# Patient Record
Sex: FEMALE | Race: WHITE | NOT HISPANIC OR LATINO | Employment: OTHER | URBAN - METROPOLITAN AREA
[De-identification: names, ages, dates, MRNs, and addresses within clinical notes are randomized per-mention and may not be internally consistent; named-entity substitution may affect disease eponyms.]

---

## 2017-01-27 ENCOUNTER — DOCUMENTATION ONLY (OUTPATIENT)
Dept: FAMILY MEDICINE | Facility: CLINIC | Age: 67
End: 2017-01-27

## 2017-01-27 NOTE — PROGRESS NOTES
Pre-Visit Chart Review  For Appointment Scheduled on 1/31/17.    Health Maintenance Due   Topic Date Due    TETANUS VACCINE  01/18/1969    DEXA SCAN  01/18/1991    Mammogram  02/10/2017

## 2017-01-31 ENCOUNTER — CLINICAL SUPPORT (OUTPATIENT)
Dept: REHABILITATION | Facility: HOSPITAL | Age: 67
End: 2017-01-31
Attending: INTERNAL MEDICINE
Payer: MEDICARE

## 2017-01-31 ENCOUNTER — LAB VISIT (OUTPATIENT)
Dept: LAB | Facility: HOSPITAL | Age: 67
End: 2017-01-31
Attending: FAMILY MEDICINE
Payer: MEDICARE

## 2017-01-31 ENCOUNTER — OFFICE VISIT (OUTPATIENT)
Dept: FAMILY MEDICINE | Facility: CLINIC | Age: 67
End: 2017-01-31
Payer: MEDICARE

## 2017-01-31 VITALS
SYSTOLIC BLOOD PRESSURE: 138 MMHG | HEIGHT: 66 IN | DIASTOLIC BLOOD PRESSURE: 80 MMHG | BODY MASS INDEX: 19.35 KG/M2 | TEMPERATURE: 98 F | HEART RATE: 99 BPM | WEIGHT: 120.38 LBS

## 2017-01-31 DIAGNOSIS — E78.2 HYPERLIPIDEMIA, MIXED: ICD-10-CM

## 2017-01-31 DIAGNOSIS — F41.9 ANXIETY AND DEPRESSION: ICD-10-CM

## 2017-01-31 DIAGNOSIS — E03.9 HYPOTHYROIDISM, UNSPECIFIED TYPE: ICD-10-CM

## 2017-01-31 DIAGNOSIS — F32.A ANXIETY AND DEPRESSION: ICD-10-CM

## 2017-01-31 DIAGNOSIS — E04.1 THYROID NODULE: ICD-10-CM

## 2017-01-31 DIAGNOSIS — I10 ESSENTIAL HYPERTENSION: Primary | ICD-10-CM

## 2017-01-31 DIAGNOSIS — G35 MULTIPLE SCLEROSIS: Primary | ICD-10-CM

## 2017-01-31 DIAGNOSIS — G47.00 INSOMNIA, UNSPECIFIED TYPE: ICD-10-CM

## 2017-01-31 DIAGNOSIS — K21.9 GASTROESOPHAGEAL REFLUX DISEASE, ESOPHAGITIS PRESENCE NOT SPECIFIED: ICD-10-CM

## 2017-01-31 LAB
ALBUMIN SERPL BCP-MCNC: 3.5 G/DL
ALP SERPL-CCNC: 138 U/L
ALT SERPL W/O P-5'-P-CCNC: 16 U/L
ANION GAP SERPL CALC-SCNC: 9 MMOL/L
AST SERPL-CCNC: 22 U/L
BILIRUB SERPL-MCNC: 0.5 MG/DL
BUN SERPL-MCNC: 6 MG/DL
CALCIUM SERPL-MCNC: 9.6 MG/DL
CHLORIDE SERPL-SCNC: 101 MMOL/L
CHOLEST/HDLC SERPL: 3.6 {RATIO}
CO2 SERPL-SCNC: 29 MMOL/L
CREAT SERPL-MCNC: 0.7 MG/DL
EST. GFR  (AFRICAN AMERICAN): >60 ML/MIN/1.73 M^2
EST. GFR  (NON AFRICAN AMERICAN): >60 ML/MIN/1.73 M^2
GLUCOSE SERPL-MCNC: 95 MG/DL
HDL/CHOLESTEROL RATIO: 28.1 %
HDLC SERPL-MCNC: 199 MG/DL
HDLC SERPL-MCNC: 56 MG/DL
LDLC SERPL CALC-MCNC: 125.4 MG/DL
NONHDLC SERPL-MCNC: 143 MG/DL
POTASSIUM SERPL-SCNC: 3.9 MMOL/L
PROT SERPL-MCNC: 7.4 G/DL
SODIUM SERPL-SCNC: 139 MMOL/L
TRIGL SERPL-MCNC: 88 MG/DL

## 2017-01-31 PROCEDURE — 1157F ADVNC CARE PLAN IN RCRD: CPT | Mod: S$GLB,,, | Performed by: FAMILY MEDICINE

## 2017-01-31 PROCEDURE — 92610 EVALUATE SWALLOWING FUNCTION: CPT | Mod: PN

## 2017-01-31 PROCEDURE — 80053 COMPREHEN METABOLIC PANEL: CPT

## 2017-01-31 PROCEDURE — 80061 LIPID PANEL: CPT

## 2017-01-31 PROCEDURE — 99999 PR PBB SHADOW E&M-EST. PATIENT-LVL IV: CPT | Mod: PBBFAC,,, | Performed by: FAMILY MEDICINE

## 2017-01-31 PROCEDURE — 3075F SYST BP GE 130 - 139MM HG: CPT | Mod: S$GLB,,, | Performed by: FAMILY MEDICINE

## 2017-01-31 PROCEDURE — G8998 SWALLOW D/C STATUS: HCPCS | Mod: CH,PN

## 2017-01-31 PROCEDURE — 99214 OFFICE O/P EST MOD 30 MIN: CPT | Mod: 25,S$GLB,, | Performed by: FAMILY MEDICINE

## 2017-01-31 PROCEDURE — 99499 UNLISTED E&M SERVICE: CPT | Mod: S$GLB,,, | Performed by: FAMILY MEDICINE

## 2017-01-31 PROCEDURE — 1126F AMNT PAIN NOTED NONE PRSNT: CPT | Mod: S$GLB,,, | Performed by: FAMILY MEDICINE

## 2017-01-31 PROCEDURE — G8997 SWALLOW GOAL STATUS: HCPCS | Mod: CH,PN

## 2017-01-31 PROCEDURE — 1159F MED LIST DOCD IN RCRD: CPT | Mod: S$GLB,,, | Performed by: FAMILY MEDICINE

## 2017-01-31 PROCEDURE — 1160F RVW MEDS BY RX/DR IN RCRD: CPT | Mod: S$GLB,,, | Performed by: FAMILY MEDICINE

## 2017-01-31 PROCEDURE — G8996 SWALLOW CURRENT STATUS: HCPCS | Mod: CH,PN

## 2017-01-31 PROCEDURE — 3079F DIAST BP 80-89 MM HG: CPT | Mod: S$GLB,,, | Performed by: FAMILY MEDICINE

## 2017-01-31 PROCEDURE — 36415 COLL VENOUS BLD VENIPUNCTURE: CPT | Mod: PO

## 2017-01-31 RX ORDER — ZOLPIDEM TARTRATE 5 MG/1
5 TABLET ORAL NIGHTLY PRN
Qty: 30 TABLET | Refills: 1 | Status: SHIPPED | OUTPATIENT
Start: 2017-01-31 | End: 2017-02-21

## 2017-01-31 RX ORDER — ESCITALOPRAM OXALATE 10 MG/1
10 TABLET ORAL DAILY
Qty: 30 TABLET | Refills: 3 | Status: SHIPPED | OUTPATIENT
Start: 2017-01-31 | End: 2017-03-13 | Stop reason: SDUPTHER

## 2017-01-31 RX ORDER — PRAVASTATIN SODIUM 10 MG/1
10 TABLET ORAL DAILY
COMMUNITY
End: 2017-02-09 | Stop reason: SDUPTHER

## 2017-01-31 RX ORDER — OMEPRAZOLE 40 MG/1
40 CAPSULE, DELAYED RELEASE ORAL
COMMUNITY
End: 2017-02-21

## 2017-01-31 RX ORDER — CHLORTHALIDONE 25 MG/1
TABLET ORAL
Qty: 30 TABLET | Refills: 11 | Status: SHIPPED | OUTPATIENT
Start: 2017-01-31 | End: 2018-01-08

## 2017-01-31 NOTE — PLAN OF CARE
"TIME RECORD    Date: 01/31/2017    Start Time: 1400  Stop Time:  1500    PROCEDURES: Clinical Swallow Evaluation    Total Timed Minutes:  0  Total Timed Units:  0  Total Untimed Units:  60  Charges Billed/# of units: 1    Primary Diagnosis: GERD; Esophagitis, Multiple Sclerosis, Sinusitis  Treatment Diagnosis:  Oropharyngeal Dysphagia  Pertinent Medical History:    Past Medical History   Diagnosis Date    Allergy     Asthma     GERD (gastroesophageal reflux disease)     Hyperlipidemia     Hypertension     Hypothyroidism     Insomnia 1/31/2017    MS (multiple sclerosis)      very well controlled, no symptoms    Shortness of breath     Sinusitis     Thyroid disease      hypothyroidism- no med       Pt is a 67 year old female. She has had multiple sinus surgeries that have not fully relieved the sinus problems, and has ongoing diagnosis of GERD.  Patient complains of a persistent cough. She stated that she has a sensation of "burning" or tickle in her throat prior to coughing episodes. She indicated that Dr. Cotter, her gastroenterologist feels that the cough is related to reflux. She noted that the cough can be triggered from liquid or solid intake, but can just as often occur at anytime of the day and is not always associated with oral intake. The patient was seen for a Modified Barium Swallow Study on 08/05/2016. Results of the study indicated oropharyngeal swallow function WNL. Results were as follows:     She exhibited trace/mild generalized pharyngeal residue with all consistencies presented.  Mild lingual residue post solids.  All residue cleared with independent repeat swallows by the Patient.  No penetration or aspiration occurred.  Delayed esophageal emptying noted on A-P view, consistent with hx GERD.  Recommend regular solids/thin liquids/medications whole as tolerated.  Recommend reflux precautions including slow rate of intake and remaining upright at least 30 minutes after meals.  F/U with " physician.    The patient reported that she has had no change in her symptoms or her swallow function since the time of the MBSS; however the coughing episodes continue. In addition to the Gastroenterologist, Pt has been seen by ENT, Allergist, and Pulmonologist, and is followed by a neurologist for MS.          Nutrition:  Pt is currently on a diet of regular solid textures and thin liquids  Alertness/Attention:  Good; WNL  Oral Motor:  Cursory oral peripheral examination reveals structure and function within normal limits for eating and swallowing function.    Presentations: Pt was given single and serial swallows of thin liquid, pudding self fed by spoon , mixed viscosity of soft fruit and juice, and cracker bolus  Oral Phase:  Oral swallow was found to be WNL for all boluses tested. Pt formed and propelled boluses without difficulty. Oral transit was efficient and timely. There was no appreciable residue with any texture, following swallows. No anterior spillage of any boluses.   Pharyngeal Phase:  Pt exhibited a swallow reflex with no apparent delay. She exhibited good laryngeal elevation and forward hyolaryngeal movement upon swallow. Cervical auscul Pt did present a cough after first swallow of liquid that persisted throughout the study. Pt noted that she has had this cough for at least two years. She indicated that it can occur in association with swallow or at other times as well. As pt continued to cough her sinuses became blocked and she was noticeably hyponasal. Although the cough persisted throughout the study, it did not change with delivery of different viscosity or textures, but remained the same.   Impressions: Oralpharyngeal swallow function is judged to be WNL.     Recommendations/Precautions:  A follow-up MBSS is not felt to be warranted at this time as pt's symptoms have not changed since the last study in August of 2016. At the time of that study there were not s/s of penetration/aspiration and  residue after swallows was mild at level of normal limits.  The coughing is not felt to be a direct cause of oral pharyngeal swallow dysfunction, and since pt reports coughing episodes at times other than eating/oral intake, it is not judged to be a direct correlation with swallow function. Therapy is not warranted at this time as swallow function was found to be WNL.    Therapist's Name: Xiomara Rodríguez    Therapist's Signature: Xiomara Rodríguez SOFIA,CCC-SLP  Date: 01/31/2017    I CERTIFY THE NEED FOR THESE SERVICES FURNISHED UNDER THIS PLAN OF TREATMENT AND WHILE UNDER MY CARE    Physician's comments: ________________________________________________________________________________________________________________________________________________      Physician's Name: Dr Lucy Cotter

## 2017-01-31 NOTE — PROGRESS NOTES
TIME RECORD    Date:  01/31/2017    Start Time: 1500  Stop Time: 1600    PROCEDURES: Clinical Swallow Evaluation        Total Timed Minutes:  0  Total Timed Units:  0  Total Untimed Units:  60  Charges Billed/# of units:  01      Pt referred by Dr Cotter for a clinical swallow evaluation. See POC for evaluation notes

## 2017-01-31 NOTE — MR AVS SNAPSHOT
[facility]  [address]  [place] [address]  Phone: [phone]  Fax: [phone]                  Babita Thomas   2017 8:00 AM   Office Visit    Description:  Female : 1950   Provider:  Natalie Gale MD   Department:  [facility] Medicine           Reason for Visit     Establish Care           Diagnoses this Visit        Comments    Essential hypertension    -  Primary     Hypothyroidism, unspecified type         Hyperlipidemia, mixed         Thyroid nodule         Insomnia, unspecified type         Anxiety and depression                To Do List           Future Appointments        Provider Department Dept Phone    2017 9:00 AM LAB, [facility] - Lab [phone]    2017 3:00 PM Xiomara Rodríguez, [facility]-SLP [facility] [phone]    2017 10:00 AM HRAMARK 1 [facility] [phone]    2017 10:30 AM SLIC US1 [facility]- Ultrasound [phone]    2017 11:20 AM Natalie Gale MD [facility] [phone]      Goals (5 Years of Data)     None      Follow-Up and Disposition     Return in about 3 weeks (around 2017) for anxiety, depression.       These Medications        Disp Refills Start End    zolpidem (AMBIEN) 5 MG Tab 30 tablet 1 2017    Take 1 tablet (5 mg total) by mouth nightly as needed. - Oral    Pharmacy: [facility] [address] [place] [address]. Ph #: [phone]       escitalopram oxalate (LEXAPRO) 10 MG tablet 30 tablet 3 2017    Take 1 tablet (10 mg total) by mouth once daily. - Oral    Pharmacy: [facility] [place] [address]. Ph #: [phone]       chlorthalidone (HYGROTEN) 25 MG Tab 30 tablet 11 2017     1 tablet PO daily prn for BP greater than 140/90    Pharmacy: [facility] [address] [place] [address] [address]. Ph #: [phone]         [facility]     [facility] -  Assistance  Registered nurses in the [facility] provide clinical advisement, health education, appointment booking, and other advisory services.  Call for this free service at [phone].             Medications           Message regarding Medications     Verify the changes and/or additions to your medication regime listed below are the same as discussed with your clinician today.  If any of these changes or additions are incorrect, please notify your healthcare provider.        START taking these NEW medications        Refills    zolpidem (AMBIEN) 5 MG Tab 1    Sig: Take 1 tablet (5 mg total) by mouth nightly as needed.    Class: Normal    Route: Oral    escitalopram oxalate (LEXAPRO) 10 MG tablet 3    Sig: Take 1 tablet (10 mg total) by mouth once daily.    Class: Normal    Route: Oral    chlorthalidone (HYGROTEN) 25 MG Tab 11    Si tablet PO daily prn for BP greater than 140/90    Class: Normal      STOP taking these medications     FLUTICASONE/VILANTEROL (BREO ELLIPTA INHL) Inhale 1 puff into the lungs once daily.     cyanocobalamin (VITAMIN B-12) 1000 MCG tablet Take 5,000 mcg by mouth once daily.    INCRUSE ELLIPTA 62.5 mcg/actuation DsDv Take 62.5 mcg by mouth once daily.     UNABLE TO FIND Take by mouth daily as needed. ULTRAFLORA BALANCE OTC  When on antibx    amlodipine (NORVASC) 2.5 MG tablet Take 1 tablet (2.5 mg total) by mouth once daily.           Verify that the below list of medications is an accurate representation of the medications you are currently taking.  If none reported, the list may be blank. If incorrect, please contact your healthcare provider. Carry this list with you in case of emergency.           Current Medications     CALCIUM ACETATE ORAL Take 1 tablet by mouth 2 (two) times daily. Every day    chlorthalidone (HYGROTEN) 25 MG Tab 1 tablet PO daily prn for BP greater than 140/90    clonazePAM (KLONOPIN) 2 MG Tab Take  "2 mg by mouth nightly as needed (1-2 qhs prn).     escitalopram oxalate (LEXAPRO) 10 MG tablet Take 1 tablet (10 mg total) by mouth once daily.    multivitamin-minerals-lutein (CENTRUM SILVER) Tab Take 1 tablet by mouth once daily. 1 Tablet(s) Oral Every day.    omeprazole (PRILOSEC) 40 MG capsule Take 40 mg by mouth 2 (two) times daily before meals.    pravastatin (PRAVACHOL) 10 MG tablet Take 10 mg by mouth once daily.    ranitidine (ZANTAC) 300 MG tablet Take 300 mg by mouth 2 (two) times daily.     vitamin D 1000 units Tab Take 2,000 mg by mouth once daily.    zolpidem (AMBIEN) 5 MG Tab Take 1 tablet (5 mg total) by mouth nightly as needed.           Clinical Reference Information           Vital Signs - Last Recorded  Most recent update: 1/31/2017  8:38 AM by Suze Castellanos MA    BP Pulse Temp Ht Wt BMI    138/80 (BP Location: Right arm, Patient Position: Sitting, BP Method: Manual) 99 98.3 °F (36.8 °C) (Oral) 5' 6" (1.676 m) 54.6 kg (120 lb 5.9 oz) 19.43 kg/m2      Blood Pressure          Most Recent Value    BP  138/80      Allergies as of 1/31/2017     Augmentin [Amoxicillin-pot Clavulanate]    Tecfidera [Dimethyl Fumarate]      Immunizations Administered on Date of Encounter - 1/31/2017     None      Orders Placed During Today's Visit     Future Labs/Procedures Expected by Expires    Comprehensive metabolic panel  1/31/2017 4/1/2018    Lipid panel  1/31/2017 4/1/2018    US Soft Tissue Head Neck Thyroid  1/31/2017 1/31/2018      Instructions    Take 1/2 up to 2 tablets of ambien nightly as needed for insomnia.     Start lexapro 10 mg daily    Stop norvasc. Can take chlorthalidone 1 tablet daily as needed for blood pressure greater than 140/90.       "

## 2017-01-31 NOTE — PROGRESS NOTES
CHIEF COMPLAINT:  Establish care      HISTORY OF PRESENT ILLNESS:  Babita Thomas is a 67 y.o. female who presents to clinic as a new patient to me to establish care    1. HTN: she is on norvasc 2.5 mg but does not take this consistently as it causes hypotension. She states that her blood pressure is usually normal    2. She has a history of hypothyroidism, thyroid nodules.  She is not on any thyroid supplementation, she is due for thyroid ultrasound    3. She has had worsening insomnia and has been on klonopin and trazodone in the past which have been ineffective. She also describes worsening anxiety, depression due to her chronic illnesses.  She was on lexapro in the past which she states worked and she did not have any side effects.     4. She has hyperlipidemia and is on pravastatin. She denies any myalgia, dark colored urine      REVIEW OF SYSTEMS:  The patient denies any fever, chills, night sweats, headaches, vision changes, difficulty speaking or swallowing, decreased hearing, weight loss, weight gain, chest pain, palpitations, shortness of breath,  nausea, vomiting, abdominal pain, dysuria, diarrhea, constipation, hematuria, hematochezia, melena, changes in her hair, skin, nails, numbness or weakness in her extremities, erythema, pain or swelling over any of her joints, myalgia, swollen glands, easy bruising, fatigue, edema        MEDICATIONS:   Reviewed and/or reconciled in EPIC    ALLERGIES:  Reviewed and/or reconciled in [facility]    PAST MEDICAL/SURGICAL HISTORY:   Past Medical History   Diagnosis Date    Allergy     Asthma     GERD (gastroesophageal reflux disease)     Hyperlipidemia     Hypertension     Hypothyroidism     MS (multiple sclerosis)      very well controlled, no symptoms    Shortness of breath     Sinusitis     Thyroid disease      hypothyroidism- no med      Past Surgical History   Procedure Laterality Date    Cholecystectomy      Esophagus surgery       6 year old, peanut  "stuck in throat     Multiple tooth extractions      [place] tooth extraction      Colonoscopy  3-5-2010     Dr Cotter 10 year bal     Breast surgery       biopsy    Tracheostomy closure       age 6 for peanut in throat    Sinus surgery  2015    Tympanostomy tube placement         FAMILY HISTORY:    Family History   Problem Relation Age of Onset    Hypertension Mother     Diabetes Mother     Heart disease Father      MI    No Known Problems Sister     No Known Problems Daughter     No Known Problems Paternal Aunt     No Known Problems Paternal Uncle     No Known Problems Maternal Grandmother     Heart disease Maternal Grandfather     No Known Problems Paternal Grandmother     No Known Problems Paternal Grandfather     No Known Problems Paternal Uncle     No Known Problems Daughter        SOCIAL HISTORY:    Social History     Social History    Marital status:      Spouse name: N/A    Number of children: N/A    Years of education: N/A     Occupational History    Not on file.     Social History Main Topics    Smoking status: Never Smoker    Smokeless tobacco: Never Used    Alcohol use No    Drug use: No    Sexual activity: Yes     Partners: Male     Other Topics Concern    Not on file     Social History Narrative       PHYSICAL EXAM:  VITAL SIGNS:   Vitals:    01/31/17 0803   BP: (!) 136/98   Pulse: 99   Temp: 98.3 °F (36.8 °C)   TempSrc: Oral   Weight: 54.6 kg (120 lb 5.9 oz)   Height: 5' 6" (1.676 m)     GENERAL:  Patient appears well nourished, sitting on exam table, in no acute distress.  HEENT:  Atraumatic, normocephalic, PERRLA, EOMI, no conjunctival injection, sclerae are anicteric, normal external auditory canals,TMs clear b/l, gross hearing intact to whisper, MMM, no oropharygneal erythema or exudate.  NECK:  Supple, normal ROM, trachea is midline , no supraclavicular or cervical LAD or masses palpated.  Thyroid gland not palpable.  CARDIOVASCULAR:  RRR, normal S1 and S2, no " m/r/g.  RESPIRATORY:  CTA b/l, no wheezes, rhonchi, rales.  No increased work of breathing, no  use of accessory muscles.  ABDOMEN:  Soft, nontender, nondistended, normoactive bowel sounds in all four quadrants, no rebound or guarding, no HSM or masses palpated.  Normal percussion.  EXTREMITIES:  2+ DP pulses b/l, no edema.  SKIN:  Warm, no lesions on exposed skin.  NEUROMUSCULAR:  Cranial nerves II-XII grossly intact.   No clubbing or cyanosis of digits/nails.  Steady gait.  PSYCH:  Patient is alert and oriented to person, time, place. They are appropriately dressed and groomed. There is normal eye contact. Rate and tone of speech is normal. Normal insight, judgement. Normal thought content and process.         ASSESSMENT/PLAN: This is a 67 y.o. female who presents to clinic for evaluation of the following concerns   1. HTN: see below  2. Thyroid nodule: thyroid ultrasound  3. Hyperlipidemia: CMP, lipid panel  4. Insomnia: stop klonopin, trazodone. Start low dose ambien. The risks/side effects of this medication were discussed with her  5. Anxiety, depression: start lexapro 10 mg daily, follow up in 3-4 weeks    Patient readiness: acceptance and barriers:none    During the course of the visit the patient was educated and counseled about the following:     Hypertension:   stop norvasc, start chlorthalidone 25 mg daily as needed for BP greater than 140/90    Goals: Hypertension: Reduce Blood Pressure    Did patient meet goals/outcomes: No    The following self management tools provided: declined    Patient Instructions (the written plan) was given to the patient/family.     Time spent with patient: 30 minutes        FOLLOW UP:  3-4 weeks      Natalie Gale MD

## 2017-01-31 NOTE — PATIENT INSTRUCTIONS
Take 1/2 up to 2 tablets of ambien nightly as needed for insomnia.     Start lexapro 10 mg daily    Stop norvasc. Can take chlorthalidone 1 tablet daily as needed for blood pressure greater than 140/90.

## 2017-02-06 ENCOUNTER — HOSPITAL ENCOUNTER (OUTPATIENT)
Dept: RADIOLOGY | Facility: CLINIC | Age: 67
Discharge: HOME | End: 2017-02-06
Attending: OTOLARYNGOLOGY
Payer: MEDICARE

## 2017-02-06 DIAGNOSIS — R05.9 COUGH: ICD-10-CM

## 2017-02-06 PROCEDURE — 71020 XR CHEST PA AND LATERAL: CPT | Mod: 26,,, | Performed by: RADIOLOGY

## 2017-02-06 PROCEDURE — 71020 XR CHEST PA AND LATERAL: CPT | Mod: TC,PO

## 2017-02-09 ENCOUNTER — TELEPHONE (OUTPATIENT)
Dept: FAMILY MEDICINE | Facility: CLINIC | Age: 67
End: 2017-02-09

## 2017-02-09 DIAGNOSIS — E78.2 HYPERLIPIDEMIA, MIXED: Primary | ICD-10-CM

## 2017-02-09 RX ORDER — PRAVASTATIN SODIUM 40 MG/1
40 TABLET ORAL DAILY
Qty: 90 TABLET | Refills: 3 | Status: SHIPPED | OUTPATIENT
Start: 2017-02-09 | End: 2017-03-14

## 2017-02-10 NOTE — TELEPHONE ENCOUNTER
Cholesterol improved but cardiac risk is high enough to be on a higher dose of pravastatin, am increasing it to 40 mg daily and needs CMP, fasting lipids in 3 months

## 2017-02-10 NOTE — TELEPHONE ENCOUNTER
Patient notified and states understanding,patient states she will call back to schedule lab appointment

## 2017-02-17 ENCOUNTER — DOCUMENTATION ONLY (OUTPATIENT)
Dept: FAMILY MEDICINE | Facility: CLINIC | Age: 67
End: 2017-02-17

## 2017-02-17 NOTE — PROGRESS NOTES
Pre-Visit Chart Review  For Appointment Scheduled on 2/21/17.    Health Maintenance Due   Topic Date Due    TETANUS VACCINE  01/18/1969    DEXA SCAN  01/18/1991    Mammogram  02/10/2016

## 2017-02-21 ENCOUNTER — HOSPITAL ENCOUNTER (OUTPATIENT)
Dept: RADIOLOGY | Facility: CLINIC | Age: 67
Discharge: HOME | End: 2017-02-21
Attending: FAMILY MEDICINE
Payer: MEDICARE

## 2017-02-21 ENCOUNTER — OFFICE VISIT (OUTPATIENT)
Dept: FAMILY MEDICINE | Facility: CLINIC | Age: 67
End: 2017-02-21
Payer: MEDICARE

## 2017-02-21 VITALS
HEART RATE: 79 BPM | TEMPERATURE: 98 F | HEIGHT: 66 IN | WEIGHT: 117.31 LBS | DIASTOLIC BLOOD PRESSURE: 81 MMHG | SYSTOLIC BLOOD PRESSURE: 117 MMHG | OXYGEN SATURATION: 90 % | BODY MASS INDEX: 18.85 KG/M2

## 2017-02-21 DIAGNOSIS — F32.A ANXIETY AND DEPRESSION: Primary | ICD-10-CM

## 2017-02-21 DIAGNOSIS — F41.9 ANXIETY AND DEPRESSION: Primary | ICD-10-CM

## 2017-02-21 DIAGNOSIS — E04.1 THYROID NODULE: ICD-10-CM

## 2017-02-21 DIAGNOSIS — I10 ESSENTIAL HYPERTENSION: ICD-10-CM

## 2017-02-21 PROCEDURE — 1126F AMNT PAIN NOTED NONE PRSNT: CPT | Mod: S$GLB,,, | Performed by: FAMILY MEDICINE

## 2017-02-21 PROCEDURE — 1160F RVW MEDS BY RX/DR IN RCRD: CPT | Mod: S$GLB,,, | Performed by: FAMILY MEDICINE

## 2017-02-21 PROCEDURE — 3079F DIAST BP 80-89 MM HG: CPT | Mod: S$GLB,,, | Performed by: FAMILY MEDICINE

## 2017-02-21 PROCEDURE — 3074F SYST BP LT 130 MM HG: CPT | Mod: S$GLB,,, | Performed by: FAMILY MEDICINE

## 2017-02-21 PROCEDURE — 1159F MED LIST DOCD IN RCRD: CPT | Mod: S$GLB,,, | Performed by: FAMILY MEDICINE

## 2017-02-21 PROCEDURE — 76536 US EXAM OF HEAD AND NECK: CPT | Mod: 26,,, | Performed by: RADIOLOGY

## 2017-02-21 PROCEDURE — 99499 UNLISTED E&M SERVICE: CPT | Mod: S$GLB,,, | Performed by: FAMILY MEDICINE

## 2017-02-21 PROCEDURE — 99214 OFFICE O/P EST MOD 30 MIN: CPT | Mod: S$GLB,,, | Performed by: FAMILY MEDICINE

## 2017-02-21 PROCEDURE — 99999 PR PBB SHADOW E&M-EST. PATIENT-LVL III: CPT | Mod: PBBFAC,,, | Performed by: FAMILY MEDICINE

## 2017-02-21 PROCEDURE — 1157F ADVNC CARE PLAN IN RCRD: CPT | Mod: S$GLB,,, | Performed by: FAMILY MEDICINE

## 2017-02-21 RX ORDER — BACLOFEN 10 MG/1
10 TABLET ORAL 4 TIMES DAILY PRN
COMMUNITY
Start: 2017-02-02 | End: 2017-08-01

## 2017-02-21 RX ORDER — BUDESONIDE 0.5 MG/2ML
2 INHALANT ORAL 2 TIMES DAILY
COMMUNITY
Start: 2017-02-07 | End: 2017-05-23 | Stop reason: CLARIF

## 2017-02-21 RX ORDER — AMLODIPINE BESYLATE 2.5 MG/1
2.5 TABLET ORAL DAILY PRN
COMMUNITY
End: 2017-02-21

## 2017-02-21 RX ORDER — MONTELUKAST SODIUM 10 MG/1
10 TABLET ORAL NIGHTLY
COMMUNITY
Start: 2017-02-06 | End: 2017-03-14

## 2017-02-21 NOTE — MR AVS SNAPSHOT
[facility]  [address]  [place] [address]  Phone: [phone]  Fax: [phone]                  Babita Thomas   2017 11:20 AM   Office Visit    Description:  Female : 1950   Provider:  Natalie Gale MD   Department:  [facility]           Reason for Visit     Follow-up           Diagnoses this Visit        Comments    Essential hypertension    -  Primary     Anxiety and depression                To Do List           Future Appointments        Provider Department Dept Phone    5/15/2017 8:30 AM LAB [facility] - Lab [phone]    2017 9:40 AM Natalie Gale MD [facility] [phone]      Goals (5 Years of Data)     None      Follow-Up and Disposition     Return in about 3 months (around 2017).      [facility] On Call     [facility] On Call Nurse [facility] -  Assistance  Registered nurses in the [facility] Call Center provide clinical advisement, health education, appointment booking, and other advisory services.  Call for this free service at [phone].             Medications           Message regarding Medications     Verify the changes and/or additions to your medication regime listed below are the same as discussed with your clinician today.  If any of these changes or additions are incorrect, please notify your healthcare provider.        STOP taking these medications     omeprazole (PRILOSEC) 40 MG capsule Take 40 mg by mouth 2 (two) times daily before meals.    ranitidine (ZANTAC) 300 MG tablet Take 300 mg by mouth 2 (two) times daily.     zolpidem (AMBIEN) 5 MG Tab Take 1 tablet (5 mg total) by mouth nightly as needed.    amlodipine (NORVASC) 2.5 MG tablet Take 2.5 mg by mouth daily as needed.           Verify that the below list of medications is an accurate representation of the medications you are currently taking.  If none reported, the list may be blank. If incorrect, please contact your  "healthcare provider. Carry this list with you in case of emergency.           Current Medications     baclofen (LIORESAL) 10 MG tablet Take 10 mg by mouth 4 (four) times daily as needed.    budesonide (PULMICORT) 0.5 mg/2 mL nebulizer solution Take 2 mLs by nebulization 2 (two) times daily.    CALCIUM ACETATE ORAL Take 1 tablet by mouth 2 (two) times daily. Every day    clonazePAM (KLONOPIN) 2 MG Tab Take 2 mg by mouth nightly as needed (1-2 qhs prn).     escitalopram oxalate (LEXAPRO) 10 MG tablet Take 1 tablet (10 mg total) by mouth once daily.    FERROUS FUMARATE/VIT BCOMP,C (SUPER B COMPLEX ORAL) Take 1 tablet by mouth once daily.    montelukast (SINGULAIR) 10 mg tablet Take 10 mg by mouth nightly.    multivitamin-minerals-lutein (CENTRUM SILVER) Tab Take 1 tablet by mouth once daily. 1 Tablet(s) Oral Every day.    pravastatin (PRAVACHOL) 40 MG tablet Take 1 tablet (40 mg total) by mouth once daily.    chlorthalidone (HYGROTEN) 25 MG Tab 1 tablet PO daily prn for BP greater than 140/90    vitamin D 1000 units Tab Take 2,000 mg by mouth once daily.           Clinical Reference Information           Your Vitals Were     BP Pulse Temp Height Weight SpO2    117/81 (BP Location: Right arm, Patient Position: Sitting, BP Method: Automatic) 79 98.3 °F (36.8 °C) (Oral) 5' 6" (1.676 m) 53.2 kg (117 lb 4.6 oz) 90%    BMI                18.93 kg/m2          Blood Pressure          Most Recent Value    BP  117/81      Allergies as of 2/21/2017     Augmentin [Amoxicillin-pot Clavulanate]    Tecfidera [Dimethyl Fumarate]      Immunizations Administered on Date of Encounter - 2/21/2017     None      Instructions    Call clinic in 3 weeks to give update on lexapro.      Stop norvasc/amlodipine.  Can take chlorthalidone as needed for BP.  Call clinic if BP not at goal (less than 140/90) after taking the chlorthalidone.        Language Assistance Services     ATTENTION: Language assistance services are available, free of charge. " Please call [phone].      ATENCIÓN: Si habla español, tiene a spencer disposición servicios gratuitos de asistencia lingüística. Llame al [phone].     CHÚ Ý: N?u b?n nói Ti?ng Vi?t, có các d?ch v? h? tr? ngôn ng? mi?n phí dành cho b?n. G?i s? [phone].         [facility] complies with applicable Federal civil rights laws and does not discriminate on the basis of race, color, national origin, age, disability, or sex.

## 2017-02-21 NOTE — PROGRESS NOTES
CHIEF COMPLAINT: follow up anxiety, depression      HISTORY OF PRESENT ILLNESS:  Babita Thomas is a 67 y.o. female who presents to clinic for follow up on anxiety and depression. She was recently started on lexapro 10 mg daily. However she states that she started this a week ago. She denies any side effects. She feels that it has improved her anxiety,depression.           REVIEW OF SYSTEMS:  The patient denies any fever, chills, night sweats, headaches, vision changes, difficulty speaking or swallowing, decreased hearing, weight loss, weight gain, chest pain, palpitations, shortness of breath,  nausea, vomiting, abdominal pain, dysuria, diarrhea, constipation, hematuria, hematochezia, melena, changes in her hair, skin, nails, numbness or weakness in her extremities, erythema, pain or swelling over any of her joints, myalgia, swollen glands, easy bruising, fatigue, edema        MEDICATIONS:   Reviewed and/or reconciled in EPIC    ALLERGIES:  Reviewed and/or reconciled in [facility]    PAST MEDICAL/SURGICAL HISTORY:   Past Medical History   Diagnosis Date    Allergy     Asthma     GERD (gastroesophageal reflux disease)     Hyperlipidemia     Hypertension     Hypothyroidism     Insomnia 1/31/2017    MS (multiple sclerosis)      very well controlled, no symptoms    Shortness of breath     Sinusitis     Thyroid disease      hypothyroidism- no med      Past Surgical History   Procedure Laterality Date    Cholecystectomy      Esophagus surgery       6 year old, peanut stuck in throat     Multiple tooth extractions      [place] tooth extraction      Colonoscopy  3-5-2010     Dr Cotter 10 year bal     Tracheostomy closure       age 6 for peanut in throat    Sinus surgery  2015     x3    Tympanostomy tube placement       x2    Breast surgery Left      biopsy, benign       FAMILY HISTORY:    Family History   Problem Relation Age of Onset    Hypertension Mother     Heart disease Father 60     MI    No Known  "Problems Sister     No Known Problems Daughter     No Known Problems Paternal Aunt     No Known Problems Paternal Uncle     No Known Problems Maternal Grandmother     Heart disease Maternal Grandfather 70     MI    No Known Problems Paternal Grandmother     No Known Problems Paternal Grandfather     No Known Problems Paternal Uncle     No Known Problems Daughter        SOCIAL HISTORY:    Social History     Social History    Marital status:      Spouse name: N/A    Number of children: N/A    Years of education: N/A     Occupational History    Not on file.     Social History Main Topics    Smoking status: Never Smoker    Smokeless tobacco: Never Used    Alcohol use No    Drug use: No    Sexual activity: Yes     Partners: Male     Other Topics Concern    Not on file     Social History Narrative       PHYSICAL EXAM:  VITAL SIGNS:   Vitals:    02/21/17 1138   BP: 117/81   BP Location: Right arm   Patient Position: Sitting   BP Method: Automatic   Pulse: 79   Temp: 98.3 °F (36.8 °C)   TempSrc: Oral   Weight: 53.2 kg (117 lb 4.6 oz)   Height: 5' 6" (1.676 m)     GENERAL:  Patient appears well nourished, sitting on exam table, in no acute distress.  HEENT:  Atraumatic, normocephalic, PERRLA, EOMI, no conjunctival injection, sclerae are anicteric, normal external auditory canals,TMs clear b/l, gross hearing intact to whisper, MMM, no oropharygneal erythema or exudate.  NECK:  Supple, normal ROM, trachea is midline , no supraclavicular or cervical LAD or masses palpated.  Thyroid gland not palpable.  CARDIOVASCULAR:  RRR, normal S1 and S2, no m/r/g.  RESPIRATORY:  CTA b/l, no wheezes, rhonchi, rales.  No increased work of breathing, no  use of accessory muscles.  ABDOMEN:  Soft, nontender, nondistended, normoactive bowel sounds in all four quadrants, no rebound or guarding, no HSM or masses palpated.  Normal percussion.  EXTREMITIES:  2+ DP pulses b/l, no edema.  SKIN:  Warm, no lesions on exposed " skin.  NEUROMUSCULAR:  Cranial nerves II-XII grossly intact.   No clubbing or cyanosis of digits/nails.  Steady gait.  PSYCH:  Patient is alert and oriented to person, time, place. They are appropriately dressed and groomed. There is normal eye contact. Rate and tone of speech is normal. Normal insight, judgement. Normal thought content and process.         ASSESSMENT/PLAN: This is a 67 y.o. female who presents to clinic for evaluation of the following concerns   1.  Anxiety, depression: continue with lexapro 10 mg daily, call clinic in 3 weeks to give update, can consider increasing lexapro at that time. Follow up in 3 months, sooner as needed  2. HTN: see below    Patient readiness: acceptance and barriers:none    During the course of the visit the patient was educated and counseled about the following:     Hypertension:   stop norvasc, start chlorthalidone 25 mg daily as needed for BP greater than 140/90    Goals: Hypertension: Reduce Blood Pressure    Did patient meet goals/outcomes: No    The following self management tools provided: declined    Patient Instructions (the written plan) was given to the patient/family.     Time spent with patient: 30 minutes        FOLLOW UP:  3 months      Natalie Gale MD

## 2017-02-21 NOTE — PATIENT INSTRUCTIONS
Call clinic in 3 weeks to give update on lexapro.      Stop norvasc/amlodipine.  Can take chlorthalidone as needed for BP.  Call clinic if BP not at goal (less than 140/90) after taking the chlorthalidone.

## 2017-02-27 ENCOUNTER — TELEPHONE (OUTPATIENT)
Dept: FAMILY MEDICINE | Facility: CLINIC | Age: 67
End: 2017-02-27

## 2017-02-27 NOTE — TELEPHONE ENCOUNTER
Spoke to patient. Reports that she has been having muscle pain, mostly located in her thighs, that she thinks it is related to the pravastatin that she is taking. Medication dose was recently increased to 40 mg daily from 109 mg daily. Patient also reporting that she is having pale colored stools since starting this medication. Please advise.

## 2017-02-27 NOTE — TELEPHONE ENCOUNTER
----- Message from Emmett Jacobs sent at 2/27/2017 10:12 AM CST -----  Contact: self   Patient wants to speak with a nurse regarding pravastatin side effects please call back at [phone] (home)

## 2017-02-27 NOTE — TELEPHONE ENCOUNTER
She can hold the pravastatin until next Monday. Let us know then if she is still having the cramping or not.

## 2017-03-13 ENCOUNTER — TELEPHONE (OUTPATIENT)
Dept: FAMILY MEDICINE | Facility: CLINIC | Age: 67
End: 2017-03-13

## 2017-03-13 ENCOUNTER — DOCUMENTATION ONLY (OUTPATIENT)
Dept: FAMILY MEDICINE | Facility: CLINIC | Age: 67
End: 2017-03-13

## 2017-03-13 RX ORDER — ESCITALOPRAM OXALATE 20 MG/1
20 TABLET ORAL DAILY
Qty: 30 TABLET | Refills: 3 | Status: SHIPPED | OUTPATIENT
Start: 2017-03-13 | End: 2017-04-04

## 2017-03-13 NOTE — TELEPHONE ENCOUNTER
----- Message from Jamil Mooney sent at 3/10/2017  9:33 AM CST -----  Contact: Patient  Patient states that the cramping has stopped when she stopped taking her cholesterol medication. Patient states that Lexapro has not improved her symptoms. Please call the patient back at [phone] to advise. Thanks.

## 2017-03-13 NOTE — TELEPHONE ENCOUNTER
Place pravastatin on intolerance list. Does she want to try crestor or a non statin cholesterol medication?    Am increasing lexapro to 20 mg and needs to call or e-mail in 3-4weeks to give update.

## 2017-03-13 NOTE — PROGRESS NOTES
Pre-Visit Chart Review  For Appointment Scheduled on 3/14/17    Health Maintenance Due   Topic Date Due    TETANUS VACCINE  01/18/1969    DEXA SCAN  01/18/1991    Mammogram  02/10/2016

## 2017-03-13 NOTE — TELEPHONE ENCOUNTER
Patient states she stopped pravastatin 2/21/17 since last visit and since stopping she has no leg cramping.patient also states lexapro is not working,still feels anxious and nervousness. Patient has never been on any other anxiety/depression medication.

## 2017-03-14 ENCOUNTER — OFFICE VISIT (OUTPATIENT)
Dept: FAMILY MEDICINE | Facility: CLINIC | Age: 67
End: 2017-03-14
Payer: MEDICARE

## 2017-03-14 VITALS
OXYGEN SATURATION: 96 % | SYSTOLIC BLOOD PRESSURE: 128 MMHG | HEART RATE: 104 BPM | TEMPERATURE: 99 F | WEIGHT: 117.06 LBS | HEIGHT: 66 IN | BODY MASS INDEX: 18.81 KG/M2 | DIASTOLIC BLOOD PRESSURE: 70 MMHG

## 2017-03-14 DIAGNOSIS — J44.9 CHRONIC OBSTRUCTIVE PULMONARY DISEASE, UNSPECIFIED COPD TYPE: ICD-10-CM

## 2017-03-14 DIAGNOSIS — J44.1 COPD WITH EXACERBATION: Primary | ICD-10-CM

## 2017-03-14 DIAGNOSIS — E04.2 MULTIPLE THYROID NODULES: ICD-10-CM

## 2017-03-14 DIAGNOSIS — F41.9 ANXIETY: ICD-10-CM

## 2017-03-14 DIAGNOSIS — I10 ESSENTIAL HYPERTENSION: ICD-10-CM

## 2017-03-14 PROCEDURE — 99214 OFFICE O/P EST MOD 30 MIN: CPT | Mod: 25,S$GLB,, | Performed by: PHYSICIAN ASSISTANT

## 2017-03-14 PROCEDURE — 1159F MED LIST DOCD IN RCRD: CPT | Mod: S$GLB,,, | Performed by: PHYSICIAN ASSISTANT

## 2017-03-14 PROCEDURE — 1160F RVW MEDS BY RX/DR IN RCRD: CPT | Mod: S$GLB,,, | Performed by: PHYSICIAN ASSISTANT

## 2017-03-14 PROCEDURE — 99999 PR PBB SHADOW E&M-EST. PATIENT-LVL IV: CPT | Mod: PBBFAC,,, | Performed by: PHYSICIAN ASSISTANT

## 2017-03-14 PROCEDURE — 94640 AIRWAY INHALATION TREATMENT: CPT | Mod: S$GLB,,, | Performed by: PHYSICIAN ASSISTANT

## 2017-03-14 PROCEDURE — 1126F AMNT PAIN NOTED NONE PRSNT: CPT | Mod: S$GLB,,, | Performed by: PHYSICIAN ASSISTANT

## 2017-03-14 PROCEDURE — 1157F ADVNC CARE PLAN IN RCRD: CPT | Mod: S$GLB,,, | Performed by: PHYSICIAN ASSISTANT

## 2017-03-14 PROCEDURE — 3078F DIAST BP <80 MM HG: CPT | Mod: S$GLB,,, | Performed by: PHYSICIAN ASSISTANT

## 2017-03-14 PROCEDURE — 3074F SYST BP LT 130 MM HG: CPT | Mod: S$GLB,,, | Performed by: PHYSICIAN ASSISTANT

## 2017-03-14 PROCEDURE — 99499 UNLISTED E&M SERVICE: CPT | Mod: S$GLB,,, | Performed by: PHYSICIAN ASSISTANT

## 2017-03-14 RX ORDER — OFLOXACIN 3 MG/ML
SOLUTION AURICULAR (OTIC)
COMMUNITY
Start: 2017-03-07 | End: 2017-05-19

## 2017-03-14 RX ORDER — ROSUVASTATIN CALCIUM 5 MG/1
5 TABLET, COATED ORAL DAILY
Qty: 90 TABLET | Refills: 1 | Status: SHIPPED | OUTPATIENT
Start: 2017-03-14 | End: 2017-06-05 | Stop reason: SDUPTHER

## 2017-03-14 RX ORDER — FLUTICASONE FUROATE AND VILANTEROL 200; 25 UG/1; UG/1
1 POWDER RESPIRATORY (INHALATION) DAILY
Qty: 28 EACH | Refills: 5 | Status: SHIPPED | OUTPATIENT
Start: 2017-03-14 | End: 2017-08-01

## 2017-03-14 RX ORDER — IPRATROPIUM BROMIDE AND ALBUTEROL SULFATE 2.5; .5 MG/3ML; MG/3ML
3 SOLUTION RESPIRATORY (INHALATION)
Status: COMPLETED | OUTPATIENT
Start: 2017-03-14 | End: 2017-03-14

## 2017-03-14 RX ORDER — MEGESTROL ACETATE 40 MG/ML
SUSPENSION ORAL
COMMUNITY
Start: 2017-03-08 | End: 2017-05-19

## 2017-03-14 RX ORDER — PREDNISONE 20 MG/1
20 TABLET ORAL 2 TIMES DAILY
Qty: 20 TABLET | Refills: 0 | Status: SHIPPED | OUTPATIENT
Start: 2017-03-14 | End: 2017-03-24

## 2017-03-14 RX ADMIN — IPRATROPIUM BROMIDE AND ALBUTEROL SULFATE 3 ML: 2.5; .5 SOLUTION RESPIRATORY (INHALATION) at 11:03

## 2017-03-14 NOTE — MR AVS SNAPSHOT
[facility]  [address]  [place] [address]  Phone: [phone]  Fax: [phone]                  Babita Thomas   3/14/2017 10:40 AM   Office Visit    Description:  Female : 1950   Provider:  PATRIA Mendes   Department:  [facility]           Reason for Visit     Shortness of Breath           Diagnoses this Visit        Comments    COPD with exacerbation    -  Primary     Chronic obstructive pulmonary disease, unspecified COPD type         Multiple thyroid nodules         Essential hypertension         Anxiety         BMI less than 19,adult                To Do List           Future Appointments        Provider Department Dept Phone    2017 9:00 AM [facility] PULMONARY [facility] [phone]    5/15/2017 8:30 AM [facility] - Lab [phone]    2017 8:40 AM [facility] - Pulmonary [phone]    2017 9:40 AM Natalie Gale MD [facility] [phone]      Goals (5 Years of Data)     None      Follow-Up and Disposition     Return for thyroid u/s in 1 year;.    Follow-up and Disposition History       These Medications        Disp Refills Start End    rosuvastatin (CRESTOR) 5 MG tablet 90 tablet 1 3/14/2017 3/14/2018    Take 1 tablet (5 mg total) by mouth once daily. - Oral    Pharmacy: [facility] [address] [place] [address]. Ph #: [phone]       predniSONE (DELTASONE) 20 MG tablet 20 tablet 0 3/14/2017 3/24/2017    Take 1 tablet (20 mg total) by mouth 2 (two) times daily. - Oral    Pharmacy: [facility] [address] [place] [address]. Ph #: [phone]       fluticasone-vilanterol (BREO ELLIPTA) 200-25 mcg/dose DsDv diskus inhaler 28 each 5 3/14/2017     Inhale 1 puff into the lungs once daily. Controller - Inhalation    Pharmacy: [facility] [address] [place] [address]. Ph #: [phone]        ipratropium-albuterol (COMBIVENT)  mcg/actuation inhaler 4 g 5 3/14/2017     Inhale 1 puff into the lungs every 6 (six) hours as needed for Wheezing. Rescue - Inhalation    Pharmacy: [facility] [id number]  #: [phone]         [facility] On Call     [facility] On Call Nurse Care Line - 24/7 Assistance  Registered nurses in the [facility] On Call Center provide clinical advisement, health education, appointment booking, and other advisory services.  Call for this free service at [phone].             Medications           Message regarding Medications     Verify the changes and/or additions to your medication regime listed below are the same as discussed with your clinician today.  If any of these changes or additions are incorrect, please notify your healthcare provider.        START taking these NEW medications        Refills    rosuvastatin (CRESTOR) 5 MG tablet 1    Sig: Take 1 tablet (5 mg total) by mouth once daily.    Class: Normal    Route: Oral    predniSONE (DELTASONE) 20 MG tablet 0    Sig: Take 1 tablet (20 mg total) by mouth 2 (two) times daily.    Class: Normal    Route: Oral    fluticasone-vilanterol (BREO ELLIPTA) 200-25 mcg/dose DsDv diskus inhaler 5    Sig: Inhale 1 puff into the lungs once daily. Controller    Class: Normal    Route: Inhalation    ipratropium-albuterol (COMBIVENT)  mcg/actuation inhaler 5    Sig: Inhale 1 puff into the lungs every 6 (six) hours as needed for Wheezing. Rescue    Class: Normal    Route: Inhalation      These medications were administered today        Dose Freq    albuterol-ipratropium 2.5mg-0.5mg/3mL nebulizer solution 3 mL 3 mL Clinic/HOD 1 time    Sig: Take 3 mLs by nebulization one time.    Class: Normal    Route: Nebulization    Cosign for Ordering: Accepted by Natalie Gale MD on 3/14/2017 11:15 AM      STOP taking these medications     pravastatin (PRAVACHOL) 40 MG tablet Take 1 tablet (40 mg total) by mouth  once daily.    montelukast (SINGULAIR) 10 mg tablet Take 10 mg by mouth nightly.           Verify that the below list of medications is an accurate representation of the medications you are currently taking.  If none reported, the list may be blank. If incorrect, please contact your healthcare provider. Carry this list with you in case of emergency.           Current Medications     baclofen (LIORESAL) 10 MG tablet Take 10 mg by mouth 4 (four) times daily as needed.    budesonide (PULMICORT) 0.5 mg/2 mL nebulizer solution Take 2 mLs by nebulization 2 (two) times daily.    CALCIUM ACETATE ORAL Take 1 tablet by mouth 2 (two) times daily. Every day    chlorthalidone (HYGROTEN) 25 MG Tab 1 tablet PO daily prn for BP greater than 140/90    clonazePAM (KLONOPIN) 2 MG Tab Take 2 mg by mouth nightly as needed (1-2 qhs prn).     escitalopram oxalate (LEXAPRO) 20 MG tablet Take 1 tablet (20 mg total) by mouth once daily.    FERROUS FUMARATE/VIT BCOMP,C (SUPER B COMPLEX ORAL) Take 1 tablet by mouth once daily.    LEVOFLOXACIN ORAL Take 100 mg by mouth.    megestrol (MEGACE) 400 mg/10 mL (40 mg/mL) Susp     multivitamin-minerals-lutein (CENTRUM SILVER) Tab Take 1 tablet by mouth once daily. 1 Tablet(s) Oral Every day.    ofloxacin (FLOXIN) 0.3 % otic solution     vitamin D 1000 units Tab Take 2,000 mg by mouth once daily.    fluticasone-vilanterol (BREO ELLIPTA) 200-25 mcg/dose DsDv diskus inhaler Inhale 1 puff into the lungs once daily. Controller    ipratropium-albuterol (COMBIVENT)  mcg/actuation inhaler Inhale 1 puff into the lungs every 6 (six) hours as needed for Wheezing. Rescue    predniSONE (DELTASONE) 20 MG tablet Take 1 tablet (20 mg total) by mouth 2 (two) times daily.    rosuvastatin (CRESTOR) 5 MG tablet Take 1 tablet (5 mg total) by mouth once daily.           Clinical Reference Information           Your Vitals Were     BP Pulse Temp Height Weight SpO2    128/70 (BP Location: Right arm, Patient Position:  "Sitting, BP Method: Manual) 104 98.5 °F (36.9 °C) (Oral) 5' 6" (1.676 m) 53.1 kg (117 lb 1 oz) 96%    BMI                18.89 kg/m2          Blood Pressure          Most Recent Value    BP  128/70      Allergies as of 3/14/2017     Augmentin [Amoxicillin-pot Clavulanate]    Pravastatin    Tecfidera [Dimethyl Fumarate]      Immunizations Administered on Date of Encounter - 3/14/2017     None      Orders Placed During Today's Visit      Normal Orders This Visit    Ambulatory referral to Pulmonology     Future Labs/Procedures Expected by Expires    US Soft Tissue Head Neck Thyroid  3/14/2017 3/14/2018    Complete PFT with bronchodilator  As directed 3/14/2018      Instructions    Please take prednisone 20mg BID x 5 days.       Language Assistance Services     ATTENTION: Language assistance services are available, free of charge. Please call [phone].      ATENCIÓN: Si habla español, tiene a spencer disposición servicios gratuitos de asistencia lingüística. Llame al [phone].     CHÚ Ý: N?u b?n nói Ti?ng Vi?t, có các d?ch v? h? tr? ngôn ng? mi?n phí dành cho b?n. G?i s? [phone].         [facility] complies with applicable Federal civil rights laws and does not discriminate on the basis of race, color, national origin, age, disability, or sex.        "

## 2017-03-14 NOTE — PROGRESS NOTES
Subjective:       Patient ID: Babita Thomas is a 67 y.o. female.    Chief Complaint: Shortness of Breath    HPI   Patient is a 67 year old  female presenting to the clinic with c/o of shortness of breath on exertion & low oxygen level. Patient reports being seen January with O2 at 90%. She had a CXR which was found to be within normal limits in Feb. Today, oxgyen initially 90% but improved to 95% after rest. She reports Dr. Stoddard took her off of all of her inhalers. Last PFT 4/2015 showed moderately severe chronic obstructive pulmonary disease. She reports she tried to call & get her inhalers refilled, but they were denied. She reports persistent wheezing that's worse with exertion. She has not seen Dr. Stoddard in quite some time. She reports previously being on Nayla Ellipta & Incruse. During walk test, patient drop to 88% for a few seconds, but increased back to 92% quickly.     She still has chronic cough concerns although improved since last time I have seen her. She reports her GI doctor thinks chronic cough issues are related to her MS & acid reflux.     Of note, we have also addressed thyroid u/s results, changing cholesterol medication to crestor & increasing lexapro dosing as per recent telephone encounter from Dr. Gale.   Review of Systems   Constitutional: Negative for activity change, appetite change, chills, diaphoresis, fatigue and fever.   HENT: Negative for congestion, postnasal drip and rhinorrhea.    Eyes: Negative for visual disturbance.   Respiratory: Positive for cough, shortness of breath and wheezing. Negative for chest tightness and stridor.    Cardiovascular: Negative.  Negative for chest pain.   Gastrointestinal: Negative for abdominal pain, blood in stool, constipation, diarrhea, nausea and vomiting.   Genitourinary: Negative for dysuria, frequency, hematuria and urgency.   Musculoskeletal: Negative.    Skin: Negative.  Negative for color change and rash.   Neurological:  Negative for dizziness and syncope.   Psychiatric/Behavioral: Negative for agitation, behavioral problems and confusion.       Objective:      Physical Exam   Constitutional: Vital signs are normal. She appears well-developed and well-nourished. No distress.   HENT:   Head: Normocephalic and atraumatic.   Nose: Nose normal.   Mouth/Throat: Uvula is midline and oropharynx is clear and moist.   White fluid behind both TMs; tubes bilaterally   Cardiovascular: Regular rhythm, S1 normal, S2 normal and normal heart sounds.  Tachycardia present.  Exam reveals no gallop.    No murmur heard.  Pulses:       Radial pulses are 2+ on the right side, and 2+ on the left side.   <2sec cap refill fingers bilat     Pulmonary/Chest: Effort normal. No respiratory distress. She has no decreased breath sounds. She has wheezes in the right upper field, the right middle field, the right lower field, the left upper field, the left middle field and the left lower field. She has no rhonchi.   Skin: Skin is warm and dry. She is not diaphoretic.   Appropriate skin turgor   Psychiatric: She has a normal mood and affect. Her speech is normal and behavior is normal. Judgment and thought content normal. Cognition and memory are normal.       Assessment:       1. COPD with exacerbation    2. Chronic obstructive pulmonary disease, unspecified COPD type    3. Multiple thyroid nodules    4. Essential hypertension    5. Anxiety    6. BMI less than 19,adult        Plan:   Babita was seen today for shortness of breath.    Diagnoses and all orders for this visit:    COPD with exacerbation  -     predniSONE (DELTASONE) 20 MG tablet; Take 1 tablet (20 mg total) by mouth 2 (two) times daily.  -     Complete PFT with bronchodilator; Future  -     fluticasone-vilanterol (BREO ELLIPTA) 200-25 mcg/dose DsDv diskus inhaler; Inhale 1 puff into the lungs once daily. Controller  -     ipratropium-albuterol (COMBIVENT)  mcg/actuation inhaler; Inhale 1 puff into the  lungs every 6 (six) hours as needed for Wheezing. Rescue    Chronic obstructive pulmonary disease, unspecified COPD type  -     Ambulatory referral to Pulmonology  -     albuterol-ipratropium 2.5mg-0.5mg/3mL nebulizer solution 3 mL; Take 3 mLs by nebulization one time.    Multiple thyroid nodules  Recent u/s with several thyroid nodules, none meeting criteria for biopsy  -     US Soft Tissue Head Neck Thyroid; Future    Essential hypertension  Good control; continue current bp medications    Anxiety  Increased dose from lexapro 10mg to lexapro 20mg daily    Other orders  -     rosuvastatin (CRESTOR) 5 MG tablet; Take 1 tablet (5 mg total) by mouth once daily.      BMI less than 19,adult    Patient readiness: acceptance and barriers:none    During the course of the visit the patient was educated and counseled about the following:     Hypertension:   Medication: no change.  Underweight:  Follow up and re-weight in: 4  weeks and as needed.     Goals: Hypertension: Reduce Blood Pressure and Underweight: Increase calorie intake and BMI      Did patient meet goals/outcomes: No    The following self management tools provided: declined    Patient Instructions (the written plan) was given to the patient/family.     Time spent with patient: 30 minutes

## 2017-03-21 ENCOUNTER — TELEPHONE (OUTPATIENT)
Dept: FAMILY MEDICINE | Facility: CLINIC | Age: 67
End: 2017-03-21

## 2017-04-03 ENCOUNTER — TELEPHONE (OUTPATIENT)
Dept: FAMILY MEDICINE | Facility: CLINIC | Age: 67
End: 2017-04-03

## 2017-04-03 DIAGNOSIS — F41.8 MIXED ANXIETY AND DEPRESSIVE DISORDER: Primary | ICD-10-CM

## 2017-04-03 NOTE — TELEPHONE ENCOUNTER
----- Message from Carol Villatoroza sent at 4/3/2017  9:35 AM CDT -----  Contact: self [phone]  She is calling back regarding the lexapro.  You were to speak with the doctor and call her back.  Please call her.  Thank you!

## 2017-04-03 NOTE — TELEPHONE ENCOUNTER
lexapro 40mg once a day patient is still having depression and anxiety symptoms asking for adjustment for medication

## 2017-04-04 ENCOUNTER — HOSPITAL ENCOUNTER (OUTPATIENT)
Dept: RESPIRATORY THERAPY | Facility: HOSPITAL | Age: 67
Discharge: HOME | End: 2017-04-04
Attending: PHYSICIAN ASSISTANT
Payer: MEDICARE

## 2017-04-04 ENCOUNTER — TELEPHONE (OUTPATIENT)
Dept: FAMILY MEDICINE | Facility: CLINIC | Age: 67
End: 2017-04-04

## 2017-04-04 DIAGNOSIS — J44.1 COPD WITH EXACERBATION: ICD-10-CM

## 2017-04-04 RX ORDER — CITALOPRAM 20 MG/1
20 TABLET, FILM COATED ORAL DAILY
Qty: 30 TABLET | Refills: 0 | Status: SHIPPED | OUTPATIENT
Start: 2017-04-04 | End: 2017-05-03

## 2017-04-04 NOTE — TELEPHONE ENCOUNTER
We will switch to celexa at same dose.     She can stop the lexapro today & start celexa tomorrow.     Please schedule 4 week f/u visit.

## 2017-04-04 NOTE — TELEPHONE ENCOUNTER
Patient had PFT done in November 2016 for Dr Stoddard per marifer with [facility], report will fax over report to see if another PFT is necessary

## 2017-04-04 NOTE — TELEPHONE ENCOUNTER
Was patient instructed to go up to lexapro 40mg?    I believe she was told to go from 10mg to 20mg which is the highest dose recommended.    Has she ever tried celexa? I would recommend switching to this.

## 2017-04-10 NOTE — TELEPHONE ENCOUNTER
If Ms. Thomas feels like her breathing is worse than it was 11/2016 with her last PFT then I think she needs another one.    Especially since her O2 has been dropping. Is she doing any better? I see her apt with Dr. Heard is not until May.

## 2017-04-11 NOTE — TELEPHONE ENCOUNTER
----- Message from Lavinia Russell sent at 4/11/2017 11:41 AM CDT -----  Patient states she missed a call from office please call [phone] (xbxd)

## 2017-04-11 NOTE — TELEPHONE ENCOUNTER
Patient states her breathing is much better than last PFT patient will keep scheduled appointment with Dr Heard for consult

## 2017-04-13 DIAGNOSIS — Z12.31 OTHER SCREENING MAMMOGRAM: ICD-10-CM

## 2017-05-01 ENCOUNTER — DOCUMENTATION ONLY (OUTPATIENT)
Dept: FAMILY MEDICINE | Facility: CLINIC | Age: 67
End: 2017-05-01

## 2017-05-03 ENCOUNTER — OFFICE VISIT (OUTPATIENT)
Dept: FAMILY MEDICINE | Facility: CLINIC | Age: 67
End: 2017-05-03
Payer: MEDICARE

## 2017-05-03 ENCOUNTER — LAB VISIT (OUTPATIENT)
Dept: LAB | Facility: HOSPITAL | Age: 67
End: 2017-05-03
Attending: FAMILY MEDICINE
Payer: MEDICARE

## 2017-05-03 VITALS
HEIGHT: 66 IN | BODY MASS INDEX: 18.35 KG/M2 | SYSTOLIC BLOOD PRESSURE: 152 MMHG | DIASTOLIC BLOOD PRESSURE: 96 MMHG | HEART RATE: 111 BPM | WEIGHT: 114.19 LBS | TEMPERATURE: 99 F

## 2017-05-03 DIAGNOSIS — R68.89 TEMPERATURE INTOLERANCE: ICD-10-CM

## 2017-05-03 DIAGNOSIS — E78.5 HYPERLIPIDEMIA, UNSPECIFIED HYPERLIPIDEMIA TYPE: ICD-10-CM

## 2017-05-03 DIAGNOSIS — I10 WHITE COAT SYNDROME WITH HYPERTENSION: ICD-10-CM

## 2017-05-03 DIAGNOSIS — R73.09 ELEVATED GLUCOSE: Primary | ICD-10-CM

## 2017-05-03 DIAGNOSIS — F41.9 ANXIETY: ICD-10-CM

## 2017-05-03 DIAGNOSIS — F41.9 ANXIETY: Primary | ICD-10-CM

## 2017-05-03 LAB
ALBUMIN SERPL BCP-MCNC: 3.9 G/DL
ALP SERPL-CCNC: 94 U/L
ALT SERPL W/O P-5'-P-CCNC: 25 U/L
ANION GAP SERPL CALC-SCNC: 11 MMOL/L
AST SERPL-CCNC: 22 U/L
BASOPHILS # BLD AUTO: 0.04 K/UL
BASOPHILS NFR BLD: 0.7 %
BILIRUB SERPL-MCNC: 0.6 MG/DL
BUN SERPL-MCNC: 7 MG/DL
CALCIUM SERPL-MCNC: 9.7 MG/DL
CHLORIDE SERPL-SCNC: 102 MMOL/L
CHOLEST/HDLC SERPL: 2.4 {RATIO}
CO2 SERPL-SCNC: 26 MMOL/L
CREAT SERPL-MCNC: 0.8 MG/DL
DIFFERENTIAL METHOD: ABNORMAL
EOSINOPHIL # BLD AUTO: 1 K/UL
EOSINOPHIL NFR BLD: 17.7 %
ERYTHROCYTE [DISTWIDTH] IN BLOOD BY AUTOMATED COUNT: 13.1 %
EST. GFR  (AFRICAN AMERICAN): >60 ML/MIN/1.73 M^2
EST. GFR  (NON AFRICAN AMERICAN): >60 ML/MIN/1.73 M^2
GLUCOSE SERPL-MCNC: 120 MG/DL
HCT VFR BLD AUTO: 47.1 %
HDL/CHOLESTEROL RATIO: 41.8 %
HDLC SERPL-MCNC: 165 MG/DL
HDLC SERPL-MCNC: 69 MG/DL
HGB BLD-MCNC: 15.9 G/DL
LDLC SERPL CALC-MCNC: 84.6 MG/DL
LYMPHOCYTES # BLD AUTO: 1 K/UL
LYMPHOCYTES NFR BLD: 19.4 %
MCH RBC QN AUTO: 31.4 PG
MCHC RBC AUTO-ENTMCNC: 33.8 %
MCV RBC AUTO: 93 FL
MONOCYTES # BLD AUTO: 0.8 K/UL
MONOCYTES NFR BLD: 14.7 %
NEUTROPHILS # BLD AUTO: 2.5 K/UL
NEUTROPHILS NFR BLD: 47.3 %
NONHDLC SERPL-MCNC: 96 MG/DL
PLATELET # BLD AUTO: 327 K/UL
PMV BLD AUTO: 9.2 FL
POTASSIUM SERPL-SCNC: 3.7 MMOL/L
PROT SERPL-MCNC: 7.7 G/DL
RBC # BLD AUTO: 5.06 M/UL
SODIUM SERPL-SCNC: 139 MMOL/L
TRIGL SERPL-MCNC: 57 MG/DL
TSH SERPL DL<=0.005 MIU/L-ACNC: 2.47 UIU/ML
WBC # BLD AUTO: 5.36 K/UL

## 2017-05-03 PROCEDURE — 80053 COMPREHEN METABOLIC PANEL: CPT

## 2017-05-03 PROCEDURE — 99213 OFFICE O/P EST LOW 20 MIN: CPT | Mod: S$GLB,,, | Performed by: PHYSICIAN ASSISTANT

## 2017-05-03 PROCEDURE — 1160F RVW MEDS BY RX/DR IN RCRD: CPT | Mod: S$GLB,,, | Performed by: PHYSICIAN ASSISTANT

## 2017-05-03 PROCEDURE — 83036 HEMOGLOBIN GLYCOSYLATED A1C: CPT

## 2017-05-03 PROCEDURE — 84443 ASSAY THYROID STIM HORMONE: CPT

## 2017-05-03 PROCEDURE — 3077F SYST BP >= 140 MM HG: CPT | Mod: S$GLB,,, | Performed by: PHYSICIAN ASSISTANT

## 2017-05-03 PROCEDURE — 85025 COMPLETE CBC W/AUTO DIFF WBC: CPT

## 2017-05-03 PROCEDURE — 3080F DIAST BP >= 90 MM HG: CPT | Mod: S$GLB,,, | Performed by: PHYSICIAN ASSISTANT

## 2017-05-03 PROCEDURE — 99499 UNLISTED E&M SERVICE: CPT | Mod: S$GLB,,, | Performed by: PHYSICIAN ASSISTANT

## 2017-05-03 PROCEDURE — 1159F MED LIST DOCD IN RCRD: CPT | Mod: S$GLB,,, | Performed by: PHYSICIAN ASSISTANT

## 2017-05-03 PROCEDURE — 36415 COLL VENOUS BLD VENIPUNCTURE: CPT | Mod: PO

## 2017-05-03 PROCEDURE — 1157F ADVNC CARE PLAN IN RCRD: CPT | Mod: S$GLB,,, | Performed by: PHYSICIAN ASSISTANT

## 2017-05-03 PROCEDURE — 80061 LIPID PANEL: CPT

## 2017-05-03 PROCEDURE — 99999 PR PBB SHADOW E&M-EST. PATIENT-LVL IV: CPT | Mod: PBBFAC,,, | Performed by: PHYSICIAN ASSISTANT

## 2017-05-03 PROCEDURE — 1126F AMNT PAIN NOTED NONE PRSNT: CPT | Mod: S$GLB,,, | Performed by: PHYSICIAN ASSISTANT

## 2017-05-03 RX ORDER — CLONAZEPAM 0.5 MG/1
0.5 TABLET ORAL 2 TIMES DAILY PRN
Qty: 60 TABLET | Refills: 0 | Status: SHIPPED | OUTPATIENT
Start: 2017-05-03 | End: 2017-06-20

## 2017-05-03 RX ORDER — VENLAFAXINE HYDROCHLORIDE 37.5 MG/1
37.5 CAPSULE, EXTENDED RELEASE ORAL DAILY
Qty: 30 CAPSULE | Refills: 1 | Status: SHIPPED | OUTPATIENT
Start: 2017-05-03 | End: 2017-05-23 | Stop reason: DRUGHIGH

## 2017-05-03 NOTE — PROGRESS NOTES
Subjective:       Patient ID: Babita Thomas is a 67 y.o. female.    Chief Complaint: 4 week follow-up    HPI   Patient is a 67 year old  female presenting to the clinic for f/u after switching from lexapro 20mg daily to celexa 20mg daily. Patient reports no improvement since starting medication. She reports brain fog, anxiety, agitation, night sweats, temerpature intoelrance. She also has klonopin 2mg on her medication list for which she uses at night time for insomnia. She is willing to try increase dose or switch medication altogether. She has white coat hypertension. BP very elevated today, but at home runs  systolic and 53-79 diastolic. She has had her BP cuff checked with Dr. Go previously which was comparable.     Of note, she continues to undergo work-up for chronic cough issues. This is thought to be related to chronic GERD issues. This has led to some bronchospasm issues as well. She is scheduled for surgery to have her epiglottis repaired to help with her chronic GERD issues. She also has an upcoming apt with Dr. Heard for second opinion regarding these issues.   Review of Systems   Constitutional: Negative for activity change, appetite change, chills, diaphoresis, fatigue and fever.   HENT: Negative for congestion, postnasal drip and rhinorrhea.    Respiratory: Negative.  Negative for cough, shortness of breath and wheezing.    Cardiovascular: Negative.  Negative for chest pain.   Gastrointestinal: Negative for abdominal pain, blood in stool, constipation, diarrhea, nausea and vomiting.   Genitourinary: Negative for dysuria, frequency, hematuria and urgency.   Musculoskeletal: Negative.    Skin: Negative.  Negative for color change and rash.   Neurological: Negative for dizziness and syncope.   Psychiatric/Behavioral: Negative for agitation, behavioral problems and confusion.       Objective:      Physical Exam   Constitutional: Vital signs are normal. She appears well-developed  and well-nourished. No distress.   Cardiovascular: Normal rate, regular rhythm, S1 normal, S2 normal and normal heart sounds.  Exam reveals no gallop.    No murmur heard.  Pulses:       Radial pulses are 2+ on the right side, and 2+ on the left side.   <2sec cap refill fingers bilat     Pulmonary/Chest: Effort normal and breath sounds normal. No respiratory distress. She has no wheezes. She has no rhonchi.   Skin: Skin is warm and dry. She is not diaphoretic.   Appropriate skin turgor   Psychiatric: She has a normal mood and affect. Her speech is normal and behavior is normal. Judgment and thought content normal. Cognition and memory are normal.       Assessment:       1. Anxiety    2. White coat syndrome with hypertension    3. Temperature intolerance    4. Hyperlipidemia, unspecified hyperlipidemia type    5. BMI less than 19,adult        Plan:   Babita was seen today for 4 week follow-up.    Diagnoses and all orders for this visit:    Anxiety  Uncontrolled; stop celexa 20mg daily; trial of effexor 37.5mg daily  -     CBC auto differential; Future  -     TSH; Future  -     venlafaxine (EFFEXOR-XR) 37.5 MG 24 hr capsule; Take 1 capsule (37.5 mg total) by mouth once daily.    White coat syndrome with hypertension  -     CBC auto differential; Future    Temperature intolerance  -     Comprehensive metabolic panel; Future  -     CBC auto differential; Future  -     TSH; Future    Hyperlipidemia, unspecified hyperlipidemia type  -     Lipid panel; Future    BMI less than 19,adult  Patient readiness: acceptance and barriers:none    During the course of the visit the patient was educated and counseled about the following:     Hypertension:   Medication: no change.  Underweight:  Follow up and re-weight in: 4  weeks and as needed.     Goals: Hypertension: Reduce Blood Pressure and Underweight: Increase calorie intake and BMI      Did patient meet goals/outcomes: No    The following self management tools provided:  declined    Patient Instructions (the written plan) was given to the patient/family.     Time spent with patient: 20 minutes

## 2017-05-03 NOTE — MR AVS SNAPSHOT
[facility]  [address]  [place] [address]  Phone: [phone]  Fax: [phone]                  Babita Thomas   5/3/2017 9:00 AM   Office Visit    Description:  Female : 1950   Provider:  PATRIA Mendes   Department:  [facility] Medicine           Reason for Visit     4 week follow-up           Diagnoses this Visit        Comments    White coat syndrome with hypertension    -  Primary     Temperature intolerance         Anxiety         Hyperlipidemia, unspecified hyperlipidemia type                To Do List           Future Appointments        Provider Department Dept Phone    5/3/2017 9:40 AM LAB, [place] [facility] [phone]    5/15/2017 8:30 AM [facility], [place] [facility] [phone]    2017 8:40 AM Angelo Heard MD [facility] [phone]    2017 9:40 AM Natalie Gale MD [facility] [phone]      Goals (5 Years of Data)     None      Follow-Up and Disposition     Return for labs today; 4 week f/u.       These Medications        Disp Refills Start End    venlafaxine (EFFEXOR-XR) 37.5 MG 24 hr capsule 30 capsule 1 5/3/2017 5/3/2018    Take 1 capsule (37.5 mg total) by mouth once daily. - Oral    Pharmacy: [facility] [address] [place] [address] - [address] [place] [address]. Ph #: [phone]         [facility] On Call     [facility] Nurse Care Line - 24/ Assistance  Unless otherwise directed by your provider, please contact [facility], our nurse care line that is available for 24/ assistance.     Registered nurses in the [facility] Center provide: appointment scheduling, clinical advisement, health education, and other advisory services.  Call: [phone] (toll free)               Medications           Message regarding Medications     Verify the changes and/or additions to your medication regime listed below are the same as discussed with your clinician today.  If any of  these changes or additions are incorrect, please notify your healthcare provider.        START taking these NEW medications        Refills    venlafaxine (EFFEXOR-XR) 37.5 MG 24 hr capsule 1    Sig: Take 1 capsule (37.5 mg total) by mouth once daily.    Class: Normal    Route: Oral      STOP taking these medications     citalopram (CELEXA) 20 MG tablet Take 1 tablet (20 mg total) by mouth once daily.    clonazePAM (KLONOPIN) 2 MG Tab Take 2 mg by mouth nightly as needed (1-2 qhs prn).            Verify that the below list of medications is an accurate representation of the medications you are currently taking.  If none reported, the list may be blank. If incorrect, please contact your healthcare provider. Carry this list with you in case of emergency.           Current Medications     baclofen (LIORESAL) 10 MG tablet Take 10 mg by mouth 4 (four) times daily as needed.    budesonide (PULMICORT) 0.5 mg/2 mL nebulizer solution Take 2 mLs by nebulization 2 (two) times daily.    CALCIUM ACETATE ORAL Take 1 tablet by mouth 2 (two) times daily. Every day    chlorthalidone (HYGROTEN) 25 MG Tab 1 tablet PO daily prn for BP greater than 140/90    FERROUS FUMARATE/VIT BCOMP,C (SUPER B COMPLEX ORAL) Take 1 tablet by mouth once daily.    fluticasone-vilanterol (BREO ELLIPTA) 200-25 mcg/dose DsDv diskus inhaler Inhale 1 puff into the lungs once daily. Controller    ipratropium-albuterol (COMBIVENT)  mcg/actuation inhaler Inhale 1 puff into the lungs every 6 (six) hours as needed for Wheezing. Rescue    LEVOFLOXACIN ORAL Take 100 mg by mouth.    megestrol (MEGACE) 400 mg/10 mL (40 mg/mL) Susp     multivitamin-minerals-lutein (CENTRUM SILVER) Tab Take 1 tablet by mouth once daily. 1 Tablet(s) Oral Every day.    ofloxacin (FLOXIN) 0.3 % otic solution     rosuvastatin (CRESTOR) 5 MG tablet Take 1 tablet (5 mg total) by mouth once daily.    vitamin D 1000 units Tab Take 2,000 mg by mouth once daily.    clonazePAM (KLONOPIN) 0.5  "MG tablet Take 1 tablet (0.5 mg total) by mouth 2 (two) times daily as needed for Anxiety.    venlafaxine (EFFEXOR-XR) 37.5 MG 24 hr capsule Take 1 capsule (37.5 mg total) by mouth once daily.           Clinical Reference Information           Your Vitals Were     BP Pulse Temp Height Weight BMI    152/96 111 98.8 °F (37.1 °C) (Oral) 5' 6" (1.676 m) 51.8 kg (114 lb 3.2 oz) 18.43 kg/m2      Blood Pressure          Most Recent Value    BP  (!)  152/96      Allergies as of 5/3/2017     Augmentin [Amoxicillin-pot Clavulanate]    Pravastatin    Tecfidera [Dimethyl Fumarate]      Immunizations Administered on Date of Encounter - 5/3/2017     None      Orders Placed During Today's Visit     Future Labs/Procedures Expected by Expires    CBC auto differential  5/3/2017 7/2/2018    Comprehensive metabolic panel  5/3/2017 7/2/2018    Lipid panel  5/3/2017 7/2/2018    TSH  5/3/2017 7/2/2018      Language Assistance Services     ATTENTION: Language assistance services are available, free of charge. Please call [phone].      ATENCIÓN: Si herberth mauricio, tiene a spencer disposición servicios gratuitos de asistencia lingüística. Llame al [phone].     ALESHIA Ý: N?u b?n nói Ti?ng Vi?t, có các d?ch v? h? tr? ngôn ng? mi?n phí dành cho b?n. G?i s? [phone].         [facility] complies with applicable Federal civil rights laws and does not discriminate on the basis of race, color, national origin, age, disability, or sex.        "

## 2017-05-04 LAB
ESTIMATED AVG GLUCOSE: 126 MG/DL
HBA1C MFR BLD HPLC: 6 %

## 2017-05-17 ENCOUNTER — DOCUMENTATION ONLY (OUTPATIENT)
Dept: FAMILY MEDICINE | Facility: CLINIC | Age: 67
End: 2017-05-17

## 2017-05-17 NOTE — PROGRESS NOTES
Pre-Visit Chart Review  For Appointment Scheduled on 05/19/17    Health Maintenance Due   Topic Date Due    TETANUS VACCINE  01/18/1969    DEXA SCAN  01/18/1991    Mammogram  02/10/2017

## 2017-05-18 ENCOUNTER — DOCUMENTATION ONLY (OUTPATIENT)
Dept: FAMILY MEDICINE | Facility: CLINIC | Age: 67
End: 2017-05-18

## 2017-05-18 NOTE — PROGRESS NOTES
Pre-Visit Chart Review  For Appointment Scheduled on 5/23/17.    Health Maintenance Due   Topic Date Due    TETANUS VACCINE  01/18/1969    DEXA SCAN  01/18/1991    Mammogram  02/10/2017

## 2017-05-19 ENCOUNTER — OFFICE VISIT (OUTPATIENT)
Dept: PULMONOLOGY | Facility: CLINIC | Age: 67
End: 2017-05-19
Payer: MEDICARE

## 2017-05-19 VITALS
DIASTOLIC BLOOD PRESSURE: 106 MMHG | SYSTOLIC BLOOD PRESSURE: 163 MMHG | BODY MASS INDEX: 18.32 KG/M2 | HEART RATE: 96 BPM | WEIGHT: 114 LBS | HEIGHT: 66 IN | OXYGEN SATURATION: 92 %

## 2017-05-19 DIAGNOSIS — D84.9 IMMUNE DEFICIENCY DISORDER: ICD-10-CM

## 2017-05-19 DIAGNOSIS — B37.9 YEAST INFECTION: ICD-10-CM

## 2017-05-19 DIAGNOSIS — D72.10 EOSINOPHILIA: Primary | ICD-10-CM

## 2017-05-19 DIAGNOSIS — J32.9 CHRONIC SINUSITIS, UNSPECIFIED LOCATION: ICD-10-CM

## 2017-05-19 DIAGNOSIS — F32.A DEPRESSION, UNSPECIFIED DEPRESSION TYPE: ICD-10-CM

## 2017-05-19 DIAGNOSIS — J45.50 UNCOMPLICATED SEVERE PERSISTENT ASTHMA: ICD-10-CM

## 2017-05-19 PROCEDURE — 99205 OFFICE O/P NEW HI 60 MIN: CPT | Mod: S$GLB,,, | Performed by: INTERNAL MEDICINE

## 2017-05-19 PROCEDURE — 3080F DIAST BP >= 90 MM HG: CPT | Mod: S$GLB,,, | Performed by: INTERNAL MEDICINE

## 2017-05-19 PROCEDURE — 3077F SYST BP >= 140 MM HG: CPT | Mod: S$GLB,,, | Performed by: INTERNAL MEDICINE

## 2017-05-19 PROCEDURE — 1126F AMNT PAIN NOTED NONE PRSNT: CPT | Mod: S$GLB,,, | Performed by: INTERNAL MEDICINE

## 2017-05-19 PROCEDURE — 99499 UNLISTED E&M SERVICE: CPT | Mod: S$GLB,,, | Performed by: INTERNAL MEDICINE

## 2017-05-19 PROCEDURE — 1160F RVW MEDS BY RX/DR IN RCRD: CPT | Mod: S$GLB,,, | Performed by: INTERNAL MEDICINE

## 2017-05-19 PROCEDURE — 99999 PR PBB SHADOW E&M-EST. PATIENT-LVL IV: CPT | Mod: PBBFAC,,, | Performed by: INTERNAL MEDICINE

## 2017-05-19 PROCEDURE — 1159F MED LIST DOCD IN RCRD: CPT | Mod: S$GLB,,, | Performed by: INTERNAL MEDICINE

## 2017-05-19 RX ORDER — ASPIRIN 81 MG/1
TABLET ORAL
COMMUNITY
Start: 2017-05-09 | End: 2017-06-15

## 2017-05-19 RX ORDER — PREDNISONE 10 MG/1
10 TABLET ORAL 2 TIMES DAILY
Qty: 60 TABLET | Refills: 2 | Status: SHIPPED | OUTPATIENT
Start: 2017-05-19 | End: 2017-07-18

## 2017-05-19 RX ORDER — MONTELUKAST SODIUM 10 MG/1
10 TABLET ORAL NIGHTLY
Qty: 30 TABLET | Refills: 11 | Status: SHIPPED | OUTPATIENT
Start: 2017-05-19 | End: 2017-08-01

## 2017-05-19 RX ORDER — AZITHROMYCIN 500 MG/1
500 TABLET, FILM COATED ORAL
Qty: 12 TABLET | Refills: 2 | Status: SHIPPED | OUTPATIENT
Start: 2017-05-20 | End: 2017-06-15 | Stop reason: ALTCHOICE

## 2017-05-19 RX ORDER — FLUTICASONE PROPIONATE 50 MCG
2 SPRAY, SUSPENSION (ML) NASAL DAILY
Qty: 1 BOTTLE | Refills: 11 | Status: SHIPPED | OUTPATIENT
Start: 2017-05-19 | End: 2017-08-01

## 2017-05-19 RX ORDER — MIRTAZAPINE 15 MG/1
15 TABLET, FILM COATED ORAL NIGHTLY
Qty: 30 TABLET | Refills: 11 | Status: SHIPPED | OUTPATIENT
Start: 2017-05-19 | End: 2017-08-01

## 2017-05-19 RX ORDER — ALPRAZOLAM 0.5 MG/1
0.5 TABLET ORAL 3 TIMES DAILY
COMMUNITY
End: 2017-06-20

## 2017-05-19 RX ORDER — FLUCONAZOLE 200 MG/1
200 TABLET ORAL DAILY
Qty: 7 TABLET | Refills: 1 | Status: SHIPPED | OUTPATIENT
Start: 2017-05-19 | End: 2017-05-26

## 2017-05-19 NOTE — LETTER
May 19, 2017      Natalie Gale MD  [address]  [place] [address]           [facility]  [address]  [place] [address]  Phone: [phone]          Patient: Babita Thomas   MR Number: [id number]   YOB: 1950   Date of Visit: 5/19/2017       Dear Dr. Natalie Gale:    Thank you for referring Babita Thomas to me for evaluation. Attached you will find relevant portions of my assessment and plan of care.    If you have questions, please do not hesitate to call me. I look forward to following Babita Thomas along with you.    Sincerely,    Angelo Heard MD    Enclosure  CC:  No Recipients    If you would like to receive this communication electronically, please contact [email or web address] or [phone] to request more information on [facility] [place] [facility] access.    For providers and/or their staff who would like to refer a patient to [facility], please contact us through our one-stop-shop provider referral line, [facility] , at [phone].    If you feel you have received this communication in error or would no longer like to receive these types of communications, please e-mail [email or web address]

## 2017-05-19 NOTE — PATIENT INSTRUCTIONS
Chronic refractory sinuses    Weak immune system?  Re check response to vaccine, may need repeat vaccine.     Antibiotic again? Take azithromycin daily for 3 then tues  thur and sat     Afrin then 2 flonase in 10 min or so.  singulair.    Lungs- breathing test was nl 2014 and now less than 1/2 normal, did have asthma response, eos associated with asthma and other problems.  Lung tissue looks good on ct,      Prednisone and follow up breathing test may be reasonable, if significant improvement may consider therapy for eosinophilic asthma.   Prednisone 10 mg twice a day.      Immune weak - re check, if off -need to get repeat vaccine and Dr Putnam follow up.      Reflux and sinuses - may benefit from fundoplication.  Constant acid taste not good, may get into sinuses??     remeron may help nerves and sleep and appetite.  15mg daily - stop effexor and use remeron if no better in 2 weeks on effexor.

## 2017-05-19 NOTE — MR AVS SNAPSHOT
[facility]  [address]  [facility] [address]  Phone: [phone]                  Babita Thomas   2017 8:40 AM   Office Visit    Description:  Female : 1950   Provider:  Angelo Heard MD   Department:  [facility]           Reason for Visit     COPD     Asthma     Sinus Problem           Diagnoses this Visit        Comments    Eosinophilia    -  Primary     Immune deficiency disorder         Uncomplicated severe persistent asthma         Chronic sinusitis, unspecified location         Yeast infection         Depression, unspecified depression type                To Do List           Future Appointments        Provider Department Dept Phone    2017 3:20 PM PATRIA Mendes [facility] [phone]    2017 10:00 AM [facility] [phone]    2017 9:40 AM [facility] [phone]    2017 8:40 AM Angelo Heard MD [facility] Pulmonary [phone]      Goals (5 Years of Data)     None      Follow-Up and Disposition     Return in about 3 months (around 2017), or if symptoms worsen or fail to improve.    Follow-up and Disposition History       These Medications        Disp Refills Start End    predniSONE (DELTASONE) 10 MG tablet 60 tablet 2 2017    Take 1 tablet (10 mg total) by mouth 2 (two) times daily. - Oral    Pharmacy: [facility] [address] [place] [address]. Ph #: [phone]       montelukast (SINGULAIR) 10 mg tablet 30 tablet 11 2017     Take 1 tablet (10 mg total) by mouth every evening. - Oral    Pharmacy: [facility] [address] [place] [address]. Ph #: [phone]       fluticasone (FLONASE) 50 mcg/actuation nasal spray 1 Bottle 11 2017     2 sprays by Each Nare route once daily. - Each Nare    Pharmacy: [facility] [address] [place] [address]. Ph  #: [phone]       mirtazapine (REMERON) 15 MG tablet 30 tablet 11 2017    Take 1 tablet (15 mg total) by mouth every evening. - Oral    Pharmacy: [address] Ph #: [phone]       azithromycin (ZITHROMAX) 500 MG tablet 12 tablet 2 2017    Take 1 tablet (500 mg total) by mouth every Tues, Th, Sat. - Oral    Pharmacy: [address]. Ph #: [phone]       fluconazole (DIFLUCAN) 200 MG Tab 7 tablet 1 2017    Take 1 tablet (200 mg total) by mouth once daily. - Oral    Pharmacy: [address]. Ph #: [phone]         [facility] On Call     [facility] On Call Nurse Care Line -  Assistance  Unless otherwise directed by your provider, please contact [facility], our nurse care line that is available for  assistance.     Registered nurses in the [facility] provide: appointment scheduling, clinical advisement, health education, and other advisory services.  Call: [phone] (toll free)               Medications           Message regarding Medications     Verify the changes and/or additions to your medication regime listed below are the same as discussed with your clinician today.  If any of these changes or additions are incorrect, please notify your healthcare provider.        START taking these NEW medications        Refills    predniSONE (DELTASONE) 10 MG tablet 2    Sig: Take 1 tablet (10 mg total) by mouth 2 (two) times daily.    Class: Normal    Route: Oral    montelukast (SINGULAIR) 10 mg tablet 11    Sig: Take 1 tablet (10 mg total) by mouth every evening.    Class: Normal    Route: Oral    fluticasone (FLONASE) 50 mcg/actuation nasal spray 11    Si sprays by Each Nare route once daily.    Class: Normal    Route: Each Nare    mirtazapine (REMERON) 15 MG tablet 11    Sig: Take 1 tablet (15 mg total)  by mouth every evening.    Class: Print    Route: Oral    azithromycin (ZITHROMAX) 500 MG tablet 2    Starting on: 5/20/2017    Sig: Take 1 tablet (500 mg total) by mouth every Tues, Thurs, Sat.    Class: Normal    Route: Oral    fluconazole (DIFLUCAN) 200 MG Tab 1    Sig: Take 1 tablet (200 mg total) by mouth once daily.    Class: Normal    Route: Oral      STOP taking these medications     FERROUS FUMARATE/VIT BCOMP,C (SUPER B COMPLEX ORAL) Take 1 tablet by mouth once daily.    LEVOFLOXACIN ORAL Take 100 mg by mouth.    megestrol (MEGACE) 400 mg/10 mL (40 mg/mL) Susp     ofloxacin (FLOXIN) 0.3 % otic solution            Verify that the below list of medications is an accurate representation of the medications you are currently taking.  If none reported, the list may be blank. If incorrect, please contact your healthcare provider. Carry this list with you in case of emergency.           Current Medications     alprazolam (XANAX) 0.5 MG tablet Take 0.5 mg by mouth 3 (three) times daily.    aspirin (ECOTRIN) 81 MG EC tablet     baclofen (LIORESAL) 10 MG tablet Take 10 mg by mouth 4 (four) times daily as needed.    CALCIUM ACETATE ORAL Take 1 tablet by mouth 2 (two) times daily. Every day    chlorthalidone (HYGROTEN) 25 MG Tab 1 tablet PO daily prn for BP greater than 140/90    clonazePAM (KLONOPIN) 0.5 MG tablet Take 1 tablet (0.5 mg total) by mouth 2 (two) times daily as needed for Anxiety.    fluticasone-vilanterol (BREO ELLIPTA) 200-25 mcg/dose DsDv diskus inhaler Inhale 1 puff into the lungs once daily. Controller    ipratropium-albuterol (COMBIVENT)  mcg/actuation inhaler Inhale 1 puff into the lungs every 6 (six) hours as needed for Wheezing. Rescue    multivitamin-minerals-lutein (CENTRUM SILVER) Tab Take 1 tablet by mouth once daily. 1 Tablet(s) Oral Every day.    rosuvastatin (CRESTOR) 5 MG tablet Take 1 tablet (5 mg total) by mouth once daily.    venlafaxine (EFFEXOR-XR) 37.5 MG 24 hr capsule Take 1  "capsule (37.5 mg total) by mouth once daily.    vitamin D 1000 units Tab Take 2,000 mg by mouth once daily.    azithromycin (ZITHROMAX) 500 MG tablet Starting on May 20, 2017. Take 1 tablet (500 mg total) by mouth every Tues, Thurs, Sat.    budesonide (PULMICORT) 0.5 mg/2 mL nebulizer solution Take 2 mLs by nebulization 2 (two) times daily.    fluconazole (DIFLUCAN) 200 MG Tab Take 1 tablet (200 mg total) by mouth once daily.    fluticasone (FLONASE) 50 mcg/actuation nasal spray 2 sprays by Each Nare route once daily.    mirtazapine (REMERON) 15 MG tablet Take 1 tablet (15 mg total) by mouth every evening.    montelukast (SINGULAIR) 10 mg tablet Take 1 tablet (10 mg total) by mouth every evening.    predniSONE (DELTASONE) 10 MG tablet Take 1 tablet (10 mg total) by mouth 2 (two) times daily.           Clinical Reference Information           Your Vitals Were     BP Pulse Height Weight SpO2 BMI    163/106 (BP Location: Left arm, Patient Position: Sitting, BP Method: Automatic) 96 5' 6" (1.676 m) 51.7 kg (113 lb 15.7 oz) 92% 18.4 kg/m2      Blood Pressure          Most Recent Value    BP  (!)  163/106      Allergies as of 5/19/2017     Augmentin [Amoxicillin-pot Clavulanate]    Pravastatin    Tecfidera [Dimethyl Fumarate]      Immunizations Administered on Date of Encounter - 5/19/2017     None      Orders Placed During Today's Visit     Future Labs/Procedures Expected by Expires    Anti-neutrophilic cytoplasmic antibody  5/19/2017 7/18/2018    Humoral Immune Eval (Pneumo 14) with H. flu  5/19/2017 7/18/2018    IgA  5/19/2017 7/18/2018    IgE  5/19/2017 7/18/2018    IgG  5/19/2017 7/18/2018    IgM  5/19/2017 7/18/2018    STRONGYLOIDES IGG ANTIBODIES  5/19/2017 7/18/2018    Basic metabolic panel  6/19/2017 7/18/2018    Complete PFT with bronchodilator  8/19/2017 5/19/2018    Complete PFT w/ bronchodilator  As directed 5/19/2018      Instructions    Chronic refractory sinuses    Weak immune system?  Re check response to " vaccine, may need repeat vaccine.     Antibiotic again? Take azithromycin daily for 3 then tues  thur and sat     Afrin then 2 flonase in 10 min or so.  singulair.    Lungs- breathing test was nl 2014 and now less than 1/2 normal, did have asthma response, eos associated with asthma and other problems.  Lung tissue looks good on ct,      Prednisone and follow up breathing test may be reasonable, if significant improvement may consider therapy for eosinophilic asthma.   Prednisone 10 mg twice a day.      Immune weak - re check, if off -need to get repeat vaccine and Dr Putnam follow up.      Reflux and sinuses - may benefit from fundoplication.  Constant acid taste not good, may get into sinuses??     remeron may help nerves and sleep and appetite.  15mg daily - stop effexor and use remeron if no better in 2 weeks on effexor.           Language Assistance Services     ATTENTION: Language assistance services are available, free of charge. Please call [phone].      ATENCIÓN: Si habla luly, tiene a spencer disposición servicios gratuitos de asistencia lingüística. Llame al [phone].     [facility] Ý: N?u b?n nói Ti?ng Vi?t, có các d?ch v? h? tr? ngôn ng? mi?n phí dành cho b?n. G?i s? [phone].         [facility] complies with applicable Federal civil rights laws and does not discriminate on the basis of race, color, national origin, age, disability, or sex.

## 2017-05-19 NOTE — PROGRESS NOTES
"5/19/2017    Babita Thomas  New Patient Consult    Chief Complaint   Patient presents with    COPD    Asthma    Sinus Problem       HPI:has had breathing problems last 2 yrs, chr nasal congestion and smell ok, had no nasal polyps, had sinus surg x 3 - one balloon and 2 other - may have helped 1/3.  Has no good response to abx, chr yg mucous.  Has had sinus problem last 3 yrs, has had bad reflux with freq taste in mouth. Uses zantac prn , tried ppi with no good result, no major swallow problem.    Appetite off, megace occ, poor sleep for yrs- no good response.      Ms initially feet numb , has lesions and in remission- 5-10 yrs.      Depressed a bit - just started effexor  2 weeks,    Lost 30 lbs, coughs mainly noct.         The chief compliant  problem is new to me",   PFSH:  Past Medical History:   Diagnosis Date    Allergy     Asthma     GERD (gastroesophageal reflux disease)     Hyperlipidemia     Hypertension     Hypothyroidism     Immune deficiency disorder 5/19/2017    Insomnia 1/31/2017    MS (multiple sclerosis)     very well controlled, no symptoms    Shortness of breath     Sinusitis     Thyroid disease     hypothyroidism- no med         Past Surgical History:   Procedure Laterality Date    BREAST SURGERY Left     biopsy, benign    CHOLECYSTECTOMY      COLONOSCOPY  3-5-2010    Dr Cotter 11 year bal     ESOPHAGUS SURGERY      6 year old, peanut stuck in throat     MULTIPLE TOOTH EXTRACTIONS      SINUS SURGERY  2015    x3    TRACHEOSTOMY CLOSURE      age 6 for peanut in throat    TYMPANOSTOMY TUBE PLACEMENT      x2    WISDOM TOOTH EXTRACTION       Social History   Substance Use Topics    Smoking status: Never Smoker    Smokeless tobacco: Never Used    Alcohol use No     Family History   Problem Relation Age of Onset    Hypertension Mother     Heart disease Father 60     MI    No Known Problems Sister     No Known Problems Daughter     No Known Problems Paternal Aunt     " "No Known Problems Paternal Uncle     No Known Problems Maternal Grandmother     Heart disease Maternal Grandfather 70     MI    No Known Problems Paternal Grandmother     No Known Problems Paternal Grandfather     No Known Problems Paternal Uncle     No Known Problems Daughter      Review of patient's allergies indicates:   Allergen Reactions    Augmentin [amoxicillin-pot clavulanate] Nausea Only    Pravastatin Other (See Comments)     cramping    Tecfidera [dimethyl fumarate] Swelling       Performance Status:The patient's activity level is functions out of house.      Review of Systems:  a review of eleven systems covering constitutional, Eye, HEENT, Psych, Respiratory, Cardiac, GI, , Musculoskeletal, Endocrine, Dermatologic was negative except for pertinent findings as listed ABOVE and below: fatigue, wt loss, sob, cough, sinus, ms,      Exam:Comprehensive exam done. BP (!) 163/106 (BP Location: Left arm, Patient Position: Sitting, BP Method: Automatic)  Pulse 96  Ht 5' 6" (1.676 m)  Wt 51.7 kg (113 lb 15.7 oz)  SpO2 (!) 92%  BMI 18.4 kg/m2  Exam included Vitals as listed, and patient's appearance and affect and alertness and mood, oral exam for yeast and hygiene and pharynx lesions and Mallapatti (M) score, neck with inspection for jvd and masses and thyroid abnormalities and lymph nodes (supraclavicular and infraclavicular nodes and axillary also examined and noted if abn), chest exam included symmetry and effort and fremitus and percussion and auscultation, cardiac exam included rhythm and gallops and murmur and rubs and jvd and edema, abdominal exam for mass and hepatosplenomegaly and tenderness and hernias and bowel sounds, Musculoskeletal exam with muscle tone and posture and mobility/gait and  strength, and skin for rashes and cyanosis and pallor and turgor, extremity for clubbing.  Findings were normal except for pertinent findings listed below:  Slender M1, good exam    Radiographs " (ct chest and cxr) reviewed: view by direct vision  Ct lungs look good    Labs reviewed outside labs        Results for DEBORAH RODRIGUEZ (MRN [id number]) as of 5/19/2017 09:58   Ref. Range 9/9/2016 13:31   Diphtheria Toxoid Ab Latest Ref Range: >0.099 IU/mL 0.775   Tetanus Ab Latest Ref Range: >0.150 IU/mL 0.930   S.pneumoniae Type 1 Latest Units: mcg/mL <0.3   S.pneumoniae Type 3 Latest Units: mcg/mL 0.7   Strep pneumo Type 4 Latest Units: mcg/mL <0.3   S.pneumoniae Type 5 Latest Units: mcg/mL <0.3   S.pneumoniae Type 6B Latest Units: mcg/mL <0.3   S.pneumoniae Type 7F Latest Units: mcg/mL 0.5   S.pneumoniae Type 8 Latest Units: mcg/mL 1.6   S.pneumoniae Type 9N Latest Units: mcg/mL <0.3   S.pneumoniae Type 9V Abs Latest Units: mcg/mL 0.3   S.pneumoniae Type 12F Latest Units: mcg/mL 0.6   Strep pneumo Type 14 Latest Units: mcg/mL 0.9   S.pneumoniae Type 18C Latest Units: mcg/mL 1.2   S.pneumoniae Type 19F Latest Units: mcg/mL 0.8   S.pneumoniae Type 23F Latest Units: mcg/mL 0.4   Results for DEBORAH RODRIGUEZ (MRN [id number]) as of 5/19/2017 09:58   Ref. Range 5/3/2017 09:47   WBC Latest Ref Range: 3.90 - 12.70 K/uL 5.36   RBC Latest Ref Range: 4.00 - 5.40 M/uL 5.06   Hemoglobin Latest Ref Range: 12.0 - 16.0 g/dL 15.9   Hematocrit Latest Ref Range: 37.0 - 48.5 % 47.1   MCV Latest Ref Range: 82 - 98 fL 93   MCH Latest Ref Range: 27.0 - 31.0 pg 31.4 (H)   MCHC Latest Ref Range: 32.0 - 36.0 % 33.8   RDW Latest Ref Range: 11.5 - 14.5 % 13.1   Platelets Latest Ref Range: 150 - 350 K/uL 327   MPV Latest Ref Range: 9.2 - 12.9 fL 9.2   Gran% Latest Ref Range: 38.0 - 73.0 % 47.3   Gran # Latest Ref Range: 1.8 - 7.7 K/uL 2.5   Lymph% Latest Ref Range: 18.0 - 48.0 % 19.4   Lymph # Latest Ref Range: 1.0 - 4.8 K/uL 1.0   Mono% Latest Ref Range: 4.0 - 15.0 % 14.7   Mono # Latest Ref Range: 0.3 - 1.0 K/uL 0.8   Eosinophil% Latest Ref Range: 0.0 - 8.0 % 17.7 (H)   Eos # Latest Ref Range: 0.0 - 0.5 K/uL 1.0 (H)   Basophil% Latest Ref  Range: 0.0 - 1.9 % 0.7   Baso # Latest Ref Range: 0.00 - 0.20 K/uL 0.04     PFT results reviewed     Plan:  Clinical impression is ambiguous and will need repeated evaluation wrt  Total picture.    Babita was seen today for copd, asthma and sinus problem.    Diagnoses and all orders for this visit:    Eosinophilia  -     IgE; Future  -     Anti-neutrophilic cytoplasmic antibody; Future  -     STRONGYLOIDES IGG ANTIBODIES; Future  -     Complete PFT with bronchodilator; Future    Immune deficiency disorder  -     Humoral Immune Eval (Pneumo 14) with H. flu; Future  -     IgG; Future  -     IgM; Future  -     IgA; Future  -     IgE; Future  -     Anti-neutrophilic cytoplasmic antibody; Future  -     azithromycin (ZITHROMAX) 500 MG tablet; Take 1 tablet (500 mg total) by mouth every Tues, Thurs, Sat.    Uncomplicated severe persistent asthma  -     Complete PFT w/ bronchodilator; Future  -     predniSONE (DELTASONE) 10 MG tablet; Take 1 tablet (10 mg total) by mouth 2 (two) times daily.  -     Anti-neutrophilic cytoplasmic antibody; Future  -     Complete PFT with bronchodilator; Future    Chronic sinusitis, unspecified location  -     montelukast (SINGULAIR) 10 mg tablet; Take 1 tablet (10 mg total) by mouth every evening.  -     fluticasone (FLONASE) 50 mcg/actuation nasal spray; 2 sprays by Each Nare route once daily.  -     Anti-neutrophilic cytoplasmic antibody; Future  -     Complete PFT with bronchodilator; Future  -     azithromycin (ZITHROMAX) 500 MG tablet; Take 1 tablet (500 mg total) by mouth every Tues, Thurs, Sat.    Yeast infection  -     fluconazole (DIFLUCAN) 200 MG Tab; Take 1 tablet (200 mg total) by mouth once daily.    Depression, unspecified depression type  -     mirtazapine (REMERON) 15 MG tablet; Take 1 tablet (15 mg total) by mouth every evening.      Return in about 3 months (around 8/19/2017), or if symptoms worsen or fail to improve.    Discussed with patient above for education the following:            Chronic refractory sinuses    Weak immune system?  Re check response to vaccine, may need repeat vaccine.     Antibiotic again? Take azithromycin daily for 3 then tues  thur and sat     Afrin then 2 flonase in 10 min or so.  singulair.    Lungs- breathing test was nl 2014 and now less than 1/2 normal, did have asthma response, eos associated with asthma and other problems.  Lung tissue looks good on ct,      Prednisone and follow up breathing test may be reasonable, if significant improvement may consider therapy for eosinophilic asthma.   Prednisone 10 mg twice a day.      Immune weak - re check, if off -need to get repeat vaccine and Dr Putnam follow up.      Reflux and sinuses - may benefit from fundoplication.  Constant acid taste not good, may get into sinuses??     remeron may help nerves and sleep and appetite.  15mg daily - stop effexor and use remeron if no better in 2 weeks on effexor.                                      Eosin Initial testing -- In the patient groups listed above, we suggest the following initial tests for possible causes of eosinophilia and signs of organ involvement (table 4) (see 'Whom to test' above):   ?Complete blood count with differential to determine abnormalities of other cell lines. Absolute numbers of other white blood cells must be determined because relative percentages may be lowered in the setting of increased eosinophils.   ?Review of the peripheral blood smear (for immature white blood cells, dysplastic features that would suggest primary hematologic disorder)  ?Serum chemistries, creatinine, urinalysis (for evidence of renal insufficiency, and in rare cases adrenal insufficiency)  ?Serum B12 level (elevated in myeloproliferative neoplasms and autoimmune lymphoproliferative syndrome [ALPS])   ?Liver function tests (for evidence of hepatic involvement)  ?Troponin (for evidence of subclinical eosinophil-associated myocarditis); those with elevated troponin  should have electrocardiography and echocardiography.  ?Chest radiography (for evidence of pulmonary involvement)  ?Serologic testing for Strongyloides. Positive serology in an untreated patient is presumptive evidence of infection; however, serology remains positive after treatment, so it is not useful for documenting cure or reinfection.  ?Flow cytometry for lymphocyte subsets (may show clonality in lymphocytic hypereosinophilic syndrome, or lymphoid malignancy (table 5); selective deficiencies in immunodeficiency syndromes (table 6)).

## 2017-05-22 ENCOUNTER — HOSPITAL ENCOUNTER (OUTPATIENT)
Dept: RESPIRATORY THERAPY | Facility: HOSPITAL | Age: 67
Discharge: HOME | End: 2017-05-22
Attending: INTERNAL MEDICINE
Payer: MEDICARE

## 2017-05-22 DIAGNOSIS — J45.50 UNCOMPLICATED SEVERE PERSISTENT ASTHMA: ICD-10-CM

## 2017-05-22 PROCEDURE — 94727 GAS DIL/WSHOT DETER LNG VOL: CPT | Mod: 26,,, | Performed by: INTERNAL MEDICINE

## 2017-05-22 PROCEDURE — 94729 DIFFUSING CAPACITY: CPT | Mod: 26,,, | Performed by: INTERNAL MEDICINE

## 2017-05-22 PROCEDURE — 94620 PR PULMONARY STRESS TESTING,SIMPLE: CPT | Mod: 26,,, | Performed by: INTERNAL MEDICINE

## 2017-05-22 PROCEDURE — 94729 DIFFUSING CAPACITY: CPT

## 2017-05-22 PROCEDURE — 94727 GAS DIL/WSHOT DETER LNG VOL: CPT

## 2017-05-22 PROCEDURE — 94060 EVALUATION OF WHEEZING: CPT

## 2017-05-23 ENCOUNTER — OFFICE VISIT (OUTPATIENT)
Dept: FAMILY MEDICINE | Facility: CLINIC | Age: 67
End: 2017-05-23
Payer: MEDICARE

## 2017-05-23 ENCOUNTER — TELEPHONE (OUTPATIENT)
Dept: PULMONOLOGY | Facility: CLINIC | Age: 67
End: 2017-05-23

## 2017-05-23 ENCOUNTER — LAB VISIT (OUTPATIENT)
Dept: LAB | Facility: HOSPITAL | Age: 67
End: 2017-05-23
Attending: INTERNAL MEDICINE
Payer: MEDICARE

## 2017-05-23 VITALS
HEIGHT: 66 IN | WEIGHT: 110.88 LBS | DIASTOLIC BLOOD PRESSURE: 88 MMHG | SYSTOLIC BLOOD PRESSURE: 122 MMHG | TEMPERATURE: 98 F | OXYGEN SATURATION: 93 % | BODY MASS INDEX: 17.82 KG/M2 | HEART RATE: 103 BPM | RESPIRATION RATE: 22 BRPM

## 2017-05-23 DIAGNOSIS — J32.9 CHRONIC SINUSITIS, UNSPECIFIED LOCATION: ICD-10-CM

## 2017-05-23 DIAGNOSIS — I10 ESSENTIAL HYPERTENSION: ICD-10-CM

## 2017-05-23 DIAGNOSIS — F32.A DEPRESSION, UNSPECIFIED DEPRESSION TYPE: Primary | ICD-10-CM

## 2017-05-23 DIAGNOSIS — F41.9 ANXIETY: ICD-10-CM

## 2017-05-23 DIAGNOSIS — D72.10 EOSINOPHILIA: ICD-10-CM

## 2017-05-23 DIAGNOSIS — R63.6 UNDERWEIGHT: ICD-10-CM

## 2017-05-23 DIAGNOSIS — J45.50 UNCOMPLICATED SEVERE PERSISTENT ASTHMA: ICD-10-CM

## 2017-05-23 DIAGNOSIS — D84.9 IMMUNE DEFICIENCY DISORDER: ICD-10-CM

## 2017-05-23 LAB
ANION GAP SERPL CALC-SCNC: 13 MMOL/L
BUN SERPL-MCNC: 14 MG/DL
CALCIUM SERPL-MCNC: 10.3 MG/DL
CHLORIDE SERPL-SCNC: 96 MMOL/L
CO2 SERPL-SCNC: 25 MMOL/L
CREAT SERPL-MCNC: 0.8 MG/DL
EST. GFR  (AFRICAN AMERICAN): >60 ML/MIN/1.73 M^2
EST. GFR  (NON AFRICAN AMERICAN): >60 ML/MIN/1.73 M^2
GLUCOSE SERPL-MCNC: 93 MG/DL
IGA SERPL-MCNC: 308 MG/DL
IGE SERPL-ACNC: 44 IU/ML
IGG SERPL-MCNC: 1086 MG/DL
IGM SERPL-MCNC: 98 MG/DL
POTASSIUM SERPL-SCNC: 3.5 MMOL/L
SODIUM SERPL-SCNC: 134 MMOL/L

## 2017-05-23 PROCEDURE — 1126F AMNT PAIN NOTED NONE PRSNT: CPT | Mod: S$GLB,,, | Performed by: FAMILY MEDICINE

## 2017-05-23 PROCEDURE — 82784 ASSAY IGA/IGD/IGG/IGM EACH: CPT | Mod: 59

## 2017-05-23 PROCEDURE — 86255 FLUORESCENT ANTIBODY SCREEN: CPT

## 2017-05-23 PROCEDURE — 82784 ASSAY IGA/IGD/IGG/IGM EACH: CPT

## 2017-05-23 PROCEDURE — 99214 OFFICE O/P EST MOD 30 MIN: CPT | Mod: S$GLB,,, | Performed by: FAMILY MEDICINE

## 2017-05-23 PROCEDURE — 99499 UNLISTED E&M SERVICE: CPT | Mod: S$GLB,,, | Performed by: FAMILY MEDICINE

## 2017-05-23 PROCEDURE — 99999 PR PBB SHADOW E&M-EST. PATIENT-LVL V: CPT | Mod: PBBFAC,,, | Performed by: FAMILY MEDICINE

## 2017-05-23 PROCEDURE — 86682 HELMINTH ANTIBODY: CPT

## 2017-05-23 PROCEDURE — 36415 COLL VENOUS BLD VENIPUNCTURE: CPT | Mod: PO

## 2017-05-23 PROCEDURE — 80048 BASIC METABOLIC PNL TOTAL CA: CPT

## 2017-05-23 PROCEDURE — 1159F MED LIST DOCD IN RCRD: CPT | Mod: S$GLB,,, | Performed by: FAMILY MEDICINE

## 2017-05-23 PROCEDURE — 82785 ASSAY OF IGE: CPT

## 2017-05-23 PROCEDURE — 86648 DIPHTHERIA ANTIBODY: CPT

## 2017-05-23 RX ORDER — VENLAFAXINE HYDROCHLORIDE 75 MG/1
75 CAPSULE, EXTENDED RELEASE ORAL DAILY
COMMUNITY
End: 2017-05-23 | Stop reason: SDUPTHER

## 2017-05-23 RX ORDER — VENLAFAXINE HYDROCHLORIDE 150 MG/1
150 CAPSULE, EXTENDED RELEASE ORAL DAILY
Qty: 30 CAPSULE | Refills: 1 | Status: SHIPPED | OUTPATIENT
Start: 2017-05-23 | End: 2017-06-02

## 2017-05-23 RX ORDER — TRIAMCINOLONE ACETONIDE 1 MG/G
OINTMENT TOPICAL 2 TIMES DAILY
Qty: 30 G | Refills: 1 | Status: SHIPPED | OUTPATIENT
Start: 2017-05-23 | End: 2017-06-20

## 2017-05-23 RX ORDER — OXYMETAZOLINE HCL 0.05 %
2 SPRAY, NON-AEROSOL (ML) NASAL DAILY
COMMUNITY
End: 2017-08-01

## 2017-05-23 NOTE — PROGRESS NOTES
CHIEF COMPLAINT: follow up anxiety, depression      HISTORY OF PRESENT ILLNESS:  Babita Thomas is a 67 y.o. female who presents to clinic for follow up on anxiety and depression. She has recently been on  lexapro, celexa and was recently started on effexor xr and is currently on 75 mg daily. She recently saw Dr. Heard who suggested possibly starting remeron if effexor xr is ineffective to also help increase her weight. She does not feel that the effexor is effective at 75 mg.       REVIEW OF SYSTEMS:  The patient denies any fever, chills, night sweats, headaches, vision changes, difficulty speaking or swallowing, decreased hearing, weight loss, weight gain, chest pain, palpitations, shortness of breath,  nausea, vomiting, abdominal pain, dysuria, diarrhea, constipation, hematuria, hematochezia, melena, changes in her hair, skin, nails, numbness or weakness in her extremities, erythema, pain or swelling over any of her joints, myalgia, swollen glands, easy bruising, fatigue, edema      MEDICATIONS:   Reviewed and/or reconciled in EPIC    ALLERGIES:  Reviewed and/or reconciled in [facility]    PAST MEDICAL/SURGICAL HISTORY:   Past Medical History:   Diagnosis Date    Allergy     Asthma     Depression 5/19/2017    GERD (gastroesophageal reflux disease)     Hyperlipidemia     Hypertension     Hypothyroidism     Immune deficiency disorder 5/19/2017    Insomnia 1/31/2017    MS (multiple sclerosis)     very well controlled, no symptoms    Shortness of breath     Sinusitis     Thyroid disease     hypothyroidism- no med      Past Surgical History:   Procedure Laterality Date    BREAST SURGERY Left     biopsy, benign    CHOLECYSTECTOMY      COLONOSCOPY  3-5-2010    Dr Cotter 11 year bal     ESOPHAGUS SURGERY      6 year old, peanut stuck in throat     MULTIPLE TOOTH EXTRACTIONS      SINUS SURGERY  2015    x3    TRACHEOSTOMY CLOSURE      age 6 for peanut in throat    TYMPANOSTOMY TUBE PLACEMENT      x2     "WISDOM TOOTH EXTRACTION         FAMILY HISTORY:    Family History   Problem Relation Age of Onset    Hypertension Mother     Heart disease Father 60     MI    No Known Problems Sister     No Known Problems Daughter     No Known Problems Paternal Aunt     No Known Problems Paternal Uncle     No Known Problems Maternal Grandmother     Heart disease Maternal Grandfather 70     MI    No Known Problems Paternal Grandmother     No Known Problems Paternal Grandfather     No Known Problems Paternal Uncle     No Known Problems Daughter        SOCIAL HISTORY:    Social History     Social History    Marital status:      Spouse name: N/A    Number of children: N/A    Years of education: N/A     Occupational History    Not on file.     Social History Main Topics    Smoking status: Never Smoker    Smokeless tobacco: Never Used    Alcohol use No    Drug use: No    Sexual activity: Yes     Partners: Male     Other Topics Concern    Not on file     Social History Narrative    No narrative on file       PHYSICAL EXAM:  VITAL SIGNS:   Vitals:    05/23/17 0941   Weight: 50.3 kg (110 lb 14.3 oz)   Height: 5' 6" (1.676 m)     GENERAL:  Patient appears well nourished, sitting on exam table, in no acute distress.  HEENT:  Atraumatic, normocephalic, PERRLA, EOMI, no conjunctival injection, sclerae are anicteric, normal external auditory canals,TMs clear b/l, gross hearing intact to whisper, MMM, no oropharygneal erythema or exudate.  NECK:  Supple, normal ROM, trachea is midline , no supraclavicular or cervical LAD or masses palpated.  Thyroid gland not palpable.  CARDIOVASCULAR:  RRR, normal S1 and S2, no m/r/g.  RESPIRATORY:  CTA b/l, no wheezes, rhonchi, rales.  No increased work of breathing, no  use of accessory muscles.  ABDOMEN:  Soft, nontender, nondistended, normoactive bowel sounds in all four quadrants, no rebound or guarding, no HSM or masses palpated.  Normal percussion.  EXTREMITIES:  2+ DP pulses " b/l, no edema.  SKIN:  Warm, no lesions on exposed skin.  NEUROMUSCULAR:  Cranial nerves II-XII grossly intact.   No clubbing or cyanosis of digits/nails.  Steady gait.  PSYCH:  Patient is alert and oriented to person, time, place. They are appropriately dressed and groomed. There is normal eye contact. Rate and tone of speech is normal. Normal insight, judgement. Normal thought content and process.         ASSESSMENT/PLAN: This is a 67 y.o. female who presents to clinic for evaluation of the following concerns   1.  Depression and anxiety: increase effexor to 150 mg daily. Call in 2 weeks to give update may further increase at that time or start remeron  2. HTN: see below  3. Underweight: see below    Patient readiness: acceptance and barriers:none    During the course of the visit the patient was educated and counseled about the following:     Hypertension:   Medication: no change.  Underweight:  Nutritional supplements and Follow up and re-weight in: 6  months and as needed.   May increase with starting remeron.    Goals: Hypertension: Reduce Blood Pressure and Underweight: Increase calorie intake and BMI      Did patient meet goals/outcomes: No    The following self management tools provided: declined    Patient Instructions (the written plan) was given to the patient/family.     Time spent with patient: 45 minutes          FOLLOW UP:  3 months      Natalie Gale MD

## 2017-05-23 NOTE — PATIENT INSTRUCTIONS
Increase effexor xr to 75 mg daily.  Call or e-mail clinic in 1-2 weeks to give update. If no improvement will increase again and then potentially start remeron.

## 2017-05-23 NOTE — TELEPHONE ENCOUNTER
Spoke to pt and she stat    ----- Message from Rebeca Wagner sent at 5/23/2017 12:24 PM CDT -----  Pt started a new medication started on Saturday/ sunday / Monday ... She is covered in a rash and whelps over her body / saw Dr Gale/ was given steroid cream ... Needs to discuss / please call pt at  [phone]

## 2017-05-24 ENCOUNTER — TELEPHONE (OUTPATIENT)
Dept: PULMONOLOGY | Facility: CLINIC | Age: 67
End: 2017-05-24

## 2017-05-24 LAB
ANCA AB TITR SER IF: NORMAL TITER
P-ANCA TITR SER IF: NORMAL TITER
STRONGYLOIDES ANTIBODY IGG: NEGATIVE

## 2017-05-24 NOTE — TELEPHONE ENCOUNTER
Notified pt and scheduled appt in June for f/u     ----- Message from Angelo Heard MD sent at 5/24/2017  1:52 PM CDT -----  Notify blood wk so far ok, should see in f/u in June please.

## 2017-05-25 NOTE — PROCEDURES
Pulmonary Functions, including spirometry and bronchodilator response and lung volumes and diffusion, study was done May 22, 2017.  Spirometry shows severe obstruction, loss vital capacity and obstruction and no bronchodilator response.   FEV1 is 40% or 0.97 liters.  Lung volumes show  loss of TLC with restriction 75% and elevated residual volume.  Diffusion shows reduced but falls within normal range when corrected for lung volumes.    Pulmonary functions show obstruction and air trapping. Clinical correlation recommended.     Angelo Heard M.D.      Copd stages by spirometry with fev1 / fvc < 0.70 (present if report indicates obstruction)     Mild Fev1 80% or more   Moderate 50% to 79%   Severe 30% to 49%   Very severe < 30%

## 2017-06-01 LAB
C DIPHTHERIAE AB SER IA-ACNC: 0.81 IU/ML
C TETANI AB SER-ACNC: 2.33 IU/ML
DEPRECATED S PNEUM 1 IGG SER-MCNC: <0.3 MCG/ML
DEPRECATED S PNEUM12 IGG SER-MCNC: 0.5 MCG/ML
DEPRECATED S PNEUM14 IGG SER-MCNC: 1.2 MCG/ML
DEPRECATED S PNEUM19 IGG SER-MCNC: 2.3 MCG/ML
DEPRECATED S PNEUM23 IGG SER-MCNC: 1.7 MCG/ML
DEPRECATED S PNEUM3 IGG SER-MCNC: 1.3 MCG/ML
DEPRECATED S PNEUM4 IGG SER-MCNC: <0.3 MCG/ML
DEPRECATED S PNEUM5 IGG SER-MCNC: 0.4 MCG/ML
DEPRECATED S PNEUM8 IGG SER-MCNC: 3.7 MCG/ML
DEPRECATED S PNEUM9 IGG SER-MCNC: <0.3 MCG/ML
HAEM INFLU B IGG SER-MCNC: 0.27 MG/L
S PNEUM DA 18C IGG SER-MCNC: 1.2 MCG/ML
S PNEUM DA 6B IGG SER-MCNC: <0.3 MCG/ML
S PNEUM DA 7F IGG SER-MCNC: <0.3 MCG/ML
S PNEUM DA 9V IGG SER-MCNC: <0.3 MCG/ML

## 2017-06-02 ENCOUNTER — TELEPHONE (OUTPATIENT)
Dept: PULMONOLOGY | Facility: CLINIC | Age: 67
End: 2017-06-02

## 2017-06-02 DIAGNOSIS — D84.9 IMMUNE DEFICIENCY DISORDER: Primary | ICD-10-CM

## 2017-06-02 RX ORDER — VENLAFAXINE HYDROCHLORIDE 75 MG/1
CAPSULE, EXTENDED RELEASE ORAL
Qty: 7 CAPSULE | Refills: 0 | Status: SHIPPED | OUTPATIENT
Start: 2017-06-02 | End: 2017-06-15

## 2017-06-02 RX ORDER — VENLAFAXINE HYDROCHLORIDE 37.5 MG/1
CAPSULE, EXTENDED RELEASE ORAL
Qty: 11 CAPSULE | Refills: 0 | Status: SHIPPED | OUTPATIENT
Start: 2017-06-02 | End: 2017-06-15

## 2017-06-02 NOTE — TELEPHONE ENCOUNTER
Spoke to pt and notified her Immune system is still weak, and that she should f/u with Dr Putnam. Pt understood.

## 2017-06-02 NOTE — TELEPHONE ENCOUNTER
Needs to do a cross taper. Can start remeron now but needs to decrease effexor to 75 mg daily for 1 week  then 37.5 mg daily  for 1 week, then 37.5 every other day for 1 week.  Then stop.    needs to let me know if she needs 75 and 37.5 mg capsules.

## 2017-06-02 NOTE — TELEPHONE ENCOUNTER
----- Message from Lavinia Marie sent at 6/2/2017 10:30 AM CDT -----  Contact: self  Patient called regarding prescribed Effexor. Was advised after a week to f/u with how she is doing, not doing better. Had a recent appt with Dr. Heard and wants the patient to stop Effexor and begin using Remeron. Please contact [phone] (pmyk)

## 2017-06-05 RX ORDER — ROSUVASTATIN CALCIUM 5 MG/1
5 TABLET, COATED ORAL DAILY
Qty: 90 TABLET | Refills: 1 | Status: SHIPPED | OUTPATIENT
Start: 2017-06-05 | End: 2018-01-08

## 2017-06-05 NOTE — TELEPHONE ENCOUNTER
----- Message from Emmett Jacobs sent at 6/5/2017  8:18 AM CDT -----  Contact: self   Patient need a refill on crestor please send to [facility], any questions please call back at [phone] (home)       [facility] - [facility] [address]  [address] [facility] [address] [address]  Phone: [phone] Fax: [phone]

## 2017-06-15 ENCOUNTER — OFFICE VISIT (OUTPATIENT)
Dept: ALLERGY | Facility: CLINIC | Age: 67
End: 2017-06-15
Payer: MEDICARE

## 2017-06-15 VITALS
BODY MASS INDEX: 19.31 KG/M2 | HEART RATE: 83 BPM | SYSTOLIC BLOOD PRESSURE: 116 MMHG | WEIGHT: 120.13 LBS | HEIGHT: 66 IN | DIASTOLIC BLOOD PRESSURE: 72 MMHG | OXYGEN SATURATION: 95 %

## 2017-06-15 DIAGNOSIS — J31.0 RHINITIS, CHRONIC: ICD-10-CM

## 2017-06-15 DIAGNOSIS — J32.9 RECURRENT SINUS INFECTIONS: Primary | ICD-10-CM

## 2017-06-15 PROCEDURE — 99999 PR PBB SHADOW E&M-EST. PATIENT-LVL III: CPT | Mod: PBBFAC,,, | Performed by: ALLERGY & IMMUNOLOGY

## 2017-06-15 PROCEDURE — 99499 UNLISTED E&M SERVICE: CPT | Mod: S$GLB,,, | Performed by: ALLERGY & IMMUNOLOGY

## 2017-06-15 PROCEDURE — 90670 PCV13 VACCINE IM: CPT | Mod: S$GLB,,, | Performed by: ALLERGY & IMMUNOLOGY

## 2017-06-15 PROCEDURE — 99214 OFFICE O/P EST MOD 30 MIN: CPT | Mod: S$GLB,,, | Performed by: ALLERGY & IMMUNOLOGY

## 2017-06-15 PROCEDURE — 1159F MED LIST DOCD IN RCRD: CPT | Mod: S$GLB,,, | Performed by: ALLERGY & IMMUNOLOGY

## 2017-06-15 PROCEDURE — 1126F AMNT PAIN NOTED NONE PRSNT: CPT | Mod: S$GLB,,, | Performed by: ALLERGY & IMMUNOLOGY

## 2017-06-15 PROCEDURE — G0009 ADMIN PNEUMOCOCCAL VACCINE: HCPCS | Mod: S$GLB,,, | Performed by: ALLERGY & IMMUNOLOGY

## 2017-06-15 NOTE — PROGRESS NOTES
Subjective:       Patient ID: Babita Thomas is a 67 y.o. female.    Chief Complaint:  Consult (low immune titers)      66 year-old woman presents for evaluation of chronic congestion, hoarse, cough and now low strep titers. She was last seen 8/2016. She had negative immunocaps in past but then some positives to tree, weed and mold on skin test. She has reflux and is on medication for that. She is on Flonase, Singulair, antihistamine, Breo, and combivent. She still always has congestion and pressure, throbbing in head but no runny nose,no PND right now but at times does, no sneeze. She has never had pneumonia but gets exacerbations of chronic sinus issues and antibiotics about 3 times per year has been going in for several years. She is fatigued.   Had 3 sinus surgeries and ear tubes. Has seen pulmonary, PFT normal, GI, ENT and nothing helps. Is on Prilosec in AM, was on BID was on with zantac and no help. Has used antihistamines like Claritin and zyrtec and nose sprays an no help.oral antibiotics an steroid no help. No eczema. No known food, insect or latex allergy.   Recent labs in Sept with normal immunoglobulins and humoral panel only -protected to 2 strep pneumo serotypes. Got pneumovax in November (had Prevnar in 2015) and repeat iters not done until end of may again normal immunoglobulins and titers protected to 4/14.     Prior History taken 10/7/14: consult from Dr Richard Stevens for cough. She states she has been coughing since June. She has been seen 8 times by 4 doctors as well as ER twice. She has had antibiotics, steroid, nasal sprays, cough meds, etc and nothing helps. One of the times she went to ER she had 2 neb treatments and steroid injection and she felt great for couple days but came back. She has albuterol inhaler but does not help. Cough is dry and hacking. It is better now, before she would get SOB when she coughed and felt like would pass out, not as bad now. occ has runny nose or sneeze  with it. She has seasonal rhinitis in past and takes loratadine in spring and fall, on ow. She has noticed after outside or work in garden she is worse. Also took nail polish off the other day and coughed after. She has reflux, she saw an ENT for this and they said was reflux so put her on med then GI doc increased med and she is still coughing. She has no known asthma, no eczema, no food, insect or latex allergy.         Environmental History: see history section for home environment  Review of Systems   Constitutional: Negative for appetite change, chills, fatigue and fever.   HENT: Positive for congestion, ear pain, postnasal drip, rhinorrhea, sinus pressure, sneezing, sore throat and voice change. Negative for ear discharge, facial swelling, nosebleeds and trouble swallowing.    Eyes: Negative for discharge, redness, itching and visual disturbance.   Respiratory: Positive for cough. Negative for choking, chest tightness, shortness of breath and wheezing.    Cardiovascular: Negative for chest pain, palpitations and leg swelling.   Gastrointestinal: Negative for abdominal distention, abdominal pain, constipation, diarrhea, nausea and vomiting.   Genitourinary: Negative for difficulty urinating.   Musculoskeletal: Negative for arthralgias, gait problem, joint swelling and myalgias.   Skin: Negative for color change and rash.   Neurological: Negative for dizziness, syncope, weakness, light-headedness and headaches.   Hematological: Negative for adenopathy. Does not bruise/bleed easily.   Psychiatric/Behavioral: Negative for agitation, behavioral problems, confusion and sleep disturbance. The patient is not nervous/anxious.         Objective:    Physical Exam   Constitutional: She is oriented to person, place, and time. She appears well-developed and well-nourished. No distress.   HENT:   Head: Normocephalic and atraumatic.   Right Ear: Hearing, tympanic membrane, external ear and ear canal normal.   Left Ear:  Hearing, tympanic membrane, external ear and ear canal normal.   Nose: No mucosal edema, rhinorrhea, sinus tenderness or septal deviation. No epistaxis. Right sinus exhibits no maxillary sinus tenderness and no frontal sinus tenderness. Left sinus exhibits no maxillary sinus tenderness and no frontal sinus tenderness.   Mouth/Throat: Uvula is midline, oropharynx is clear and moist and mucous membranes are normal. No uvula swelling.   Eyes: Conjunctivae are normal. Right eye exhibits no discharge. Left eye exhibits no discharge.   Neck: Normal range of motion. No thyromegaly present.   Cardiovascular: Normal rate, regular rhythm and normal heart sounds.    No murmur heard.  Pulmonary/Chest: Effort normal and breath sounds normal. No respiratory distress. She has no wheezes.   Abdominal: Soft. She exhibits no distension. There is no tenderness.   Musculoskeletal: Normal range of motion. She exhibits no edema or tenderness.   Lymphadenopathy:     She has no cervical adenopathy.   Neurological: She is alert and oriented to person, place, and time.   Skin: Skin is warm and dry. No rash noted. No erythema.   Psychiatric: She has a normal mood and affect. Her behavior is normal. Judgment and thought content normal.   Nursing note and vitals reviewed.      Laboratory:   Percutaneous Skin Testing: inhalants; 2+ pecan tree, alternaria mold, 1-2+ pigweed and penicillium mold, 1+ cottonwood tree, acremonium with 3+ histamine control and reminder negative  Assessment:       1. Recurrent sinus infections    2. Rhinitis, chronic         Plan:       1. Prevnar today and repeat labs in 5 weeks with phone review  2. continue Flonase 2 SNE daily, antihistamine daily and montelukast daily  3. continue PPI daily

## 2017-06-20 ENCOUNTER — OFFICE VISIT (OUTPATIENT)
Dept: PULMONOLOGY | Facility: CLINIC | Age: 67
End: 2017-06-20
Payer: MEDICARE

## 2017-06-20 VITALS
OXYGEN SATURATION: 95 % | SYSTOLIC BLOOD PRESSURE: 146 MMHG | HEIGHT: 66 IN | HEART RATE: 93 BPM | DIASTOLIC BLOOD PRESSURE: 96 MMHG | WEIGHT: 119.69 LBS | BODY MASS INDEX: 19.24 KG/M2

## 2017-06-20 DIAGNOSIS — F32.A DEPRESSION, UNSPECIFIED DEPRESSION TYPE: ICD-10-CM

## 2017-06-20 DIAGNOSIS — G47.00 INSOMNIA, UNSPECIFIED TYPE: ICD-10-CM

## 2017-06-20 DIAGNOSIS — J32.4 CHRONIC PANSINUSITIS: Primary | ICD-10-CM

## 2017-06-20 DIAGNOSIS — J45.30 MILD PERSISTENT ASTHMA WITHOUT COMPLICATION: ICD-10-CM

## 2017-06-20 DIAGNOSIS — D84.9 IMMUNE DEFICIENCY DISORDER: ICD-10-CM

## 2017-06-20 PROCEDURE — 1159F MED LIST DOCD IN RCRD: CPT | Mod: S$GLB,,, | Performed by: INTERNAL MEDICINE

## 2017-06-20 PROCEDURE — 99999 PR PBB SHADOW E&M-EST. PATIENT-LVL III: CPT | Mod: PBBFAC,,, | Performed by: INTERNAL MEDICINE

## 2017-06-20 PROCEDURE — 99214 OFFICE O/P EST MOD 30 MIN: CPT | Mod: S$GLB,,, | Performed by: INTERNAL MEDICINE

## 2017-06-20 PROCEDURE — 1126F AMNT PAIN NOTED NONE PRSNT: CPT | Mod: S$GLB,,, | Performed by: INTERNAL MEDICINE

## 2017-06-20 PROCEDURE — 99499 UNLISTED E&M SERVICE: CPT | Mod: S$GLB,,, | Performed by: INTERNAL MEDICINE

## 2017-06-20 RX ORDER — VENLAFAXINE 25 MG/1
25 TABLET ORAL 2 TIMES DAILY
Qty: 60 TABLET | Refills: 3 | Status: SHIPPED | OUTPATIENT
Start: 2017-06-20 | End: 2017-08-01

## 2017-06-20 NOTE — PATIENT INSTRUCTIONS
Use sinus irrigation with 1% baby shampoo in saline twice a day.    Reflux disease may be playing role with sinus disease.      Breathing good now?  Check lung capacity in 6 months.    Might skip singulair but need to resume if any worsening?    Need to continue flonase, use afrin if stuffy.    If no infections and stable, may be best to observe immune problem??    Use effexor if needed and good results,  For sleep.    Use remeron for appetite/sleep/mood.  Higher dose may just affect mood?    Need gerd rxed.

## 2017-06-20 NOTE — PROGRESS NOTES
"6/20/2017    Babita Thomas  New Patient Consult    Chief Complaint   Patient presents with    Follow-up    Asthma       June 20,  Breathing fine , still sinus problems, mood good/appetite good- sleep poorly.  Had rash was pustular on prednisone and azithromycin.  Still having gerd with severe disease.  Slept well with remeron and effexor.  Sinus problem still.        May 19, 2017HPI:has had breathing problems last 2 yrs, chr nasal congestion and smell ok, had no nasal polyps, had sinus surg x 3 - one balloon and 2 other - may have helped 1/3.  Has no good response to abx, chr yg mucous.  Has had sinus problem last 3 yrs, has had bad reflux with freq taste in mouth. Uses zantac prn , tried ppi with no good result, no major swallow problem.    Appetite off, megace occ, poor sleep for yrs- no good response.      Ms initially feet numb , has lesions and in remission- 5-10 yrs.      Depressed a bit - just started effexor  2 weeks,    Lost 30 lbs, coughs mainly noct.         The chief compliant  problem is new to me",   PFSH:  Past Medical History:   Diagnosis Date    Allergy     Asthma     Depression 5/19/2017    GERD (gastroesophageal reflux disease)     Hyperlipidemia     Hypertension     Hypothyroidism     Immune deficiency disorder 5/19/2017    Insomnia 1/31/2017    MS (multiple sclerosis)     very well controlled, no symptoms    Shortness of breath     Sinusitis     Thyroid disease     hypothyroidism- no med         Past Surgical History:   Procedure Laterality Date    BREAST SURGERY Left     biopsy, benign    CHOLECYSTECTOMY      COLONOSCOPY  3-5-2010    Dr Cotter 11 year bal     ESOPHAGUS SURGERY      6 year old, peanut stuck in throat     MULTIPLE TOOTH EXTRACTIONS      SINUS SURGERY  2015    x3    TRACHEOSTOMY CLOSURE      age 6 for peanut in throat    TYMPANOSTOMY TUBE PLACEMENT      x2    WISDOM TOOTH EXTRACTION       Social History   Substance Use Topics    Smoking status: Never " "Smoker    Smokeless tobacco: Never Used    Alcohol use No     Family History   Problem Relation Age of Onset    Hypertension Mother     Heart disease Father 60     MI    No Known Problems Sister     No Known Problems Daughter     No Known Problems Paternal Aunt     No Known Problems Paternal Uncle     No Known Problems Maternal Grandmother     Heart disease Maternal Grandfather 70     MI    No Known Problems Paternal Grandmother     No Known Problems Paternal Grandfather     No Known Problems Paternal Uncle     No Known Problems Daughter      Review of patient's allergies indicates:   Allergen Reactions    Augmentin [amoxicillin-pot clavulanate] Nausea Only    Pravastatin Other (See Comments)     cramping    Tecfidera [dimethyl fumarate] Swelling       Performance Status:The patient's activity level is functions out of house.      Review of Systems:  a review of eleven systems covering constitutional, Eye, HEENT, Psych, Respiratory, Cardiac, GI, , Musculoskeletal, Endocrine, Dermatologic was negative except for pertinent findings as listed ABOVE and below: fatigue, wt loss, sob, cough, sinus, ms,      Exam:Comprehensive exam done. BP (!) 146/96 (BP Location: Left arm, Patient Position: Sitting, BP Method: Automatic)   Pulse 93   Ht 5' 5.5" (1.664 m)   Wt 54.3 kg (119 lb 11.4 oz)   SpO2 95%   BMI 19.62 kg/m²   Exam included Vitals as listed, and patient's appearance and affect and alertness and mood, oral exam for yeast and hygiene and pharynx lesions and Mallapatti (M) score, neck with inspection for jvd and masses and thyroid abnormalities and lymph nodes (supraclavicular and infraclavicular nodes and axillary also examined and noted if abn), chest exam included symmetry and effort and fremitus and percussion and auscultation, cardiac exam included rhythm and gallops and murmur and rubs and jvd and edema, abdominal exam for mass and hepatosplenomegaly and tenderness and hernias and bowel " sounds, Musculoskeletal exam with muscle tone and posture and mobility/gait and  strength, and skin for rashes and cyanosis and pallor and turgor, extremity for clubbing.  Findings were normal except for pertinent findings listed below:  Slender M1, good exam    Radiographs (ct chest and cxr) reviewed: view by direct vision  Ct lungs look good    Labs reviewed outside labs        Results for DEBORAH RODRIGUEZ (MRN [id number]) as of 5/19/2017 09:58   Ref. Range 9/9/2016 13:31   Diphtheria Toxoid Ab Latest Ref Range: >0.099 IU/mL 0.775   Tetanus Ab Latest Ref Range: >0.150 IU/mL 0.930   S.pneumoniae Type 1 Latest Units: mcg/mL <0.3   S.pneumoniae Type 3 Latest Units: mcg/mL 0.7   Strep pneumo Type 4 Latest Units: mcg/mL <0.3   S.pneumoniae Type 5 Latest Units: mcg/mL <0.3   S.pneumoniae Type 6B Latest Units: mcg/mL <0.3   S.pneumoniae Type 7F Latest Units: mcg/mL 0.5   S.pneumoniae Type 8 Latest Units: mcg/mL 1.6   S.pneumoniae Type 9N Latest Units: mcg/mL <0.3   S.pneumoniae Type 9V Abs Latest Units: mcg/mL 0.3   S.pneumoniae Type 12F Latest Units: mcg/mL 0.6   Strep pneumo Type 14 Latest Units: mcg/mL 0.9   S.pneumoniae Type 18C Latest Units: mcg/mL 1.2   S.pneumoniae Type 19F Latest Units: mcg/mL 0.8   S.pneumoniae Type 23F Latest Units: mcg/mL 0.4   Results for DEBORAH RODRIGUEZ (MRN [id number]) as of 5/19/2017 09:58   Ref. Range 5/3/2017 09:47   WBC Latest Ref Range: 3.90 - 12.70 K/uL 5.36   RBC Latest Ref Range: 4.00 - 5.40 M/uL 5.06   Hemoglobin Latest Ref Range: 12.0 - 16.0 g/dL 15.9   Hematocrit Latest Ref Range: 37.0 - 48.5 % 47.1   MCV Latest Ref Range: 82 - 98 fL 93   MCH Latest Ref Range: 27.0 - 31.0 pg 31.4 (H)   MCHC Latest Ref Range: 32.0 - 36.0 % 33.8   RDW Latest Ref Range: 11.5 - 14.5 % 13.1   Platelets Latest Ref Range: 150 - 350 K/uL 327   MPV Latest Ref Range: 9.2 - 12.9 fL 9.2   Gran% Latest Ref Range: 38.0 - 73.0 % 47.3   Gran # Latest Ref Range: 1.8 - 7.7 K/uL 2.5   Lymph% Latest Ref Range:  18.0 - 48.0 % 19.4   Lymph # Latest Ref Range: 1.0 - 4.8 K/uL 1.0   Mono% Latest Ref Range: 4.0 - 15.0 % 14.7   Mono # Latest Ref Range: 0.3 - 1.0 K/uL 0.8   Eosinophil% Latest Ref Range: 0.0 - 8.0 % 17.7 (H)   Eos # Latest Ref Range: 0.0 - 0.5 K/uL 1.0 (H)   Basophil% Latest Ref Range: 0.0 - 1.9 % 0.7   Baso # Latest Ref Range: 0.00 - 0.20 K/uL 0.04     PFT results reviewed     Plan:  Clinical impression is ambiguous and will need repeated evaluation wrt  Total picture.    Babita was seen today for follow-up and asthma.    Diagnoses and all orders for this visit:    Chronic pansinusitis    Mild persistent asthma without complication    Immune deficiency disorder    Depression, unspecified depression type  -     venlafaxine (EFFEXOR) 25 MG Tab; Take 1 tablet (25 mg total) by mouth 2 (two) times daily.    Insomnia, unspecified type        Return in about 6 months (around 12/20/2017), or if symptoms worsen or fail to improve.    Discussed with patient above for education the following:        Use sinus irrigation with 1% baby shampoo in saline twice a day.    Reflux disease may be playing role with sinus disease.      Breathing good now?  Check lung capacity in 6 months.    Might skip singulair but need to resume if any worsening?    Need to continue flonase, use afrin if stuffy.    If no infections and stable, may be best to observe immune problem??    Use effexor if needed and good results,  For sleep.    Use remeron for appetite/sleep/mood.  Higher dose may just affect mood?    Need gerd rxed.

## 2017-07-20 ENCOUNTER — LAB VISIT (OUTPATIENT)
Dept: LAB | Facility: HOSPITAL | Age: 67
End: 2017-07-20
Attending: ALLERGY & IMMUNOLOGY
Payer: MEDICARE

## 2017-07-20 DIAGNOSIS — J32.9 RECURRENT SINUS INFECTIONS: ICD-10-CM

## 2017-07-20 LAB — IGG SERPL-MCNC: 913 MG/DL

## 2017-07-20 PROCEDURE — 36415 COLL VENOUS BLD VENIPUNCTURE: CPT | Mod: PO

## 2017-07-20 PROCEDURE — 82784 ASSAY IGA/IGD/IGG/IGM EACH: CPT

## 2017-07-20 PROCEDURE — 82787 IGG 1 2 3 OR 4 EACH: CPT | Mod: 59

## 2017-07-20 PROCEDURE — 86684 HEMOPHILUS INFLUENZA ANTIBDY: CPT

## 2017-07-22 LAB
IGG1 SER-MCNC: 499 MG/DL
IGG2 SER-MCNC: 172 MG/DL
IGG3 SER-MCNC: 26 MG/DL
IGG4 SER-MCNC: 13 MG/DL

## 2017-07-24 ENCOUNTER — TELEPHONE (OUTPATIENT)
Dept: PULMONOLOGY | Facility: CLINIC | Age: 67
End: 2017-07-24

## 2017-07-24 NOTE — TELEPHONE ENCOUNTER
----- Message from Angelo Heard MD sent at 7/24/2017  4:21 PM CDT -----  Stop remeron and see eye doc asap, remeron can cause glaucoma and sensitivity to light.

## 2017-07-27 LAB
C DIPHTHERIAE AB SER IA-ACNC: 1.11 IU/ML
C TETANI AB SER-ACNC: 0.7 IU/ML
DEPRECATED S PNEUM 1 IGG SER-MCNC: 0.5 MCG/ML
DEPRECATED S PNEUM12 IGG SER-MCNC: 0.5 MCG/ML
DEPRECATED S PNEUM14 IGG SER-MCNC: 2.2 MCG/ML
DEPRECATED S PNEUM19 IGG SER-MCNC: 2.1 MCG/ML
DEPRECATED S PNEUM23 IGG SER-MCNC: 2.6 MCG/ML
DEPRECATED S PNEUM3 IGG SER-MCNC: 3.1 MCG/ML
DEPRECATED S PNEUM4 IGG SER-MCNC: <0.3 MCG/ML
DEPRECATED S PNEUM5 IGG SER-MCNC: 0.6 MCG/ML
DEPRECATED S PNEUM8 IGG SER-MCNC: 2.9 MCG/ML
DEPRECATED S PNEUM9 IGG SER-MCNC: 0.3 MCG/ML
HAEM INFLU B IGG SER-MCNC: 0.28 MG/L
S PNEUM DA 18C IGG SER-MCNC: 1.7 MCG/ML
S PNEUM DA 6B IGG SER-MCNC: 0.7 MCG/ML
S PNEUM DA 7F IGG SER-MCNC: 0.6 MCG/ML
S PNEUM DA 9V IGG SER-MCNC: 0.4 MCG/ML

## 2017-07-28 ENCOUNTER — TELEPHONE (OUTPATIENT)
Dept: ALLERGY | Facility: CLINIC | Age: 67
End: 2017-07-28

## 2017-07-28 NOTE — TELEPHONE ENCOUNTER
Please let her know she did not have great response to Prevnar, increased some to protection 6/14. 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 I cannot remember how much we talked about IgG therapy but I would like her to consider it so may be best if she comes in to discuss further

## 2017-07-28 NOTE — TELEPHONE ENCOUNTER
"Spoke to pt, told her DR Putnam said:    "Please let her know she did not have great response to Prevnar, increased some to protection 6/14. 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 I cannot remember how much we talked about IgG therapy but I would like her to consider it so may be best if she comes in to discuss further."    Made appt for pt 8/8/17  "

## 2017-07-31 ENCOUNTER — DOCUMENTATION ONLY (OUTPATIENT)
Dept: FAMILY MEDICINE | Facility: CLINIC | Age: 67
End: 2017-07-31

## 2017-07-31 NOTE — PROGRESS NOTES
Pre-Visit Chart Review  For Appointment Scheduled on 07/31/17    Health Maintenance Due   Topic Date Due    TETANUS VACCINE  01/18/1969    DEXA SCAN  01/18/1991    Mammogram  02/10/2016

## 2017-08-01 ENCOUNTER — OFFICE VISIT (OUTPATIENT)
Dept: FAMILY MEDICINE | Facility: CLINIC | Age: 67
End: 2017-08-01
Payer: MEDICARE

## 2017-08-01 VITALS
DIASTOLIC BLOOD PRESSURE: 87 MMHG | BODY MASS INDEX: 20.55 KG/M2 | HEART RATE: 74 BPM | HEIGHT: 66 IN | SYSTOLIC BLOOD PRESSURE: 134 MMHG | WEIGHT: 127.88 LBS | TEMPERATURE: 98 F

## 2017-08-01 DIAGNOSIS — G47.00 INSOMNIA, UNSPECIFIED TYPE: ICD-10-CM

## 2017-08-01 DIAGNOSIS — F41.1 GAD (GENERALIZED ANXIETY DISORDER): Primary | ICD-10-CM

## 2017-08-01 PROCEDURE — 1159F MED LIST DOCD IN RCRD: CPT | Mod: S$GLB,,, | Performed by: PHYSICIAN ASSISTANT

## 2017-08-01 PROCEDURE — 1126F AMNT PAIN NOTED NONE PRSNT: CPT | Mod: S$GLB,,, | Performed by: PHYSICIAN ASSISTANT

## 2017-08-01 PROCEDURE — 99214 OFFICE O/P EST MOD 30 MIN: CPT | Mod: S$GLB,,, | Performed by: PHYSICIAN ASSISTANT

## 2017-08-01 PROCEDURE — 99999 PR PBB SHADOW E&M-EST. PATIENT-LVL III: CPT | Mod: PBBFAC,,, | Performed by: PHYSICIAN ASSISTANT

## 2017-08-01 PROCEDURE — 99499 UNLISTED E&M SERVICE: CPT | Mod: S$GLB,,, | Performed by: PHYSICIAN ASSISTANT

## 2017-08-01 RX ORDER — ZOLPIDEM TARTRATE 5 MG/1
TABLET ORAL
Qty: 30 TABLET | Refills: 0 | Status: SHIPPED | OUTPATIENT
Start: 2017-08-01 | End: 2017-08-11

## 2017-08-01 RX ORDER — VENLAFAXINE HYDROCHLORIDE 37.5 MG/1
CAPSULE, EXTENDED RELEASE ORAL
Qty: 21 CAPSULE | Refills: 0 | Status: SHIPPED | OUTPATIENT
Start: 2017-08-01 | End: 2017-09-12

## 2017-08-01 RX ORDER — VENLAFAXINE HYDROCHLORIDE 75 MG/1
150 CAPSULE, EXTENDED RELEASE ORAL DAILY
Qty: 30 CAPSULE | Refills: 0 | Status: SHIPPED | OUTPATIENT
Start: 2017-08-01 | End: 2017-08-08

## 2017-08-01 NOTE — PROGRESS NOTES
Subjective:       Patient ID: Babita Thomas is a 67 y.o. female.    Chief Complaint: Insomnia    HPI   Patient is a 67 year old  female presenting to the clinic for f/u anxiety & insomnia. She was previously taking effexor 150mg but was weaned down & this was tried with remeron via Dr. Heard. She reports the Remeron seemed to help her sleep, but caused her visual issues so she had to stop this. She has tried lexapro & celexa in the past without any improvement. She would like to retry the effexor again & wean up to 225mg. She also reports increasing difficulty sleeping at night time. She reports laying in bed for 3 hours last night before falling asleep. She also has difficulty staying asleep at night time. She has tried trazodone & klonopin in the past without much relief.   Review of Systems   Constitutional: Negative for activity change, appetite change, chills, diaphoresis, fatigue and fever.   HENT: Negative for congestion, postnasal drip and rhinorrhea.    Respiratory: Negative.  Negative for cough, shortness of breath and wheezing.    Cardiovascular: Negative.  Negative for chest pain.   Gastrointestinal: Negative for abdominal pain, blood in stool, constipation, diarrhea, nausea and vomiting.   Genitourinary: Negative for dysuria, frequency, hematuria and urgency.   Musculoskeletal: Negative.    Skin: Negative.  Negative for color change and rash.   Neurological: Negative for dizziness and syncope.   Psychiatric/Behavioral: Positive for sleep disturbance. Negative for agitation, behavioral problems, confusion, self-injury and suicidal ideas. The patient is not nervous/anxious.        Objective:      Physical Exam   Constitutional: Vital signs are normal. She appears well-developed and well-nourished. No distress.   Cardiovascular: Normal rate, regular rhythm, S1 normal, S2 normal and normal heart sounds.  Exam reveals no gallop.    No murmur heard.  Pulses:       Radial pulses are 2+ on the right  side, and 2+ on the left side.   <2sec cap refill fingers bilat     Pulmonary/Chest: Effort normal and breath sounds normal. No respiratory distress. She has no wheezes. She has no rhonchi.   Skin: Skin is warm and dry. She is not diaphoretic.   Appropriate skin turgor   Psychiatric: She has a normal mood and affect. Her speech is normal and behavior is normal. Judgment and thought content normal. Cognition and memory are normal.       Assessment:       1. SAMUEL (generalized anxiety disorder)    2. Insomnia, unspecified type        Plan:   Babita was seen today for insomnia.    Diagnoses and all orders for this visit:    SAMUEL (generalized anxiety disorder)  Wean effexor back up to 150mg daily; may increase to 225mg   -     venlafaxine (EFFEXOR-XR) 37.5 MG 24 hr capsule; Take 37.5mg x 1 week; then take 75mg x 1 week; then start 150mg capsules  -     venlafaxine (EFFEXOR-XR) 75 MG 24 hr capsule; Take 2 capsules (150 mg total) by mouth once daily.    Insomnia, unspecified type  Trial of ambien 2.5-5mg nightly PRN insomnia per Dr. Gale

## 2017-08-04 ENCOUNTER — TELEPHONE (OUTPATIENT)
Dept: FAMILY MEDICINE | Facility: CLINIC | Age: 67
End: 2017-08-04

## 2017-08-04 NOTE — TELEPHONE ENCOUNTER
Spoke with pt, states since she started the 37.5 of effexor and has woken up in the middle of the night with headaches and abd cramping. Does not like the way it makes her feel. Pt asked if she could bump back down to 25mg. Advised pt that will be okay and she will wait to hear from us with what PATRIA Cordoba suggest. Please advise

## 2017-08-04 NOTE — TELEPHONE ENCOUNTER
----- Message from Janette Olsen sent at 8/4/2017  8:33 AM CDT -----  Patient requesting to speak with nurse concerning medication/has question/please call back at [phone] to advise. (only information given)

## 2017-08-04 NOTE — TELEPHONE ENCOUNTER
If she is doing well on the effexor, we can stay on this dose, but I thought we discussed this was ineffective.     I would recommend trying something else- maybe zoloft?    Is the ambien helping with sleep.    Let me know if she wants to try zozloft.

## 2017-08-08 ENCOUNTER — OFFICE VISIT (OUTPATIENT)
Dept: ALLERGY | Facility: CLINIC | Age: 67
End: 2017-08-08
Payer: MEDICARE

## 2017-08-08 ENCOUNTER — TELEPHONE (OUTPATIENT)
Dept: FAMILY MEDICINE | Facility: CLINIC | Age: 67
End: 2017-08-08

## 2017-08-08 ENCOUNTER — TELEPHONE (OUTPATIENT)
Dept: ALLERGY | Facility: CLINIC | Age: 67
End: 2017-08-08

## 2017-08-08 VITALS
WEIGHT: 126.56 LBS | BODY MASS INDEX: 21.09 KG/M2 | HEART RATE: 78 BPM | HEIGHT: 65 IN | SYSTOLIC BLOOD PRESSURE: 149 MMHG | DIASTOLIC BLOOD PRESSURE: 89 MMHG

## 2017-08-08 DIAGNOSIS — K21.9 GASTROESOPHAGEAL REFLUX DISEASE WITHOUT ESOPHAGITIS: ICD-10-CM

## 2017-08-08 DIAGNOSIS — G35 MULTIPLE SCLEROSIS: ICD-10-CM

## 2017-08-08 DIAGNOSIS — D83.9 CVID (COMMON VARIABLE IMMUNODEFICIENCY): Primary | ICD-10-CM

## 2017-08-08 DIAGNOSIS — J32.4 CHRONIC PANSINUSITIS: ICD-10-CM

## 2017-08-08 PROCEDURE — 3008F BODY MASS INDEX DOCD: CPT | Mod: S$GLB,,, | Performed by: ALLERGY & IMMUNOLOGY

## 2017-08-08 PROCEDURE — 1159F MED LIST DOCD IN RCRD: CPT | Mod: S$GLB,,, | Performed by: ALLERGY & IMMUNOLOGY

## 2017-08-08 PROCEDURE — 99214 OFFICE O/P EST MOD 30 MIN: CPT | Mod: S$GLB,,, | Performed by: ALLERGY & IMMUNOLOGY

## 2017-08-08 PROCEDURE — 3079F DIAST BP 80-89 MM HG: CPT | Mod: S$GLB,,, | Performed by: ALLERGY & IMMUNOLOGY

## 2017-08-08 PROCEDURE — 99999 PR PBB SHADOW E&M-EST. PATIENT-LVL III: CPT | Mod: PBBFAC,,, | Performed by: ALLERGY & IMMUNOLOGY

## 2017-08-08 PROCEDURE — 99499 UNLISTED E&M SERVICE: CPT | Mod: S$GLB,,, | Performed by: ALLERGY & IMMUNOLOGY

## 2017-08-08 PROCEDURE — 3077F SYST BP >= 140 MM HG: CPT | Mod: S$GLB,,, | Performed by: ALLERGY & IMMUNOLOGY

## 2017-08-08 RX ORDER — PNV NO.95/FERROUS FUM/FOLIC AC 28MG-0.8MG
1000 TABLET ORAL DAILY
COMMUNITY
End: 2018-01-08

## 2017-08-08 NOTE — TELEPHONE ENCOUNTER
Patient advised that the lower dose of effexor is starting to work, but she feels like she needs something in addition to this, like a booster. She was taking remeron previously but this gave her visual problems. Also patient would like to know if she can take klonopin for sleep instead of ambien.

## 2017-08-08 NOTE — TELEPHONE ENCOUNTER
Please see below message. She needs to try ambien as klonopin is just as dangerous as ambien & may even be more dangerous as the dose she was taking.    Also, we suggest she try a different medication other than the effexor.

## 2017-08-08 NOTE — TELEPHONE ENCOUNTER
Dr. Gale,    Please see below telephone messages. Would you like for me to make her the first available apt for her to see you to discuss these medications?    Edwige

## 2017-08-08 NOTE — TELEPHONE ENCOUNTER
"Patient notified of message below and will start Ambien in place on Klonopin.    Patient states she isn't taking 75mg and tried to "Take 37.5mg x 1 week; then take 75mg x 1 week; then start 150mg capsules" but patient stopped and continued with just 25mg again because she couldn't stand the headaches it was giving her and the 25mg seems to help for now. States she feels like it just worked better when she was taking it with Remeron in the past.    Patient states she can continue medication as is and will follow up if needed.  "

## 2017-08-08 NOTE — TELEPHONE ENCOUNTER
If you want, but it won't be soon and needs to be 40 min.  I would tell her that the ambien is just as safe as the klonopin.  Also if effexor 75 is not effective why would she take 25 mg ?  I suggest she start a different medication.

## 2017-08-08 NOTE — TELEPHONE ENCOUNTER
----- Message from Lisette Degroot sent at 8/8/2017 10:05 AM CDT -----  Patient is returning office call concerning her medication. Please call for details at [phone].

## 2017-08-08 NOTE — TELEPHONE ENCOUNTER
Patient advised she has heard a lot of stories about ambien and has reservations about taking this medication.

## 2017-08-08 NOTE — PROGRESS NOTES
Subjective:       Patient ID: Babita Thomas is a 67 y.o. female.    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 She was last seen 6/15/2017. She had negative immunocaps in past but then some positives to tree, weed and mold on skin test. She has reflux and is on medication for that. She is on Flonase, Singulair, antihistamine, Breo, and combivent. She still always has congestion and pressure, throbbing in head but no runny nose,no PND right now but at times does, no sneeze. She has never had pneumonia but gets exacerbations of chronic sinus issues and antibiotics about 3 times per year has been going in for several years. She is fatigued.   Had 3 sinus surgeries and ear tubes. Has seen pulmonary, PFT normal, GI, ENT and nothing helps. Is on Prilosec in AM, was on BID was on with zantac and no help. Has used antihistamines like Claritin and zyrtec and nose sprays an no help.oral antibiotics an steroid no help. No eczema. No known food, insect or latex allergy.   Recent labs in Sept with normal immunoglobulins and humoral panel only -protected to 2 strep pneumo serotypes. Got pneumovax in November (had Prevnar in 2015) then got Prevnar at last visit and repeat labs show low IgG2 and strep titers only slightly protected to 6/14. She still has pressure and CT scan shows pansinusitis despite 3 surgeries and many antibiotics.     Prior History taken 10/7/14: consult from Dr Richard Stevens for cough. She states she has been coughing since June. She has been seen 8 times by 4 doctors as well as ER twice. She has had antibiotics, steroid, nasal sprays, cough meds, etc and nothing helps. One of the times she went to ER she had 2 neb treatments and steroid injection and she felt great for couple days but came back. She has albuterol inhaler but does not help. Cough is dry  and hacking. It is better now, before she would get SOB when she coughed and felt like would pass out, not as bad now. occ has runny nose or sneeze with it. She has seasonal rhinitis in past and takes loratadine in spring and fall, on ow. She has noticed after outside or work in garden she is worse. Also took nail polish off the other day and coughed after. She has reflux, she saw an ENT for this and they said was reflux so put her on med then GI doc increased med and she is still coughing. She has no known asthma, no eczema, no food, insect or latex allergy.         Environmental History: see history section for home environment  Review of Systems   Constitutional: Negative for appetite change, chills, fatigue and fever.   HENT: Positive for congestion, ear pain, postnasal drip, rhinorrhea, sinus pressure, sneezing, sore throat and voice change. Negative for ear discharge, facial swelling, nosebleeds and trouble swallowing.    Eyes: Negative for discharge, redness, itching and visual disturbance.   Respiratory: Positive for cough. Negative for choking, chest tightness, shortness of breath and wheezing.    Cardiovascular: Negative for chest pain, palpitations and leg swelling.   Gastrointestinal: Negative for abdominal distention, abdominal pain, constipation, diarrhea, nausea and vomiting.   Genitourinary: Negative for difficulty urinating.   Musculoskeletal: Negative for arthralgias, gait problem, joint swelling and myalgias.   Skin: Negative for color change and rash.   Neurological: Negative for dizziness, syncope, weakness, light-headedness and headaches.   Hematological: Negative for adenopathy. Does not bruise/bleed easily.   Psychiatric/Behavioral: Negative for agitation, behavioral problems, confusion and sleep disturbance. The patient is not nervous/anxious.         Objective:    Physical Exam   Constitutional: She is oriented to person, place, and time. She appears well-developed and well-nourished. No  distress.   HENT:   Head: Normocephalic and atraumatic.   Right Ear: Hearing, tympanic membrane, external ear and ear canal normal.   Left Ear: Hearing, tympanic membrane, external ear and ear canal normal.   Nose: No mucosal edema, rhinorrhea, sinus tenderness or septal deviation. No epistaxis. Right sinus exhibits no maxillary sinus tenderness and no frontal sinus tenderness. Left sinus exhibits no maxillary sinus tenderness and no frontal sinus tenderness.   Mouth/Throat: Uvula is midline, oropharynx is clear and moist and mucous membranes are normal. No uvula swelling.   Eyes: Conjunctivae are normal. Right eye exhibits no discharge. Left eye exhibits no discharge.   Neck: Normal range of motion. No thyromegaly present.   Cardiovascular: Normal rate, regular rhythm and normal heart sounds.    No murmur heard.  Pulmonary/Chest: Effort normal and breath sounds normal. No respiratory distress. She has no wheezes.   Abdominal: Soft. She exhibits no distension. There is no tenderness.   Musculoskeletal: Normal range of motion. She exhibits no edema or tenderness.   Lymphadenopathy:     She has no cervical adenopathy.   Neurological: She is alert and oriented to person, place, and time.   Skin: Skin is warm and dry. No rash noted. No erythema.   Psychiatric: She has a normal mood and affect. Her behavior is normal. Judgment and thought content normal.   Nursing note and vitals reviewed.      Laboratory:   Percutaneous Skin Testing: inhalants; 2+ pecan tree, alternaria mold, 1-2+ pigweed and penicillium mold, 1+ cottonwood tree, acremonium with 3+ histamine control and reminder negative  Assessment:       1. CVID (common variable immunodeficiency)    2. Multiple sclerosis    3. Chronic pansinusitis    4. Gastroesophageal reflux disease without esophagitis         Plan:       1. 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 Wilson tart IgG cuvitru 12 g every 2 weeks  2. continue Flonase 2 SNE daily, antihistamine daily and montelukast daily  3. continue PPI daily

## 2017-08-09 ENCOUNTER — TELEPHONE (OUTPATIENT)
Dept: FAMILY MEDICINE | Facility: CLINIC | Age: 67
End: 2017-08-09

## 2017-08-09 NOTE — TELEPHONE ENCOUNTER
----- Message from Katelyn Bullock sent at 8/9/2017  8:35 AM CDT -----  Contact: pt, # [phone]  Pt was told by Dr Gale to schedule appt for next month for: Med Chk  Call back on # [phone]  thanks

## 2017-08-11 ENCOUNTER — TELEPHONE (OUTPATIENT)
Dept: FAMILY MEDICINE | Facility: CLINIC | Age: 67
End: 2017-08-11

## 2017-08-11 RX ORDER — CLONAZEPAM 2 MG/1
2 TABLET ORAL NIGHTLY PRN
Qty: 30 TABLET | Refills: 2 | Status: SHIPPED | OUTPATIENT
Start: 2017-08-11 | End: 2017-09-12

## 2017-08-11 NOTE — TELEPHONE ENCOUNTER
ambien removed from med list, amberly sent to pharmacy. Please let patient know that I recommend that she see psychiatry for her insomnia, depression due to the fact that the medications we have tried have been ineffective or she has had intolerances. She can check with Dr. Zhu, Dr. Shirley's offices or the [facility] which has a psychiatrist, or [facility].  If she finds a psychiatrist and needs a referral she can then let us know.

## 2017-08-11 NOTE — TELEPHONE ENCOUNTER
Patient notified and states understanding spell Doctors names out below for patient,states she will notify office who she decides to see patient states understanding.

## 2017-08-11 NOTE — TELEPHONE ENCOUNTER
Patient advised she is taking 1 whole ambien, and it is not helping. She states again she would like to go back to 2mg of klonopin.

## 2017-08-11 NOTE — TELEPHONE ENCOUNTER
----- Message from Mikey Dorman sent at 8/11/2017  9:47 AM CDT -----  Contact: patient  Patient called to advise that she is taking Ambien and is not helping.please call back at [phone]. Thanks,

## 2017-08-14 ENCOUNTER — TELEPHONE (OUTPATIENT)
Dept: ALLERGY | Facility: CLINIC | Age: 67
End: 2017-08-14

## 2017-08-14 RX ORDER — CEFDINIR 300 MG/1
300 CAPSULE ORAL 2 TIMES DAILY
Qty: 42 CAPSULE | Refills: 0 | Status: SHIPPED | OUTPATIENT
Start: 2017-08-14 | End: 2017-09-04

## 2017-08-14 NOTE — TELEPHONE ENCOUNTER
----- Message from Francesca Bryan LPN sent at 8/14/2017 11:02 AM CDT -----  Contact: pt      ----- Message -----  From: Katelyn Bullock  Sent: 8/14/2017   8:37 AM  To: [place] MURPHY Staff    Pt states would like to do the Antibiotic,please send to [facility]  Call back on # [phone]  thanks    [facility] [address]  [place] [address]  Phone: [phone] Fax: [phone]

## 2017-08-14 NOTE — TELEPHONE ENCOUNTER
Please call and see what she is talking about, we had discussed avoiding antibiotic so please see what is going on

## 2017-08-14 NOTE — TELEPHONE ENCOUNTER
"Spoke to pt, she said she is feeling a little better but would like to try one more round of antibiotic to see if that will knock it out. If not she would do the infusion, she states that the infusion seemed "like a big deal".     "

## 2017-08-15 NOTE — TELEPHONE ENCOUNTER
Spoke to pt, told her Dr Putnam called in antibiotic. Pt states she picked it up this morning, asked that  call in something for yeast infection because everytime she take an antibiotic she gets a yeast infection.

## 2017-08-15 NOTE — TELEPHONE ENCOUNTER
I don't routinely call in diflucan, of she starts with symptoms then can call and let us know what is going on, I don't want her to take unless sh needs it

## 2017-08-22 ENCOUNTER — TELEPHONE (OUTPATIENT)
Dept: ALLERGY | Facility: CLINIC | Age: 67
End: 2017-08-22

## 2017-08-22 RX ORDER — FLUCONAZOLE 150 MG/1
150 TABLET ORAL DAILY
Qty: 1 TABLET | Refills: 0 | Status: SHIPPED | OUTPATIENT
Start: 2017-08-22 | End: 2017-08-23

## 2017-08-22 NOTE — TELEPHONE ENCOUNTER
Spoke to pt, she states that she has not started the antibiotics because she had to go out of town, but she knows that every time she has taken an antibiotic in the past 3 yrs she has developed a yeast infection. Would like to have diflucan on hand.         ----- Message from Naeem White sent at 8/21/2017 12:08 PM CDT -----  Contact: pt  Pt is calling to see if she can get a prescription for Diflucan   Call Back#[phone]  Thanks    [facility] [address]  [place] [address]  Phone: [phone] Fax: [phone]

## 2017-08-30 ENCOUNTER — TELEPHONE (OUTPATIENT)
Dept: PULMONOLOGY | Facility: CLINIC | Age: 67
End: 2017-08-30

## 2017-08-30 NOTE — TELEPHONE ENCOUNTER
----- Message from Angelo Heard MD sent at 8/30/2017  2:55 PM CDT -----  Contact: pt [phone]  Dr dennis indicates psyc should manage  Her insomnia.  I can do pulmonary, but have to support dr dennis.  ----- Message -----  From: Stephanie Joe MA  Sent: 8/30/2017   2:37 PM  To: Angelo Heard MD        ----- Message -----  From: Jennifer Hawley  Sent: 8/30/2017  10:38 AM  To: Félix JEFFERSON Staff    Patient called and asked for a call back she is asking for some medication to help her sleep.

## 2017-09-12 ENCOUNTER — DOCUMENTATION ONLY (OUTPATIENT)
Dept: FAMILY MEDICINE | Facility: CLINIC | Age: 67
End: 2017-09-12

## 2017-09-12 ENCOUNTER — OFFICE VISIT (OUTPATIENT)
Dept: FAMILY MEDICINE | Facility: CLINIC | Age: 67
End: 2017-09-12
Payer: MEDICARE

## 2017-09-12 ENCOUNTER — TELEPHONE (OUTPATIENT)
Dept: ALLERGY | Facility: CLINIC | Age: 67
End: 2017-09-12

## 2017-09-12 VITALS
OXYGEN SATURATION: 98 % | BODY MASS INDEX: 21.71 KG/M2 | HEIGHT: 65 IN | SYSTOLIC BLOOD PRESSURE: 115 MMHG | WEIGHT: 130.31 LBS | TEMPERATURE: 98 F | HEART RATE: 91 BPM | DIASTOLIC BLOOD PRESSURE: 81 MMHG

## 2017-09-12 DIAGNOSIS — I10 ESSENTIAL HYPERTENSION: ICD-10-CM

## 2017-09-12 DIAGNOSIS — D83.9 CVID (COMMON VARIABLE IMMUNODEFICIENCY): ICD-10-CM

## 2017-09-12 DIAGNOSIS — F41.1 GAD (GENERALIZED ANXIETY DISORDER): Primary | ICD-10-CM

## 2017-09-12 DIAGNOSIS — G47.00 INSOMNIA, UNSPECIFIED TYPE: ICD-10-CM

## 2017-09-12 PROCEDURE — 99999 PR PBB SHADOW E&M-EST. PATIENT-LVL IV: CPT | Mod: PBBFAC,,, | Performed by: PHYSICIAN ASSISTANT

## 2017-09-12 PROCEDURE — 99213 OFFICE O/P EST LOW 20 MIN: CPT | Mod: S$GLB,,, | Performed by: PHYSICIAN ASSISTANT

## 2017-09-12 PROCEDURE — 1159F MED LIST DOCD IN RCRD: CPT | Mod: S$GLB,,, | Performed by: PHYSICIAN ASSISTANT

## 2017-09-12 PROCEDURE — 1126F AMNT PAIN NOTED NONE PRSNT: CPT | Mod: S$GLB,,, | Performed by: PHYSICIAN ASSISTANT

## 2017-09-12 PROCEDURE — 3074F SYST BP LT 130 MM HG: CPT | Mod: S$GLB,,, | Performed by: PHYSICIAN ASSISTANT

## 2017-09-12 PROCEDURE — 99499 UNLISTED E&M SERVICE: CPT | Mod: S$GLB,,, | Performed by: PHYSICIAN ASSISTANT

## 2017-09-12 PROCEDURE — 3079F DIAST BP 80-89 MM HG: CPT | Mod: S$GLB,,, | Performed by: PHYSICIAN ASSISTANT

## 2017-09-12 PROCEDURE — 3008F BODY MASS INDEX DOCD: CPT | Mod: S$GLB,,, | Performed by: PHYSICIAN ASSISTANT

## 2017-09-12 RX ORDER — MIRTAZAPINE 15 MG/1
15 TABLET, FILM COATED ORAL NIGHTLY
COMMUNITY
End: 2017-09-12

## 2017-09-12 RX ORDER — MIRTAZAPINE 30 MG/1
30 TABLET, FILM COATED ORAL NIGHTLY
Qty: 30 TABLET | Refills: 2 | Status: SHIPPED | OUTPATIENT
Start: 2017-09-12 | End: 2018-01-08

## 2017-09-12 RX ORDER — ZOLPIDEM TARTRATE 5 MG/1
5 TABLET ORAL NIGHTLY PRN
COMMUNITY
End: 2017-09-26

## 2017-09-12 RX ORDER — BACLOFEN 10 MG/1
10 TABLET ORAL 3 TIMES DAILY
COMMUNITY
End: 2018-01-08

## 2017-09-12 NOTE — PROGRESS NOTES
Subjective:       Patient ID: Babita Thomas is a 67 y.o. female.    Chief Complaint: follow up anxiety and insomnia    HPI   Patient is a 67 year old  female presenting to the clinic for f/u SAMUEL, insomnia. She reports effexor was providing her no relief. She stopped this on her own. She reports wanting to try remeron again because it helped the best in the past, however, she had some eye issue side effects. She had an eye exam since try remeron & her eye doctor did not feel like her symptoms were related to the medication so she has decided to restart remeron 15mg nightly. She reports that this is helping best with her insomnia & helps some with her anxiety, but feels like her anxiety could be better controlled. She reports increased stress in her life, family issues, taking care of elderly mother out of town.  Review of Systems   Constitutional: Negative for activity change, appetite change, chills, diaphoresis, fatigue and fever.   HENT: Negative for congestion, postnasal drip and rhinorrhea.    Respiratory: Negative.  Negative for cough, shortness of breath and wheezing.    Cardiovascular: Negative.  Negative for chest pain.   Gastrointestinal: Negative for abdominal pain, blood in stool, constipation, diarrhea, nausea and vomiting.   Genitourinary: Negative for dysuria, frequency, hematuria and urgency.   Musculoskeletal: Negative.    Skin: Negative.  Negative for color change and rash.   Neurological: Negative for dizziness and syncope.   Psychiatric/Behavioral: Positive for sleep disturbance. Negative for agitation, behavioral problems and confusion. The patient is nervous/anxious.        Objective:      Physical Exam   Constitutional: Vital signs are normal. She appears well-developed and well-nourished. No distress.   Cardiovascular: Normal rate, regular rhythm, S1 normal, S2 normal and normal heart sounds.  Exam reveals no gallop.    No murmur heard.  Pulses:       Radial pulses are 2+ on the  right side, and 2+ on the left side.        Pulmonary/Chest: Effort normal and breath sounds normal. No respiratory distress. She has no wheezes. She has no rhonchi.   Skin: Skin is warm and dry. She is not diaphoretic.   Psychiatric: She has a normal mood and affect. Her speech is normal and behavior is normal. Judgment and thought content normal. Cognition and memory are normal.       Assessment:       1. SAMUEL (generalized anxiety disorder)    2. Insomnia, unspecified type    3. Essential hypertension        Plan:   Babita was seen today for follow up anxiety and insomnia.    Diagnoses and all orders for this visit:    SAMUEL (generalized anxiety disorder)  Discussed trial of increasing remeron to 30mg nightly  Phone call in 2-3 weeks to let us know how medication is working.  -     mirtazapine (REMERON) 30 MG tablet; Take 1 tablet (30 mg total) by mouth every evening.    Insomnia, unspecified type  -     mirtazapine (REMERON) 30 MG tablet; Take 1 tablet (30 mg total) by mouth every evening.    Patient reports ambien did not help with sleep well; she is now concerned to take klonopin that she was finally given- 2mg nightly; she does still, however, have this on hand in case needed.    Essential hypertension  Well controlled  Continue current bp medications    Patient readiness: acceptance and barriers:none    During the course of the visit the patient was educated and counseled about the following:     Hypertension:   Medication: no change.    Goals: Hypertension: Reduce Blood Pressure    Did patient meet goals/outcomes: No    The following self management tools provided: declined    Patient Instructions (the written plan) was given to the patient/family.     Time spent with patient: 30 minutes

## 2017-09-12 NOTE — TELEPHONE ENCOUNTER
No repeat labs are needed. As we discussed at her visit she has subclass deficiencies and low antibodies and we talked about Cuvitru. She asked to try antibiotic again to see how she feels but there are no more labs needed

## 2017-09-12 NOTE — PATIENT INSTRUCTIONS
Established High Blood Pressure    High blood pressure (hypertension) is a chronic disease. Often, healthcare providers dont know what causes it. But it can be caused by certain health conditions and medicines.  If you have high blood pressure, you may not have any symptoms. If you do have symptoms, they may include headache, dizziness, changes in your vision, chest pain, and shortness of breath. But even without symptoms, high blood pressure thats not treated raises your risk for heart attack and stroke. High blood pressure is a serious health risk and shouldnt be ignored.  A blood pressure reading is made up of two numbers: a higher number over a lower number. The top number is the systolic pressure. The bottom number is the diastolic pressure. A normal blood pressure is a systolic pressure of  less than 120 over a diastolic pressure of less than 80. You will see your blood pressure readings written together. For example, a person with a systolic pressure of 188 and a diastolic pressure of 78 will have 118/78 written in the medical record.  High blood pressure is when either the top number is 140 or higher, or the bottom number is 90 or higher. This must be the result when taking your blood pressure a number of times. The blood pressures between normal and high are called prehypertension.  Home care  If you have high blood pressure, you should do what is listed below to lower your blood pressure. If you are taking medicines for high blood pressure, these methods may reduce or end your need for medicines in the future.  · Begin a weight-loss program if you are overweight.  · Cut back on how much salt you get in your diet. Heres how to do this:  ¨ Dont eat foods that have a lot of salt. These include olives, pickles, smoked meats, and salted potato chips.  ¨ Dont add salt to your food at the table.  ¨ Use only small amounts of salt when cooking.  · Start an exercise program. Talk with your healthcare  provider about the type of exercise program that would be best for you. It doesn't have to be hard. Even brisk walking for 20 minutes 3 times a week is a good form of exercise.  · Dont take medicines that stimulate the heart. This includes many over-the-counter cold and sinus decongestant pills and sprays, as well as diet pills. Check the warnings about hypertension on the label. Before buying any over-the-counter medicines or supplements, always ask the pharmacist about the product's potential interaction with your high blood pressure and your high blood pressure medicines.  · Stimulants such as amphetamine or cocaine could be deadly for someone with high blood pressure. Never take these.  · Limit how much caffeine you get in your diet. Switch to caffeine-free products.  · Stop smoking. If you are a long-time smoker, this can be hard. Talk to your healthcare provider about medicines and nicotine replacement options to help you. Also, enroll in a stop-smoking program to make it more likely that you will quit for good.  · Learn how to handle stress. This is an important part of any program to lower blood pressure. Learn about relaxation methods like meditation, yoga, or biofeedback.  · If your provider prescribed medicines, take them exactly as directed. Missing doses may cause your blood pressure get out of control.  · If you miss a dose or doses, check with your healthcare provider or pharmacist about what to do.  · Consider buying an automatic blood pressure machine. Ask your provider for a recommendation. You can get one of these at most pharmacies.     The American Heart Association recommends the following guidelines for home blood pressure monitoring:  · Don't smoke or drink coffee for 30 minutes before taking your blood pressure.  · Go to the bathroom before the test.  · Relax for 5 minutes before taking the measurement.  · Sit with your back supported (don't sit on a couch or soft chair); keep your feet on  the floor uncrossed. Place your arm on a solid flat surface (like a table) with the upper part of the arm at heart level. Place the middle of the cuff directly above the eye of the elbow. Check the monitor's instruction manual for an illustration.  · Take multiple readings. When you measure, take 2 to 3 readings one minute apart and record all of the results.  · Take your blood pressure at the same time every day, or as your healthcare provider recommends.  · Record the date, time, and blood pressure reading.  · Take the record with you to your next medical appointment. If your blood pressure monitor has a built-in memory, simply take the monitor with you to your next appointment.  · Call your provider if you have several high readings. Don't be frightened by a single high blood pressure reading, but if you get several high readings, check in with your healthcare provider.  · Note: When blood pressure reaches a systolic (top number) of 180 or higher OR diastolic (bottom number) of 110 or higher, seek emergency medical treatment.  Follow-up care  You will need to see your healthcare provider regularly. This is to check your blood pressure and to make changes to your medicines. Make a follow-up appointment as directed. Bring the record of your home blood pressure readings to the appointment.  When to seek medical advice  Call your healthcare provider right away if any of these occur:  · Blood pressure reaches a systolic (upper number) of 180 or higher OR a diastolic (bottom number) of 110 or higher  · Chest pain or shortness of breath  · Severe headache  · Throbbing or rushing sound in the ears  · Nosebleed  · Sudden severe pain in your belly (abdomen)  · Extreme drowsiness, confusion, or fainting  · Dizziness or spinning sensation (vertigo)  · Weakness of an arm or leg or one side of the face  · You have problems speaking or seeing   Date Last Reviewed: 12/1/2016  © 0832-4779 [employer]. [address] [place] [address]  [place] [address]. All rights reserved. This information is not intended as a substitute for professional medical care. Always follow your healthcare professional's instructions.

## 2017-09-12 NOTE — TELEPHONE ENCOUNTER
----- Message from Francesca Bryan LPN sent at 9/12/2017 11:29 AM CDT -----  Contact: self  Please advise.      ----- Message -----  From: Serena Betancourt  Sent: 9/12/2017  10:02 AM  To: [place] MURPHY Staff    Patient called to report that she has finished the antibiotics and needs to know if she now needs labs   Please call  to advise     Thanks

## 2017-09-12 NOTE — PROGRESS NOTES
Pre-Visit Chart Review  For Appointment Scheduled on 9/12/17    Health Maintenance Due   Topic Date Due    TETANUS VACCINE  01[birth year]18/1968    DEXA SCAN  01/18/1991    Mammogram  02/10/2016    Influenza Vaccine  08/01/2017

## 2017-09-26 ENCOUNTER — OFFICE VISIT (OUTPATIENT)
Dept: FAMILY MEDICINE | Facility: CLINIC | Age: 67
End: 2017-09-26
Payer: MEDICARE

## 2017-09-26 ENCOUNTER — DOCUMENTATION ONLY (OUTPATIENT)
Dept: FAMILY MEDICINE | Facility: CLINIC | Age: 67
End: 2017-09-26

## 2017-09-26 ENCOUNTER — LAB VISIT (OUTPATIENT)
Dept: LAB | Facility: HOSPITAL | Age: 67
End: 2017-09-26
Attending: PHYSICIAN ASSISTANT
Payer: MEDICARE

## 2017-09-26 VITALS
BODY MASS INDEX: 22.44 KG/M2 | HEART RATE: 81 BPM | HEIGHT: 65 IN | DIASTOLIC BLOOD PRESSURE: 81 MMHG | SYSTOLIC BLOOD PRESSURE: 115 MMHG | TEMPERATURE: 98 F | WEIGHT: 134.69 LBS

## 2017-09-26 DIAGNOSIS — M25.473 ANKLE SWELLING, UNSPECIFIED LATERALITY: ICD-10-CM

## 2017-09-26 DIAGNOSIS — R63.5 WEIGHT GAIN: ICD-10-CM

## 2017-09-26 DIAGNOSIS — T50.905A MEDICATION REACTION, INITIAL ENCOUNTER: Primary | ICD-10-CM

## 2017-09-26 DIAGNOSIS — R22.0 FACIAL SWELLING: ICD-10-CM

## 2017-09-26 DIAGNOSIS — R63.6 UNDERWEIGHT: ICD-10-CM

## 2017-09-26 LAB
BASOPHILS # BLD AUTO: 0.06 K/UL
BASOPHILS NFR BLD: 0.9 %
BNP SERPL-MCNC: 72 PG/ML
DIFFERENTIAL METHOD: ABNORMAL
EOSINOPHIL # BLD AUTO: 1.1 K/UL
EOSINOPHIL NFR BLD: 16 %
ERYTHROCYTE [DISTWIDTH] IN BLOOD BY AUTOMATED COUNT: 12.9 %
HCT VFR BLD AUTO: 42.4 %
HGB BLD-MCNC: 13.7 G/DL
LYMPHOCYTES # BLD AUTO: 2.1 K/UL
LYMPHOCYTES NFR BLD: 31 %
MCH RBC QN AUTO: 30.3 PG
MCHC RBC AUTO-ENTMCNC: 32.3 G/DL
MCV RBC AUTO: 94 FL
MONOCYTES # BLD AUTO: 1 K/UL
MONOCYTES NFR BLD: 14 %
NEUTROPHILS # BLD AUTO: 2.6 K/UL
NEUTROPHILS NFR BLD: 37.8 %
PLATELET # BLD AUTO: 327 K/UL
PMV BLD AUTO: 9.9 FL
RBC # BLD AUTO: 4.52 M/UL
WBC # BLD AUTO: 6.87 K/UL

## 2017-09-26 PROCEDURE — 99214 OFFICE O/P EST MOD 30 MIN: CPT | Mod: S$GLB,,, | Performed by: PHYSICIAN ASSISTANT

## 2017-09-26 PROCEDURE — 3008F BODY MASS INDEX DOCD: CPT | Mod: S$GLB,,, | Performed by: PHYSICIAN ASSISTANT

## 2017-09-26 PROCEDURE — 3074F SYST BP LT 130 MM HG: CPT | Mod: S$GLB,,, | Performed by: PHYSICIAN ASSISTANT

## 2017-09-26 PROCEDURE — 85025 COMPLETE CBC W/AUTO DIFF WBC: CPT

## 2017-09-26 PROCEDURE — 99999 PR PBB SHADOW E&M-EST. PATIENT-LVL III: CPT | Mod: PBBFAC,,, | Performed by: PHYSICIAN ASSISTANT

## 2017-09-26 PROCEDURE — 36415 COLL VENOUS BLD VENIPUNCTURE: CPT | Mod: PO

## 2017-09-26 PROCEDURE — 3079F DIAST BP 80-89 MM HG: CPT | Mod: S$GLB,,, | Performed by: PHYSICIAN ASSISTANT

## 2017-09-26 PROCEDURE — 84443 ASSAY THYROID STIM HORMONE: CPT

## 2017-09-26 PROCEDURE — 1159F MED LIST DOCD IN RCRD: CPT | Mod: S$GLB,,, | Performed by: PHYSICIAN ASSISTANT

## 2017-09-26 PROCEDURE — 1125F AMNT PAIN NOTED PAIN PRSNT: CPT | Mod: S$GLB,,, | Performed by: PHYSICIAN ASSISTANT

## 2017-09-26 PROCEDURE — 80053 COMPREHEN METABOLIC PANEL: CPT

## 2017-09-26 PROCEDURE — 83880 ASSAY OF NATRIURETIC PEPTIDE: CPT

## 2017-09-26 RX ORDER — CLONAZEPAM 2 MG/1
2 TABLET ORAL 2 TIMES DAILY
COMMUNITY
End: 2018-04-16

## 2017-09-26 NOTE — PROGRESS NOTES
Pre-Visit Chart Review  For Appointment Scheduled on 09/26/17    Health Maintenance Due   Topic Date Due    TETANUS VACCINE  01/18/1969    DEXA SCAN  01/18/1991    Mammogram  02/10/2016    Influenza Vaccine  08/01/2017

## 2017-09-26 NOTE — PROGRESS NOTES
Subjective:       Patient ID: Babita Thomas is a 67 y.o. female.    Chief Complaint: Edema (Feet)    HPI   Patient is a 67 year old  female presenting to the clinic for c/o swelling in b/l ankles and feet for the past 2 weeks. She reports swelling is persistent throughout the entire day. She denies any pain. She denies any redness and warmth in lower legs. The only recent medication adjustments have been due to increasing klonopin to 2mg nightly for difficult to control insomnia & restarting back on remeron (which did not cause similar issue when being on this in the past.) She denies any associated shortness of breath, but does have a chronic cough. She also reports swelling under bilateral eyes.  Review of Systems   Constitutional: Negative for activity change, appetite change, chills, diaphoresis, fatigue and fever.   HENT: Negative for congestion, postnasal drip and rhinorrhea.    Respiratory: Positive for cough. Negative for shortness of breath and wheezing.    Cardiovascular: Positive for leg swelling. Negative for chest pain.   Gastrointestinal: Negative for abdominal pain, blood in stool, constipation, diarrhea, nausea and vomiting.   Genitourinary: Negative for dysuria, frequency, hematuria and urgency.   Musculoskeletal: Negative.    Skin: Negative.  Negative for color change and rash.   Neurological: Negative for dizziness and syncope.   Psychiatric/Behavioral: Negative for agitation, behavioral problems and confusion.       Objective:      Physical Exam   Constitutional: Vital signs are normal. She appears well-developed and well-nourished. No distress.   HENT:   Head: Normocephalic and atraumatic.   Nose: Nose normal.   Mouth/Throat: Uvula is midline and oropharynx is clear and moist.   Mild edema b/l lower lids   Cardiovascular: Normal rate, regular rhythm, S1 normal, S2 normal and normal heart sounds.  Exam reveals no gallop.    No murmur heard.  Pulses:       Radial pulses are 2+ on the  right side, and 2+ on the left side.   <2sec cap refill fingers bilat     Pulmonary/Chest: Effort normal and breath sounds normal. No respiratory distress. She has no wheezes. She has no rhonchi.   Skin: Skin is warm and dry. She is not diaphoretic.   Mild feet and ankle swelling bilaterally, non-pitting, non-TTP, no warmth, no redness   Psychiatric: She has a normal mood and affect. Her speech is normal and behavior is normal. Judgment and thought content normal. Cognition and memory are normal.       Assessment:       1. Medication reaction, initial encounter    2. Facial swelling    3. Ankle swelling, unspecified laterality    4. Underweight    5. Weight gain        Plan:   Babita was seen today for edema.    Diagnoses and all orders for this visit:    Medication reaction, initial encounter  Decrease Klonopin to 1mg nightly & add melatonin to help with insomnia   Patient to call next week to let us know how change is working.    Facial swelling  -     Comprehensive metabolic panel; Future  -     CBC auto differential; Future  -     TSH; Future  -     Brain natriuretic peptide; Future  -     Urinalysis; Future    Ankle swelling, unspecified laterality  -     Comprehensive metabolic panel; Future  -     CBC auto differential; Future  -     TSH; Future  -     Brain natriuretic peptide; Future  -     Urinalysis; Future    Underweight    Weight gain  -     TSH; Future

## 2017-09-27 LAB
ALBUMIN SERPL BCP-MCNC: 3.5 G/DL
ALP SERPL-CCNC: 105 U/L
ALT SERPL W/O P-5'-P-CCNC: 24 U/L
ANION GAP SERPL CALC-SCNC: 8 MMOL/L
AST SERPL-CCNC: 36 U/L
BILIRUB SERPL-MCNC: 0.2 MG/DL
BUN SERPL-MCNC: 14 MG/DL
CALCIUM SERPL-MCNC: 9.5 MG/DL
CHLORIDE SERPL-SCNC: 103 MMOL/L
CO2 SERPL-SCNC: 31 MMOL/L
CREAT SERPL-MCNC: 0.8 MG/DL
EST. GFR  (AFRICAN AMERICAN): >60 ML/MIN/1.73 M^2
EST. GFR  (NON AFRICAN AMERICAN): >60 ML/MIN/1.73 M^2
GLUCOSE SERPL-MCNC: 91 MG/DL
POTASSIUM SERPL-SCNC: 3.6 MMOL/L
PROT SERPL-MCNC: 7.2 G/DL
SODIUM SERPL-SCNC: 142 MMOL/L
TSH SERPL DL<=0.005 MIU/L-ACNC: 3.2 UIU/ML

## 2017-10-02 ENCOUNTER — TELEPHONE (OUTPATIENT)
Dept: FAMILY MEDICINE | Facility: CLINIC | Age: 67
End: 2017-10-02

## 2017-10-02 NOTE — TELEPHONE ENCOUNTER
----- Message from PATRIA Mosley sent at 9/28/2017  9:30 AM CDT -----  Blood counts, electrolytes, thyroid all normal/stable. Lab looking for fluid overload is within normal limits.  How is cutting back on the klonopin with melatonin helping?

## 2017-10-18 ENCOUNTER — TELEPHONE (OUTPATIENT)
Dept: FAMILY MEDICINE | Facility: CLINIC | Age: 67
End: 2017-10-18

## 2017-10-18 NOTE — TELEPHONE ENCOUNTER
Patient states since increasing patient's remeron 30mg she is hot and sweat all the time. Patient requesting to take remeron 15mg with combination of effexor 25mg. Patient states she stopped effexor in the past due to remeron effective alone at first.

## 2017-10-18 NOTE — TELEPHONE ENCOUNTER
I would not recommend restarting effexor as it was ineffective at both low and high doses and gave her a headache.  I thought we had recommended she establish care with psychiatry?

## 2017-10-26 ENCOUNTER — TELEPHONE (OUTPATIENT)
Dept: ALLERGY | Facility: CLINIC | Age: 67
End: 2017-10-26

## 2017-10-26 NOTE — TELEPHONE ENCOUNTER
Faxed documentation for curt Crocker.     ----- Message from Ortiz Moreno sent at 10/25/2017  8:29 AM CDT -----  Contact: Lizzette/ [facility]/ [phone] / [phone] fax  Company is calling to request information on the pt above.  They need lab work indicating low IGG levels and a 2nd set of Pneumovax titers with the dates of the vaccines.  They will also need the diagnosis for CVID.  Please call and advise.    Thank you

## 2017-11-03 NOTE — TELEPHONE ENCOUNTER
The patient needs to establish care with psychiatry. Edwige and I will not be treating her depression or anxiety due to her multiple drug intolerances.

## 2017-11-03 NOTE — TELEPHONE ENCOUNTER
Spoke to patient,patient states she does not want to see physiatrist. Patient states she is taking care of her mother and does not have the time to see one. Patient states the Remeron is effective but she is gaining weight and does not want to gain weight

## 2017-11-09 ENCOUNTER — TELEPHONE (OUTPATIENT)
Dept: FAMILY MEDICINE | Facility: CLINIC | Age: 67
End: 2017-11-09

## 2017-11-09 NOTE — TELEPHONE ENCOUNTER
Provided patient with phone number to adhd clinic.  Patient states she will call the providers from her book of providers to see who she can see.  Patient will notified office with who she decides to see

## 2017-11-09 NOTE — TELEPHONE ENCOUNTER
----- Message from Tabitha Chilel sent at 11/8/2017 12:29 PM CST -----  Contact: gabe   Referral to psyc   Needs another   Call back

## 2017-11-16 ENCOUNTER — TELEPHONE (OUTPATIENT)
Dept: ALLERGY | Facility: CLINIC | Age: 67
End: 2017-11-16

## 2017-11-16 NOTE — TELEPHONE ENCOUNTER
Faxed forms back to Lizzette.     ----- Message from Inderjit Umanzor sent at 11/8/2017 11:11 AM CST -----  Contact: Lizzette/[facility]  Lizzette called in regarding the attached patient and stated she got a couple of days ago a Rx for patient for Cuvitru but only certain pharmacies can dispense this.  Lizzette wanted to see if Rx could be changed to Hizentra?      Lizzette's call back number is [phone], option 2

## 2017-11-20 ENCOUNTER — TELEPHONE (OUTPATIENT)
Dept: FAMILY MEDICINE | Facility: CLINIC | Age: 67
End: 2017-11-20

## 2017-11-20 NOTE — TELEPHONE ENCOUNTER
She has an immune deficiency and was recently started on cuvitru by Dr. Putnam.    She needs to contact their office about the need for getting another pneumonia vaccine. I believe Dr. Eastman is out of the office this week but will reply to her when she is back.

## 2017-11-20 NOTE — TELEPHONE ENCOUNTER
Spoke to patient notified she has had both pneumonia vaccines. Patient states Dr Putnam told her when she took pneumonia shot and flu shot little improvement in her immune system. Patient is requesting shots to be reorder notified patient I was not sure about this and will send message to MD

## 2017-11-20 NOTE — TELEPHONE ENCOUNTER
----- Message from Mimi Baltazar sent at 11/20/2017  9:20 AM CST -----  Contact: self [phone]  Patient came to , wants to get flu and pneumonia shots.  I told her we could do the flu shot but needed to get order for pneumonia shot.  Patient said she would wait to have both done at the same time.  Please put in order for pneumonia shot and contact patient to book appts at [phone]

## 2017-11-21 ENCOUNTER — TELEPHONE (OUTPATIENT)
Dept: PULMONOLOGY | Facility: CLINIC | Age: 67
End: 2017-11-21

## 2017-12-04 ENCOUNTER — TELEPHONE (OUTPATIENT)
Dept: PULMONOLOGY | Facility: CLINIC | Age: 67
End: 2017-12-04

## 2017-12-04 NOTE — TELEPHONE ENCOUNTER
No answer, left voice mail with scheduling's phone number and our number and told her to call either one to reschedule.     ----- Message from Janette Olsen sent at 12/4/2017 12:43 PM CST -----  Patient needs to reschedule Pulmonary Function test on December 12th/please call back at [phone] to reschedule with patient.

## 2017-12-06 ENCOUNTER — TELEPHONE (OUTPATIENT)
Dept: FAMILY MEDICINE | Facility: CLINIC | Age: 67
End: 2017-12-06

## 2017-12-06 NOTE — TELEPHONE ENCOUNTER
----- Message from Rebeca Wagner sent at 12/5/2017  2:15 PM CST -----  Please call [phone] ... Cannot find a new  psychiatry appointment in [place]

## 2017-12-06 NOTE — TELEPHONE ENCOUNTER
Spoke to patient.  Patient states she can not find psychiatry that has available appointment that are coming up notified patient to schedule first available and notified office with appointment date so Dr Gale can be notified

## 2017-12-07 DIAGNOSIS — F41.9 ANXIETY: Primary | ICD-10-CM

## 2017-12-07 RX ORDER — VENLAFAXINE HYDROCHLORIDE 37.5 MG/1
37.5 CAPSULE, EXTENDED RELEASE ORAL DAILY
Qty: 30 CAPSULE | Refills: 0 | Status: SHIPPED | OUTPATIENT
Start: 2017-12-07 | End: 2018-01-08 | Stop reason: SDUPTHER

## 2017-12-07 NOTE — TELEPHONE ENCOUNTER
Patient states she will see Dr Zhu in January requesting refill on venlafaxine 37.5mg once a day until she is seen in January  patient will also need a referral

## 2017-12-07 NOTE — TELEPHONE ENCOUNTER
----- Message from Carlota Walters sent at 12/6/2017  1:38 PM CST -----  Contact: Patient  Meli, patient [phone] calling for a referral to see Dr. Zhu in Psychiatry. Patient would like to speak with nurse as well, has a few questions. Please advise. Thanks.

## 2017-12-12 ENCOUNTER — HOSPITAL ENCOUNTER (OUTPATIENT)
Dept: RESPIRATORY THERAPY | Facility: HOSPITAL | Age: 67
Discharge: HOME | End: 2017-12-12
Attending: INTERNAL MEDICINE
Payer: MEDICARE

## 2017-12-12 DIAGNOSIS — J32.9 CHRONIC SINUSITIS, UNSPECIFIED LOCATION: ICD-10-CM

## 2017-12-12 DIAGNOSIS — D72.10 EOSINOPHILIA: ICD-10-CM

## 2017-12-12 DIAGNOSIS — J45.50 UNCOMPLICATED SEVERE PERSISTENT ASTHMA: ICD-10-CM

## 2017-12-12 PROCEDURE — 94729 DIFFUSING CAPACITY: CPT | Mod: 26,,, | Performed by: INTERNAL MEDICINE

## 2017-12-12 PROCEDURE — 94060 EVALUATION OF WHEEZING: CPT | Mod: 26,,, | Performed by: INTERNAL MEDICINE

## 2017-12-12 PROCEDURE — 94727 GAS DIL/WSHOT DETER LNG VOL: CPT | Mod: 26,,, | Performed by: INTERNAL MEDICINE

## 2017-12-12 PROCEDURE — 94729 DIFFUSING CAPACITY: CPT

## 2017-12-12 PROCEDURE — 94060 EVALUATION OF WHEEZING: CPT

## 2017-12-12 PROCEDURE — 94727 GAS DIL/WSHOT DETER LNG VOL: CPT

## 2017-12-15 ENCOUNTER — TELEPHONE (OUTPATIENT)
Dept: PULMONOLOGY | Facility: CLINIC | Age: 67
End: 2017-12-15

## 2017-12-15 NOTE — TELEPHONE ENCOUNTER
----- Message from Angelo Heard MD sent at 12/14/2017  7:02 PM CST -----  Notify pft 72% capacity.

## 2017-12-15 NOTE — PROCEDURES
Pulmonary Functions, including spirometry and bronchodilator response and lung volumes and diffusion, study was done 12/12/2017.  Spirometry shows moderate obstruction, loss vital capacity and obstruction and no bronchodilator response.   FEV1 is 72% or 1.74 liters.  Lung volumes show  loss of TLC with restriction 76%.  Diffusion shows reduced but falls within normal range when corrected for lung volumes.    Pulmonary functions show moderate obstruction and low tlc. Clinical correlation recommended.     Angelo Heard M.D.

## 2018-01-08 ENCOUNTER — OFFICE VISIT (OUTPATIENT)
Dept: PULMONOLOGY | Facility: CLINIC | Age: 68
End: 2018-01-08
Payer: MEDICARE

## 2018-01-08 VITALS
HEIGHT: 65 IN | DIASTOLIC BLOOD PRESSURE: 86 MMHG | BODY MASS INDEX: 22.11 KG/M2 | HEART RATE: 73 BPM | WEIGHT: 132.69 LBS | OXYGEN SATURATION: 99 % | SYSTOLIC BLOOD PRESSURE: 130 MMHG

## 2018-01-08 DIAGNOSIS — J45.909 UNCOMPLICATED ASTHMA, UNSPECIFIED ASTHMA SEVERITY, UNSPECIFIED WHETHER PERSISTENT: Primary | ICD-10-CM

## 2018-01-08 DIAGNOSIS — F32.A DEPRESSION, UNSPECIFIED DEPRESSION TYPE: ICD-10-CM

## 2018-01-08 PROCEDURE — 99214 OFFICE O/P EST MOD 30 MIN: CPT | Mod: S$GLB,,, | Performed by: INTERNAL MEDICINE

## 2018-01-08 PROCEDURE — 99999 PR PBB SHADOW E&M-EST. PATIENT-LVL III: CPT | Mod: PBBFAC,,, | Performed by: INTERNAL MEDICINE

## 2018-01-08 PROCEDURE — 99499 UNLISTED E&M SERVICE: CPT | Mod: S$GLB,,, | Performed by: INTERNAL MEDICINE

## 2018-01-08 RX ORDER — CHLORTHALIDONE 25 MG/1
25 TABLET ORAL DAILY
COMMUNITY
End: 2018-04-16

## 2018-01-08 RX ORDER — VENLAFAXINE HYDROCHLORIDE 37.5 MG/1
75 CAPSULE, EXTENDED RELEASE ORAL DAILY
Qty: 30 CAPSULE | Refills: 0 | Status: SHIPPED | OUTPATIENT
Start: 2018-01-08 | End: 2018-04-16

## 2018-01-08 RX ORDER — CHOLECALCIFEROL (VITAMIN D3) 50 MCG
TABLET ORAL
COMMUNITY

## 2018-01-08 RX ORDER — ALBUTEROL SULFATE 90 UG/1
AEROSOL, METERED RESPIRATORY (INHALATION)
Qty: 1 INHALER | Refills: 11 | Status: SHIPPED | OUTPATIENT
Start: 2018-01-08 | End: 2018-04-16

## 2018-01-08 NOTE — PROGRESS NOTES
"1/8/2018    Babita Thomas  offic f/u    Chief Complaint   Patient presents with    Follow-up     3 month    Cough     dry   jan 8, 2018-- started hirxentra just 2 doses - having problems with sinus infections.  Got off and now back on sinus rinse with baby shampoo - abx ordered.  Coughs light daily - much better than initially.  Weight gained and stable.  Mood still a problem - to see Dr Kapadia. Uses effexor and clonopin-     June 20,  Breathing fine , still sinus problems, mood good/appetite good- sleep poorly.  Had rash was pustular on prednisone and azithromycin.  Still having gerd with severe disease.  Slept well with remeron and effexor.  Sinus problem still.        May 19, 2017HPI:has had breathing problems last 2 yrs, chr nasal congestion and smell ok, had no nasal polyps, had sinus surg x 3 - one balloon and 2 other - may have helped 1/3.  Has no good response to abx, chr yg mucous.  Has had sinus problem last 3 yrs, has had bad reflux with freq taste in mouth. Uses zantac prn , tried ppi with no good result, no major swallow problem.    Appetite off, megace occ, poor sleep for yrs- no good response.      Ms initially feet numb , has lesions and in remission- 5-10 yrs.      Depressed a bit - just started effexor  2 weeks,    Lost 30 lbs, coughs mainly noct.         The chief compliant  problem is new to me",   PFSH:  Past Medical History:   Diagnosis Date    Allergy     Asthma     Depression 5/19/2017    GERD (gastroesophageal reflux disease)     Hyperlipidemia     Hypertension     Hypothyroidism     Immune deficiency disorder 5/19/2017    Insomnia 1/31/2017    MS (multiple sclerosis)     very well controlled, no symptoms    Shortness of breath     Sinusitis     Thyroid disease     hypothyroidism- no med         Past Surgical History:   Procedure Laterality Date    BREAST SURGERY Left     biopsy, benign    CHOLECYSTECTOMY      COLONOSCOPY  3-5-2010    Dr Cotter 11 year bal     " "ESOPHAGUS SURGERY      6 year old, peanut stuck in throat     MULTIPLE TOOTH EXTRACTIONS      SINUS SURGERY  2015    x3    TRACHEOSTOMY CLOSURE      age 6 for peanut in throat    TYMPANOSTOMY TUBE PLACEMENT      x2    WISDOM TOOTH EXTRACTION       Social History   Substance Use Topics    Smoking status: Never Smoker    Smokeless tobacco: Never Used    Alcohol use No     Family History   Problem Relation Age of Onset    Hypertension Mother     Heart disease Father 60     MI    No Known Problems Sister     No Known Problems Daughter     No Known Problems Paternal Aunt     No Known Problems Paternal Uncle     No Known Problems Maternal Grandmother     Heart disease Maternal Grandfather 70     MI    No Known Problems Paternal Grandmother     No Known Problems Paternal Grandfather     No Known Problems Paternal Uncle     No Known Problems Daughter     Allergic rhinitis Neg Hx     Allergies Neg Hx     Angioedema Neg Hx     Asthma Neg Hx     Atopy Neg Hx     Eczema Neg Hx     Immunodeficiency Neg Hx     Rhinitis Neg Hx     Urticaria Neg Hx      Review of patient's allergies indicates:   Allergen Reactions    Augmentin [amoxicillin-pot clavulanate] Nausea Only    Pravastatin Other (See Comments)     cramping    Tecfidera [dimethyl fumarate] Swelling       Performance Status:The patient's activity level is functions out of house.      Review of Systems:  a review of eleven systems covering constitutional, Eye, HEENT, Psych, Respiratory, Cardiac, GI, , Musculoskeletal, Endocrine, Dermatologic was negative except for pertinent findings as listed ABOVE and below: fatigue, wt loss, sob, cough, sinus, ms,      Exam:Comprehensive exam done. /86 (BP Location: Right arm, Patient Position: Sitting)   Pulse 73   Ht 5' 5" (1.651 m)   Wt 60.2 kg (132 lb 11.5 oz)   SpO2 99%   BMI 22.09 kg/m²   Exam included Vitals as listed, and patient's appearance and affect and alertness and mood, oral exam " for yeast and hygiene and pharynx lesions and Mallapatti (M) score, neck with inspection for jvd and masses and thyroid abnormalities and lymph nodes (supraclavicular and infraclavicular nodes and axillary also examined and noted if abn), chest exam included symmetry and effort and fremitus and percussion and auscultation, cardiac exam included rhythm and gallops and murmur and rubs and jvd and edema, abdominal exam for mass and hepatosplenomegaly and tenderness and hernias and bowel sounds, Musculoskeletal exam with muscle tone and posture and mobility/gait and  strength, and skin for rashes and cyanosis and pallor and turgor, extremity for clubbing.  Findings were normal except for pertinent findings listed below:  Slender M1, good exam    Radiographs (ct chest and cxr) reviewed: view by direct vision  Ct lungs look good    Labs reviewed outside labs        Results for DEBORAH RODRIGUEZ (MRN [id number]) as of 5/19/2017 09:58   Ref. Range 9/9/2016 13:31   Diphtheria Toxoid Ab Latest Ref Range: >0.099 IU/mL 0.775   Tetanus Ab Latest Ref Range: >0.150 IU/mL 0.930   S.pneumoniae Type 1 Latest Units: mcg/mL <0.3   S.pneumoniae Type 3 Latest Units: mcg/mL 0.7   Strep pneumo Type 4 Latest Units: mcg/mL <0.3   S.pneumoniae Type 5 Latest Units: mcg/mL <0.3   S.pneumoniae Type 6B Latest Units: mcg/mL <0.3   S.pneumoniae Type 7F Latest Units: mcg/mL 0.5   S.pneumoniae Type 8 Latest Units: mcg/mL 1.6   S.pneumoniae Type 9N Latest Units: mcg/mL <0.3   S.pneumoniae Type 9V Abs Latest Units: mcg/mL 0.3   S.pneumoniae Type 12F Latest Units: mcg/mL 0.6   Strep pneumo Type 14 Latest Units: mcg/mL 0.9   S.pneumoniae Type 18C Latest Units: mcg/mL 1.2   S.pneumoniae Type 19F Latest Units: mcg/mL 0.8   S.pneumoniae Type 23F Latest Units: mcg/mL 0.4   Results for DEBORAH RDORIGUEZ (MRN [id number]) as of 5/19/2017 09:58   Ref. Range 5/3/2017 09:47   WBC Latest Ref Range: 3.90 - 12.70 K/uL 5.36   RBC Latest Ref Range: 4.00 - 5.40 M/uL  5.06   Hemoglobin Latest Ref Range: 12.0 - 16.0 g/dL 15.9   Hematocrit Latest Ref Range: 37.0 - 48.5 % 47.1   MCV Latest Ref Range: 82 - 98 fL 93   MCH Latest Ref Range: 27.0 - 31.0 pg 31.4 (H)   MCHC Latest Ref Range: 32.0 - 36.0 % 33.8   RDW Latest Ref Range: 11.5 - 14.5 % 13.1   Platelets Latest Ref Range: 150 - 350 K/uL 327   MPV Latest Ref Range: 9.2 - 12.9 fL 9.2   Gran% Latest Ref Range: 38.0 - 73.0 % 47.3   Gran # Latest Ref Range: 1.8 - 7.7 K/uL 2.5   Lymph% Latest Ref Range: 18.0 - 48.0 % 19.4   Lymph # Latest Ref Range: 1.0 - 4.8 K/uL 1.0   Mono% Latest Ref Range: 4.0 - 15.0 % 14.7   Mono # Latest Ref Range: 0.3 - 1.0 K/uL 0.8   Eosinophil% Latest Ref Range: 0.0 - 8.0 % 17.7 (H)   Eos # Latest Ref Range: 0.0 - 0.5 K/uL 1.0 (H)   Basophil% Latest Ref Range: 0.0 - 1.9 % 0.7   Baso # Latest Ref Range: 0.00 - 0.20 K/uL 0.04     PFT     fev 1.14 in 2016 pft now 1.74 liter in dec 2017- 72%  Pulmonary Functions, including spirometry and bronchodilator response and lung volumes and diffusion, study was done 12/12/2017.  Spirometry shows moderate obstruction, loss vital capacity and obstruction and no bronchodilator response.   FEV1 is 72% or 1.74 liters.  Lung volumes show  loss of TLC with restriction 76%.  Diffusion shows reduced but falls within normal range when corrected for lung volumes.     Pulmonary functions show moderate obstruction and low tlc. Clinical correlation recommended.     Angelo Heard M.D.    Plan:  Clinical impression is ambiguous and will need repeated evaluation wrt  Total picture.    Babita was seen today for follow-up and cough.    Diagnoses and all orders for this visit:    Uncomplicated asthma, unspecified asthma severity, unspecified whether persistent  -     albuterol 90 mcg/actuation inhaler; 2 puffs every 4 hours as needed for cough, wheeze, or shortness of breath    Depression, unspecified depression type  -     venlafaxine (EFFEXOR-XR) 37.5 MG 24 hr capsule; Take 2 capsules (75 mg  total) by mouth once daily.        Return in about 1 year (around 1/8/2019), or if symptoms worsen or fail to improve.    Discussed with patient above for education the following:        LUNGS ARE DOING WELL, SINUSES MAY BE TRIGGER FOR ASTHMA- USE ALBUTEROL AS  NEEDED.    You will be out of effexor soon, will renew at your request - use 37.5 daily, 75 may be helpful if no headache.  Call to see Dr Kapadia.    Call as needed.        Gave script for tamiflu to start after calling in.

## 2018-01-08 NOTE — PATIENT INSTRUCTIONS
LUNGS ARE DOING WELL, SINUSES MAY BE TRIGGER FOR ASTHMA- USE ALBUTEROL AS  NEEDED.    You will be out of effexor soon, will renew at your request - use 37.5 daily, 75 may be helpful if no headache.  Call to see Dr Kapadia.    Call as needed.

## 2018-01-09 ENCOUNTER — TELEPHONE (OUTPATIENT)
Dept: FAMILY MEDICINE | Facility: CLINIC | Age: 68
End: 2018-01-09

## 2018-01-09 NOTE — TELEPHONE ENCOUNTER
----- Message from Saleem Hogan sent at 1/9/2018 10:38 AM CST -----  Contact: Patient  Patient states that she would like the referral that is in the [facility] faxed over the the psychiatry department.  To Dr Joanne Zhu's Office as soon as you can because the patient stated that she has been waiting to make an appointment.   The doctor's fax number is:  [phone]    Any questions, you can call the patient back at [phone].  Thank you

## 2018-01-15 ENCOUNTER — PES CALL (OUTPATIENT)
Dept: ADMINISTRATIVE | Facility: CLINIC | Age: 68
End: 2018-01-15

## 2018-01-16 ENCOUNTER — TELEPHONE (OUTPATIENT)
Dept: FAMILY MEDICINE | Facility: CLINIC | Age: 68
End: 2018-01-16

## 2018-01-16 NOTE — TELEPHONE ENCOUNTER
----- Message from Janette Olsen sent at 1/16/2018  9:04 AM CST -----  Patient requesting to speak with nurse concerning home health care/has question/please call back at [phone] to advise.

## 2018-01-16 NOTE — TELEPHONE ENCOUNTER
Patient calling to see if her mother can be seen so she can get home health here in [place].  Advised Ms Thomas her mom will need to be seen first.  She will call the phone room to schedule an appt

## 2018-02-01 ENCOUNTER — LAB VISIT (OUTPATIENT)
Dept: LAB | Facility: HOSPITAL | Age: 68
End: 2018-02-01
Attending: PSYCHIATRY & NEUROLOGY
Payer: MEDICARE

## 2018-02-01 DIAGNOSIS — E55.9 VITAMIN D DEFICIENCY DISEASE: ICD-10-CM

## 2018-02-01 DIAGNOSIS — E78.2 MULTIPLE-TYPE HYPERLIPIDEMIA: Primary | ICD-10-CM

## 2018-02-01 DIAGNOSIS — Z79.899 POLYPHARMACY: ICD-10-CM

## 2018-02-01 LAB
ALBUMIN SERPL BCP-MCNC: 3.3 G/DL
ALP SERPL-CCNC: 89 U/L
ALT SERPL W/O P-5'-P-CCNC: 20 U/L
ANION GAP SERPL CALC-SCNC: 9 MMOL/L
AST SERPL-CCNC: 26 U/L
BASOPHILS # BLD AUTO: 0.05 K/UL
BASOPHILS NFR BLD: 1.1 %
BILIRUB SERPL-MCNC: 0.4 MG/DL
BUN SERPL-MCNC: 14 MG/DL
CALCIUM SERPL-MCNC: 9.6 MG/DL
CHLORIDE SERPL-SCNC: 101 MMOL/L
CO2 SERPL-SCNC: 28 MMOL/L
CREAT SERPL-MCNC: 0.7 MG/DL
DIFFERENTIAL METHOD: ABNORMAL
EOSINOPHIL # BLD AUTO: 0.4 K/UL
EOSINOPHIL NFR BLD: 9.3 %
ERYTHROCYTE [DISTWIDTH] IN BLOOD BY AUTOMATED COUNT: 12.6 %
ERYTHROCYTE [SEDIMENTATION RATE] IN BLOOD BY WESTERGREN METHOD: 4 MM/HR
EST. GFR  (AFRICAN AMERICAN): >60 ML/MIN/1.73 M^2
EST. GFR  (NON AFRICAN AMERICAN): >60 ML/MIN/1.73 M^2
FOLATE SERPL-MCNC: 16.8 NG/ML
GLUCOSE SERPL-MCNC: 79 MG/DL
HCT VFR BLD AUTO: 42.5 %
HGB BLD-MCNC: 14 G/DL
IMM GRANULOCYTES # BLD AUTO: 0.01 K/UL
IMM GRANULOCYTES NFR BLD AUTO: 0.2 %
LYMPHOCYTES # BLD AUTO: 2.1 K/UL
LYMPHOCYTES NFR BLD: 46.8 %
MAGNESIUM SERPL-MCNC: 2 MG/DL
MCH RBC QN AUTO: 30.3 PG
MCHC RBC AUTO-ENTMCNC: 32.9 G/DL
MCV RBC AUTO: 92 FL
MONOCYTES # BLD AUTO: 0.6 K/UL
MONOCYTES NFR BLD: 14.5 %
NEUTROPHILS # BLD AUTO: 1.2 K/UL
NEUTROPHILS NFR BLD: 28.1 %
NRBC BLD-RTO: 0 /100 WBC
PLATELET # BLD AUTO: 300 K/UL
PMV BLD AUTO: 9.6 FL
POTASSIUM SERPL-SCNC: 4.1 MMOL/L
PROT SERPL-MCNC: 7.2 G/DL
RBC # BLD AUTO: 4.62 M/UL
SODIUM SERPL-SCNC: 138 MMOL/L
TSH SERPL DL<=0.005 MIU/L-ACNC: 3.16 UIU/ML
VIT B12 SERPL-MCNC: >2000 PG/ML
WBC # BLD AUTO: 4.42 K/UL

## 2018-02-01 PROCEDURE — 84443 ASSAY THYROID STIM HORMONE: CPT

## 2018-02-01 PROCEDURE — 82652 VIT D 1 25-DIHYDROXY: CPT

## 2018-02-01 PROCEDURE — 82607 VITAMIN B-12: CPT

## 2018-02-01 PROCEDURE — 36415 COLL VENOUS BLD VENIPUNCTURE: CPT | Mod: PO

## 2018-02-01 PROCEDURE — 80053 COMPREHEN METABOLIC PANEL: CPT

## 2018-02-01 PROCEDURE — 86038 ANTINUCLEAR ANTIBODIES: CPT

## 2018-02-01 PROCEDURE — 83735 ASSAY OF MAGNESIUM: CPT

## 2018-02-01 PROCEDURE — 85651 RBC SED RATE NONAUTOMATED: CPT | Mod: PO

## 2018-02-01 PROCEDURE — 85025 COMPLETE CBC W/AUTO DIFF WBC: CPT

## 2018-02-01 PROCEDURE — 82746 ASSAY OF FOLIC ACID SERUM: CPT

## 2018-02-02 LAB — ANA SER QL IF: NORMAL

## 2018-02-05 LAB — 1,25(OH)2D3 SERPL-MCNC: 63 PG/ML

## 2018-04-06 ENCOUNTER — HOSPITAL ENCOUNTER (OUTPATIENT)
Dept: RADIOLOGY | Facility: CLINIC | Age: 68
Discharge: HOME | End: 2018-04-06
Attending: SPECIALIST
Payer: MEDICARE

## 2018-04-06 DIAGNOSIS — Z12.39 ENCOUNTER FOR SPECIAL SCREENING EXAMINATION FOR NEOPLASM OF BREAST: ICD-10-CM

## 2018-04-06 DIAGNOSIS — Z78.0 POST-MENOPAUSAL: ICD-10-CM

## 2018-04-06 DIAGNOSIS — N94.89 PELVIC CONGESTION SYNDROME: ICD-10-CM

## 2018-04-06 PROCEDURE — 76830 TRANSVAGINAL US NON-OB: CPT | Mod: TC,PO

## 2018-04-06 PROCEDURE — 76830 TRANSVAGINAL US NON-OB: CPT | Mod: 26,,, | Performed by: RADIOLOGY

## 2018-04-06 PROCEDURE — 77080 DXA BONE DENSITY AXIAL: CPT | Mod: 26,,, | Performed by: RADIOLOGY

## 2018-04-06 PROCEDURE — 77080 DXA BONE DENSITY AXIAL: CPT | Mod: TC,PO

## 2018-04-06 PROCEDURE — 77067 SCR MAMMO BI INCL CAD: CPT | Mod: 26,,, | Performed by: RADIOLOGY

## 2018-04-06 PROCEDURE — 77063 BREAST TOMOSYNTHESIS BI: CPT | Mod: 26,,, | Performed by: RADIOLOGY

## 2018-04-06 PROCEDURE — 77067 SCR MAMMO BI INCL CAD: CPT | Mod: TC,PO

## 2018-04-06 PROCEDURE — 76856 US EXAM PELVIC COMPLETE: CPT | Mod: 26,,, | Performed by: RADIOLOGY

## 2018-04-09 ENCOUNTER — TELEPHONE (OUTPATIENT)
Dept: FAMILY MEDICINE | Facility: CLINIC | Age: 68
End: 2018-04-09

## 2018-04-09 NOTE — TELEPHONE ENCOUNTER
----- Message from Tammie Franco sent at 4/9/2018  9:21 AM CDT -----  Contact: pt  Pt states supposed to have an ultrasound of thyroid once a year so it is time...[phone] (home)

## 2018-04-09 NOTE — TELEPHONE ENCOUNTER
She needs an office visit as other labs will likely need to be ordered in addition to her ultrasound.

## 2018-04-16 ENCOUNTER — LAB VISIT (OUTPATIENT)
Dept: LAB | Facility: HOSPITAL | Age: 68
End: 2018-04-16
Attending: PHYSICIAN ASSISTANT
Payer: MEDICARE

## 2018-04-16 ENCOUNTER — OFFICE VISIT (OUTPATIENT)
Dept: FAMILY MEDICINE | Facility: CLINIC | Age: 68
End: 2018-04-16
Payer: MEDICARE

## 2018-04-16 ENCOUNTER — DOCUMENTATION ONLY (OUTPATIENT)
Dept: FAMILY MEDICINE | Facility: CLINIC | Age: 68
End: 2018-04-16

## 2018-04-16 VITALS
OXYGEN SATURATION: 99 % | BODY MASS INDEX: 22.81 KG/M2 | HEART RATE: 81 BPM | DIASTOLIC BLOOD PRESSURE: 86 MMHG | WEIGHT: 136.88 LBS | TEMPERATURE: 98 F | HEIGHT: 65 IN | SYSTOLIC BLOOD PRESSURE: 123 MMHG

## 2018-04-16 DIAGNOSIS — E78.5 HYPERLIPIDEMIA, UNSPECIFIED HYPERLIPIDEMIA TYPE: Primary | ICD-10-CM

## 2018-04-16 DIAGNOSIS — R73.02 GLUCOSE INTOLERANCE (IMPAIRED GLUCOSE TOLERANCE): ICD-10-CM

## 2018-04-16 DIAGNOSIS — Z13.220 SCREENING CHOLESTEROL LEVEL: ICD-10-CM

## 2018-04-16 DIAGNOSIS — E04.1 THYROID NODULE: ICD-10-CM

## 2018-04-16 DIAGNOSIS — E78.00 HYPERCHOLESTEREMIA: ICD-10-CM

## 2018-04-16 DIAGNOSIS — R73.02 GLUCOSE INTOLERANCE (IMPAIRED GLUCOSE TOLERANCE): Primary | ICD-10-CM

## 2018-04-16 LAB
CHOLEST SERPL-MCNC: 358 MG/DL
CHOLEST/HDLC SERPL: 5 {RATIO}
ESTIMATED AVG GLUCOSE: 100 MG/DL
HBA1C MFR BLD HPLC: 5.1 %
HDLC SERPL-MCNC: 72 MG/DL
HDLC SERPL: 20.1 %
LDLC SERPL CALC-MCNC: 261.6 MG/DL
NONHDLC SERPL-MCNC: 286 MG/DL
TRIGL SERPL-MCNC: 122 MG/DL

## 2018-04-16 PROCEDURE — 99213 OFFICE O/P EST LOW 20 MIN: CPT | Mod: S$GLB,,, | Performed by: PHYSICIAN ASSISTANT

## 2018-04-16 PROCEDURE — 83036 HEMOGLOBIN GLYCOSYLATED A1C: CPT

## 2018-04-16 PROCEDURE — 36415 COLL VENOUS BLD VENIPUNCTURE: CPT | Mod: PO

## 2018-04-16 PROCEDURE — 3074F SYST BP LT 130 MM HG: CPT | Mod: CPTII,S$GLB,, | Performed by: PHYSICIAN ASSISTANT

## 2018-04-16 PROCEDURE — 99999 PR PBB SHADOW E&M-EST. PATIENT-LVL III: CPT | Mod: PBBFAC,,, | Performed by: PHYSICIAN ASSISTANT

## 2018-04-16 PROCEDURE — 99499 UNLISTED E&M SERVICE: CPT | Mod: S$PBB,,, | Performed by: PHYSICIAN ASSISTANT

## 2018-04-16 PROCEDURE — 80061 LIPID PANEL: CPT

## 2018-04-16 PROCEDURE — 3079F DIAST BP 80-89 MM HG: CPT | Mod: CPTII,S$GLB,, | Performed by: PHYSICIAN ASSISTANT

## 2018-04-16 RX ORDER — QUETIAPINE FUMARATE 100 MG/1
100-200 TABLET, FILM COATED ORAL NIGHTLY PRN
COMMUNITY
Start: 2018-03-28 | End: 2023-07-10

## 2018-04-16 RX ORDER — LOVASTATIN 10 MG/1
10 TABLET ORAL NIGHTLY
Qty: 90 TABLET | Refills: 0 | Status: SHIPPED | OUTPATIENT
Start: 2018-04-16 | End: 2018-06-25 | Stop reason: SDUPTHER

## 2018-04-16 RX ORDER — VENLAFAXINE HYDROCHLORIDE 75 MG/1
CAPSULE, EXTENDED RELEASE ORAL DAILY
COMMUNITY
Start: 2018-03-23 | End: 2019-09-03

## 2018-04-16 RX ORDER — HUMAN IMMUNOGLOBULIN G 0.2 G/ML
LIQUID SUBCUTANEOUS
COMMUNITY
Start: 2018-04-03 | End: 2018-12-18

## 2018-04-16 NOTE — PROGRESS NOTES
Pre-Visit Chart Review  For Appointment Scheduled on 04/16/18    Health Maintenance Due   Topic Date Due    TETANUS VACCINE  01/18/1968    Influenza Vaccine  08/01/2017

## 2018-04-16 NOTE — PROGRESS NOTES
Subjective:       Patient ID: Babita Thomas is a 68 y.o. female.    Chief Complaint: Hand Pain    HPI   Patient is a 68 year old  female presenting to the clinic for orders for yearly thyroid ultrasound. She recently had TSH checked via Dr. Zhu which was within normal limits. She is doing well at this time. She reports chronic cough has resolved since undergoing infusions for her immune system.   Review of Systems   Constitutional: Negative for activity change, appetite change, chills, diaphoresis, fatigue and fever.   HENT: Negative for congestion, postnasal drip and rhinorrhea.    Respiratory: Negative.  Negative for cough, shortness of breath and wheezing.    Cardiovascular: Negative.  Negative for chest pain.   Gastrointestinal: Negative for abdominal pain, blood in stool, constipation, diarrhea, nausea and vomiting.   Genitourinary: Negative for dysuria, frequency, hematuria and urgency.   Musculoskeletal: Negative.    Skin: Negative.  Negative for color change and rash.   Neurological: Negative for dizziness and syncope.   Psychiatric/Behavioral: Negative for agitation, behavioral problems and confusion.       Objective:      Physical Exam   Constitutional: Vital signs are normal. She appears well-developed and well-nourished. No distress.   Cardiovascular: Normal rate, regular rhythm, S1 normal, S2 normal and normal heart sounds.  Exam reveals no gallop.    No murmur heard.  Pulses:       Radial pulses are 2+ on the right side, and 2+ on the left side.   <2sec cap refill fingers bilat     Pulmonary/Chest: Effort normal and breath sounds normal. No respiratory distress. She has no wheezes. She has no rhonchi.   Skin: Skin is warm and dry. She is not diaphoretic.   Appropriate skin turgor   Psychiatric: She has a normal mood and affect. Her speech is normal and behavior is normal. Judgment and thought content normal. Cognition and memory are normal.       Assessment:       1. Glucose intolerance  (impaired glucose tolerance)    2. Thyroid nodule    3. Screening cholesterol level    4. Hypercholesteremia        Plan:       Babita was seen today for hand pain.    Diagnoses and all orders for this visit:    Glucose intolerance (impaired glucose tolerance)  -     Hemoglobin A1c; Future  -     Lipid panel; Future    Thyroid nodule  -     US Soft Tissue Head Neck Thyroid; Future    Screening cholesterol level  -     Lipid panel; Future    Hypercholesteremia  -     Lipid panel; Future

## 2018-04-17 ENCOUNTER — HOSPITAL ENCOUNTER (OUTPATIENT)
Dept: RADIOLOGY | Facility: CLINIC | Age: 68
Discharge: HOME | End: 2018-04-17
Attending: PHYSICIAN ASSISTANT
Payer: MEDICARE

## 2018-04-17 DIAGNOSIS — E04.1 THYROID NODULE: ICD-10-CM

## 2018-04-17 PROCEDURE — 76536 US EXAM OF HEAD AND NECK: CPT | Mod: TC,PO

## 2018-04-17 PROCEDURE — 76536 US EXAM OF HEAD AND NECK: CPT | Mod: 26,,, | Performed by: RADIOLOGY

## 2018-04-17 NOTE — TELEPHONE ENCOUNTER
----- Message from PATRIA Mosley sent at 4/17/2018  1:24 PM CDT -----  Thyroid size is normal. There is a stable right thyroid nodule and stable cyst in the lower left lobe.  There is a newer nodule of 7mm not seen on previous study on inferior pole of thyroid.     However, none of these meet criteria for biopsy. She needs to continue with yearly thyroid ultrasounds.

## 2018-04-20 ENCOUNTER — TELEPHONE (OUTPATIENT)
Dept: FAMILY MEDICINE | Facility: CLINIC | Age: 68
End: 2018-04-20

## 2018-05-23 ENCOUNTER — PES CALL (OUTPATIENT)
Dept: ADMINISTRATIVE | Facility: CLINIC | Age: 68
End: 2018-05-23

## 2018-05-25 ENCOUNTER — DOCUMENTATION ONLY (OUTPATIENT)
Dept: FAMILY MEDICINE | Facility: CLINIC | Age: 68
End: 2018-05-25

## 2018-05-25 NOTE — PROGRESS NOTES
Pre-Visit Chart Review  For Appointment Scheduled on 05/28/18    Health Maintenance Due   Topic Date Due    TETANUS VACCINE  01/18/1968

## 2018-05-28 ENCOUNTER — OFFICE VISIT (OUTPATIENT)
Dept: FAMILY MEDICINE | Facility: CLINIC | Age: 68
End: 2018-05-28
Payer: MEDICARE

## 2018-05-28 VITALS
BODY MASS INDEX: 22.85 KG/M2 | WEIGHT: 137.13 LBS | DIASTOLIC BLOOD PRESSURE: 85 MMHG | HEIGHT: 65 IN | TEMPERATURE: 98 F | HEART RATE: 100 BPM | SYSTOLIC BLOOD PRESSURE: 139 MMHG

## 2018-05-28 DIAGNOSIS — K21.00 GERD WITH ESOPHAGITIS: ICD-10-CM

## 2018-05-28 DIAGNOSIS — R05.3 PERSISTENT COUGH: Primary | ICD-10-CM

## 2018-05-28 PROCEDURE — 99499 UNLISTED E&M SERVICE: CPT | Mod: S$PBB,,, | Performed by: PHYSICIAN ASSISTANT

## 2018-05-28 PROCEDURE — 3079F DIAST BP 80-89 MM HG: CPT | Mod: CPTII,S$GLB,, | Performed by: PHYSICIAN ASSISTANT

## 2018-05-28 PROCEDURE — 99999 PR PBB SHADOW E&M-EST. PATIENT-LVL III: CPT | Mod: PBBFAC,,, | Performed by: PHYSICIAN ASSISTANT

## 2018-05-28 PROCEDURE — 3075F SYST BP GE 130 - 139MM HG: CPT | Mod: CPTII,S$GLB,, | Performed by: PHYSICIAN ASSISTANT

## 2018-05-28 PROCEDURE — 99214 OFFICE O/P EST MOD 30 MIN: CPT | Mod: S$GLB,,, | Performed by: PHYSICIAN ASSISTANT

## 2018-05-28 RX ORDER — PANTOPRAZOLE SODIUM 20 MG/1
20 TABLET, DELAYED RELEASE ORAL
Qty: 60 TABLET | Refills: 2 | Status: SHIPPED | OUTPATIENT
Start: 2018-05-28 | End: 2018-12-18

## 2018-05-28 NOTE — PROGRESS NOTES
Subjective:       Patient ID: Babita Thomas is a 68 y.o. female.    Chief Complaint: Cough    HPI   Patient is a 68 year old  female presenting to the clinic with concern of persistent cough. She has had a chronic cough for years that has recently improved with tx of immunodeficiency among other things. Patient states this cough does not feel like the chronic cough she had for years. The cough is described a dry, irritating cough into the back of the throat and is associated with increased regurgitation, reflux, heart burn. She denies any abdominal pain, black stools, blood in stool. She does have more frequent belching. She does have a history of GERD, but is not currently taking any medication.  Review of Systems   Constitutional: Negative for activity change, appetite change, chills, diaphoresis, fatigue and fever.   HENT: Negative for congestion, postnasal drip and rhinorrhea.    Respiratory: Positive for cough. Negative for shortness of breath and wheezing.    Cardiovascular: Negative.  Negative for chest pain.   Gastrointestinal: Negative for abdominal pain, blood in stool, constipation, diarrhea, nausea and vomiting.   Genitourinary: Negative for dysuria, frequency, hematuria and urgency.   Musculoskeletal: Negative.    Skin: Negative.  Negative for color change and rash.   Neurological: Negative for dizziness and syncope.   Psychiatric/Behavioral: Negative for agitation, behavioral problems and confusion.       Objective:      Physical Exam   Constitutional: Vital signs are normal. She appears well-developed and well-nourished. No distress.   HENT:   Head: Normocephalic and atraumatic.   Right Ear: Hearing, tympanic membrane, external ear and ear canal normal.   Left Ear: Hearing, tympanic membrane, external ear and ear canal normal.   Nose: Nose normal.   Mouth/Throat: Uvula is midline. Posterior oropharyngeal erythema present.   Cardiovascular: Normal rate, regular rhythm, S1 normal, S2 normal  and normal heart sounds.  Exam reveals no gallop.    No murmur heard.  Pulses:       Radial pulses are 2+ on the right side, and 2+ on the left side.   <2sec cap refill fingers bilat     Pulmonary/Chest: Effort normal and breath sounds normal. No respiratory distress. She has no wheezes. She has no rhonchi.   Skin: Skin is warm and dry. She is not diaphoretic.   Appropriate skin turgor   Psychiatric: She has a normal mood and affect. Her speech is normal and behavior is normal. Judgment and thought content normal. Cognition and memory are normal.       Assessment:       1. Persistent cough    2. GERD with esophagitis        Plan:       Babita was seen today for cough.    Diagnoses and all orders for this visit:    Persistent cough  -     pantoprazole (PROTONIX) 20 MG tablet; Take 1 tablet (20 mg total) by mouth 2 (two) times daily before meals.  -     ranitidine (ZANTAC) 300 MG tablet; Take 1 tablet (300 mg total) by mouth every evening.    GERD with esophagitis  -     pantoprazole (PROTONIX) 20 MG tablet; Take 1 tablet (20 mg total) by mouth 2 (two) times daily before meals.  -     ranitidine (ZANTAC) 300 MG tablet; Take 1 tablet (300 mg total) by mouth every evening.    Patient to inform clinic if no improvement in cough & will discuss further recommendations.

## 2018-06-05 DIAGNOSIS — J45.909 ASTHMA, UNSPECIFIED ASTHMA SEVERITY, UNSPECIFIED WHETHER COMPLICATED, UNSPECIFIED WHETHER PERSISTENT: Primary | ICD-10-CM

## 2018-06-05 RX ORDER — FLUTICASONE FUROATE AND VILANTEROL 200; 25 UG/1; UG/1
1 POWDER RESPIRATORY (INHALATION) DAILY
Qty: 1 EACH | Refills: 5 | Status: SHIPPED | OUTPATIENT
Start: 2018-06-05 | End: 2018-08-27 | Stop reason: SDUPTHER

## 2018-06-05 NOTE — TELEPHONE ENCOUNTER
----- Message from Carlota Walters sent at 6/5/2018  9:21 AM CDT -----  Contact: Patient  Meli, patient [phone] calling because she needs something called into pharmacy for her asthma, she does not have any inhalers. Please advise. Thanks.    [facility] [address]  [place] [address]  Phone: [phone] Fax: [phone]

## 2018-06-07 ENCOUNTER — OFFICE VISIT (OUTPATIENT)
Dept: FAMILY MEDICINE | Facility: CLINIC | Age: 68
End: 2018-06-07
Payer: MEDICARE

## 2018-06-07 VITALS
HEIGHT: 65 IN | BODY MASS INDEX: 22.89 KG/M2 | SYSTOLIC BLOOD PRESSURE: 115 MMHG | HEART RATE: 103 BPM | WEIGHT: 137.38 LBS | DIASTOLIC BLOOD PRESSURE: 81 MMHG

## 2018-06-07 DIAGNOSIS — J45.909 UNCOMPLICATED ASTHMA, UNSPECIFIED ASTHMA SEVERITY, UNSPECIFIED WHETHER PERSISTENT: ICD-10-CM

## 2018-06-07 DIAGNOSIS — Z00.00 ENCOUNTER FOR PREVENTIVE HEALTH EXAMINATION: Primary | ICD-10-CM

## 2018-06-07 DIAGNOSIS — D84.9 IMMUNE DEFICIENCY DISORDER: ICD-10-CM

## 2018-06-07 DIAGNOSIS — K21.9 GASTROESOPHAGEAL REFLUX DISEASE WITHOUT ESOPHAGITIS: ICD-10-CM

## 2018-06-07 DIAGNOSIS — I10 ESSENTIAL HYPERTENSION: ICD-10-CM

## 2018-06-07 DIAGNOSIS — I77.1 TORTUOUS AORTA: ICD-10-CM

## 2018-06-07 DIAGNOSIS — G35 MULTIPLE SCLEROSIS: ICD-10-CM

## 2018-06-07 DIAGNOSIS — E03.9 HYPOTHYROIDISM, UNSPECIFIED TYPE: ICD-10-CM

## 2018-06-07 DIAGNOSIS — F33.9 EPISODE OF RECURRENT MAJOR DEPRESSIVE DISORDER, UNSPECIFIED DEPRESSION EPISODE SEVERITY: ICD-10-CM

## 2018-06-07 PROBLEM — B37.9 YEAST INFECTION: Status: RESOLVED | Noted: 2017-05-19 | Resolved: 2018-06-07

## 2018-06-07 PROCEDURE — G0439 PPPS, SUBSEQ VISIT: HCPCS | Mod: S$GLB,,, | Performed by: NURSE PRACTITIONER

## 2018-06-07 PROCEDURE — 3074F SYST BP LT 130 MM HG: CPT | Mod: CPTII,S$GLB,, | Performed by: NURSE PRACTITIONER

## 2018-06-07 PROCEDURE — 99499 UNLISTED E&M SERVICE: CPT | Mod: S$GLB,,, | Performed by: NURSE PRACTITIONER

## 2018-06-07 PROCEDURE — 99999 PR PBB SHADOW E&M-EST. PATIENT-LVL IV: CPT | Mod: PBBFAC,,, | Performed by: NURSE PRACTITIONER

## 2018-06-07 PROCEDURE — 3079F DIAST BP 80-89 MM HG: CPT | Mod: CPTII,S$GLB,, | Performed by: NURSE PRACTITIONER

## 2018-06-07 NOTE — Clinical Note
Primary Care Providers: Natalie Gale MD, MD (General)  Your patient was seen today for a HRA visit. Gap(s) in care (HEDIS gaps) have been identified during this visit that require additional testing and possible follow up.  No orders of the defined types were placed in this encounter.   These orders were placed using [facility] approved protocol and any results will be forwarded to your office for appropriate follow up. I have included a copy of my visit note; please review the note and feel free to contact me with any questions.   Thank you for allowing me to participate in the care of your patients. Kellie Browne NP

## 2018-06-07 NOTE — PROGRESS NOTES
"Babita Thomas presented for a  Medicare AWV and comprehensive Health Risk Assessment today. The following components were reviewed and updated:    · Medical history  · Family History  · Social history  · Allergies and Current Medications  · Health Risk Assessment  · Health Maintenance  · Care Team     ** See Completed Assessments for Annual Wellness Visit within the encounter summary.**       The following assessments were completed:  · Living Situation  · CAGE  · Depression Screening  · Timed Get Up and Go  · Whisper Test  · Cognitive Function Screening  · Nutrition Screening  · ADL Screening  · PAQ Screening    Vitals:    06/07/18 0802   BP: 115/81   Pulse: 103   Weight: 62.3 kg (137 lb 5.6 oz)   Height: 5' 5" (1.651 m)     Body mass index is 22.86 kg/m².  Physical Exam   Constitutional: She is oriented to person, place, and time. She appears well-developed.   HENT:   Head: Normocephalic and atraumatic.   Eyes: Pupils are equal, round, and reactive to light.   Neck: Normal range of motion.   Cardiovascular: Normal rate, regular rhythm and normal heart sounds.    No murmur heard.  Pulmonary/Chest: Effort normal and breath sounds normal.   Abdominal: Soft. Bowel sounds are normal. She exhibits no mass.   Musculoskeletal: Normal range of motion. She exhibits no edema.   Neurological: She is alert and oriented to person, place, and time. She exhibits normal muscle tone.   Skin: Skin is warm and dry.   Psychiatric: She has a normal mood and affect. Her behavior is normal.   Nursing note and vitals reviewed.            Diagnoses and health risks identified today and associated recommendations/orders:    Encounter for preventive health examination    Tortuous aorta  Comments:  stable; continue to monitor    Multiple sclerosis  Comments:  stable; followed by neurology    Immune deficiency disorder  Comments:  stable; continue to monitor    BMI 22.0-22.9, adult  Comments:  stable; continue to monitor    Gastroesophageal " reflux disease without esophagitis  Comments:  stable; contineu mediaction regimen    Essential hypertension  Comments:  controlled; continnue regimen    Episode of recurrent major depressive disorder, unspecified depression episode severity  Comments:  stable; continue to monitor    Uncomplicated asthma, unspecified asthma severity, unspecified whether persistent  Comments:  stable; continue to monitor    Hypothyroidism, unspecified type  Comments:  stable; continue to monitor          Provided Babita with a 5-10 year written screening schedule and personal prevention plan. Recommendations were developed using the USPSTF age appropriate recommendations. Education, counseling, and referrals were provided as needed. After Visit Summary printed and given to patient which includes a list of additional screenings\tests needed.    F/U with Dr. Gale in 3-4 months    Kellie Browne NP     Patient readiness: acceptance and barriers:none    During the course of the visit the patient was educated and counseled about the following:     Hypertension:   Regular aerobic exercise.    Goals: Hypertension: Reduce Blood Pressure    Did patient meet goals/outcomes: Yes    The following self management tools provided: declined    Patient Instructions (the written plan) was given to the patient/family.     Time spent with patient: 55 minutes    Barriers to medications present (no )    Adverse reactions to current medications (no)    Over the counter medications reviewed (Yes)

## 2018-06-07 NOTE — PATIENT INSTRUCTIONS
Counseling and Referral of Other Preventative  (Italic type indicates deductible and co-insurance are waived)    Patient Name: Babita Thomas  Today's Date: 6/7/2018    Health Maintenance       Date Due Completion Date    TETANUS VACCINE 06/14/2019 (Originally 1/18/1968) ---    Influenza Vaccine 08/01/2018 4/16/2018 (ClinicallyNA)    Override on 4/16/2018: Not Clinically Appropriate    Lipid Panel 04/16/2019 4/16/2018    Colonoscopy 03/05/2020 3/5/2010 (Done)    Override on 3/5/2010: Done (Dr Cotter 10 year bal)    Mammogram 04/06/2020 4/6/2018    Override on 2/10/2015: Done (DIS--sent to scanning 03/28/2016)    DEXA SCAN 04/06/2021 4/6/2018        No orders of the defined types were placed in this encounter.    The following information is provided to all patients.  This information is to help you find resources for any of the problems found today that may be affecting your health:                Living healthy guide: www.[facility].[place].gov      Understanding Diabetes: www.diabetes.org      Eating healthy: www.cdc.gov/healthyweight      CDC home safety checklist: www.cdc.gov/steadi/patient.html      Agency on Aging: www.goea.[place].gov      Alcoholics anonymous (AA): www.aa.org      Physical Activity: www.jagdish.nih.gov/oq7hdnv      Tobacco use: www.quitwithusla.org

## 2018-06-25 DIAGNOSIS — E78.5 HYPERLIPIDEMIA, UNSPECIFIED HYPERLIPIDEMIA TYPE: ICD-10-CM

## 2018-06-25 RX ORDER — LOVASTATIN 10 MG/1
TABLET ORAL
Qty: 90 TABLET | Refills: 0 | Status: SHIPPED | OUTPATIENT
Start: 2018-06-25 | End: 2018-07-17

## 2018-07-16 ENCOUNTER — LAB VISIT (OUTPATIENT)
Dept: LAB | Facility: HOSPITAL | Age: 68
End: 2018-07-16
Attending: PHYSICIAN ASSISTANT
Payer: MEDICARE

## 2018-07-16 DIAGNOSIS — E78.5 HYPERLIPIDEMIA, UNSPECIFIED HYPERLIPIDEMIA TYPE: ICD-10-CM

## 2018-07-16 LAB
ALBUMIN SERPL BCP-MCNC: 3.7 G/DL
ALP SERPL-CCNC: 82 U/L
ALT SERPL W/O P-5'-P-CCNC: 23 U/L
ANION GAP SERPL CALC-SCNC: 7 MMOL/L
AST SERPL-CCNC: 30 U/L
BILIRUB SERPL-MCNC: 0.4 MG/DL
BUN SERPL-MCNC: 13 MG/DL
CALCIUM SERPL-MCNC: 10.2 MG/DL
CHLORIDE SERPL-SCNC: 102 MMOL/L
CHOLEST SERPL-MCNC: 273 MG/DL
CHOLEST/HDLC SERPL: 3.4 {RATIO}
CO2 SERPL-SCNC: 29 MMOL/L
CREAT SERPL-MCNC: 0.9 MG/DL
EST. GFR  (AFRICAN AMERICAN): >60 ML/MIN/1.73 M^2
EST. GFR  (NON AFRICAN AMERICAN): >60 ML/MIN/1.73 M^2
GLUCOSE SERPL-MCNC: 89 MG/DL
HDLC SERPL-MCNC: 80 MG/DL
HDLC SERPL: 29.3 %
LDLC SERPL CALC-MCNC: 177.2 MG/DL
NONHDLC SERPL-MCNC: 193 MG/DL
POTASSIUM SERPL-SCNC: 4.7 MMOL/L
PROT SERPL-MCNC: 7.6 G/DL
SODIUM SERPL-SCNC: 138 MMOL/L
TRIGL SERPL-MCNC: 79 MG/DL

## 2018-07-16 PROCEDURE — 36415 COLL VENOUS BLD VENIPUNCTURE: CPT | Mod: PO

## 2018-07-16 PROCEDURE — 80053 COMPREHEN METABOLIC PANEL: CPT

## 2018-07-16 PROCEDURE — 80061 LIPID PANEL: CPT

## 2018-07-17 DIAGNOSIS — E78.5 HYPERLIPIDEMIA, UNSPECIFIED HYPERLIPIDEMIA TYPE: Primary | ICD-10-CM

## 2018-07-17 RX ORDER — LOVASTATIN 20 MG/1
20 TABLET ORAL NIGHTLY
Qty: 90 TABLET | Refills: 3 | Status: SHIPPED | OUTPATIENT
Start: 2018-07-17 | End: 2020-01-15

## 2018-08-09 ENCOUNTER — HOSPITAL ENCOUNTER (OUTPATIENT)
Dept: RADIOLOGY | Facility: CLINIC | Age: 68
Discharge: HOME | End: 2018-08-09
Attending: SPECIALIST
Payer: MEDICARE

## 2018-08-09 DIAGNOSIS — N95.0 PMB (POSTMENOPAUSAL BLEEDING): ICD-10-CM

## 2018-08-09 PROCEDURE — 76856 US EXAM PELVIC COMPLETE: CPT | Mod: TC,PO

## 2018-08-09 PROCEDURE — 76830 TRANSVAGINAL US NON-OB: CPT | Mod: 26,,, | Performed by: RADIOLOGY

## 2018-08-09 PROCEDURE — 76856 US EXAM PELVIC COMPLETE: CPT | Mod: 26,,, | Performed by: RADIOLOGY

## 2018-08-09 PROCEDURE — 76830 TRANSVAGINAL US NON-OB: CPT | Mod: TC,PO

## 2018-08-27 DIAGNOSIS — J45.909 ASTHMA, UNSPECIFIED ASTHMA SEVERITY, UNSPECIFIED WHETHER COMPLICATED, UNSPECIFIED WHETHER PERSISTENT: ICD-10-CM

## 2018-08-27 RX ORDER — FLUTICASONE FUROATE AND VILANTEROL 200; 25 UG/1; UG/1
1 POWDER RESPIRATORY (INHALATION) DAILY
Qty: 1 EACH | Refills: 5 | Status: SHIPPED | OUTPATIENT
Start: 2018-08-27 | End: 2019-05-14 | Stop reason: ALTCHOICE

## 2018-08-27 NOTE — TELEPHONE ENCOUNTER
Sent request to MD   ----- Message from Perico Washington sent at 8/27/2018  4:37 PM CDT -----  Contact: Patient  Meli, [phone]. Requesting refill for fluticasone-vilanterol (BREO ELLIPTA) 200-25 mcg/dose DsDv diskus inhaler 90 day supply. Please advise. Thanks.    [facility]  [address] [address]  Phone: [phone] Fax: [phone]

## 2018-10-23 ENCOUNTER — TELEPHONE (OUTPATIENT)
Dept: ALLERGY | Facility: CLINIC | Age: 68
End: 2018-10-23

## 2018-11-05 ENCOUNTER — TELEPHONE (OUTPATIENT)
Dept: FAMILY MEDICINE | Facility: CLINIC | Age: 68
End: 2018-11-05

## 2018-11-05 NOTE — TELEPHONE ENCOUNTER
Called pt regarding below message. Informed pt that Dr. Gale typically does not place lab orders prior to her visits. Informed pt she does have orders placed per Edwige ESPAÑA if she wishes to have those done a little earlier in order to discuss with Dr. Gale. Pt wishes to reschedule labs. Appt date, time, and location given. Pt verbalized understanding with no further questions.     ----- Message from Mikey Dorman sent at 11/5/2018  9:29 AM CST -----  Contact: patient  Type: Needs Medical Advice    Who Called:  patient  Symptoms (please be specific):    How long has patient had these symptoms:    Pharmacy name and phone #:    Best Call Back Number: [phone]  Additional Information: requesting a call back when order for labs has been place in epic

## 2018-11-19 ENCOUNTER — CLINICAL SUPPORT (OUTPATIENT)
Dept: FAMILY MEDICINE | Facility: CLINIC | Age: 68
End: 2018-11-19
Payer: MEDICARE

## 2018-11-19 DIAGNOSIS — Z23 ENCOUNTER FOR IMMUNIZATION: Primary | ICD-10-CM

## 2018-11-19 PROCEDURE — 99499 UNLISTED E&M SERVICE: CPT | Mod: S$GLB,,, | Performed by: FAMILY MEDICINE

## 2018-11-19 PROCEDURE — 99999 PR PBB SHADOW E&M-EST. PATIENT-LVL I: CPT | Mod: PBBFAC,,, | Performed by: FAMILY MEDICINE

## 2018-11-19 PROCEDURE — G0008 ADMIN INFLUENZA VIRUS VAC: HCPCS | Mod: S$GLB,,, | Performed by: FAMILY MEDICINE

## 2018-11-19 PROCEDURE — 90662 IIV NO PRSV INCREASED AG IM: CPT | Mod: S$GLB,,, | Performed by: FAMILY MEDICINE

## 2018-12-11 DIAGNOSIS — D83.9 CVID (COMMON VARIABLE IMMUNODEFICIENCY): Primary | ICD-10-CM

## 2018-12-12 ENCOUNTER — LAB VISIT (OUTPATIENT)
Dept: LAB | Facility: HOSPITAL | Age: 68
End: 2018-12-12
Attending: ALLERGY & IMMUNOLOGY
Payer: MEDICARE

## 2018-12-12 DIAGNOSIS — D83.9 CVID (COMMON VARIABLE IMMUNODEFICIENCY): ICD-10-CM

## 2018-12-12 LAB — IGG SERPL-MCNC: 1164 MG/DL

## 2018-12-12 PROCEDURE — 36415 COLL VENOUS BLD VENIPUNCTURE: CPT | Mod: HCWC,PO

## 2018-12-12 PROCEDURE — 82784 ASSAY IGA/IGD/IGG/IGM EACH: CPT | Mod: HCWC

## 2018-12-18 ENCOUNTER — TELEPHONE (OUTPATIENT)
Dept: ALLERGY | Facility: CLINIC | Age: 68
End: 2018-12-18

## 2018-12-18 ENCOUNTER — OFFICE VISIT (OUTPATIENT)
Dept: ALLERGY | Facility: CLINIC | Age: 68
End: 2018-12-18
Payer: MEDICARE

## 2018-12-18 VITALS — BODY MASS INDEX: 23.43 KG/M2 | HEART RATE: 82 BPM | OXYGEN SATURATION: 96 % | HEIGHT: 65 IN | WEIGHT: 140.63 LBS

## 2018-12-18 DIAGNOSIS — D83.9 CVID (COMMON VARIABLE IMMUNODEFICIENCY): Primary | ICD-10-CM

## 2018-12-18 DIAGNOSIS — K21.9 GASTROESOPHAGEAL REFLUX DISEASE WITHOUT ESOPHAGITIS: ICD-10-CM

## 2018-12-18 DIAGNOSIS — J45.40 MODERATE PERSISTENT ASTHMA WITHOUT COMPLICATION: ICD-10-CM

## 2018-12-18 DIAGNOSIS — D83.9 CVID (COMMON VARIABLE IMMUNODEFICIENCY): ICD-10-CM

## 2018-12-18 DIAGNOSIS — G35 MULTIPLE SCLEROSIS: ICD-10-CM

## 2018-12-18 DIAGNOSIS — J32.9 RECURRENT SINUS INFECTIONS: ICD-10-CM

## 2018-12-18 DIAGNOSIS — J31.0 RHINITIS, CHRONIC: ICD-10-CM

## 2018-12-18 PROCEDURE — 99999 PR PBB SHADOW E&M-EST. PATIENT-LVL III: CPT | Mod: PBBFAC,,, | Performed by: ALLERGY & IMMUNOLOGY

## 2018-12-18 PROCEDURE — 1101F PT FALLS ASSESS-DOCD LE1/YR: CPT | Mod: CPTII,S$GLB,, | Performed by: ALLERGY & IMMUNOLOGY

## 2018-12-18 PROCEDURE — 99214 OFFICE O/P EST MOD 30 MIN: CPT | Mod: S$GLB,,, | Performed by: ALLERGY & IMMUNOLOGY

## 2018-12-18 NOTE — TELEPHONE ENCOUNTER
Spoke to efraín at [facility] [phone] he confirmed pt's hizentra dose is 12g every 14 days.     His fax number is [phone] if we need to send an update.

## 2018-12-18 NOTE — PROGRESS NOTES
ALLERGY & IMMUNOLOGY CLINIC - FOLLOW UP     HISTORY OF PRESENT ILLNESS     Patient ID: Babita Thomas is a 68 y.o. 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 She was last seen by Dr. Putnam in 6/5/2017.    She has been on Hizentra 12g every 2 weeks. [facility] is delivering, and a nurse comes to the house to administer. She has been on the Hizentra for one year.  She has never been on any other formulations of IgG.    In the past year, she has been on one or two courses of antibiotics for sinus infections, this is fewer courses of antibiotics than prior to starting hizentra. She notes an improvement in her rhinitis symptoms, asthma symptoms, stomach symptoms, and ear symptoms since starting Hizentra. No signficant side effects from Hizentra.     Has a history of asthma, has been prescribed Breo. She takes this as needed, and has not needed in several months. Unsure when last PFTs were done (at least one year ago).     Has a history of rhinitis, but is not currently on any nasal sprays or allergy medications.     She has a history of eosinophilia in the past.      REVIEW OF SYSTEMS     CONST: no F/C/NS, no unintentional weight changes  NEURO: no H/A, no weakness, no paresthesias  EYES: no discharge, no pruritus, no erythema  EARS: no hearing loss, no sensation of fullness  NOSE: no congestion, no rhinorrhea, no discharge, no itching, no sneezing  PULM: no SOB, no wheezing, no cough, no snoring  CV: no CP, no palpitations, no leg swelling  GI: no dysphagia, no heartburn, no pain, no N/V/D, no BRBPR/melena  URO: no dysuria, no hematuria, no nocturia  MSK: no joint pain, no muscle pain  DERM: no rashes, no skin breaks     MEDICAL HISTORY     MedHx: active problems reviewed  Allergies: Pravastatin, Tecfidera  Medications: MAR reviewed  Vaccines: has completed pneumonia series, flu shot one  "month ago    H/o Asthma: yes  H/o Eczema: denies  H/o Rhinitis: yes  Oral Allergy:  denies  Food Allergy: denies  Venom Allergy: denies     PHYSICAL EXAM     VS: Pulse 82   Ht 5' 5" (1.651 m)   Wt 63.8 kg (140 lb 10.5 oz)   SpO2 96%   BMI 23.41 kg/m²   GENERAL: AAOx4, NAD, well-appearing, cooperative  EYES: PERRL, EOMI, no conjunctival injection, no discharge, no infraorbital shiners  EARS: external auditory canals normal B/L, TM normal B/L  NOSE: NT 2+ and pink B/L, no stringing mucous, no polyps  ORAL: MMM, no ulcers, no thrush, no cobblestoning  NECK: supple, trachea midline, no thyromegaly, no LAD  LUNGS: CTAB, no w/r/c, no increased WOB  HEART: RRR, normal S1/S2, no m/g/r  EXTREMITIES: +2 distal pulses, no c/c/e  LYMPHATICS: no cervical/submandibular LAD  DERM: no rashes, no skin breaks, no dystrophic fingernails  NEURO: normal gait, no facial asymmetry     LABORATORY STUDIES     7/2017: IgG2 low, pneumococcal titers protective to 1/14.   AEC most recently 400, has been 1100 in the past     PULMONARY FUNCTION     PFTs 12/2017: moderate obstruction without response to bronchodilator     IMAGING & OTHER DIAGNOSTICS     CT Chest 2014: linear scarring vs atelectasis in RML, no mention of bronchiectasis in the read     ASSESSMENT & PLAN     Babita Thomas is a 68 y.o. 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    -[facility] for hizentra 12g q14 days   -Recommend annual flu shot   -Has had all annual "CVID labs" in the past 12 months.   -Spirometry ordered today    History of asthma, with obstruction without response to bronchodilator on most recent spirometry.   -Spirometry ordered today   -Would recommend daily use of Breo   -Flu shot UTD, pneumonia vaccine series completed, also is on IgG.    Chronic rhinitis   -Low threshhold to re-start flonase and azelastine if " symptoms recur    History of Eosinophilia   -Not with most recent CBC, will follow    Follow up annually or sooner if symptoms change.

## 2018-12-18 NOTE — LETTER
December 18, 2018      Renetta Putnam MD  [address] [facility] [address] [place] [address]           [place] [facility]  [address]  [place] [address]  Phone: [phone]          Patient: Babita Thomas   MR Number: [id number]   YOB: 1950   Date of Visit: 12/18/2018       Dear Dr. Renetta Putnam:    Thank you for referring Babita Thomas to me for evaluation. Attached you will find relevant portions of my assessment and plan of care.    If you have questions, please do not hesitate to call me. I look forward to following Babita Thomas along with you.    Sincerely,    Trista Tesfaye MD    Enclosure  CC:  No Recipients    If you would like to receive this communication electronically, please contact [email or web address] or [phone] to request more information on [facility] Link access.    For providers and/or their staff who would like to refer a patient to [facility], please contact us through our one-stop-shop provider referral line, [facility], at [phone].    If you feel you have received this communication in error or would no longer like to receive these types of communications, please e-mail [email or web address]

## 2018-12-26 ENCOUNTER — TELEPHONE (OUTPATIENT)
Dept: ALLERGY | Facility: CLINIC | Age: 68
End: 2018-12-26

## 2018-12-26 NOTE — TELEPHONE ENCOUNTER
----- Message from Lavinia Marie sent at 12/26/2018 12:14 PM CST -----  Type: Needs Medical Advice    Who Called: Patient    Best Call Back Number: [phone] (home)     Additional Information: Patient stating she is supposed to start infusion but has not received a call regarding. Please contact to discuss

## 2018-12-27 ENCOUNTER — TELEPHONE (OUTPATIENT)
Dept: ALLERGY | Facility: CLINIC | Age: 68
End: 2018-12-27

## 2018-12-27 NOTE — TELEPHONE ENCOUNTER
Spoke to [facility] they are processing her medication    Called pt to tell her I spoke to [facility]  ----- Message from Arianne Pruett LPN sent at 12/26/2018  2:52 PM CST -----  Hi  Would you mind calling this patient.  I see she gets her meds from [facility] and didn't know if you knew when they should be sending them.  Thanks,  J  ----- Message -----  From: Lavinia Marie  Sent: 12/26/2018  12:14 PM  To: [facility] Staff    Type: Needs Medical Advice    Who Called: Patient    Best Call Back Number: [phone] (home)     Additional Information: Patient stating she is supposed to start infusion but has not received a call regarding. Please contact to discuss

## 2019-01-02 ENCOUNTER — LAB VISIT (OUTPATIENT)
Dept: LAB | Facility: HOSPITAL | Age: 69
End: 2019-01-02
Attending: PHYSICIAN ASSISTANT
Payer: MEDICARE

## 2019-01-02 ENCOUNTER — DOCUMENTATION ONLY (OUTPATIENT)
Dept: FAMILY MEDICINE | Facility: CLINIC | Age: 69
End: 2019-01-02

## 2019-01-02 DIAGNOSIS — E78.5 HYPERLIPIDEMIA, UNSPECIFIED HYPERLIPIDEMIA TYPE: ICD-10-CM

## 2019-01-02 LAB
ALBUMIN SERPL BCP-MCNC: 3.5 G/DL
ALP SERPL-CCNC: 121 U/L
ALT SERPL W/O P-5'-P-CCNC: 20 U/L
ANION GAP SERPL CALC-SCNC: 8 MMOL/L
AST SERPL-CCNC: 27 U/L
BILIRUB SERPL-MCNC: 0.2 MG/DL
BUN SERPL-MCNC: 15 MG/DL
CALCIUM SERPL-MCNC: 9.4 MG/DL
CHLORIDE SERPL-SCNC: 103 MMOL/L
CHOLEST SERPL-MCNC: 231 MG/DL
CHOLEST/HDLC SERPL: 3.3 {RATIO}
CO2 SERPL-SCNC: 28 MMOL/L
CREAT SERPL-MCNC: 0.8 MG/DL
EST. GFR  (AFRICAN AMERICAN): >60 ML/MIN/1.73 M^2
EST. GFR  (NON AFRICAN AMERICAN): >60 ML/MIN/1.73 M^2
GLUCOSE SERPL-MCNC: 99 MG/DL
HDLC SERPL-MCNC: 69 MG/DL
HDLC SERPL: 29.9 %
LDLC SERPL CALC-MCNC: 149 MG/DL
NONHDLC SERPL-MCNC: 162 MG/DL
POTASSIUM SERPL-SCNC: 4.2 MMOL/L
PROT SERPL-MCNC: 7.6 G/DL
SODIUM SERPL-SCNC: 139 MMOL/L
TRIGL SERPL-MCNC: 65 MG/DL

## 2019-01-02 PROCEDURE — 80061 LIPID PANEL: CPT

## 2019-01-02 PROCEDURE — 36415 COLL VENOUS BLD VENIPUNCTURE: CPT | Mod: PO

## 2019-01-02 PROCEDURE — 80053 COMPREHEN METABOLIC PANEL: CPT

## 2019-01-07 ENCOUNTER — TELEPHONE (OUTPATIENT)
Dept: FAMILY MEDICINE | Facility: CLINIC | Age: 69
End: 2019-01-07

## 2019-01-07 NOTE — TELEPHONE ENCOUNTER
Called pt. Need to reschedule appt with Dr. Gale today. Dr. Gale called out sick.  Rescheduled to 2/13/19. Thanks, Suze

## 2019-01-29 ENCOUNTER — DOCUMENTATION ONLY (OUTPATIENT)
Dept: FAMILY MEDICINE | Facility: CLINIC | Age: 69
End: 2019-01-29

## 2019-01-29 NOTE — PROGRESS NOTES
Pre-Visit Chart Review  For Appointment Scheduled on 2/13/19.    There are no preventive care reminders to display for this patient.

## 2019-01-30 ENCOUNTER — HOSPITAL ENCOUNTER (OUTPATIENT)
Dept: RESPIRATORY THERAPY | Facility: HOSPITAL | Age: 69
Discharge: HOME | End: 2019-01-30
Attending: ALLERGY & IMMUNOLOGY
Payer: MEDICARE

## 2019-01-30 DIAGNOSIS — D83.9 CVID (COMMON VARIABLE IMMUNODEFICIENCY): ICD-10-CM

## 2019-01-30 DIAGNOSIS — J45.40 MODERATE PERSISTENT ASTHMA WITHOUT COMPLICATION: ICD-10-CM

## 2019-01-30 LAB
BRPFT: ABNORMAL
ERV LLN: 0.69
ERV REF: 0.69
ERVN2 LLN: 0.69
ERVN2 PRE REF: 72.7 %
ERVN2 PRE: 0.5 L (ref 0.69–0.69)
ERVN2 REF: 0.69
FEF 25 75 CHG: 26.9 %
FEF 25 75 LLN: 0.94
FEF 25 75 POST REF: 96.3 %
FEF 25 75 PRE REF: 75.9 %
FEF 25 75 REF: 1.99
FET100 CHG: -22.6 %
FEV1 CHG: 0.6 %
FEV1 FVC CHG: 6.5 %
FEV1 FVC LLN: 65
FEV1 FVC POST REF: 101.1 %
FEV1 FVC PRE REF: 94.9 %
FEV1 FVC REF: 78
FEV1 LLN: 1.72
FEV1 POST REF: 80.4 %
FEV1 PRE REF: 79.9 %
FEV1 REF: 2.34
FEV6 CHG: -2.3 %
FEV6 LLN: 2.35
FEV6 POST REF: 77 %
FEV6 POST: 2.35 L (ref 2.35–3.75)
FEV6 PRE REF: 78.9 %
FEV6 PRE: 2.4 L (ref 2.35–3.75)
FEV6 REF: 3.05
FRCN2 LLN: 1.94
FRCN2 PRE REF: 105.5 %
FRCN2 REF: 2.76
FRCPLETH LLN: 1.94
FRCPLETH REF: 2.76
FVC CHG: -5.5 %
FVC LLN: 2.22
FVC POST REF: 78.9 %
FVC PRE REF: 83.5 %
FVC REF: 3.01
MVV LLN: 75
MVV PRE REF: 92.4 %
MVV REF: 89
PEF CHG: -10.4 %
PEF LLN: 4.18
PEF POST REF: 61.3 %
PEF PRE REF: 68.4 %
PEF REF: 5.94
POST FEF 25 75: 1.91 L/S (ref 0.94–3.03)
POST FET 100: 8.18 SEC
POST FEV1 FVC: 79.09 % (ref 65.26–91.23)
POST FEV1: 1.88 L (ref 1.72–2.96)
POST FVC: 2.38 L (ref 2.22–3.81)
POST PEF: 3.64 L/S (ref 4.18–7.7)
PRE FEF 25 75: 1.51 L/S (ref 0.94–3.03)
PRE FET 100: 10.58 SEC
PRE FEV1 FVC: 74.28 % (ref 65.26–91.23)
PRE FEV1: 1.87 L (ref 1.72–2.96)
PRE FRC N2: 2.92 L
PRE FVC: 2.52 L (ref 2.22–3.81)
PRE MVV: 82 L/MIN (ref 75.39–102)
PRE PEF: 4.06 L/S (ref 4.18–7.7)
RAW LLN: 3.06
RAW REF: 3.06
RV LLN: 1.5
RV REF: 2.07
RVN2 LLN: 1.5
RVN2 PRE REF: 95.3 %
RVN2 PRE: 1.98 L (ref 1.5–2.65)
RVN2 REF: 2.07
RVN2TLCN2 LLN: 32
RVN2TLCN2 PRE REF: 104.6 %
RVN2TLCN2 PRE: 44 % (ref 32.49–51.67)
RVN2TLCN2 REF: 42
RVTLC LLN: 32
RVTLC REF: 42
TLC LLN: 4.11
TLC REF: 5.1
TLCN2 LLN: 4.11
TLCN2 PRE REF: 88.1 %
TLCN2 PRE: 4.49 L (ref 4.11–6.09)
TLCN2 REF: 5.1
VC LLN: 2.22
VC REF: 3.01
VCMAXN2 LLN: 2.22
VCMAXN2 PRE REF: 83.5 %
VCMAXN2 PRE: 2.52 L (ref 2.22–3.81)
VCMAXN2 REF: 3.01

## 2019-01-30 PROCEDURE — 94060 EVALUATION OF WHEEZING: CPT | Mod: HCNC

## 2019-01-30 PROCEDURE — 94727 GAS DIL/WSHOT DETER LNG VOL: CPT | Mod: HCNC

## 2019-01-31 ENCOUNTER — TELEPHONE (OUTPATIENT)
Dept: ALLERGY | Facility: CLINIC | Age: 69
End: 2019-01-31

## 2019-01-31 NOTE — TELEPHONE ENCOUNTER
Spoke to pt, gave message below:   ----- Message from Trista Tesfaye MD sent at 1/31/2019 12:07 PM CST -----  Can you please let Ms. Jc know that her lung function testing looks normal. Thanks!

## 2019-02-13 ENCOUNTER — OFFICE VISIT (OUTPATIENT)
Dept: FAMILY MEDICINE | Facility: CLINIC | Age: 69
End: 2019-02-13
Payer: MEDICARE

## 2019-02-13 VITALS
DIASTOLIC BLOOD PRESSURE: 80 MMHG | TEMPERATURE: 99 F | WEIGHT: 142.88 LBS | HEIGHT: 65 IN | SYSTOLIC BLOOD PRESSURE: 137 MMHG | HEART RATE: 88 BPM | BODY MASS INDEX: 23.81 KG/M2

## 2019-02-13 DIAGNOSIS — E78.2 HYPERLIPIDEMIA, MIXED: ICD-10-CM

## 2019-02-13 DIAGNOSIS — E04.1 THYROID NODULE: Primary | ICD-10-CM

## 2019-02-13 DIAGNOSIS — M19.049 HAND ARTHRITIS: ICD-10-CM

## 2019-02-13 PROCEDURE — 3075F PR MOST RECENT SYSTOLIC BLOOD PRESS GE 130-139MM HG: ICD-10-PCS | Mod: HCNC,CPTII,S$GLB, | Performed by: FAMILY MEDICINE

## 2019-02-13 PROCEDURE — 1101F PR PT FALLS ASSESS DOC 0-1 FALLS W/OUT INJ PAST YR: ICD-10-PCS | Mod: HCNC,CPTII,S$GLB, | Performed by: FAMILY MEDICINE

## 2019-02-13 PROCEDURE — 99214 PR OFFICE/OUTPT VISIT, EST, LEVL IV, 30-39 MIN: ICD-10-PCS | Mod: HCNC,S$GLB,, | Performed by: FAMILY MEDICINE

## 2019-02-13 PROCEDURE — 99499 RISK ADDL DX/OHS AUDIT: ICD-10-PCS | Mod: HCNC,S$GLB,, | Performed by: FAMILY MEDICINE

## 2019-02-13 PROCEDURE — 3079F DIAST BP 80-89 MM HG: CPT | Mod: HCNC,CPTII,S$GLB, | Performed by: FAMILY MEDICINE

## 2019-02-13 PROCEDURE — 99999 PR PBB SHADOW E&M-EST. PATIENT-LVL IV: CPT | Mod: PBBFAC,HCNC,, | Performed by: FAMILY MEDICINE

## 2019-02-13 PROCEDURE — 3079F PR MOST RECENT DIASTOLIC BLOOD PRESSURE 80-89 MM HG: ICD-10-PCS | Mod: HCNC,CPTII,S$GLB, | Performed by: FAMILY MEDICINE

## 2019-02-13 PROCEDURE — 99999 PR PBB SHADOW E&M-EST. PATIENT-LVL IV: ICD-10-PCS | Mod: PBBFAC,HCNC,, | Performed by: FAMILY MEDICINE

## 2019-02-13 PROCEDURE — 1101F PT FALLS ASSESS-DOCD LE1/YR: CPT | Mod: HCNC,CPTII,S$GLB, | Performed by: FAMILY MEDICINE

## 2019-02-13 PROCEDURE — 99214 OFFICE O/P EST MOD 30 MIN: CPT | Mod: HCNC,S$GLB,, | Performed by: FAMILY MEDICINE

## 2019-02-13 PROCEDURE — 99499 UNLISTED E&M SERVICE: CPT | Mod: HCNC,S$GLB,, | Performed by: FAMILY MEDICINE

## 2019-02-13 PROCEDURE — 3075F SYST BP GE 130 - 139MM HG: CPT | Mod: HCNC,CPTII,S$GLB, | Performed by: FAMILY MEDICINE

## 2019-02-13 RX ORDER — DICLOFENAC SODIUM 10 MG/G
2 GEL TOPICAL 2 TIMES DAILY PRN
Qty: 100 G | Refills: 6 | Status: SHIPPED | OUTPATIENT
Start: 2019-02-13 | End: 2019-05-14 | Stop reason: ALTCHOICE

## 2019-02-13 RX ORDER — ESOMEPRAZOLE MAGNESIUM 20 MG/1
1 TABLET, DELAYED RELEASE ORAL DAILY
COMMUNITY
End: 2019-05-14 | Stop reason: ALTCHOICE

## 2019-02-13 RX ORDER — MELOXICAM 15 MG/1
15 TABLET ORAL DAILY
Qty: 30 TABLET | Refills: 11 | Status: SHIPPED | OUTPATIENT
Start: 2019-02-13 | End: 2019-04-09

## 2019-02-13 RX ORDER — ASPIRIN 81 MG/1
81 TABLET ORAL DAILY
COMMUNITY
End: 2020-02-10

## 2019-02-13 NOTE — PROGRESS NOTES
CHIEF COMPLAINT:  Follow up thyroid nodules, hyperlipidemia      HISTORY OF PRESENT ILLNESS:  Babita Thomas is a 69 y.o. female who presents to clinic as a for follow up on her chronic medical conditions. She has thyroid nodules and is due for a repeat thyroid ultrasound and TSH. She has hyperlipidemia and is on mevacor. She denies any side effects, recent lipids were at goal. She has intermittent pain over her b/l hands due to arthritis and has been using tylenol with minimal relief.       REVIEW OF SYSTEMS:  The patient denies any fever, chills, night sweats, headaches, vision changes, difficulty speaking or swallowing, decreased hearing, weight loss, weight gain, chest pain, palpitations, shortness of breath, cough, nausea, vomiting, abdominal pain, dysuria, diarrhea, constipation, hematuria, hematochezia, melena, changes in her hair, skin, nails, numbness or weakness in her extremities, erythema, swelling over any of her joints, myalgia, swollen glands, easy bruising, fatigue, edema, symptoms of anxiety or depression. She denies any vaginal discharge, breast masses, nipple discharge, change in the skin overlying her breasts.      MEDICATIONS:   Reviewed and/or reconciled in EPIC    ALLERGIES:  Reviewed and/or reconciled in [facility]    PAST MEDICAL/SURGICAL HISTORY:   Past Medical History:   Diagnosis Date    Allergy     Asthma     Depression 5/19/2017    GERD (gastroesophageal reflux disease)     Hyperlipidemia     Hypertension     Hypothyroidism     Immune deficiency disorder 5/19/2017    Insomnia 1/31/2017    MS (multiple sclerosis)     very well controlled, no symptoms    Shortness of breath     Sinusitis     Thyroid disease     hypothyroidism- no med      Past Surgical History:   Procedure Laterality Date    BREAST SURGERY Left 15 yrs ago    biopsy, benign    CHOLECYSTECTOMY      COLONOSCOPY  3-5-2010    Dr Cotter 10 year bal     ESOPHAGUS SURGERY      6 year old, peanut stuck in throat      ETHMOIDECTOMY N/A 12/19/2016    [facility]    MULTIPLE TOOTH EXTRACTIONS      SEPTOPLASTY N/A 4/28/2015    [facility]    SINUS SURGERY  2015    x3    SINUS SURGERY FUNCTIONAL ENDOSCOPIC WITH NAVIGATION N/A 12/19/2016    [facility]    SINUS SURGERY FUNCTIONAL ENDOSCOPIC WITH NAVIGATION Bilateral 4/28/2015    [facility]    TRACHEOSTOMY CLOSURE      age 6 for peanut in throat    TYMPANOSTOMY TUBE PLACEMENT      x2    WISDOM TOOTH EXTRACTION         FAMILY HISTORY:    Family History   Problem Relation Age of Onset    Hypertension Mother     Dementia Mother     Hyperlipidemia Mother     Heart disease Father 60        MI    Hypertension Sister     No Known Problems Daughter     Heart disease Paternal Uncle     Heart disease Maternal Grandfather 70        MI    Heart disease Paternal Grandfather     No Known Problems Daughter     Allergic rhinitis Neg Hx     Allergies Neg Hx     Angioedema Neg Hx     Asthma Neg Hx     Atopy Neg Hx     Eczema Neg Hx     Immunodeficiency Neg Hx     Rhinitis Neg Hx     Urticaria Neg Hx        SOCIAL HISTORY:    Social History     Socioeconomic History    Marital status:      Spouse name: Not on file    Number of children: Not on file    Years of education: Not on file    Highest education level: Not on file   Social Needs    Financial resource strain: Not on file    Food insecurity - worry: Not on file    Food insecurity - inability: Not on file    Transportation needs - medical: Not on file    Transportation needs - non-medical: Not on file   Occupational History    Not on file   Tobacco Use    Smoking status: Never Smoker    Smokeless tobacco: Never Used   Substance and Sexual Activity    Alcohol use: No    Drug use: No    Sexual activity: Yes     Partners: Male   Other Topics Concern    Not on file   Social  "History Narrative    Not on file       PHYSICAL EXAM:  VITAL SIGNS:   Vitals:    02/13/19 1140 02/13/19 1219 02/13/19 1220   BP: (!) 159/101 (!) 146/90 137/80   BP Location: Left arm Left arm    Patient Position: Sitting Sitting    BP Method: Medium (Automatic) Medium (Manual)    Pulse: 88     Temp: 98.5 °F (36.9 °C)     TempSrc: Oral     Weight: 64.8 kg (142 lb 13.7 oz)     Height: 5' 5" (1.651 m)       GENERAL:  Patient appears well nourished, sitting on exam table, in no acute distress.  HEENT:  Atraumatic, normocephalic, PERRLA, EOMI, no conjunctival injection, sclerae are anicteric, normal external auditory canals,TMs clear b/l, gross hearing intact to whisper, MMM, no oropharygneal erythema or exudate.  NECK:  Supple, normal ROM, trachea is midline , no supraclavicular or cervical LAD or masses palpated.  Thyroid gland not palpable.  CARDIOVASCULAR:  RRR, normal S1 and S2, no m/r/g.  RESPIRATORY:  CTA b/l, no wheezes, rhonchi, rales.  No increased work of breathing, no  use of accessory muscles.  ABDOMEN:  Soft, nontender, nondistended, normoactive bowel sounds in all four quadrants, no rebound or guarding, no HSM or masses palpated.  Normal percussion.  EXTREMITIES:  2+ DP pulses b/l, no edema.  SKIN:  Warm, no lesions on exposed skin.  NEUROMUSCULAR:  Cranial nerves II-XII grossly intact.  , 2+ biceps and patellar reflexes b/l. No clubbing or cyanosis of digits/nails.  Steady gait.  PSYCH:  Patient is alert and oriented to person, time, place. They are appropriately dressed and groomed. There is normal eye contact. Rate and tone of speech is normal. Normal insight, judgement. Normal thought content and process.     LABORATORY/IMAGING STUDIES: pending     ASSESSMENT/PLAN: This is a 69 y.o. female who presents to clinic for evaluation of the following concerns.  1. Thyroid nodule  - TSH; Future  - US Soft Tissue Head Neck Thyroid; Future    2. Hyperlipidemia, mixed  Continue with current dose of mevacor and " low cholesterol/fat diet. Recheck lipid panel and cmp in 6 months    3. Hand arthritis  Start mobic and prn diclofenac gel. The risks/side effects of this medication were discussed with her.         FOLLOW UP: 6 months      Natalie Gale MD

## 2019-02-14 ENCOUNTER — TELEPHONE (OUTPATIENT)
Dept: ALLERGY | Facility: CLINIC | Age: 69
End: 2019-02-14

## 2019-02-14 NOTE — TELEPHONE ENCOUNTER
Lizzette with [facility] is calling asking for a prescription filled out on [facility] letter head/form for audit purposes.

## 2019-02-15 ENCOUNTER — TELEPHONE (OUTPATIENT)
Dept: ALLERGY | Facility: CLINIC | Age: 69
End: 2019-02-15

## 2019-02-15 NOTE — TELEPHONE ENCOUNTER
Faxed signed by Dr Putnam a note to [facility] [phone] stating that Dr putnam had previously prescribed hizentra on 11/15/2017.

## 2019-02-26 ENCOUNTER — TELEPHONE (OUTPATIENT)
Dept: ALLERGY | Facility: CLINIC | Age: 69
End: 2019-02-26

## 2019-02-26 NOTE — TELEPHONE ENCOUNTER
Spoke to pt told her that Dr Tesfaye said, (see message below). Pt expressed understanding.     ----- Message from Trista Tesfaye MD sent at 2/26/2019  4:21 PM CST -----  The package insert says that it should be dosed every two weeks (or more frequently!) so I'd rather not change her. Can you let her know? See if that's ok with her?  Thanks!      ----- Message -----  From: Francesca Bryan LPN  Sent: 2/26/2019   3:53 PM  To: Trista Tesfaye MD    Pt would like to know if she can go from every two week infusions to doing it every three weeks. Please advise.     ----- Message -----  From: Elizabeth A Bosworth, LPN  Sent: 2/26/2019  11:32 AM  To: Francesca Bryan LPN        ----- Message -----  From: Barbara Baltazar  Sent: 2/26/2019  11:19 AM  To: Brien Weston Staff      Pt  Is calling to speak  To the  Nurse about   Her infusions  // please call [phone]

## 2019-04-08 ENCOUNTER — TELEPHONE (OUTPATIENT)
Dept: FAMILY MEDICINE | Facility: CLINIC | Age: 69
End: 2019-04-08

## 2019-04-08 ENCOUNTER — HOSPITAL ENCOUNTER (OUTPATIENT)
Dept: RADIOLOGY | Facility: CLINIC | Age: 69
Discharge: HOME | End: 2019-04-08
Attending: FAMILY MEDICINE
Payer: MEDICARE

## 2019-04-08 DIAGNOSIS — E04.1 THYROID NODULE: ICD-10-CM

## 2019-04-08 PROCEDURE — 76536 US EXAM OF HEAD AND NECK: CPT | Mod: 26,HCNC,, | Performed by: RADIOLOGY

## 2019-04-08 PROCEDURE — 76536 US SOFT TISSUE HEAD NECK THYROID: ICD-10-PCS | Mod: 26,HCNC,, | Performed by: RADIOLOGY

## 2019-04-08 PROCEDURE — 76536 US EXAM OF HEAD AND NECK: CPT | Mod: TC,HCNC,PO

## 2019-04-08 NOTE — TELEPHONE ENCOUNTER
Pt states her voltaren gel and meloxicam from 2/13/19 are not working and was told to let Dr Gale know. Pt is asking for something to help with her arthritis please advise asked me to send this to Dr Gale's PA or NP seen Edwgie 5/28/18                ----- Message from Mikey Dorman sent at 4/8/2019 12:13 PM CDT -----  Contact: patient  Type: Needs Medical Advice    Who Called:  patient  Symptoms (please be specific):    How long has patient had these symptoms:    Pharmacy name and phone #:    Best Call Back Number: [phone]  Additional Information: requesting a call back stated neither the cream or pill is working for the arthritis.

## 2019-04-09 RX ORDER — NAPROXEN 500 MG/1
500 TABLET ORAL 2 TIMES DAILY WITH MEALS
Qty: 60 TABLET | Refills: 0 | Status: ON HOLD | OUTPATIENT
Start: 2019-04-09 | End: 2019-06-10

## 2019-04-09 NOTE — TELEPHONE ENCOUNTER
Pt states she would like to try another anti-inflammatory first. She stated Dr Gale told her there was others she could try, but did not know the names of any. Pt wants to hold off on ortho at this time and see if a med will work first.  [facility] on [address] is her pharmacy

## 2019-04-09 NOTE — TELEPHONE ENCOUNTER
Would she like to see orthopedics? We could try a different anti-inflammatory, but other than that I am not sure we have many options. However, they may be able to do a steroid injection if appropriate.

## 2019-05-07 ENCOUNTER — PES CALL (OUTPATIENT)
Dept: ADMINISTRATIVE | Facility: CLINIC | Age: 69
End: 2019-05-07

## 2019-05-14 ENCOUNTER — OFFICE VISIT (OUTPATIENT)
Dept: UROLOGY | Facility: CLINIC | Age: 69
End: 2019-05-14
Payer: MEDICARE

## 2019-05-14 VITALS
HEIGHT: 65 IN | BODY MASS INDEX: 23.52 KG/M2 | HEART RATE: 105 BPM | SYSTOLIC BLOOD PRESSURE: 128 MMHG | WEIGHT: 141.13 LBS | DIASTOLIC BLOOD PRESSURE: 84 MMHG | RESPIRATION RATE: 18 BRPM

## 2019-05-14 DIAGNOSIS — R31.0 GROSS HEMATURIA: Primary | ICD-10-CM

## 2019-05-14 LAB
BILIRUB SERPL-MCNC: NORMAL MG/DL
BLOOD URINE, POC: NORMAL
COLOR, POC UA: YELLOW
GLUCOSE UR QL STRIP: NORMAL
KETONES UR QL STRIP: NORMAL
LEUKOCYTE ESTERASE URINE, POC: NORMAL
NITRITE, POC UA: NORMAL
PH, POC UA: 7
PROTEIN, POC: NORMAL
SPECIFIC GRAVITY, POC UA: 1.02
UROBILINOGEN, POC UA: 0.2

## 2019-05-14 PROCEDURE — 3074F PR MOST RECENT SYSTOLIC BLOOD PRESSURE < 130 MM HG: ICD-10-PCS | Mod: HCNC,CPTII,S$GLB, | Performed by: UROLOGY

## 2019-05-14 PROCEDURE — 87086 URINE CULTURE/COLONY COUNT: CPT | Mod: HCNC

## 2019-05-14 PROCEDURE — 3074F SYST BP LT 130 MM HG: CPT | Mod: HCNC,CPTII,S$GLB, | Performed by: UROLOGY

## 2019-05-14 PROCEDURE — 1101F PT FALLS ASSESS-DOCD LE1/YR: CPT | Mod: HCNC,CPTII,S$GLB, | Performed by: UROLOGY

## 2019-05-14 PROCEDURE — 81002 URINALYSIS NONAUTO W/O SCOPE: CPT | Mod: HCNC,S$GLB,, | Performed by: UROLOGY

## 2019-05-14 PROCEDURE — 99204 OFFICE O/P NEW MOD 45 MIN: CPT | Mod: 25,HCNC,S$GLB, | Performed by: UROLOGY

## 2019-05-14 PROCEDURE — 99999 PR PBB SHADOW E&M-EST. PATIENT-LVL V: ICD-10-PCS | Mod: PBBFAC,HCNC,, | Performed by: UROLOGY

## 2019-05-14 PROCEDURE — 81002 POCT URINE DIPSTICK WITHOUT MICROSCOPE: ICD-10-PCS | Mod: HCNC,S$GLB,, | Performed by: UROLOGY

## 2019-05-14 PROCEDURE — 99204 PR OFFICE/OUTPT VISIT, NEW, LEVL IV, 45-59 MIN: ICD-10-PCS | Mod: 25,HCNC,S$GLB, | Performed by: UROLOGY

## 2019-05-14 PROCEDURE — 3079F DIAST BP 80-89 MM HG: CPT | Mod: HCNC,CPTII,S$GLB, | Performed by: UROLOGY

## 2019-05-14 PROCEDURE — 99999 PR PBB SHADOW E&M-EST. PATIENT-LVL V: CPT | Mod: PBBFAC,HCNC,, | Performed by: UROLOGY

## 2019-05-14 PROCEDURE — 3079F PR MOST RECENT DIASTOLIC BLOOD PRESSURE 80-89 MM HG: ICD-10-PCS | Mod: HCNC,CPTII,S$GLB, | Performed by: UROLOGY

## 2019-05-14 PROCEDURE — 1101F PR PT FALLS ASSESS DOC 0-1 FALLS W/OUT INJ PAST YR: ICD-10-PCS | Mod: HCNC,CPTII,S$GLB, | Performed by: UROLOGY

## 2019-05-14 NOTE — PROGRESS NOTES
[facility] Note - [place]  Staff: MD Lyudmila    Referring provider and please cc: Mishel Huitron MD  [address] [place] [address]   PCP: Natalie Gale MD    [facility]: inactive    Chief Complaint: No chief complaint on file.        Subjective:        HPI: Babita Thomas is a 69 y.o. female presents with     Pt referred by , her gynecologist. She started having cramping associated with spotting when she wiped after urinating. This would occur about 1x a month. She eventually had us pelvis which showed fibroids and then underwent a D&C 3/2019 and since then continues to have spotting with cramps and was referred here to r/o urologic source.     She denies any personal or family hx of stones. Denies any GH. No h/o smoking. No abnormal paps. No recent uti's (has uti's documented but did not recall having sx). She does have MS but denies any OAB, does have niocturia 2-3x a night but admits to poor sleep.     ECOG Status: 0    Urinalysis void: negative  Urine history:     9/26/17     No cx, void: neg   11/18/2016 ESCHERICHIA COLI . . ., void: 1+ketones   11/18/2016  2/1/15 ENTEROCOCCUS FAECALIS . . .  No cx, void: tr prot     5/14/2012       MULTIPLE ORGANISMS ISOLATED. NONE IN PREDOMINANCE. REPEAT IF CLINICALLY NECESSARY.       G2, P 2, vaginal, no hysterectomy. Gross HematuriaNo.     REVIEW OF SYSTEMS:  General ROS: no fevers, no chills  Psychological ROS: no depression  Endocrine ROS: no heat or cold  Respiratory ROS: no SOB  Cardiovascular ROS: no CP  Gastrointestinal ROS: no abdominal pain, no constipation, no diarrhea, nonoBRBPR  Musculoskeletal ROS: no muscle pain  Neurological ROS: no headaches  Dermatological ROS: no rashes  HEENT: no glasses, no sinus   ROS: per HPI     PMHx:  Past Medical History:   Diagnosis Date    Allergy     Asthma     Depression 5/19/2017    GERD (gastroesophageal reflux disease)     Hyperlipidemia     Hypertension      Hypothyroidism     Immune deficiency disorder 5/19/2017    Insomnia 1/31/2017    MS (multiple sclerosis)     very well controlled, no symptoms    Shortness of breath     Sinusitis     Thyroid disease     hypothyroidism- no med     Kidney stones: No    PSHx:  Past Surgical History:   Procedure Laterality Date    BREAST SURGERY Left 15 yrs ago    biopsy, benign    CHOLECYSTECTOMY      COLONOSCOPY  3-5-2010    Dr Cotter 11 year bal     ESOPHAGUS SURGERY      6 year old, peanut stuck in throat     ETHMOIDECTOMY N/A 12/19/2016    [facility]    MULTIPLE TOOTH EXTRACTIONS      SEPTOPLASTY N/A 4/28/2015    [facility] at [facility]    SINUS SURGERY  2015    x3    SINUS SURGERY FUNCTIONAL ENDOSCOPIC WITH NAVIGATION N/A 12/19/2016    [facility]    SINUS SURGERY FUNCTIONAL ENDOSCOPIC WITH NAVIGATION Bilateral 4/28/2015    [facility] at [facility]    TRACHEOSTOMY CLOSURE      age 6 for peanut in throat    TYMPANOSTOMY TUBE PLACEMENT      x2    WISDOM TOOTH EXTRACTION         Stents/Valves/Foreign Bodies: no  Cardiac Evaluation/Cardiologist: none  Gastroenterologist/colonoscopy status: , none within the last 5  Years    Gynecologist/Pap Status:    Pulmonologist:Dr.Dale Bearden Hx:   malignancies: No , gyn malignancies: No .   No stones    Soc Hx:  Social History     Tobacco Use   Smoking Status Never Smoker   Smokeless Tobacco Never Used   no tob    No  alcohol   Lives in [place]  :  Children: 2  Occupation: and triner  Hobby: gardening    Allergies:  Pravastatin and Tecfidera [dimethyl fumarate]    Home Medications: reviewed   Urologic Medications: none  Anticoagulation: Yes - asa 81mg daily     Objective:     Vitals:    05/14/19 1245   BP: 128/84   Pulse: 105   Resp: 18       General:WDWN in NAD  Eyes: PERRLA, normal conjunctiva  Respiratory: no  increased work on breathing. No wheezing.   Cardiovascular: No obvious extremity edema. Warm and well perfused.   GI: no palpation of masses. No tenderness. No hepatosplenomegaly to palpation.  Musculoskeletal: normal range of motion of bilateral upper extremities. Normal muscle strength and tone.  Skin: no obvious rashes or lesions. No tightening of skin noted.  Neurologic: CN grossly normal. Normal sensation.   Psychiatric: awake, alert and oriented x 3. Mood and affect normal. Cooperative.    Pelvic exam  External Genitalia: normal hair distribution, no lesions  Urethral meatus: normal without prolapse, +small caruncle  Urethra: without tenderness or mass  Bladder: without fulness or tenderness  Vagina: normal appearing. No discharge or lesions. no prolapse.  Anus and perineum: appear normal    LABS REVIEW:    Lab Results   Component Value Date    WBC 4.42 02/01/2018    HGB 14.0 02/01/2018    HCT 42.5 02/01/2018    MCV 92 02/01/2018     02/01/2018     BMP  Lab Results   Component Value Date     01/02/2019    K 4.2 01/02/2019     01/02/2019    CO2 28 01/02/2019    BUN 15 01/02/2019    CREATININE 0.8 01/02/2019    CALCIUM 9.4 01/02/2019    ANIONGAP 8 01/02/2019    ESTGFRAFRICA >60.0 01/02/2019    EGFRNONAA >60.0 01/02/2019     Lab Results   Component Value Date    HGBA1C 5.1 04/16/2018       Pertinent urologic PATHOLOGY REVIEW:  none    Pertinent Urologic RADIOGRAPHIC REVIEW:  Us pelvis 4/8/19  The uterus is normal in size and measures 6 x 2.9 x 4 cm.  Endometrial thickness is normal measuring 3 mm.  There is a submucosal fibroid which is quite subtle but in retrospect was present on the previous examination and is unchanged.  The fibroid is located in the right aspect of the uterine fundus and distorts the endometrial cavity.  The fibroid measures 1.9 x 1.8 x 1.5 cm.  Two small intramural fibroids are present in the right posterior aspect of the uterine body measuring about 6 mm in diameter each  and are unchanged.  Both ovaries are normal in size and appearance.  The right ovary measures 1.0 x 1.9 x 1.5 cm and the left ovary measures 1.5 x 1.7 x 0.9 cm.  No adnexal masses or abnormal fluid collections are identified.  The bladder is unremarkable.    ctap 5/15/12  Axial images are obtained from the dome of the diaphragm to the floor the pelvis both before and after administration of IV contrast.  The patient also received oral contrast.  The liver is of normal size contour and CT density without a focal mass.  The gallbladder is absent with surgical clips seen.  The pancreas is of normal contour and CT density without focal mass or edema.  The spleen is of normal size and CT density.  The kidneys in general are of normal size contour and excretory function.  An 8 mm cyst is seen off the lower pole of the right kidney.  No hydronephrosis or stone is identified.  The abdominal aorta and inferior vena cava are of normal caliber.    Retroperitoneal or para-aortic adenopathy or masses are not seen.  There is a large amount of stool seen throughout the redundant colon.  Small bowel dilatation or air fluid levels are not seen.  Within the pelvis the uterus and ovaries are of normal size and contour.  The bladder is of normal size and wall thickness without mass or asymmetry.  No free fluid or free air is noted in the abdomen or pelvis.    Assessment:       1. Gross hematuria          Plan:     Although only c/o spotting will proceed with workup  Caruncle could explain spotting    Urine culture- previous ua showed no infection but cx + (no sx)  Cytology not indicated  ctu - last abd imaging in 2012  Cysto on [address], no urine needed 1 week prior         [unfilled]  Orders Placed This Encounter   Procedures    Urine culture          CT Urogram Abd Pelvis W WO     Please obtain images all the way through pelvis for the contrast phase   Please re-scan delayed images 3 to 5 minutes through images pelvis if entire  ureter is not visualized   Goal is to see distal ureters since they often have phelobiliths and we are trying to evaluate if it is a ureteral stone or phlebolith and we do this based on delayed ureteral images.     Standing Status:   Future     Standing Expiration Date:   5/14/2020     Scheduling Instructions:      Please obtain images all the way through pelvis for the contrast phase       Please re-scan delayed images 3 to 5 minutes through images pelvis if entire ureter is not visualized       Goal is to see distal ureters since they often have phelobiliths and we are trying to evaluate if it is a ureteral stone or phlebolith and we do this based on delayed ureteral images.     Order Specific Question:   Is the patient allergic to iodine or contrast?     Answer:   No     Order Specific Question:   Is the patient on ANY Metformin medication such as Glucophage/Glucovance ?     Answer:   No     Order Specific Question:   Does the patient have any of the following risk factors?     Answer:   None    Creatinine, serum     Standing Status:   Future     Standing Expiration Date:   7/12/2020    POCT URINE DIPSTICK WITHOUT MICROSCOPE    Urine Dip with micro, POC    Case Request Operating Room: CYSTOSCOPY     Order Specific Question:   CPT Code:     Answer:   SC CYSTOURETHROSCOPY [07219]     Order Specific Question:   Medical Necessity:     Answer:   Medically Non-Urgent [100]      IP VTE LOW RISK PATIENT  PLACE IN OUTPATIENT  CT UROGRAM ABD PELVIS W WO    Route Dr.Eddington Faiza Coreas MD

## 2019-05-14 NOTE — PATIENT INSTRUCTIONS
Although only c/o spotting will proceed with workup  Caruncle could explain spotting    Urine culture- previous ua showed no infection but cx + (no sx)  Cytology not indicated  ctu - last abd imaging in 2012  Cysto on [address], no urine needed 1 week prior

## 2019-05-14 NOTE — H&P (VIEW-ONLY)
[facility] Note - [place]  Staff: MD Lyudmila    Referring provider and please cc: Mishel Huitron MD  [address] [place] [address]   PCP: Natalie Gale MD    [facility]: inactive    Chief Complaint: No chief complaint on file.        Subjective:        HPI: Babita Thomas is a 69 y.o. female presents with     Pt referred by , her gynecologist. She started having cramping associated with spotting when she wiped after urinating. This would occur about 1x a month. She eventually had us pelvis which showed fibroids and then underwent a D&C 3/2019 and since then continues to have spotting with cramps and was referred here to r/o urologic source.     She denies any personal or family hx of stones. Denies any GH. No h/o smoking. No abnormal paps. No recent uti's (has uti's documented but did not recall having sx). She does have MS but denies any OAB, does have niocturia 2-3x a night but admits to poor sleep.     ECOG Status: 0    Urinalysis void: negative  Urine history:     9/26/17     No cx, void: neg   11/18/2016 ESCHERICHIA COLI . . ., void: 1+ketones   11/18/2016  2/1/15 ENTEROCOCCUS FAECALIS . . .  No cx, void: tr prot     5/14/2012       MULTIPLE ORGANISMS ISOLATED. NONE IN PREDOMINANCE. REPEAT IF CLINICALLY NECESSARY.       G2, P 2, vaginal, no hysterectomy. Gross HematuriaNo.     REVIEW OF SYSTEMS:  General ROS: no fevers, no chills  Psychological ROS: no depression  Endocrine ROS: no heat or cold  Respiratory ROS: no SOB  Cardiovascular ROS: no CP  Gastrointestinal ROS: no abdominal pain, no constipation, no diarrhea, nonoBRBPR  Musculoskeletal ROS: no muscle pain  Neurological ROS: no headaches  Dermatological ROS: no rashes  HEENT: no glasses, no sinus   ROS: per HPI     PMHx:  Past Medical History:   Diagnosis Date    Allergy     Asthma     Depression 5/19/2017    GERD (gastroesophageal reflux disease)     Hyperlipidemia     Hypertension      Hypothyroidism     Immune deficiency disorder 5/19/2017    Insomnia 1/31/2017    MS (multiple sclerosis)     very well controlled, no symptoms    Shortness of breath     Sinusitis     Thyroid disease     hypothyroidism- no med     Kidney stones: No    PSHx:  Past Surgical History:   Procedure Laterality Date    BREAST SURGERY Left 15 yrs ago    biopsy, benign    CHOLECYSTECTOMY      COLONOSCOPY  3-5-2010    Dr Cotter 11 year bal     ESOPHAGUS SURGERY      6 year old, peanut stuck in throat     ETHMOIDECTOMY N/A 12/19/2016    [facility]    MULTIPLE TOOTH EXTRACTIONS      SEPTOPLASTY N/A 4/28/2015    [facility]    SINUS SURGERY  2015    x3    SINUS SURGERY FUNCTIONAL ENDOSCOPIC WITH NAVIGATION N/A 12/19/2016    [facility]    SINUS SURGERY FUNCTIONAL ENDOSCOPIC WITH NAVIGATION Bilateral 4/28/2015    [facility]    TRACHEOSTOMY CLOSURE      age 6 for peanut in throat    TYMPANOSTOMY TUBE PLACEMENT      x2    WISDOM TOOTH EXTRACTION         Stents/Valves/Foreign Bodies: no  Cardiac Evaluation/Cardiologist: none  Gastroenterologist/colonoscopy status: , none within the last 5  Years    Gynecologist/Pap Status:    Pulmonologist:Dr.Dale Bearden Hx:   malignancies: No , gyn malignancies: No .   No stones    Soc Hx:  Social History     Tobacco Use   Smoking Status Never Smoker   Smokeless Tobacco Never Used   no tob    No  alcohol   Lives in [place]  :  Children: 2  Occupation: and triner  Hobby: gardening    Allergies:  Pravastatin and Tecfidera [dimethyl fumarate]    Home Medications: reviewed   Urologic Medications: none  Anticoagulation: Yes - asa 81mg daily     Objective:     Vitals:    05/14/19 1245   BP: 128/84   Pulse: 105   Resp: 18       General:WDWN in NAD  Eyes: PERRLA, normal conjunctiva  Respiratory: no  increased work on breathing. No wheezing.   Cardiovascular: No obvious extremity edema. Warm and well perfused.   GI: no palpation of masses. No tenderness. No hepatosplenomegaly to palpation.  Musculoskeletal: normal range of motion of bilateral upper extremities. Normal muscle strength and tone.  Skin: no obvious rashes or lesions. No tightening of skin noted.  Neurologic: CN grossly normal. Normal sensation.   Psychiatric: awake, alert and oriented x 3. Mood and affect normal. Cooperative.    Pelvic exam  External Genitalia: normal hair distribution, no lesions  Urethral meatus: normal without prolapse, +small caruncle  Urethra: without tenderness or mass  Bladder: without fulness or tenderness  Vagina: normal appearing. No discharge or lesions. no prolapse.  Anus and perineum: appear normal    LABS REVIEW:    Lab Results   Component Value Date    WBC 4.42 02/01/2018    HGB 14.0 02/01/2018    HCT 42.5 02/01/2018    MCV 92 02/01/2018     02/01/2018     BMP  Lab Results   Component Value Date     01/02/2019    K 4.2 01/02/2019     01/02/2019    CO2 28 01/02/2019    BUN 15 01/02/2019    CREATININE 0.8 01/02/2019    CALCIUM 9.4 01/02/2019    ANIONGAP 8 01/02/2019    ESTGFRAFRICA >60.0 01/02/2019    EGFRNONAA >60.0 01/02/2019     Lab Results   Component Value Date    HGBA1C 5.1 04/16/2018       Pertinent urologic PATHOLOGY REVIEW:  none    Pertinent Urologic RADIOGRAPHIC REVIEW:  Us pelvis 4/8/19  The uterus is normal in size and measures 6 x 2.9 x 4 cm.  Endometrial thickness is normal measuring 3 mm.  There is a submucosal fibroid which is quite subtle but in retrospect was present on the previous examination and is unchanged.  The fibroid is located in the right aspect of the uterine fundus and distorts the endometrial cavity.  The fibroid measures 1.9 x 1.8 x 1.5 cm.  Two small intramural fibroids are present in the right posterior aspect of the uterine body measuring about 6 mm in diameter each  and are unchanged.  Both ovaries are normal in size and appearance.  The right ovary measures 1.0 x 1.9 x 1.5 cm and the left ovary measures 1.5 x 1.7 x 0.9 cm.  No adnexal masses or abnormal fluid collections are identified.  The bladder is unremarkable.    ctap 5/15/12  Axial images are obtained from the dome of the diaphragm to the floor the pelvis both before and after administration of IV contrast.  The patient also received oral contrast.  The liver is of normal size contour and CT density without a focal mass.  The gallbladder is absent with surgical clips seen.  The pancreas is of normal contour and CT density without focal mass or edema.  The spleen is of normal size and CT density.  The kidneys in general are of normal size contour and excretory function.  An 8 mm cyst is seen off the lower pole of the right kidney.  No hydronephrosis or stone is identified.  The abdominal aorta and inferior vena cava are of normal caliber.    Retroperitoneal or para-aortic adenopathy or masses are not seen.  There is a large amount of stool seen throughout the redundant colon.  Small bowel dilatation or air fluid levels are not seen.  Within the pelvis the uterus and ovaries are of normal size and contour.  The bladder is of normal size and wall thickness without mass or asymmetry.  No free fluid or free air is noted in the abdomen or pelvis.    Assessment:       1. Gross hematuria          Plan:     Although only c/o spotting will proceed with workup  Caruncle could explain spotting    Urine culture- previous ua showed no infection but cx + (no sx)  Cytology not indicated  ctu - last abd imaging in 2012  Cysto on [address], no urine needed 1 week prior         [unfilled]  Orders Placed This Encounter   Procedures    Urine culture          CT Urogram Abd Pelvis W WO     Please obtain images all the way through pelvis for the contrast phase   Please re-scan delayed images 3 to 5 minutes through images pelvis if entire  ureter is not visualized   Goal is to see distal ureters since they often have phelobiliths and we are trying to evaluate if it is a ureteral stone or phlebolith and we do this based on delayed ureteral images.     Standing Status:   Future     Standing Expiration Date:   5/14/2020     Scheduling Instructions:      Please obtain images all the way through pelvis for the contrast phase       Please re-scan delayed images 3 to 5 minutes through images pelvis if entire ureter is not visualized       Goal is to see distal ureters since they often have phelobiliths and we are trying to evaluate if it is a ureteral stone or phlebolith and we do this based on delayed ureteral images.     Order Specific Question:   Is the patient allergic to iodine or contrast?     Answer:   No     Order Specific Question:   Is the patient on ANY Metformin medication such as Glucophage/Glucovance ?     Answer:   No     Order Specific Question:   Does the patient have any of the following risk factors?     Answer:   None    Creatinine, serum     Standing Status:   Future     Standing Expiration Date:   7/12/2020    POCT URINE DIPSTICK WITHOUT MICROSCOPE    Urine Dip with micro, POC    Case Request Operating Room: CYSTOSCOPY     Order Specific Question:   CPT Code:     Answer:   [place] CYSTOURETHROSCOPY [39711]     Order Specific Question:   Medical Necessity:     Answer:   Medically Non-Urgent [100]      IP VTE LOW RISK PATIENT  PLACE IN OUTPATIENT  CT UROGRAM ABD PELVIS W WO    Route Dr.Eddington Faiza Coreas MD

## 2019-05-16 LAB — BACTERIA UR CULT: NO GROWTH

## 2019-05-23 ENCOUNTER — HOSPITAL ENCOUNTER (OUTPATIENT)
Dept: RADIOLOGY | Facility: HOSPITAL | Age: 69
Discharge: HOME | End: 2019-05-23
Attending: UROLOGY
Payer: MEDICARE

## 2019-05-23 DIAGNOSIS — R31.0 GROSS HEMATURIA: ICD-10-CM

## 2019-05-23 PROCEDURE — 74178 CT ABD&PLV WO CNTR FLWD CNTR: CPT | Mod: TC,HCNC

## 2019-05-23 PROCEDURE — 25500020 PHARM REV CODE 255: Mod: HCNC | Performed by: UROLOGY

## 2019-05-23 PROCEDURE — 74178 CT ABD&PLV WO CNTR FLWD CNTR: CPT | Mod: 26,HCNC,, | Performed by: RADIOLOGY

## 2019-05-23 PROCEDURE — 74178 CT UROGRAM ABD PELVIS W WO: ICD-10-PCS | Mod: 26,HCNC,, | Performed by: RADIOLOGY

## 2019-05-23 RX ADMIN — IOHEXOL 125 ML: 350 INJECTION, SOLUTION INTRAVENOUS at 02:05

## 2019-06-10 ENCOUNTER — HOSPITAL ENCOUNTER (OUTPATIENT)
Facility: AMBULARY SURGERY CENTER | Age: 69
Discharge: HOME | End: 2019-06-10
Attending: UROLOGY | Admitting: UROLOGY
Payer: MEDICARE

## 2019-06-10 DIAGNOSIS — R31.0 GROSS HEMATURIA: ICD-10-CM

## 2019-06-10 LAB
BACTERIA SPEC CULT: NORMAL
BILIRUB SERPL-MCNC: NORMAL MG/DL
BLOOD URINE, POC: NORMAL
CASTS: NORMAL
COLOR, POC UA: NORMAL
CRYSTALS: NORMAL
GLUCOSE UR QL STRIP: NORMAL
KETONES UR QL STRIP: NORMAL
LEUKOCYTE ESTERASE URINE, POC: NORMAL
NITRITE, POC UA: NORMAL
PH, POC UA: 8
PROTEIN, POC: NORMAL
RBC CELLS COUNTED: NORMAL
SPECIFIC GRAVITY, POC UA: 1
UROBILINOGEN, POC UA: NORMAL
WHITE BLOOD CELLS: NORMAL

## 2019-06-10 PROCEDURE — 52000 CYSTOURETHROSCOPY: CPT | Performed by: UROLOGY

## 2019-06-10 PROCEDURE — 58999 UNLISTED PX FML GENITAL SYS: CPT | Mod: 51,,, | Performed by: UROLOGY

## 2019-06-10 PROCEDURE — 58999 PR VAGINOSCOPY W/CYSTOSCOPY: ICD-10-PCS | Mod: 51,,, | Performed by: UROLOGY

## 2019-06-10 PROCEDURE — 52000 PR CYSTOURETHROSCOPY: ICD-10-PCS | Mod: ,,, | Performed by: UROLOGY

## 2019-06-10 PROCEDURE — 58999 UNLISTED PX FML GENITAL SYS: CPT

## 2019-06-10 PROCEDURE — 52000 CYSTOURETHROSCOPY: CPT | Mod: ,,, | Performed by: UROLOGY

## 2019-06-10 RX ORDER — CIPROFLOXACIN 500 MG/1
500 TABLET ORAL ONCE
Status: COMPLETED | OUTPATIENT
Start: 2019-06-10 | End: 2019-06-10

## 2019-06-10 RX ORDER — WATER 1000 ML/1000ML
INJECTION, SOLUTION INTRAVENOUS
Status: DISCONTINUED | OUTPATIENT
Start: 2019-06-10 | End: 2019-06-10 | Stop reason: HOSPADM

## 2019-06-10 RX ORDER — LIDOCAINE HYDROCHLORIDE 20 MG/ML
JELLY TOPICAL
Status: DISCONTINUED | OUTPATIENT
Start: 2019-06-10 | End: 2019-06-10 | Stop reason: HOSPADM

## 2019-06-10 RX ADMIN — CIPROFLOXACIN 500 MG: 500 TABLET ORAL at 04:06

## 2019-06-10 NOTE — DISCHARGE INSTRUCTIONS
Why cystography is done  A cystography can help diagnose such bladder problems as:  · Kidney stones  · Wounds or bursting (rupture) of your bladder wall  · Urinary tract infection  · Blood clots  · Tumors  .  P   After your procedure  · You may feel some burning when you urinate the first few times after the test. Drink plenty of water after the exam to help flush the dye out of your body.   · Your provider will discuss your test results with you. He or she will recommend more testing or treatment as needed.  When to call your healthcare provider   Call your provider right away if you have:  · Blood in your urine, after you have gone to the bathroom three times   · Signs of infection, such as chills, fever, rapid heartbeat, or fast breathing              After the procedure    · Drink plenty of fluids.  · You may have burning or light bleeding when you urinate--this is normal.  · Medications may be prescribed to ease any discomfort or prevent infection. Take these as directed.  · Call your doctor if you have heavy bleeding or blood clots, burning that lasts more than a day, a fever over 100°F  (38° C), or trouble urinating.

## 2019-06-10 NOTE — OP NOTE
[facility] Procedure Note  Date: 06/10/2019    Procedure:   1. Flexible cysto-uretheroscopy     Pre Procedure Diagnosis:vaginal bleeding vs gross heamturia    Post Procedure Diagnosis: same, see below for findings    Surgeon: Faiza Coreas MD    Specimen: none    Anesthesia: 2% uro-jet lidocaine jelly for local analgesia    Indications: Babita Thomas is a 69 y.o. female  With above pre-procedure diagnosis. Urine reviewed. H&P reviewed.     Procedure in detail:   Flexible cysto-urethroscopy was performed after consent was obtained.  The risks and benefits were explained.    2% lidocaine urojet was used for local analgesia.  The genitalia was prepped and draped in the sterile fashion with betadine.    The flexible scope was advanced into the urethra and into the bladder.  Bilateral ureteral orifice were evaluated and noted to be normal with clear efflux.  The bladder was completely surveyed in a systematic fashion and the scope was retroflexed.    Cystoscopy findings as below in findings.   No strictures were noted    Cystoscope placed in vagina.       The patient tolerated the procedure well without complication.    Findings: (pictures were  uploaded into media)  1. Cystoscopy findings:  She had essentially normal cysto. No evidence of tumors. At trigone had some cystitis cystica.   2. Vaginal exam findings: vaginoscopy performed showed cervix without obvious drainage  3. Other findings: none  No Bladder biopsy      Assesment: Babita Thomas is a 69 y.o. female with gross hematuria vs vaginal bleeding with negative cystoscope today and +fluid seen in endoetrium on ct  Plan:   She needs to f/u with gyn to discuss further w/u for ct uterus findings.   No source/urologic cause seen for blood seen when she wipes.   Cipro in recovery    Faiza Coreas MD

## 2019-06-10 NOTE — INTERVAL H&P NOTE
The patient has been examined and the H&P has been reviewed:    I concur with the findings and no changes have occurred since H&P was written.    Anesthesia/Surgery risks, benefits and alternative options discussed and understood by patient/family.    Risks and benefits of cysto discussed  This patient has been cleared for surgery in ambulatory surgical facility.   ua neg  ctu reviewed- pt given copy     Active Hospital Problems    Diagnosis  POA    Gross hematuria [R31.0]  Yes      Resolved Hospital Problems   No resolved problems to display.

## 2019-06-10 NOTE — DISCHARGE SUMMARY
[facility]  Urology  Discharge Note - Short Stay      Patient Name: Babita Thomas  MRN: [id number]  Discharge Date and Time:  06/10/2019 4:20 PM  Attending Physician: Faiza Coreas,*   Discharging Provider: Faiza Coreas MD  Primary Care Physician: Primary Doctor No    Final Active Diagnoses:    Diagnosis Date Noted POA    Gross hematuria [R31.0] 06/10/2019 Yes      Problems Resolved During this Admission:       Final Diagnoses: Same as principal problem.    Hospital Course: Patient was admitted for an outpatient procedure and tolerated the procedure well with no complications.*    Procedure(s) (LRB):  CYSTOSCOPY (N/A)     Indwelling Lines/Drains at time of discharge:   Lines/Drains/Airways          None          Discharged Condition: good    Disposition: home    Follow Up:      Patient Instructions:   No discharge procedures on file.    Medications:  Reconciled Home Medications:      Medication List      CONTINUE taking these medications    aspirin 81 MG EC tablet  Commonly known as:  ECOTRIN  Take 81 mg by mouth once daily.        ASK your doctor about these medications    CALCIUM ACETATE ORAL  Take 1 tablet by mouth 2 (two) times daily. Every day     CENTRUM SILVER Tab  Generic drug:  multivitamin-minerals-lutein  Take 1 tablet by mouth once daily. 1 Tablet(s) Oral Every day.     cholecalciferol (vitamin D3) 2,000 unit Tab  Commonly known as:  VITAMIN D3  Take by mouth.     immun glob G(IgG)-pro-IgA 0-50 10 gram/50 mL (20 %) Soln  Commonly known as:  HIZENTRA  Inject 12 g into the skin every 14 (fourteen) days.     lovastatin 20 MG tablet  Commonly known as:  MEVACOR  Take 1 tablet (20 mg total) by mouth every evening.     QUEtiapine 100 MG Tab  Commonly known as:  SEROQUEL  Take 100-200 mg by mouth nightly as needed.     venlafaxine 75 MG 24 hr capsule  Commonly known as:  EFFEXOR-XR  Take by mouth once daily. Takes 2 in AM, and 1 tablet in PM            Discharge  Procedure Orders (must include Diet, Follow-up, Activity):  No discharge procedures on file.         Faiza Coreas MD  Urology  [facility]

## 2019-06-11 VITALS
HEART RATE: 79 BPM | RESPIRATION RATE: 18 BRPM | HEIGHT: 65 IN | DIASTOLIC BLOOD PRESSURE: 115 MMHG | SYSTOLIC BLOOD PRESSURE: 192 MMHG | TEMPERATURE: 98 F | WEIGHT: 141.13 LBS | BODY MASS INDEX: 23.52 KG/M2 | OXYGEN SATURATION: 99 %

## 2019-06-14 ENCOUNTER — TELEPHONE (OUTPATIENT)
Dept: DERMATOLOGY | Facility: CLINIC | Age: 69
End: 2019-06-14

## 2019-06-14 NOTE — TELEPHONE ENCOUNTER
Returned pts call. Has painful boil on chest. Has appt with Dr Oh 7/5/, but wanted to be seen today. Informed provider out of office. Offered appt for another provider early next week. Pt states she will go to urgent care.

## 2019-06-14 NOTE — TELEPHONE ENCOUNTER
----- Message from Zurdo Meyer sent at 6/14/2019  8:00 AM CDT -----  Contact: Patient  [phone]  Type:  Sooner Apoointment Request    Caller is requesting a sooner appointment.  Caller declined first available appointment listed below.  Caller will not accept being placed on the waitlist and is requesting a message be sent to doctor.    Name of Caller:  Patient  [phone]    When is the first available appointment?  07-05-19    Symptoms:  Mole boil like growth on chest very painful.    Best Call Back Number:  Patient  [phone]    Additional Information:  Advised needs to see the Dras soon as possible condition is very painful. Please call.  Has appmnt on 07-05 -19.

## 2019-07-05 ENCOUNTER — OFFICE VISIT (OUTPATIENT)
Dept: DERMATOLOGY | Facility: CLINIC | Age: 69
End: 2019-07-05
Payer: MEDICARE

## 2019-07-05 VITALS — WEIGHT: 141.13 LBS | HEIGHT: 65 IN | BODY MASS INDEX: 23.52 KG/M2

## 2019-07-05 DIAGNOSIS — L72.3 INFLAMED SEBACEOUS CYST: Primary | ICD-10-CM

## 2019-07-05 DIAGNOSIS — D18.01 CHERRY ANGIOMA: ICD-10-CM

## 2019-07-05 DIAGNOSIS — L82.1 SEBORRHEIC KERATOSES: ICD-10-CM

## 2019-07-05 DIAGNOSIS — Q82.5 CONGENITAL NEVUS: ICD-10-CM

## 2019-07-05 DIAGNOSIS — D22.9 MULTIPLE BENIGN NEVI: ICD-10-CM

## 2019-07-05 PROCEDURE — 1101F PT FALLS ASSESS-DOCD LE1/YR: CPT | Mod: HCNC,CPTII,S$GLB, | Performed by: DERMATOLOGY

## 2019-07-05 PROCEDURE — 99999 PR PBB SHADOW E&M-EST. PATIENT-LVL II: CPT | Mod: PBBFAC,HCNC,, | Performed by: DERMATOLOGY

## 2019-07-05 PROCEDURE — 87070 CULTURE OTHR SPECIMN AEROBIC: CPT | Mod: HCNC

## 2019-07-05 PROCEDURE — 99999 PR PBB SHADOW E&M-EST. PATIENT-LVL II: ICD-10-PCS | Mod: PBBFAC,HCNC,, | Performed by: DERMATOLOGY

## 2019-07-05 PROCEDURE — 1101F PR PT FALLS ASSESS DOC 0-1 FALLS W/OUT INJ PAST YR: ICD-10-PCS | Mod: HCNC,CPTII,S$GLB, | Performed by: DERMATOLOGY

## 2019-07-05 PROCEDURE — 99203 OFFICE O/P NEW LOW 30 MIN: CPT | Mod: HCNC,S$GLB,, | Performed by: DERMATOLOGY

## 2019-07-05 PROCEDURE — 99203 PR OFFICE/OUTPT VISIT, NEW, LEVL III, 30-44 MIN: ICD-10-PCS | Mod: HCNC,S$GLB,, | Performed by: DERMATOLOGY

## 2019-07-05 NOTE — PROGRESS NOTES
Subjective:       Patient ID:  Babita Thomas is a 69 y.o. female who presents for   Chief Complaint   Patient presents with    Skin Check     TBSE    Lesion     central chest, 3 weeks, tx with course of abx and mupirocin ointment     Initial visit  No h/o skin cancer, no prior skin biopsy  Requests TBSE for cancer screening    MS patient      Lesion  - Initial  Affected locations: Central chest.  Duration: 3 weeks  Signs / symptoms: itching and tender  Severity: mild  Timing: constant  Aggravated by: nothing  Relieving factors/Treatments tried: antibiotics (Mupriocin ointment, PO clinda 10 day 300 mg)        Review of Systems   Constitutional: Negative for fever, chills and fatigue.   Skin: Positive for daily sunscreen use, activity-related sunscreen use and wears hat.   Hematologic/Lymphatic: Does not bruise/bleed easily.        Objective:    Physical Exam   Constitutional: She appears well-developed and well-nourished. No distress.   Neurological: She is alert and oriented to person, place, and time. She is not disoriented.   Psychiatric: She has a normal mood and affect.   Skin:   Areas Examined (abnormalities noted in diagram):   Scalp / Hair Palpated and Inspected  Head / Face Inspection Performed  Neck Inspection Performed  Chest / Axilla Inspection Performed  Abdomen Inspection Performed  Genitals / Buttocks / Groin Inspection Performed  Back Inspection Performed  RUE Inspected  LUE Inspection Performed  RLE Inspected  LLE Inspection Performed  Nails and Digits Inspection Performed                  Diagram Legend     Erythematous scaling macule/papule c/w actinic keratosis       Vascular papule c/w angioma      Pigmented verrucoid papule/plaque c/w seborrheic keratosis      Yellow umbilicated papule c/w sebaceous hyperplasia      Irregularly shaped tan macule c/w lentigo     1-2 mm smooth white papules consistent with Milia      Movable subcutaneous cyst with punctum c/w epidermal inclusion cyst       Subcutaneous movable cyst c/w pilar cyst      Firm pink to brown papule c/w dermatofibroma      Pedunculated fleshy papule(s) c/w skin tag(s)      Evenly pigmented macule c/w junctional nevus     Mildly variegated pigmented, slightly irregular-bordered macule c/w mildly atypical nevus      Flesh colored to evenly pigmented papule c/w intradermal nevus       Pink pearly papule/plaque c/w basal cell carcinoma      Erythematous hyperkeratotic cursted plaque c/w SCC      Surgical scar with no sign of skin cancer recurrence      Open and closed comedones      Inflammatory papules and pustules      Verrucoid papule consistent consistent with wart     Erythematous eczematous patches and plaques     Dystrophic onycholytic nail with subungual debris c/w onychomycosis     Umbilicated papule    Erythematous-base heme-crusted tan verrucoid plaque consistent with inflamed seborrheic keratosis     Erythematous Silvery Scaling Plaque c/w Psoriasis     See annotation      Assessment / Plan:        Inflamed sebaceous cyst  Left/central chest  Spontaneous drainage  S/p clinda course (UC)  Bacterial cx to r/o superinfection  Cleaned with hibiclens, removed crust, no cyst wall identified  Anticipate will fibrose down  Warm compresses TID    Cherry angioma  This is a benign vascular lesion. Reassurance given. No treatment required.     Seborrheic keratoses  These are benign inherited growths without a malignant potential. Reassurance given to patient. No treatment is necessary.     Congenital nevus, Multiple benign nevi  Discussed ABCDE's of nevi.  Monitor for new mole or moles that are becoming bigger, darker, irritated, or developing irregular borders. Brochure provided.    Patient instructed in importance in daily sun protection of at least spf 30. Mineral sunscreen ingredients preferred (Zinc +/- Titanium).   Recommend Elta MD for daily use on face and neck.  Patient encouraged to wear hat for all outdoor exposure.   Also discussed  sun avoidance and use of protective clothing.           Follow up if symptoms worsen or fail to improve.

## 2019-07-08 LAB — BACTERIA SPEC AEROBE CULT: NORMAL

## 2019-09-03 ENCOUNTER — OFFICE VISIT (OUTPATIENT)
Dept: PODIATRY | Facility: CLINIC | Age: 69
End: 2019-09-03
Payer: MEDICARE

## 2019-09-03 VITALS — BODY MASS INDEX: 23.49 KG/M2 | WEIGHT: 141 LBS | HEIGHT: 65 IN

## 2019-09-03 DIAGNOSIS — S92.215A CLOSED NONDISPLACED FRACTURE OF CUBOID OF LEFT FOOT, INITIAL ENCOUNTER: ICD-10-CM

## 2019-09-03 DIAGNOSIS — S92.342A CLOSED DISPLACED FRACTURE OF FOURTH METATARSAL BONE OF LEFT FOOT, INITIAL ENCOUNTER: ICD-10-CM

## 2019-09-03 DIAGNOSIS — S92.332A CLOSED DISPLACED FRACTURE OF THIRD METATARSAL BONE OF LEFT FOOT, INITIAL ENCOUNTER: ICD-10-CM

## 2019-09-03 DIAGNOSIS — S92.245A CLOSED NONDISPLACED FRACTURE OF MEDIAL CUNEIFORM OF LEFT FOOT, INITIAL ENCOUNTER: ICD-10-CM

## 2019-09-03 DIAGNOSIS — S93.325A LISFRANC DISLOCATION, LEFT, INITIAL ENCOUNTER: Primary | ICD-10-CM

## 2019-09-03 DIAGNOSIS — M79.672 FOOT PAIN, LEFT: ICD-10-CM

## 2019-09-03 DIAGNOSIS — S92.322A CLOSED DISPLACED FRACTURE OF SECOND METATARSAL BONE OF LEFT FOOT, INITIAL ENCOUNTER: ICD-10-CM

## 2019-09-03 DIAGNOSIS — S92.315A CLOSED NONDISPLACED FRACTURE OF FIRST METATARSAL BONE OF LEFT FOOT, INITIAL ENCOUNTER: ICD-10-CM

## 2019-09-03 DIAGNOSIS — S92.352A CLOSED DISPLACED FRACTURE OF FIFTH METATARSAL BONE OF LEFT FOOT, INITIAL ENCOUNTER: ICD-10-CM

## 2019-09-03 PROCEDURE — 1101F PT FALLS ASSESS-DOCD LE1/YR: CPT | Mod: S$GLB,,, | Performed by: PODIATRIST

## 2019-09-03 PROCEDURE — 99999 PR PBB SHADOW E&M-NEW PATIENT-LVL III: ICD-10-PCS | Mod: PBBFAC,,, | Performed by: PODIATRIST

## 2019-09-03 PROCEDURE — 99203 PR OFFICE/OUTPT VISIT, NEW, LEVL III, 30-44 MIN: ICD-10-PCS | Mod: S$GLB,,, | Performed by: PODIATRIST

## 2019-09-03 PROCEDURE — 1101F PR PT FALLS ASSESS DOC 0-1 FALLS W/OUT INJ PAST YR: ICD-10-PCS | Mod: S$GLB,,, | Performed by: PODIATRIST

## 2019-09-03 PROCEDURE — 99999 PR PBB SHADOW E&M-NEW PATIENT-LVL III: CPT | Mod: PBBFAC,,, | Performed by: PODIATRIST

## 2019-09-03 PROCEDURE — 99203 OFFICE O/P NEW LOW 30 MIN: CPT | Mod: S$GLB,,, | Performed by: PODIATRIST

## 2019-09-03 RX ORDER — VENLAFAXINE HYDROCHLORIDE 150 MG/1
CAPSULE, EXTENDED RELEASE ORAL 2 TIMES DAILY
COMMUNITY
Start: 2019-07-30 | End: 2020-07-30 | Stop reason: ALTCHOICE

## 2019-09-03 RX ORDER — FLUOXETINE HYDROCHLORIDE 20 MG/1
10 CAPSULE ORAL NIGHTLY
Refills: 0 | COMMUNITY
Start: 2019-07-26 | End: 2020-02-10

## 2019-09-03 RX ORDER — METFORMIN HYDROCHLORIDE 500 MG/1
500 TABLET ORAL 2 TIMES DAILY
Refills: 2 | COMMUNITY
Start: 2019-08-24 | End: 2020-07-30

## 2019-09-03 NOTE — H&P (VIEW-ONLY)
[address]  [place] [address]  Phone: [phone]   [phone]    Patient's PCP:Primary Doctor No  Referring Provider: No ref. provider found    Subjective:      Chief Complaint:: Foot Pain (left)    LALA Thomas is a 69 y.o. female who presents with a complaint of left foot lasting for about 3 weeks. Onset of the symptoms was thought was going to fall, hurried up to get on couch and hurt foot - thinks got caught on couch. Current symptoms include pain on top of foot.  Aggravating factors are prolonged walking, weight bearing without boot cast. Treatment to date have included went to urgent care on 09/01 and they took x-rays and told pt she broke/fx her foot and gave her a boot cast. Patients rates pain 5/10 on pain scale.    There were no vitals filed for this visit.  Shoe Size: 6.5    Past Surgical History:   Procedure Laterality Date    BREAST SURGERY Left 15 yrs ago    biopsy, benign    CHOLECYSTECTOMY      COLONOSCOPY  3-5-2010    Dr Cotter 11 year bal     CYSTOSCOPY N/A 6/10/2019    [facility]    ESOPHAGUS SURGERY      6 year old, peanut stuck in throat     ETHMOIDECTOMY N/A 12/19/2016    Performed by Parish [facility]man [facility] at [facility]    MULTIPLE TOOTH EXTRACTIONS      SEPTOPLASTY N/A 4/28/2015    Performed by Angelo Winkler MD at [facility]    SINUS SURGERY  2015    x3    SINUS SURGERY FUNCTIONAL ENDOSCOPIC WITH NAVIGATION N/A 12/19/2016    Performed by Parish [facility]man [facility] at [facility]    SINUS SURGERY FUNCTIONAL ENDOSCOPIC WITH NAVIGATION Bilateral 4/28/2015    Performed by [facility]ler [facility] at [facility]    TRACHEOSTOMY CLOSURE      age 6 for peanut in throat    TYMPANOSTOMY TUBE PLACEMENT      x2    WISDOM TOOTH EXTRACTION       Past Medical History:   Diagnosis Date    Allergy     Asthma     Depression 5/19/2017    GERD (gastroesophageal reflux disease)     Hyperlipidemia     Hypertension     Hypothyroidism      Immune deficiency disorder 5/19/2017    Insomnia 1/31/2017    MS (multiple sclerosis)     very well controlled, no symptoms    Shortness of breath     Sinusitis     Thyroid disease     hypothyroidism- no med     Family History   Problem Relation Age of Onset    Hypertension Mother     Dementia Mother     Hyperlipidemia Mother     Heart disease Father 60        MI    Hypertension Sister     No Known Problems Daughter     Heart disease Paternal Uncle     Heart disease Maternal Grandfather 70        MI    Heart disease Paternal Grandfather     No Known Problems Daughter     Allergic rhinitis Neg Hx     Allergies Neg Hx     Angioedema Neg Hx     Asthma Neg Hx     Atopy Neg Hx     Eczema Neg Hx     Immunodeficiency Neg Hx     Rhinitis Neg Hx     Urticaria Neg Hx     Melanoma Neg Hx     Psoriasis Neg Hx     Lupus Neg Hx         Social History:   Marital Status:   Alcohol History:  reports that she does not drink alcohol.  Tobacco History:  reports that she has never smoked. She has never used smokeless tobacco.  Drug History:  reports that she does not use drugs.    Review of patient's allergies indicates:   Allergen Reactions    Pravastatin Other (See Comments)     cramping    Tecfidera [dimethyl fumarate] Swelling       Current Outpatient Medications   Medication Sig Dispense Refill    aspirin (ECOTRIN) 81 MG EC tablet Take 81 mg by mouth once daily.      CALCIUM ACETATE ORAL Take 1 tablet by mouth 2 (two) times daily. Every day      cholecalciferol, vitamin D3, 2,000 unit Tab Take by mouth.      immun glob G,IgG,-pro-IgA 0-50 (HIZENTRA) 10 gram/50 mL (20 %) Soln Inject 12 g into the skin every 14 (fourteen) days. 4 vial 11    multivitamin-minerals-lutein (CENTRUM SILVER) Tab Take 1 tablet by mouth once daily. 1 Tablet(s) Oral Every day.      QUEtiapine (SEROQUEL) 100 MG Tab Take 100-200 mg by mouth nightly as needed.       venlafaxine (EFFEXOR-XR) 150 MG Cp24 2 (two) times  daily.       FLUoxetine 20 MG capsule Take 20 mg by mouth once daily.  0    lovastatin (MEVACOR) 20 MG tablet Take 1 tablet (20 mg total) by mouth every evening. 90 tablet 3    metFORMIN (GLUCOPHAGE) 500 MG tablet Take 500 mg by mouth 2 (two) times daily.  2     No current facility-administered medications for this visit.        Review of Systems      Objective:        Physical Exam:   Foot Exam    General  General Appearance: appears stated age and healthy   Orientation: alert and oriented to person, place, and time   Affect: appropriate   Gait: antalgic       Right Foot/Ankle     Inspection and Palpation  Arch: normal    Neurovascular  Dorsalis pedis: 2+  Posterior tibial: 2+  Saphenous nerve sensation: normal  Tibial nerve sensation: normal  Superficial peroneal nerve sensation: normal  Deep peroneal nerve sensation: normal  Sural nerve sensation: normal      Left Foot/Ankle      Inspection and Palpation  Ecchymosis: dorsum (Entire dorsal foot and plantar foot)  Tenderness: (All metatarsals Lisfranc joint from medial to lateral)  Swelling: (Entire foot from medial lateral dorsal surface special long Lisfranc joint)  Arch: pes planus  Skin Exam: skin intact;     Neurovascular  Dorsalis pedis: 2+  Posterior tibial: 2+  Saphenous nerve sensation: normal  Tibial nerve sensation: normal  Superficial peroneal nerve sensation: normal  Deep peroneal nerve sensation: normal  Sural nerve sensation: normal          Physical Exam   Cardiovascular:   Pulses:       Dorsalis pedis pulses are 2+ on the right side, and 2+ on the left side.        Posterior tibial pulses are 2+ on the right side, and 2+ on the left side.   Musculoskeletal:        Feet:        Imaging:   X-rays from outside source:  The AP, lateral, lateral oblique nonweightbearing x-rays of the left foot:  There are comminuted displaced fractures of metatarsals 2, 3, 4, 5 as well as a fracture of the medial cuneiform 1st metatarsal base that is nondisplaced and  a fracture of the cuboid that appears to be a compression fracture.         Assessment:       1. Foot pain, left    2. Lisfranc dislocation, left, initial encounter    3. Closed nondisplaced fracture of first metatarsal bone of left foot, initial encounter    4. Closed displaced fracture of second metatarsal bone of left foot, initial encounter    5. Closed displaced fracture of third metatarsal bone of left foot, initial encounter    6. Closed displaced fracture of fourth metatarsal bone of left foot, initial encounter    7. Closed displaced fracture of fifth metatarsal bone of left foot, initial encounter    8. Closed nondisplaced fracture of cuboid of left foot, initial encounter    9. Closed nondisplaced fracture of medial cuneiform of left foot, initial encounter      Plan:   Foot pain, left    Lisfranc dislocation, left, initial encounter    Closed nondisplaced fracture of first metatarsal bone of left foot, initial encounter    Closed displaced fracture of second metatarsal bone of left foot, initial encounter    Closed displaced fracture of third metatarsal bone of left foot, initial encounter    Closed displaced fracture of fourth metatarsal bone of left foot, initial encounter    Closed displaced fracture of fifth metatarsal bone of left foot, initial encounter    Closed nondisplaced fracture of cuboid of left foot, initial encounter    Closed nondisplaced fracture of medial cuneiform of left foot, initial encounter    Patient was educated about the entire pre, nick and post-operative time period.  They  were educated about the pro's and con's of surgery.  All conservative measures have been exhausted. Patient understands the particular risks involved which may occur in connection with the procedure proposed including pain, swelling, infection, stiffness, decreased ROM, recurrence, rejection, numbness, delayed healing, scar formation.    Patient was educated that their diagnosis may be surgically treated.   The patient's problem will probably advance and usually will not get better without surgery.  All of the pre-operative treatment plans have been exhausted or will no longer be successful at this point in time.  Patient was told of the possible outcomes and expectations of the surgical procedure.  They  will need to be followed-up post-operatively.  Today, pictures were drawn, questions answered.      We discussed the following surgical procedures:  Open reduction internal fixation of fractures 2-3 4 and 5 and 1 metatarsals with arthrodesis of Lisfranc joint left foot. I am scheduling this for Friday to get it done as quick as possible because she is ready walked on this for 3 weeks and a further we go the harder is going to be able to reduce and fixate and fuse this joint. I explained to her that the fusion is the best way to go with this because she will and a with osteoarthritis even of I am able to fixate all the comminuted areas and put back together.  Will probably have a problem with her insurance [employer] they are terrible at aproving things in a timely manner.  I am treating this as an urgent matter in will do the surgery Friday.      Her primary care doctor has left the [facility] for greener pastures.  This point time she does not have a primary care doctor and if all her lab tests are normal I will discuss with anesthesia whether that will clear the patient. I am attempting to get the lab test done tomorrow socket results.  The left toes show any abnormalities or of anesthesia once a [facility].  No follow-ups on file.    Procedures - None    Counseling:   I discussed with the pt. the type of fracture and the treatment and time required for healing of the the specific type fracture.  I provided patient education verbally regarding:   Patient diagnosis, treatment options, as well as alternatives, risks, and benefits.     This note was created using Dragon voice  recognition software that occasionally misinterpreted phrases or words.

## 2019-09-03 NOTE — PROGRESS NOTES
[address]  [place] [address]  Phone: [phone]   [phone]    Patient's PCP:Primary Doctor No  Referring Provider: No ref. provider found    Subjective:      Chief Complaint:: Foot Pain (left)    LALA Thomas is a 69 y.o. female who presents with a complaint of left foot lasting for about 3 weeks. Onset of the symptoms was thought was going to fall, hurried up to get on couch and hurt foot - thinks got caught on couch. Current symptoms include pain on top of foot.  Aggravating factors are prolonged walking, weight bearing without boot cast. Treatment to date have included went to urgent care on 09/01 and they took x-rays and told pt she broke/fx her foot and gave her a boot cast. Patients rates pain 5/10 on pain scale.    There were no vitals filed for this visit.  Shoe Size: 6.5    Past Surgical History:   Procedure Laterality Date    BREAST SURGERY Left 15 yrs ago    biopsy, benign    CHOLECYSTECTOMY      COLONOSCOPY  3-5-2010    Dr Cotter 10 year bal     CYSTOSCOPY N/A 6/10/2019    Performed by [facility]iza Coreas [facility] at [facility]    ESOPHAGUS SURGERY      6 year old, peanut stuck in throat     ETHMOIDECTOMY N/A 12/19/2016    Performed by Parish Aleman MD at [facility]    MULTIPLE TOOTH EXTRACTIONS      SEPTOPLASTY N/A 4/28/2015    Performed by [facility]o Winkler MD at [facility]    SINUS SURGERY  2015    x3    SINUS SURGERY FUNCTIONAL ENDOSCOPIC WITH NAVIGATION N/A 12/19/2016    Performed by Parish [facility]man [facility] at [facility]    SINUS SURGERY FUNCTIONAL ENDOSCOPIC WITH NAVIGATION Bilateral 4/28/2015    Performed by [facility]inkler [facility] at [facility]    TRACHEOSTOMY CLOSURE      age 6 for peanut in throat    TYMPANOSTOMY TUBE PLACEMENT      x2    WISDOM TOOTH EXTRACTION       Past Medical History:   Diagnosis Date    Allergy     Asthma     Depression 5/19/2017    GERD (gastroesophageal reflux disease)     Hyperlipidemia     Hypertension     Hypothyroidism      Immune deficiency disorder 5/19/2017    Insomnia 1/31/2017    MS (multiple sclerosis)     very well controlled, no symptoms    Shortness of breath     Sinusitis     Thyroid disease     hypothyroidism- no med     Family History   Problem Relation Age of Onset    Hypertension Mother     Dementia Mother     Hyperlipidemia Mother     Heart disease Father 60        MI    Hypertension Sister     No Known Problems Daughter     Heart disease Paternal Uncle     Heart disease Maternal Grandfather 70        MI    Heart disease Paternal Grandfather     No Known Problems Daughter     Allergic rhinitis Neg Hx     Allergies Neg Hx     Angioedema Neg Hx     Asthma Neg Hx     Atopy Neg Hx     Eczema Neg Hx     Immunodeficiency Neg Hx     Rhinitis Neg Hx     Urticaria Neg Hx     Melanoma Neg Hx     Psoriasis Neg Hx     Lupus Neg Hx         Social History:   Marital Status:   Alcohol History:  reports that she does not drink alcohol.  Tobacco History:  reports that she has never smoked. She has never used smokeless tobacco.  Drug History:  reports that she does not use drugs.    Review of patient's allergies indicates:   Allergen Reactions    Pravastatin Other (See Comments)     cramping    Tecfidera [dimethyl fumarate] Swelling       Current Outpatient Medications   Medication Sig Dispense Refill    aspirin (ECOTRIN) 81 MG EC tablet Take 81 mg by mouth once daily.      CALCIUM ACETATE ORAL Take 1 tablet by mouth 2 (two) times daily. Every day      cholecalciferol, vitamin D3, 2,000 unit Tab Take by mouth.      immun glob G,IgG,-pro-IgA 0-50 (HIZENTRA) 10 gram/50 mL (20 %) Soln Inject 12 g into the skin every 14 (fourteen) days. 4 vial 11    multivitamin-minerals-lutein (CENTRUM SILVER) Tab Take 1 tablet by mouth once daily. 1 Tablet(s) Oral Every day.      QUEtiapine (SEROQUEL) 100 MG Tab Take 100-200 mg by mouth nightly as needed.       venlafaxine (EFFEXOR-XR) 150 MG Cp24 2 (two) times  daily.       FLUoxetine 20 MG capsule Take 20 mg by mouth once daily.  0    lovastatin (MEVACOR) 20 MG tablet Take 1 tablet (20 mg total) by mouth every evening. 90 tablet 3    metFORMIN (GLUCOPHAGE) 500 MG tablet Take 500 mg by mouth 2 (two) times daily.  2     No current facility-administered medications for this visit.        Review of Systems      Objective:        Physical Exam:   Foot Exam    General  General Appearance: appears stated age and healthy   Orientation: alert and oriented to person, place, and time   Affect: appropriate   Gait: antalgic       Right Foot/Ankle     Inspection and Palpation  Arch: normal    Neurovascular  Dorsalis pedis: 2+  Posterior tibial: 2+  Saphenous nerve sensation: normal  Tibial nerve sensation: normal  Superficial peroneal nerve sensation: normal  Deep peroneal nerve sensation: normal  Sural nerve sensation: normal      Left Foot/Ankle      Inspection and Palpation  Ecchymosis: dorsum (Entire dorsal foot and plantar foot)  Tenderness: (All metatarsals Lisfranc joint from medial to lateral)  Swelling: (Entire foot from medial lateral dorsal surface special long Lisfranc joint)  Arch: pes planus  Skin Exam: skin intact;     Neurovascular  Dorsalis pedis: 2+  Posterior tibial: 2+  Saphenous nerve sensation: normal  Tibial nerve sensation: normal  Superficial peroneal nerve sensation: normal  Deep peroneal nerve sensation: normal  Sural nerve sensation: normal          Physical Exam   Cardiovascular:   Pulses:       Dorsalis pedis pulses are 2+ on the right side, and 2+ on the left side.        Posterior tibial pulses are 2+ on the right side, and 2+ on the left side.   Musculoskeletal:        Feet:        Imaging:   X-rays from outside source:  The AP, lateral, lateral oblique nonweightbearing x-rays of the left foot:  There are comminuted displaced fractures of metatarsals 2, 3, 4, 5 as well as a fracture of the medial cuneiform 1st metatarsal base that is nondisplaced and  a fracture of the cuboid that appears to be a compression fracture.         Assessment:       1. Foot pain, left    2. Lisfranc dislocation, left, initial encounter    3. Closed nondisplaced fracture of first metatarsal bone of left foot, initial encounter    4. Closed displaced fracture of second metatarsal bone of left foot, initial encounter    5. Closed displaced fracture of third metatarsal bone of left foot, initial encounter    6. Closed displaced fracture of fourth metatarsal bone of left foot, initial encounter    7. Closed displaced fracture of fifth metatarsal bone of left foot, initial encounter    8. Closed nondisplaced fracture of cuboid of left foot, initial encounter    9. Closed nondisplaced fracture of medial cuneiform of left foot, initial encounter      Plan:   Foot pain, left    Lisfranc dislocation, left, initial encounter    Closed nondisplaced fracture of first metatarsal bone of left foot, initial encounter    Closed displaced fracture of second metatarsal bone of left foot, initial encounter    Closed displaced fracture of third metatarsal bone of left foot, initial encounter    Closed displaced fracture of fourth metatarsal bone of left foot, initial encounter    Closed displaced fracture of fifth metatarsal bone of left foot, initial encounter    Closed nondisplaced fracture of cuboid of left foot, initial encounter    Closed nondisplaced fracture of medial cuneiform of left foot, initial encounter    Patient was educated about the entire pre, nick and post-operative time period.  They  were educated about the pro's and con's of surgery.  All conservative measures have been exhausted. Patient understands the particular risks involved which may occur in connection with the procedure proposed including pain, swelling, infection, stiffness, decreased ROM, recurrence, rejection, numbness, delayed healing, scar formation.    Patient was educated that their diagnosis may be surgically treated.   The patient's problem will probably advance and usually will not get better without surgery.  All of the pre-operative treatment plans have been exhausted or will no longer be successful at this point in time.  Patient was told of the possible outcomes and expectations of the surgical procedure.  They  will need to be followed-up post-operatively.  Today, pictures were drawn, questions answered.      We discussed the following surgical procedures:  Open reduction internal fixation of fractures 2-3 4 and 5 and 1 metatarsals with arthrodesis of Lisfranc joint left foot. I am scheduling this for Friday to get it done as quick as possible because she is ready walked on this for 3 weeks and a further we go the harder is going to be able to reduce and fixate and fuse this joint. I explained to her that the fusion is the best way to go with this because she will and a with osteoarthritis even of I am able to fixate all the comminuted areas and put back together.  Will probably have a problem with her insurance [employer] they are terrible at aproving things in a timely manner.  I am treating this as an urgent matter in will do the surgery Friday.      Her primary care doctor has left the [facility] for greener pastures.  This point time she does not have a primary care doctor and if all her lab tests are normal I will discuss with anesthesia whether that will clear the patient. I am attempting to get the lab test done tomorrow socket results.  The left toes show any abnormalities or of anesthesia once a [facility].  No follow-ups on file.    Procedures - None    Counseling:   I discussed with the pt. the type of fracture and the treatment and time required for healing of the the specific type fracture.  I provided patient education verbally regarding:   Patient diagnosis, treatment options, as well as alternatives, risks, and benefits.     This note was created using Dragon voice  recognition software that occasionally misinterpreted phrases or words.

## 2019-09-04 ENCOUNTER — HOSPITAL ENCOUNTER (OUTPATIENT)
Dept: RADIOLOGY | Facility: HOSPITAL | Age: 69
Discharge: HOME | End: 2019-09-04
Attending: PODIATRIST
Payer: MEDICARE

## 2019-09-04 ENCOUNTER — HOSPITAL ENCOUNTER (OUTPATIENT)
Dept: PREADMISSION TESTING | Facility: HOSPITAL | Age: 69
Discharge: HOME | End: 2019-09-04
Attending: PODIATRIST
Payer: MEDICARE

## 2019-09-04 VITALS
RESPIRATION RATE: 18 BRPM | DIASTOLIC BLOOD PRESSURE: 98 MMHG | HEIGHT: 66 IN | TEMPERATURE: 98 F | BODY MASS INDEX: 22 KG/M2 | WEIGHT: 136.88 LBS | OXYGEN SATURATION: 96 % | SYSTOLIC BLOOD PRESSURE: 148 MMHG | HEART RATE: 105 BPM

## 2019-09-04 DIAGNOSIS — S92.315A CLOSED NONDISPLACED FRACTURE OF FIRST METATARSAL BONE OF LEFT FOOT, INITIAL ENCOUNTER: ICD-10-CM

## 2019-09-04 DIAGNOSIS — S93.325A: ICD-10-CM

## 2019-09-04 DIAGNOSIS — Z41.9 SURGERY, ELECTIVE: Primary | ICD-10-CM

## 2019-09-04 DIAGNOSIS — S93.325A LISFRANC DISLOCATION, LEFT, INITIAL ENCOUNTER: Primary | ICD-10-CM

## 2019-09-04 DIAGNOSIS — S92.245A CLOSED NONDISPLACED FRACTURE OF MEDIAL CUNEIFORM OF LEFT FOOT, INITIAL ENCOUNTER: ICD-10-CM

## 2019-09-04 DIAGNOSIS — S92.332A CLOSED DISPLACED FRACTURE OF THIRD METATARSAL BONE OF LEFT FOOT, INITIAL ENCOUNTER: ICD-10-CM

## 2019-09-04 DIAGNOSIS — Z41.9 SURGERY, ELECTIVE: ICD-10-CM

## 2019-09-04 DIAGNOSIS — S92.352A CLOSED DISPLACED FRACTURE OF FIFTH METATARSAL BONE OF LEFT FOOT, INITIAL ENCOUNTER: ICD-10-CM

## 2019-09-04 DIAGNOSIS — S92.215A CLOSED NONDISPLACED FRACTURE OF CUBOID OF LEFT FOOT, INITIAL ENCOUNTER: ICD-10-CM

## 2019-09-04 DIAGNOSIS — D84.9 IMMUNE DEFICIENCY DISORDER: ICD-10-CM

## 2019-09-04 DIAGNOSIS — S92.322A CLOSED DISPLACED FRACTURE OF SECOND METATARSAL BONE OF LEFT FOOT, INITIAL ENCOUNTER: ICD-10-CM

## 2019-09-04 DIAGNOSIS — S92.342A CLOSED DISPLACED FRACTURE OF FOURTH METATARSAL BONE OF LEFT FOOT, INITIAL ENCOUNTER: ICD-10-CM

## 2019-09-04 PROCEDURE — 93005 ELECTROCARDIOGRAM TRACING: CPT

## 2019-09-04 PROCEDURE — 71046 X-RAY EXAM CHEST 2 VIEWS: CPT | Mod: TC

## 2019-09-04 RX ORDER — IBUPROFEN 200 MG
200 TABLET ORAL EVERY 6 HOURS PRN
COMMUNITY
End: 2020-02-10

## 2019-09-04 RX ORDER — OMEPRAZOLE 20 MG/1
20 CAPSULE, DELAYED RELEASE ORAL DAILY
COMMUNITY
End: 2023-07-10 | Stop reason: SDUPTHER

## 2019-09-04 RX ORDER — OMEGA-3-ACID ETHYL ESTERS 1 G/1
2 CAPSULE, LIQUID FILLED ORAL DAILY
COMMUNITY
End: 2020-01-15

## 2019-09-04 NOTE — DISCHARGE INSTRUCTIONS
To confirm, Your doctor has instructed you that surgery is scheduled for:     Please report to [facility] on [address] the morning of surgery.     Pre-Op will call the afternoon prior to surgery between 4:00 and 6:00 PM with the final arrival time.      PLEASE NOTE:  The surgery schedule has many variables which may affect the time of your surgery case.  Family members should be available if your surgery time changes.  Plan to be here the day of your procedure between 4-6 hours.    MEDICATIONS:  TAKE ONLY THESE MEDICATIONS WITH A SMALL SIP OF WATER THE MORNING OF YOUR PROCEDURE: Effexor, prilosec.      DO NOT TAKE THESE MEDICATIONS 5-7 DAYS PRIOR to your procedure or per your surgeon's request: ASPIRIN, ALEVE, ADVIL, IBUPROFEN, FISH OIL VITAMIN E, HERBALS  (May take Tylenol)    ONLY if you are prescribed any types of blood thinners such as:  Aspirin, Coumadin, Plavix, Pradaxa, Xarelto, Aggrenox, Effient, Eliquis, Savasya, Brilinta, or any other, ask your surgeon whether you should stop taking them and how long before surgery you should stop.  You may also need to verify with the prescribing physician if it is ok to stop your medication.      INSTRUCTIONS IMPORTANT!!  · Do not eat or drink anything between midnight and the time of your procedure- this includes gum, mints, and candy.  · ONLY if you are diabetic, check your sugar in the morning before your procedure.  · Do not smoke, vape or drink alcoholic beverages 24 hours prior to your procedure.  · Shower the night before AND the morning of your procedure with a Chlorhexidine wash such as Hibiclens or Dial antibacterial soap from the neck down.  Do not get it on your face or in your eyes.  You may use your own shampoo and face wash. This helps your skin to be as bacteria free as possible.    · If you wear contact lenses, dentures, hearing aids or glasses, bring a container to put them in during surgery and give to a family member for safe keeping.  Please  leave all jewelry, piercing's and valuables at home.   · DO NOT remove hair from the surgery site.  Do not shave the incision site unless you are given specific instructions to do so.    · ONLY if you have been diagnosed with sleep apnea please bring your C-PAP machine.  · ONLY if you wear home oxygen please bring your portable oxygen tank the day of your procedure.   · ONLY for patients requiring bowel prep, written instructions will be given by your doctor's office.  · ONLY if you have a neuro stimulator, please bring the controller with you the morning of surgery  · ONLY if a type and screen test is needed before surgery, please return:  · If your doctor has scheduled you for an overnight stay, bring a small overnight bag with any personal items you need.  · Make arrangements in advance for transportation home by a responsible adult.  · You must make arrangements for transportation, TAXI'S, UBER'S OR LYFTS ARE NOT ALLOWED.          If you have any questions about these instructions, call Pre-Op Admit  Nursing at [phone] or the Pre-Op Day Surgery Unit at [phone].

## 2019-09-06 ENCOUNTER — ANESTHESIA (OUTPATIENT)
Dept: SURGERY | Facility: HOSPITAL | Age: 69
End: 2019-09-06
Payer: MEDICARE

## 2019-09-06 ENCOUNTER — HOSPITAL ENCOUNTER (OUTPATIENT)
Dept: RADIOLOGY | Facility: HOSPITAL | Age: 69
Discharge: HOME | End: 2019-09-06
Attending: PODIATRIST | Admitting: PODIATRIST
Payer: MEDICARE

## 2019-09-06 ENCOUNTER — ANESTHESIA EVENT (OUTPATIENT)
Dept: SURGERY | Facility: HOSPITAL | Age: 69
End: 2019-09-06
Payer: MEDICARE

## 2019-09-06 ENCOUNTER — HOSPITAL ENCOUNTER (OUTPATIENT)
Facility: HOSPITAL | Age: 69
LOS: 1 days | Discharge: HOME | End: 2019-09-08
Attending: PODIATRIST | Admitting: PODIATRIST
Payer: MEDICARE

## 2019-09-06 DIAGNOSIS — S93.325A LISFRANC DISLOCATION, LEFT, INITIAL ENCOUNTER: ICD-10-CM

## 2019-09-06 DIAGNOSIS — S92.315A CLOSED NONDISPLACED FRACTURE OF FIRST METATARSAL BONE OF LEFT FOOT, INITIAL ENCOUNTER: ICD-10-CM

## 2019-09-06 DIAGNOSIS — S92.245A CLOSED NONDISPLACED FRACTURE OF MEDIAL CUNEIFORM OF LEFT FOOT, INITIAL ENCOUNTER: ICD-10-CM

## 2019-09-06 DIAGNOSIS — S92.332A CLOSED DISPLACED FRACTURE OF THIRD METATARSAL BONE OF LEFT FOOT, INITIAL ENCOUNTER: ICD-10-CM

## 2019-09-06 DIAGNOSIS — S92.342A CLOSED DISPLACED FRACTURE OF FOURTH METATARSAL BONE OF LEFT FOOT, INITIAL ENCOUNTER: ICD-10-CM

## 2019-09-06 DIAGNOSIS — Z41.9 SURGERY, ELECTIVE: Primary | ICD-10-CM

## 2019-09-06 DIAGNOSIS — S93.325A: ICD-10-CM

## 2019-09-06 DIAGNOSIS — S92.215A CLOSED NONDISPLACED FRACTURE OF CUBOID OF LEFT FOOT, INITIAL ENCOUNTER: ICD-10-CM

## 2019-09-06 DIAGNOSIS — S92.352A CLOSED DISPLACED FRACTURE OF FIFTH METATARSAL BONE OF LEFT FOOT, INITIAL ENCOUNTER: ICD-10-CM

## 2019-09-06 DIAGNOSIS — Q70.22 FUSION OF TOES OF LEFT FOOT: ICD-10-CM

## 2019-09-06 DIAGNOSIS — S92.322A CLOSED DISPLACED FRACTURE OF SECOND METATARSAL BONE OF LEFT FOOT, INITIAL ENCOUNTER: ICD-10-CM

## 2019-09-06 LAB
BACTERIA #/AREA URNS HPF: NEGATIVE /HPF
CAOX CRY URNS QL MICRO: ABNORMAL
HYALINE CASTS #/AREA URNS LPF: 2 /LPF
MICROSCOPIC COMMENT: ABNORMAL
RBC #/AREA URNS HPF: 2 /HPF (ref 0–4)
SQUAMOUS #/AREA URNS HPF: 6 /HPF
WBC #/AREA URNS HPF: 3 /HPF (ref 0–5)

## 2019-09-06 PROCEDURE — 64446 NJX AA&/STRD SC NRV NFS IMG: CPT | Performed by: ANESTHESIOLOGY

## 2019-09-06 PROCEDURE — 25000003 PHARM REV CODE 250: Performed by: ANESTHESIOLOGY

## 2019-09-06 PROCEDURE — 27202105 HC BIS BILATERAL SENSOR: Performed by: ANESTHESIOLOGY

## 2019-09-06 PROCEDURE — 28485 PR OPEN TREATMENT METATARSAL FRACTURE EACH: ICD-10-PCS | Mod: 51,T2,, | Performed by: PODIATRIST

## 2019-09-06 PROCEDURE — 27000671 HC TUBING MICROBORE EXT: Performed by: ANESTHESIOLOGY

## 2019-09-06 PROCEDURE — 27200702 HC TUBE,ENDO XOMED EMG: Performed by: ANESTHESIOLOGY

## 2019-09-06 PROCEDURE — 27200664 HC NERVE BLOCK COMPLETE KIT: Performed by: ANESTHESIOLOGY

## 2019-09-06 PROCEDURE — S0020 INJECTION, BUPIVICAINE HYDRO: HCPCS | Performed by: ANESTHESIOLOGY

## 2019-09-06 PROCEDURE — 63600175 PHARM REV CODE 636 W HCPCS: Performed by: NURSE ANESTHETIST, CERTIFIED REGISTERED

## 2019-09-06 PROCEDURE — 63600175 PHARM REV CODE 636 W HCPCS: Performed by: ANESTHESIOLOGY

## 2019-09-06 PROCEDURE — C1713 ANCHOR/SCREW BN/BN,TIS/BN: HCPCS | Performed by: PODIATRIST

## 2019-09-06 PROCEDURE — 25000003 PHARM REV CODE 250: Performed by: NURSE ANESTHETIST, CERTIFIED REGISTERED

## 2019-09-06 PROCEDURE — 36000709 HC OR TIME LEV III EA ADD 15 MIN: Performed by: PODIATRIST

## 2019-09-06 PROCEDURE — G0378 HOSPITAL OBSERVATION PER HR: HCPCS

## 2019-09-06 PROCEDURE — 94761 N-INVAS EAR/PLS OXIMETRY MLT: CPT

## 2019-09-06 PROCEDURE — 76000 FLUOROSCOPY <1 HR PHYS/QHP: CPT | Mod: TC

## 2019-09-06 PROCEDURE — 37000009 HC ANESTHESIA EA ADD 15 MINS: Performed by: PODIATRIST

## 2019-09-06 PROCEDURE — 63600175 PHARM REV CODE 636 W HCPCS: Performed by: PODIATRIST

## 2019-09-06 PROCEDURE — 71000039 HC RECOVERY, EACH ADD'L HOUR: Performed by: PODIATRIST

## 2019-09-06 PROCEDURE — 36000708 HC OR TIME LEV III 1ST 15 MIN: Performed by: PODIATRIST

## 2019-09-06 PROCEDURE — 27000673 HC TUBING BLOOD Y: Performed by: ANESTHESIOLOGY

## 2019-09-06 PROCEDURE — S0028 INJECTION, FAMOTIDINE, 20 MG: HCPCS | Performed by: ANESTHESIOLOGY

## 2019-09-06 PROCEDURE — 28476 PRQ SKEL FIX METAR FX W/MNPJ: CPT | Mod: 59,T3,, | Performed by: PODIATRIST

## 2019-09-06 PROCEDURE — 71000033 HC RECOVERY, INTIAL HOUR: Performed by: PODIATRIST

## 2019-09-06 PROCEDURE — C2626 INFUSION PUMP, NON-PROG,TEMP: HCPCS | Performed by: ANESTHESIOLOGY

## 2019-09-06 PROCEDURE — 27000221 HC OXYGEN, UP TO 24 HOURS

## 2019-09-06 PROCEDURE — 94640 AIRWAY INHALATION TREATMENT: CPT

## 2019-09-06 PROCEDURE — C1769 GUIDE WIRE: HCPCS | Performed by: PODIATRIST

## 2019-09-06 PROCEDURE — 27000654 HC CATH IV JELCO: Performed by: ANESTHESIOLOGY

## 2019-09-06 PROCEDURE — 28485 OPTX METATARSAL FX EACH: CPT | Mod: TA,,, | Performed by: PODIATRIST

## 2019-09-06 PROCEDURE — 27201423 OPTIME MED/SURG SUP & DEVICES STERILE SUPPLY: Performed by: PODIATRIST

## 2019-09-06 PROCEDURE — 28476 PR PERCUT RX METATARSAL FX: ICD-10-PCS | Mod: 59,T3,, | Performed by: PODIATRIST

## 2019-09-06 PROCEDURE — 25000242 PHARM REV CODE 250 ALT 637 W/ HCPCS: Performed by: ANESTHESIOLOGY

## 2019-09-06 PROCEDURE — 37000008 HC ANESTHESIA 1ST 15 MINUTES: Performed by: PODIATRIST

## 2019-09-06 PROCEDURE — 81001 URINALYSIS AUTO W/SCOPE: CPT

## 2019-09-06 RX ORDER — LIDOCAINE HYDROCHLORIDE 40 MG/ML
SOLUTION TOPICAL
Status: DISCONTINUED | OUTPATIENT
Start: 2019-09-06 | End: 2019-09-06

## 2019-09-06 RX ORDER — LEVALBUTEROL 1.25 MG/.5ML
1.25 SOLUTION, CONCENTRATE RESPIRATORY (INHALATION) ONCE
Status: COMPLETED | OUTPATIENT
Start: 2019-09-06 | End: 2019-09-06

## 2019-09-06 RX ORDER — METOPROLOL TARTRATE 1 MG/ML
2.5 INJECTION, SOLUTION INTRAVENOUS ONCE
Status: COMPLETED | OUTPATIENT
Start: 2019-09-06 | End: 2019-09-06

## 2019-09-06 RX ORDER — ONDANSETRON 4 MG/1
8 TABLET, ORALLY DISINTEGRATING ORAL EVERY 8 HOURS PRN
Status: DISCONTINUED | OUTPATIENT
Start: 2019-09-06 | End: 2019-09-08 | Stop reason: HOSPADM

## 2019-09-06 RX ORDER — FENTANYL CITRATE 50 UG/ML
INJECTION, SOLUTION INTRAMUSCULAR; INTRAVENOUS
Status: DISCONTINUED | OUTPATIENT
Start: 2019-09-06 | End: 2019-09-06

## 2019-09-06 RX ORDER — GABAPENTIN 100 MG/1
200 CAPSULE ORAL ONCE
Status: COMPLETED | OUTPATIENT
Start: 2019-09-06 | End: 2019-09-06

## 2019-09-06 RX ORDER — DIPHENHYDRAMINE HYDROCHLORIDE 50 MG/ML
25 INJECTION INTRAMUSCULAR; INTRAVENOUS EVERY 6 HOURS PRN
Status: DISCONTINUED | OUTPATIENT
Start: 2019-09-06 | End: 2019-09-06 | Stop reason: HOSPADM

## 2019-09-06 RX ORDER — SUCCINYLCHOLINE CHLORIDE 20 MG/ML
INJECTION INTRAMUSCULAR; INTRAVENOUS
Status: DISCONTINUED | OUTPATIENT
Start: 2019-09-06 | End: 2019-09-06

## 2019-09-06 RX ORDER — HYDROCODONE BITARTRATE AND ACETAMINOPHEN 5; 325 MG/1; MG/1
1 TABLET ORAL EVERY 4 HOURS PRN
Status: DISCONTINUED | OUTPATIENT
Start: 2019-09-06 | End: 2019-09-07

## 2019-09-06 RX ORDER — ONDANSETRON 2 MG/ML
INJECTION INTRAMUSCULAR; INTRAVENOUS
Status: DISCONTINUED | OUTPATIENT
Start: 2019-09-06 | End: 2019-09-06

## 2019-09-06 RX ORDER — FENTANYL CITRATE 50 UG/ML
25 INJECTION, SOLUTION INTRAMUSCULAR; INTRAVENOUS
Status: DISCONTINUED | OUTPATIENT
Start: 2019-09-06 | End: 2019-09-06 | Stop reason: HOSPADM

## 2019-09-06 RX ORDER — PROPOFOL 10 MG/ML
VIAL (ML) INTRAVENOUS
Status: DISCONTINUED | OUTPATIENT
Start: 2019-09-06 | End: 2019-09-06

## 2019-09-06 RX ORDER — ROCURONIUM BROMIDE 10 MG/ML
INJECTION, SOLUTION INTRAVENOUS
Status: DISCONTINUED | OUTPATIENT
Start: 2019-09-06 | End: 2019-09-06

## 2019-09-06 RX ORDER — OXYCODONE HYDROCHLORIDE 5 MG/1
5 TABLET ORAL
Status: DISCONTINUED | OUTPATIENT
Start: 2019-09-06 | End: 2019-09-06 | Stop reason: HOSPADM

## 2019-09-06 RX ORDER — SODIUM CHLORIDE 0.9 % (FLUSH) 0.9 %
10 SYRINGE (ML) INJECTION
Status: DISCONTINUED | OUTPATIENT
Start: 2019-09-06 | End: 2019-09-08 | Stop reason: HOSPADM

## 2019-09-06 RX ORDER — ROPIVACAINE HYDROCHLORIDE 5 MG/ML
INJECTION, SOLUTION EPIDURAL; INFILTRATION; PERINEURAL
Status: COMPLETED | OUTPATIENT
Start: 2019-09-06 | End: 2019-09-06

## 2019-09-06 RX ORDER — CEFAZOLIN SODIUM 2 G/50ML
2 SOLUTION INTRAVENOUS
Status: COMPLETED | OUTPATIENT
Start: 2019-09-06 | End: 2019-09-07

## 2019-09-06 RX ORDER — SODIUM CHLORIDE, SODIUM LACTATE, POTASSIUM CHLORIDE, CALCIUM CHLORIDE 600; 310; 30; 20 MG/100ML; MG/100ML; MG/100ML; MG/100ML
INJECTION, SOLUTION INTRAVENOUS CONTINUOUS PRN
Status: DISCONTINUED | OUTPATIENT
Start: 2019-09-06 | End: 2019-09-06

## 2019-09-06 RX ORDER — CELECOXIB 100 MG/1
200 CAPSULE ORAL ONCE
Status: COMPLETED | OUTPATIENT
Start: 2019-09-06 | End: 2019-09-06

## 2019-09-06 RX ORDER — ONDANSETRON 2 MG/ML
4 INJECTION INTRAMUSCULAR; INTRAVENOUS EVERY 12 HOURS PRN
Status: DISCONTINUED | OUTPATIENT
Start: 2019-09-06 | End: 2019-09-08 | Stop reason: HOSPADM

## 2019-09-06 RX ORDER — LIDOCAINE HYDROCHLORIDE 10 MG/ML
INJECTION, SOLUTION EPIDURAL; INFILTRATION; INTRACAUDAL; PERINEURAL
Status: DISCONTINUED | OUTPATIENT
Start: 2019-09-06 | End: 2019-09-06

## 2019-09-06 RX ORDER — MIDAZOLAM HYDROCHLORIDE 1 MG/ML
INJECTION INTRAMUSCULAR; INTRAVENOUS
Status: DISCONTINUED | OUTPATIENT
Start: 2019-09-06 | End: 2019-09-06

## 2019-09-06 RX ORDER — ACETAMINOPHEN 10 MG/ML
1000 INJECTION, SOLUTION INTRAVENOUS ONCE
Status: COMPLETED | OUTPATIENT
Start: 2019-09-06 | End: 2019-09-06

## 2019-09-06 RX ORDER — FAMOTIDINE 20 MG/50ML
20 INJECTION, SOLUTION INTRAVENOUS ONCE
Status: COMPLETED | OUTPATIENT
Start: 2019-09-06 | End: 2019-09-06

## 2019-09-06 RX ORDER — DEXAMETHASONE SODIUM PHOSPHATE 4 MG/ML
INJECTION, SOLUTION INTRA-ARTICULAR; INTRALESIONAL; INTRAMUSCULAR; INTRAVENOUS; SOFT TISSUE
Status: DISCONTINUED | OUTPATIENT
Start: 2019-09-06 | End: 2019-09-06

## 2019-09-06 RX ORDER — BUPIVACAINE HYDROCHLORIDE 5 MG/ML
INJECTION, SOLUTION EPIDURAL; INTRACAUDAL
Status: COMPLETED | OUTPATIENT
Start: 2019-09-06 | End: 2019-09-06

## 2019-09-06 RX ORDER — MEPERIDINE HYDROCHLORIDE 50 MG/ML
12.5 INJECTION INTRAMUSCULAR; INTRAVENOUS; SUBCUTANEOUS EVERY 10 MIN PRN
Status: DISCONTINUED | OUTPATIENT
Start: 2019-09-06 | End: 2019-09-06 | Stop reason: HOSPADM

## 2019-09-06 RX ORDER — ONDANSETRON 2 MG/ML
4 INJECTION INTRAMUSCULAR; INTRAVENOUS DAILY PRN
Status: DISCONTINUED | OUTPATIENT
Start: 2019-09-06 | End: 2019-09-06 | Stop reason: HOSPADM

## 2019-09-06 RX ORDER — CEFAZOLIN SODIUM 1 G/50ML
SOLUTION INTRAVENOUS
Status: DISCONTINUED | OUTPATIENT
Start: 2019-09-06 | End: 2019-09-06

## 2019-09-06 RX ADMIN — ROPIVACAINE HYDROCHLORIDE 20 ML: 5 INJECTION, SOLUTION EPIDURAL; INFILTRATION; PERINEURAL at 01:09

## 2019-09-06 RX ADMIN — SODIUM CHLORIDE, SODIUM LACTATE, POTASSIUM CHLORIDE, AND CALCIUM CHLORIDE: .6; .31; .03; .02 INJECTION, SOLUTION INTRAVENOUS at 02:09

## 2019-09-06 RX ADMIN — FENTANYL CITRATE 100 MCG: 50 INJECTION INTRAMUSCULAR; INTRAVENOUS at 02:09

## 2019-09-06 RX ADMIN — SUCCINYLCHOLINE CHLORIDE 140 MG: 20 INJECTION, SOLUTION INTRAMUSCULAR; INTRAVENOUS at 01:09

## 2019-09-06 RX ADMIN — CEFAZOLIN SODIUM 2 G: 2 SOLUTION INTRAVENOUS at 09:09

## 2019-09-06 RX ADMIN — BUPIVACAINE HYDROCHLORIDE 10 ML: 5 INJECTION, SOLUTION EPIDURAL; INTRACAUDAL; PERINEURAL at 01:09

## 2019-09-06 RX ADMIN — LIDOCAINE HYDROCHLORIDE 50 MG: 10 INJECTION, SOLUTION EPIDURAL; INFILTRATION; INTRACAUDAL; PERINEURAL at 01:09

## 2019-09-06 RX ADMIN — LIDOCAINE HYDROCHLORIDE 3 MG: 40 SOLUTION TOPICAL at 01:09

## 2019-09-06 RX ADMIN — METOPROLOL TARTRATE 2.5 MG: 5 INJECTION, SOLUTION INTRAVENOUS at 06:09

## 2019-09-06 RX ADMIN — CELECOXIB 200 MG: 100 CAPSULE ORAL at 12:09

## 2019-09-06 RX ADMIN — ROCURONIUM BROMIDE 5 MG: 10 INJECTION, SOLUTION INTRAVENOUS at 01:09

## 2019-09-06 RX ADMIN — CEFAZOLIN SODIUM 2 G: 1 SOLUTION INTRAVENOUS at 01:09

## 2019-09-06 RX ADMIN — ONDANSETRON 4 MG: 2 INJECTION INTRAMUSCULAR; INTRAVENOUS at 01:09

## 2019-09-06 RX ADMIN — FAMOTIDINE 20 MG: 20 INJECTION, SOLUTION INTRAVENOUS at 12:09

## 2019-09-06 RX ADMIN — MIDAZOLAM HYDROCHLORIDE 1 MG: 1 INJECTION, SOLUTION INTRAMUSCULAR; INTRAVENOUS at 12:09

## 2019-09-06 RX ADMIN — LEVALBUTEROL HYDROCHLORIDE 1.25 MG: 1.25 SOLUTION, CONCENTRATE RESPIRATORY (INHALATION) at 06:09

## 2019-09-06 RX ADMIN — FENTANYL CITRATE 50 MCG: 50 INJECTION INTRAMUSCULAR; INTRAVENOUS at 12:09

## 2019-09-06 RX ADMIN — BUPIVACAINE HYDROCHLORIDE: 5 INJECTION, SOLUTION EPIDURAL; INTRACAUDAL; PERINEURAL at 05:09

## 2019-09-06 RX ADMIN — DEXAMETHASONE SODIUM PHOSPHATE 8 MG: 4 INJECTION, SOLUTION INTRAMUSCULAR; INTRAVENOUS at 02:09

## 2019-09-06 RX ADMIN — PROPOFOL 130 MG: 10 INJECTION, EMULSION INTRAVENOUS at 01:09

## 2019-09-06 RX ADMIN — SODIUM CHLORIDE, SODIUM LACTATE, POTASSIUM CHLORIDE, AND CALCIUM CHLORIDE: .6; .31; .03; .02 INJECTION, SOLUTION INTRAVENOUS at 12:09

## 2019-09-06 RX ADMIN — ACETAMINOPHEN 1000 MG: 10 INJECTION, SOLUTION INTRAVENOUS at 12:09

## 2019-09-06 RX ADMIN — GABAPENTIN 200 MG: 100 CAPSULE ORAL at 12:09

## 2019-09-06 NOTE — TRANSFER OF CARE
Anesthesia Transfer of Care Note    Patient: Babita D Crumby    Procedure(s) Performed: Procedure(s) (LRB):  FUSION, JOINT, MIDFOOT (Left)    Patient location: PACU    Anesthesia Type: general    Transport from OR: Transported from OR on room air with adequate spontaneous ventilation    Post pain: adequate analgesia    Post assessment: no apparent anesthetic complications    Post vital signs: stable    Level of consciousness: awake and alert    Nausea/Vomiting: no nausea/vomiting    Complications: none    Transfer of care protocol was followed      Last vitals:   Visit Vitals  BP (!) 129/90 (BP Location: Right arm)   Pulse 107   Temp 36.9 °C (98.5 °F)   Resp 16   SpO2 97%   Breastfeeding? No

## 2019-09-06 NOTE — ANESTHESIA POSTPROCEDURE EVALUATION
Anesthesia Post Evaluation    Patient: Babita D Crumby    Procedure(s) Performed: Procedure(s) (LRB):  FUSION, JOINT, MIDFOOT (Left)    Final Anesthesia Type: general  Patient location during evaluation: PACU  Patient participation: Yes- Able to Participate  Level of consciousness: awake and alert and oriented  Post-procedure vital signs: reviewed and stable  Pain management: adequate  Airway patency: patent  PONV status at discharge: No PONV  Anesthetic complications: no      Cardiovascular status: blood pressure returned to baseline and hemodynamically stable  Respiratory status: unassisted, spontaneous ventilation and nasal cannula  Hydration status: euvolemic  Follow-up not needed.          Vitals Value Taken Time   /83 9/6/2019  6:30 PM   Temp 36.3 °C (97.4 °F) 9/6/2019  5:45 PM   Pulse 106 9/6/2019  6:33 PM   Resp 17 9/6/2019  6:33 PM   SpO2 96 % 9/6/2019  6:33 PM   Vitals shown include unvalidated device data.      No case tracking events are documented in the log.      Pain/Andi Score: Pain Rating Prior to Med Admin: 0 (9/6/2019 12:42 PM)  Andi Score: 9 (9/6/2019  6:30 PM)

## 2019-09-06 NOTE — OP NOTE
The is is fragments distally.  Operative Report     Patient name: Babita Thomas   MRN: [id number]  Date of surgery: 9/6/2019    Surgeon: Damian Cantu   Assistant:  Luis Miguel Mendez DPM, PGY 2    Preoperative diagnosis:  Displaced comminuted fractures of 2nd, 3rd, 4th metatarsals and dislocated Lisfranc joint fracture medial cuneiform and cuboid subluxation of 1st metatarsal cuneiform joint left foot  Postoperative diagnosis:  Same as the above  Procedure:  Open reduction internal fixation with bridge plating of 1st metatarsal cuneiform bridge plating of the comminuted 2nd metatarsal to metatarsal intermediate cuneiform joint, bridge plating of 3rd metatarsal 2 lateral cuneiform.  Percutaneous fixation of 4th metatarsal cuboid joint left foot.  Anesthesia:  General anesthesia and a popliteal and saphenous pain ball block  Hemostasis:  Yes  Estimated blood loss:  30 cc   Specimen:  No  Complications: None  Condition upon discharge: Stable    Procedure in detail:  The patient was brought the operating room placed on the operating table in a supine position had general anesthesia obtained.  After the usual aseptic prep and drape of the left lower extremity he ankle tourniquet was inflated to 250 mm of mercury.  Using the C-arm I would displaced of Lisfranc joint and laid out my incisions.  One incision was over the 1st metatarsal cuneiform joint slightly medial to the dorsal line.  The other incisions between the 2nd metatarsal cuneiform and 3rd metatarsal cuneiform joints. I made the incision with 15 blade bluntly and sharply dissected down and coagulated superficial vessels.  Care was taken to preserve neurovascular structures identified including the medial terminal nerve in the peroneal vessel the dorsal foot. There was lot of scar tissue and bone callus from wear this fracture ray started healing since he was 3 weeks ago. I carried the incisions down to the 1st metatarsal cuneiform joint, 2nd metatarsocuneiform  joint, 3rd metatarsal cuneiform joint. There was a lot of fibrous tissue and early bone callus tissue from this 3-week-old injury.  I was able open the joints and then I had to dissect the hematoma and an early bone callus of the fragments and bring the bones back out to length.  I originally wanted to do a primary arthrodesis with the fragments or so comminuted in the 2nd and 3rd metatarsal cuneiform joints I decided just do a bridge plating to have the healing of the fractures occurred to get them back out to normal length and back and more anatomical alignment.  I brought the 2nd metatarsal out to length and rete do's the malalignment temporarily fixated with K-wires.  I did the  same thing with the 3rd metatarsal which was  more difficult bring it out to length and get rotated but finally was able to get in good approximation verified by C-arm and temporarily fixated it.  I placed a compression screw from the medial cuneiform into the 2nd metatarsal base to maintain in good position. And then I applied a locking plate on the dorsal surface of the 2nd metatarsal 2nd metatarsal cuneiform joint bringing and maintaining the fracture at length and in good alignment with the 1st metatarsal.  Once a verified the fixation with the C-arm I I brought the 3rd metatarsal out to length and de rotated in approximated it to the proximal base which was comminuted.  I temporarily fixated it.  I verified position by C-arm.  I placed the bridge plate over the dorsal surface of the lateral cuneiform out to the mid shaft of the 3rd metatarsal maintaining the 3rd metatarsal out to length and in good position.  This was verified with C-arm I placed some bone fragments and void that was in the 3rd metatarsal.  There was a fragment laterally on the 3rd metatarsal that I was able to approximate and percutaneously K-wire through the skin. Next I evaluated the 4th and 5th metatarsal cuboid articulation appeared to be more stable secondary  to the vessel maneuver of the metatarsals moving back into proper alignment.  There was a fracture of the cuboid from compression where there was displacement.  I percutaneously K-wire to 4th metatarsal to the cuboid to help with the reduction to maintain and in place without doing any plating of fusing that is joint. I felt comfortable with what the reduction looked like an decided to close this.  I irrigated the area close all deep structures a 2.0 Vicryl suture subcutaneous tissue with 3.0 Vicryl suture, 4.0 Vicryl suture and closed the skin with simple interrupted 4.0 Prolene sutures.  Final x-rays were taken with the C-arm.  The area was dressed with Adaptic 4x4s India Ace wrap.  The tourniquet was deflated for the 2nd time vascular status of digits were intact. Cam Walker boot cast applied.  Patient anesthesia reversed left the operating room with stable vitals.    1. Keep dressings, clean, dry, and intact to surgical extremity.  2. Rest, ice, and elevate the surgical extremity.  3.  Cam Walker boot cast to surgical extremity in operating room  4. Take all medication as discussed at discharge.  5. Contact the clinic with any postoperative concerns or complications.  6. Follow up in one week for 1st post op.  7.  Patient will be admitted to observation for PCA IV antibiotics and physical therapy.

## 2019-09-06 NOTE — ANESTHESIA PROCEDURE NOTES
Left Continuous Popliteal Peripheral Block    Patient location during procedure: holding area   Block not for primary anesthetic.  Reason for block: at surgeon's request and post-op pain management   Post-op Pain Location: Lateral left distal thigh  Start time: 9/6/2019 12:21 PM  Timeout: 9/6/2019 12:20 PM   End time: 9/6/2019 12:30 PM    Staffing  Authorizing Provider: Naveed Smith MD  Performing Provider: Naveed Smith MD    Preanesthetic Checklist  Completed: patient identified, site marked, surgical consent, pre-op evaluation, timeout performed, IV checked, risks and benefits discussed and monitors and equipment checked  Peripheral Block  Patient position: right lateral decubitus  Prep: ChloraPrep  Patient monitoring: heart rate, cardiac monitor, continuous pulse ox and frequent blood pressure checks  Block type: popliteal and adductor canal  Laterality: left  Injection technique: continuous  Needle  Needle type: Tuohy   Needle gauge: 17 G  Needle length: 5 in  Needle localization: nerve stimulator and ultrasound guidance  Needle insertion depth: 5 cm  Catheter type: stimulating  Catheter size: 18 G  Test dose: negative and lidocaine 1.5% with Epi 1-to-200,000     Assessment  Injection assessment: negative aspiration, negative parasthesia and local visualized surrounding nerve  Paresthesia pain: none  Heart rate change: no  Slow fractionated injection: yes  Additional Notes  Time-out was performed. Patient was placed in right side down left side up lateral position. Right lateral thigh was then prepped with ChloraPrep and a sterile drape was applied.  Sterile gloves were used.  And sterile ultrasound guidance was used.  Popliteal nerve of the left leg was identified via ultrasound guidance.  1% lidocaine was used to anesthetize skin on the lateral distal thigh.  A 17 gauge stimulating Touhy was advanced under continuous sterile ultrasound guidance to the popliteal nerve where stimulation was difficult  to achieve although needle placement was very precise.  The nerve did stimulate and 1.3 milliamps and was lost at 1.0 milliamps.  Negative aspiration of blood was confirmed.  1.0% lidocaine was used to form a local anesthetic pocket surrounding the nerves with approximately 5 cc.  The catheter was then inserted within the pocket where negative aspiration was confirmed.  The catheter proved to be negative for intravascular placement after a test dose of 1.5% lidocaine with epinephrine 5 cc was injected. No paresthesias upon needle placement or catheter injection was experienced by the patient was conversant throughout the procedure. 0.5% ropivacaine was injected through the catheter 5 cc increments checking for negative aspiration blood between dosings.  After the catheter was secured in place with Steri-Strips and sterile Tegaderm.  Patient was placed in the supine position for the beginning of a left continuous adductor canal nerve block.    Once again a timeout was performed. For the beginning of a left adductor canal continuous nerve block.  Left anterior and medial thigh was prepped with ChloraPrep and a full sterile drape was applied.  Sterile gloves masking have used.  Sterile ultrasound guidance was used.  The distal adductor canal was easily identified and the femoral artery was visualized.  Sensory nerves to the femoral nerve were also visualized within the adductor canal.  1% lidocaine was used anesthetize the skin in the anterior thigh and a 17 gauge Touhy needle was advanced under continuous sterile ultrasound guidance to the adductor canal.  1% lidocaine was used to form a pocket around sensory nerves in the adductor canal and a total of 5 cc was injected. The catheter easily advanced into the pocket was formed.  Negative aspiration of blood was confirmed.  1.5% lidocaine with epinephrine 3 cc was injected improved to be negative for intravascular placement therefore negative test dose.  The catheter  was then dosed with 0.5% Marcaine in 5 cc increments to a total of 10 cc.  The catheter was then secured with Steri-Strips and sterile Tegaderm.    During the procedure the patient was conversant and received conscious sedation for anxiolysis.  She never experienced paresthesias or radicular symptoms upon needle placement, catheter placement, or catheter injection. She tolerated the procedure well.

## 2019-09-06 NOTE — INTERVAL H&P NOTE
The patient has been examined and the H&P has been reviewed:    I concur with the findings and no changes have occurred since H&P was written.    Anesthesia/Surgery risks, benefits and alternative options discussed and understood by patient/family.          Active Hospital Problems    Diagnosis  POA    Closed dislocation of tarsometatarsal joint of left foot [S93.325A]  Yes      Resolved Hospital Problems   No resolved problems to display.

## 2019-09-06 NOTE — CARE UPDATE
09/06/19 1827   Patient Assessment/Suction   Level of Consciousness (AVPU) alert   Respiratory Effort Normal;Unlabored   Expansion/Accessory Muscles/Retractions no use of accessory muscles   LLL Breath Sounds crackles, fine;wheezes, expiratory   RLL Breath Sounds crackles, fine;wheezes, expiratory   Rhythm/Pattern, Respiratory unlabored   Cough Frequency infrequent   PRE-TX-O2   O2 Device (Oxygen Therapy) nasal cannula   $ Is the patient on Low Flow Oxygen? Yes   Flow (L/min) 2   SpO2 95 %   Pulse Oximetry Type Continuous   $ Pulse Oximetry - Multiple Charge Pulse Oximetry - Multiple   Pulse 104   Resp 18   Aerosol Therapy   $ Aerosol Therapy Charges Aerosol Treatment   Daily Review of Necessity (SVN) completed   Respiratory Treatment Status (SVN) given   Treatment Route (SVN) mask   Patient Position (SVN) semi-Daugherty's   Post Treatment Assessment (SVN) breath sounds improved;increased aeration   Signs of Intolerance (SVN) none   Breath Sounds Post-Respiratory Treatment   Throughout All Fields Post-Treatment All Fields   Throughout All Fields Post-Treatment aeration increased   Post-treatment Heart Rate (beats/min) 106   Post-treatment Resp Rate (breaths/min) 18   Equipment Change   $ RT Equipment Treatment nebuilzer;Aerosol treatment mask

## 2019-09-06 NOTE — ANESTHESIA PREPROCEDURE EVALUATION
09/06/2019  Babita Thomas is a 69 y.o., female.    Anesthesia Evaluation    I have reviewed the Patient Summary Reports.    I have reviewed the Nursing Notes.   I have reviewed the Medications.     Review of Systems  Anesthesia Hx:  No problems with previous Anesthesia    Social:  Non-Smoker, No Alcohol Use    Hematology/Oncology:  Hematology Normal   Oncology Normal    -- Immunodeficiency Disorder (on medication to boost immune system):    EENT/Dental:   Hearing aids     Cardiovascular:  Cardiovascular Normal     Renal/:  Renal/ Normal     Hepatic/GI:   Hiatal Hernia,    Musculoskeletal:   Arthritis     Neurological:   Neuromuscular Disease, (h/o Multiple Sclerosis)        Physical Exam  General:  Well nourished    Airway/Jaw/Neck:  Airway Findings: Mouth Opening: Normal Tongue: Normal  General Airway Assessment: Adult  Mallampati: I  Improves to I with phonation.  TM Distance: Normal, at least 6 cm  Jaw/Neck Findings:     Neck ROM: Normal ROM     Eyes/Ears/Nose:  Eyes/Ears/Nose Findings: (Rt eye hemorrhagic sclera that patient self inflicted prior to surgery while taking out contact lens)    Dental:  Dental Findings: In tact   Chest/Lungs:  Chest/Lungs Findings: Clear to auscultation, Normal Respiratory Rate     Heart/Vascular:  Heart Findings: Rate: Normal  Rhythm: Regular Rhythm  Sounds: Normal        Mental Status:  Mental Status Findings:  Cooperative, Alert and Oriented         Anesthesia Plan  Type of Anesthesia, risks & benefits discussed:  Anesthesia Type:  general, regional  Patient's Preference:   Intra-op Monitoring Plan: standard ASA monitors  Intra-op Monitoring Plan Comments:   Post Op Pain Control Plan: multimodal analgesia  Post Op Pain Control Plan Comments:   Induction:   IV  Beta Blocker:  Patient is not currently on a Beta-Blocker (No further documentation required).       Informed  Consent: Patient understands risks and agrees with Anesthesia plan.  Questions answered. Anesthesia consent signed with patient.  ASA Score: 3     Day of Surgery Review of History & Physical:        Anesthesia Plan Notes: Ofirmiv 1 gm  Decadron 8 mg  Pepcid 20 mg  Neurontin 200 mg  Celebrex 200 mg  Zofran 4 mg    Popliteal and adductor canal continuous blocks.        Ready For Surgery From Anesthesia Perspective.

## 2019-09-07 PROBLEM — E03.9 HYPOTHYROIDISM: Status: ACTIVE | Noted: 2019-09-07

## 2019-09-07 PROCEDURE — 63600175 PHARM REV CODE 636 W HCPCS: Performed by: ANESTHESIOLOGY

## 2019-09-07 PROCEDURE — 99900035 HC TECH TIME PER 15 MIN (STAT)

## 2019-09-07 PROCEDURE — G0378 HOSPITAL OBSERVATION PER HR: HCPCS

## 2019-09-07 PROCEDURE — 25000003 PHARM REV CODE 250: Performed by: PODIATRIST

## 2019-09-07 PROCEDURE — 63600175 PHARM REV CODE 636 W HCPCS: Performed by: PODIATRIST

## 2019-09-07 PROCEDURE — 25000242 PHARM REV CODE 250 ALT 637 W/ HCPCS: Performed by: PODIATRIST

## 2019-09-07 PROCEDURE — 97116 GAIT TRAINING THERAPY: CPT

## 2019-09-07 PROCEDURE — 97530 THERAPEUTIC ACTIVITIES: CPT

## 2019-09-07 PROCEDURE — 94640 AIRWAY INHALATION TREATMENT: CPT

## 2019-09-07 PROCEDURE — 97162 PT EVAL MOD COMPLEX 30 MIN: CPT

## 2019-09-07 PROCEDURE — 94760 N-INVAS EAR/PLS OXIMETRY 1: CPT

## 2019-09-07 PROCEDURE — 94761 N-INVAS EAR/PLS OXIMETRY MLT: CPT

## 2019-09-07 RX ORDER — SODIUM CHLORIDE 9 MG/ML
INJECTION, SOLUTION INTRAVENOUS CONTINUOUS
Status: DISCONTINUED | OUTPATIENT
Start: 2019-09-07 | End: 2019-09-08 | Stop reason: HOSPADM

## 2019-09-07 RX ORDER — HYDROMORPHONE HCL IN 0.9% NACL 6 MG/30 ML
PATIENT CONTROLLED ANALGESIA SYRINGE INTRAVENOUS CONTINUOUS
Status: DISCONTINUED | OUTPATIENT
Start: 2019-09-07 | End: 2019-09-07

## 2019-09-07 RX ORDER — LEVALBUTEROL 1.25 MG/.5ML
1.25 SOLUTION, CONCENTRATE RESPIRATORY (INHALATION)
Status: DISCONTINUED | OUTPATIENT
Start: 2019-09-07 | End: 2019-09-08 | Stop reason: HOSPADM

## 2019-09-07 RX ORDER — ONDANSETRON 2 MG/ML
4 INJECTION INTRAMUSCULAR; INTRAVENOUS EVERY 12 HOURS PRN
Status: DISCONTINUED | OUTPATIENT
Start: 2019-09-07 | End: 2019-09-08 | Stop reason: HOSPADM

## 2019-09-07 RX ORDER — DIPHENHYDRAMINE HYDROCHLORIDE 50 MG/ML
12.5 INJECTION INTRAMUSCULAR; INTRAVENOUS EVERY 4 HOURS PRN
Status: DISCONTINUED | OUTPATIENT
Start: 2019-09-07 | End: 2019-09-08 | Stop reason: HOSPADM

## 2019-09-07 RX ORDER — HYDROCODONE BITARTRATE AND ACETAMINOPHEN 10; 325 MG/1; MG/1
1 TABLET ORAL EVERY 4 HOURS PRN
Status: DISCONTINUED | OUTPATIENT
Start: 2019-09-07 | End: 2019-09-08 | Stop reason: HOSPADM

## 2019-09-07 RX ORDER — ACETAMINOPHEN 500 MG
1000 TABLET ORAL EVERY 8 HOURS
Status: DISCONTINUED | OUTPATIENT
Start: 2019-09-08 | End: 2019-09-08 | Stop reason: HOSPADM

## 2019-09-07 RX ORDER — ACETAMINOPHEN 10 MG/ML
1000 INJECTION, SOLUTION INTRAVENOUS EVERY 8 HOURS
Status: COMPLETED | OUTPATIENT
Start: 2019-09-07 | End: 2019-09-08

## 2019-09-07 RX ORDER — NALOXONE HCL 0.4 MG/ML
0.02 VIAL (ML) INJECTION ONCE
Status: DISCONTINUED | OUTPATIENT
Start: 2019-09-07 | End: 2019-09-07

## 2019-09-07 RX ORDER — NALOXONE HCL 0.4 MG/ML
0.02 VIAL (ML) INJECTION
Status: DISCONTINUED | OUTPATIENT
Start: 2019-09-07 | End: 2019-09-08 | Stop reason: HOSPADM

## 2019-09-07 RX ADMIN — LEVALBUTEROL HYDROCHLORIDE 1.25 MG: 1.25 SOLUTION, CONCENTRATE RESPIRATORY (INHALATION) at 08:09

## 2019-09-07 RX ADMIN — HYDROCODONE BITARTRATE AND ACETAMINOPHEN 1 TABLET: 5; 325 TABLET ORAL at 07:09

## 2019-09-07 RX ADMIN — HYDROCODONE BITARTRATE AND ACETAMINOPHEN 1 TABLET: 10; 325 TABLET ORAL at 03:09

## 2019-09-07 RX ADMIN — CEFAZOLIN SODIUM 2 G: 2 SOLUTION INTRAVENOUS at 02:09

## 2019-09-07 RX ADMIN — Medication: at 06:09

## 2019-09-07 RX ADMIN — ACETAMINOPHEN 1000 MG: 10 INJECTION, SOLUTION INTRAVENOUS at 09:09

## 2019-09-07 RX ADMIN — CEFAZOLIN SODIUM 2 G: 2 SOLUTION INTRAVENOUS at 08:09

## 2019-09-07 RX ADMIN — HYDROCODONE BITARTRATE AND ACETAMINOPHEN 1 TABLET: 5; 325 TABLET ORAL at 11:09

## 2019-09-07 RX ADMIN — SODIUM CHLORIDE: 0.9 INJECTION, SOLUTION INTRAVENOUS at 06:09

## 2019-09-07 NOTE — ANESTHESIA POSTPROCEDURE EVALUATION
Anesthesia Post Evaluation    Patient: Babita D Crumby    Procedure(s) Performed: Procedure(s) (LRB):  FUSION, JOINT, MIDFOOT (Left)    Final Anesthesia Type: general  Patient location during evaluation: floor  Patient participation: Yes- Able to Participate  Level of consciousness: awake and alert  Post-procedure vital signs: reviewed and stable  Pain management: adequate  Airway patency: patent  PONV status at discharge: No PONV  Anesthetic complications: no      Cardiovascular status: hemodynamically stable  Respiratory status: unassisted, spontaneous ventilation and room air  Hydration status: euvolemic  Follow-up needed           Vitals Value Taken Time   /85 9/7/2019 12:00 PM   Temp 36.9 °C (98.5 °F) 9/7/2019 12:00 PM   Pulse 93 9/7/2019 12:00 PM   Resp 18 9/7/2019 12:00 PM   SpO2 97 % 9/7/2019 12:00 PM         Event Time     Out of Recovery 19:10:00          Pain/Andi Score: Pain Rating Prior to Med Admin: 10 (9/7/2019  3:50 PM)  Pain Rating Post Med Admin: 10 (9/7/2019 12:53 PM)  Andi Score: 9 (9/6/2019  7:00 PM)    Pt is 1 day s/p her LLE procedure.  She had received continuous popliteal and adductor canal blocks.  Blocks are functioning well, as pt appears comfortable and reports only mild pain across the top of her foot.  Systems was quickly evaluated and, indeed, appear to be in normal working order.  Only a small amount of leaking was noticed around the popliteal catheter.  A small amount of leaking is common and normal.  Dressing remains intact.  Pt and family member were educated on how and when to pull the catheters when the infusions are complete.  Will follow.

## 2019-09-07 NOTE — PROGRESS NOTES
The department of anesthesiology was called to evaluate the leakage of local anesthetic from under the Tegaderm dressing placed in placement of the popliteal and adductor canal nerve blocks. The patient's dressings were found to be clean and dry upon arrival following nurse having tried to site and applied additional Tegaderm dressings to reinforce the existing dressings. The catheter entrance sites were examined and the catheters appeared to be in place at the distance following initial block placement. The patient and the patient's nurse was informed that leakage of local anesthetic is common. The patient was examined and was found to have decreased sensation to light touch below her knee and including the toes of her left foot. The patient was advised to continue the local anesthetic infusions at this time. She was told that should the local anesthetic continued to leak from under the dressing and is intolerable that the continuous nerve block catheters may be discontinued at that time. The continuous nerve block is to be discontinued as scheduled in approximately 12 hours.  The patient notes increased comfort to the dorsal port for foot at this time unrelieved with the oral analgesics that have been made available to her. The plan as discussed with the patient is to initiate a Dilaudid PCA for additional pain relief.  The patient may receive analgesics as well as the IV narcotics for optimal pain relief.

## 2019-09-07 NOTE — SUBJECTIVE & OBJECTIVE
Past Medical History:   Diagnosis Date    Allergy     Arthritis 1990    hands    Asthma 02/2016    no meds    Depression 5/19/2017    Fracture of left foot 09/04/2019    Dr Cantu    GERD (gastroesophageal reflux disease) 2009    on meds    High cholesterol 2014    on meds    Hyperlipidemia     Hypothyroidism     Immune deficiency disorder 05/19/2017    on meds infusion    Insomnia 1/31/2017    MS (multiple sclerosis) 2005    very well controlled, no symptoms    Shortness of breath     Sinusitis     Thyroid disease 2005    hypothyroidism- no med       Past Surgical History:   Procedure Laterality Date    BREAST SURGERY Left 15 yrs ago    biopsy, benign    CHOLECYSTECTOMY      COLONOSCOPY  3-5-2010    Dr Cotter 10 year bal     CYSTOSCOPY N/A 6/10/2019    [facility]    ESOPHAGUS SURGERY      6 year old, peanut stuck in throat     ETHMOIDECTOMY N/A 12/19/2016    [facility]    MULTIPLE TOOTH EXTRACTIONS      SEPTOPLASTY N/A 4/28/2015    [facility] at [facility]    SINUS SURGERY  2015    x3    SINUS SURGERY FUNCTIONAL ENDOSCOPIC WITH NAVIGATION N/A 12/19/2016    [facility]    SINUS SURGERY FUNCTIONAL ENDOSCOPIC WITH NAVIGATION Bilateral 4/28/2015    [facility]    TRACHEOSTOMY CLOSURE      age 6 for peanut in throat    TYMPANOSTOMY TUBE PLACEMENT      x2    WISDOM TOOTH EXTRACTION         Review of patient's allergies indicates:   Allergen Reactions    Pravastatin Other (See Comments)     cramping    Tecfidera [dimethyl fumarate] Swelling       No current facility-administered medications on file prior to encounter.      Current Outpatient Medications on File Prior to Encounter   Medication Sig    aspirin (ECOTRIN) 81 MG EC tablet Take 81 mg by mouth once daily.    CALCIUM ACETATE ORAL Take 1 tablet by mouth 2 (two) times daily.  Every day    cholecalciferol, vitamin D3, 2,000 unit Tab Take by mouth.    FLUoxetine 20 MG capsule Take 10 mg by mouth every evening.     ibuprofen (ADVIL,MOTRIN) 200 MG tablet Take 200 mg by mouth every 6 (six) hours as needed for Pain.    metFORMIN (GLUCOPHAGE) 500 MG tablet Take 500 mg by mouth 2 (two) times daily.    multivitamin-minerals-lutein (CENTRUM SILVER) Tab Take 1 tablet by mouth once daily. 1 Tablet(s) Oral Every day.    omega-3 acid ethyl esters (LOVAZA) 1 gram capsule Take 2 g by mouth once daily.    omeprazole (PRILOSEC) 20 MG capsule Take 20 mg by mouth once daily.    QUEtiapine (SEROQUEL) 100 MG Tab Take 100-200 mg by mouth nightly as needed.     venlafaxine (EFFEXOR-XR) 150 MG Cp24 2 (two) times daily.     immun glob G,IgG,-pro-IgA 0-50 (HIZENTRA) 10 gram/50 mL (20 %) Soln Inject 12 g into the skin every 14 (fourteen) days.    lovastatin (MEVACOR) 20 MG tablet Take 1 tablet (20 mg total) by mouth every evening.     Family History     Problem Relation (Age of Onset)    Dementia Mother    Heart disease Father (60), Paternal Uncle, Maternal Grandfather (70), Paternal Grandfather    Hyperlipidemia Mother    Hypertension Mother, Sister    No Known Problems Daughter, Daughter        Tobacco Use    Smoking status: Never Smoker    Smokeless tobacco: Never Used   Substance and Sexual Activity    Alcohol use: No    Drug use: No    Sexual activity: Yes     Partners: Male     Review of Systems +foot  pain no chest pain no abdominal pain no shortness of breath no fever no headache  Objective:     Vital Signs (Most Recent):  Temp: 98.5 °F (36.9 °C) (09/07/19 1200)  Pulse: 93 (09/07/19 1200)  Resp: 18 (09/07/19 1200)  BP: (!) 150/85 (09/07/19 1200)  SpO2: 97 % (09/07/19 1200) Vital Signs (24h Range):  Temp:  [97.4 °F (36.3 °C)-98.5 °F (36.9 °C)] 98.5 °F (36.9 °C)  Pulse:  [] 93  Resp:  [13-20] 18  SpO2:  [92 %-99 %] 97 %  BP: (136-160)/(77-93) 150/85     Weight: 62 kg (136 lb 11.2  oz)  Body mass index is 22.75 kg/m².    Physical Exam   Patient is lying in bed appears comfortable  HEENT sclerae nonicteric right eye conjunctiva with erythema  Neck is supple  Lungs are clear to auscultation  Heart is regular  Abdomen is soft nontender positive bowel sounds  Extremities no edema  Left foot as per Podiatry  Neuro patient is alert oriented x3    Significant Labs:   BMP: No results for input(s): GLU, NA, K, CL, CO2, BUN, CREATININE, CALCIUM, MG in the last 48 hours.  CBC: No results for input(s): WBC, HGB, HCT, PLT in the last 48 hours.  CMP: No results for input(s): NA, K, CL, CO2, GLU, BUN, CREATININE, CALCIUM, PROT, ALBUMIN, BILITOT, ALKPHOS, AST, ALT, ANIONGAP, EGFRNONAA in the last 48 hours.    Invalid input(s): ESTGFAFRICA  Troponin: No results for input(s): TROPONINI in the last 48 hours.  TSH:   Recent Labs   Lab 04/08/19  0910   TSH 3.065

## 2019-09-07 NOTE — CONSULTS
[facility] Medicine  Consult Note    Patient Name: Babita Thomas  MRN: [id number]  Admission Date: 9/6/2019  Hospital Length of Stay: 1 days  Attending Physician: Damian Cantu DPM   Primary Care Provider: Primary Doctor No           Patient information was obtained from patient, spouse/SO and ER records.     Consults  Subjective:     Principal Problem: <principal problem not specified>    Chief Complaint: No chief complaint on file.       HPI: Patient is a 69 year old female with hx  Displaced comminuted fractures of 2nd, 3rd, 4th metatarsals and dislocated Lisfranc joint fracture medial cuneiform and cuboid subluxation of 1st metatarsal cuneiform joint left foot.  She is S/P -open reduction internal fixation with bridge plating of 1st metatarsal cuneiform bridge plating of the comminuted 2nd metatarsal to metatarsal intermediate cuneiform joint, bridge plating of 3rd metatarsal 2 lateral cuneiform.  Percutaneous fixation of 4th metatarsal cuboid joint left foot. I was asked to see her post operatively     Past Medical History:   Diagnosis Date    Allergy     Arthritis 1990    hands    Asthma 02/2016    no meds    Depression 5/19/2017    Fracture of left foot 09/04/2019    Dr Cantu    GERD (gastroesophageal reflux disease) 2009    on meds    High cholesterol 2014    on meds    Hyperlipidemia     Hypothyroidism     Immune deficiency disorder 05/19/2017    on meds infusion    Insomnia 1/31/2017    MS (multiple sclerosis) 2005    very well controlled, no symptoms    Shortness of breath     Sinusitis     Thyroid disease 2005    hypothyroidism- no med       Past Surgical History:   Procedure Laterality Date    BREAST SURGERY Left 15 yrs ago    biopsy, benign    CHOLECYSTECTOMY      COLONOSCOPY  3-5-2010    Dr Cotter 10 year bal     CYSTOSCOPY N/A 6/10/2019    [facility]    ESOPHAGUS SURGERY      6 year old, peanut stuck in throat      ETHMOIDECTOMY N/A 12/19/2016    [facility]    MULTIPLE TOOTH EXTRACTIONS      SEPTOPLASTY N/A 4/28/2015    [facility]    SINUS SURGERY  2015    x3    SINUS SURGERY FUNCTIONAL ENDOSCOPIC WITH NAVIGATION N/A 12/19/2016    [facility]    SINUS SURGERY FUNCTIONAL ENDOSCOPIC WITH NAVIGATION Bilateral 4/28/2015    [facility]    TRACHEOSTOMY CLOSURE      age 6 for peanut in throat    TYMPANOSTOMY TUBE PLACEMENT      x2    WISDOM TOOTH EXTRACTION         Review of patient's allergies indicates:   Allergen Reactions    Pravastatin Other (See Comments)     cramping    Tecfidera [dimethyl fumarate] Swelling       No current facility-administered medications on file prior to encounter.      Current Outpatient Medications on File Prior to Encounter   Medication Sig    aspirin (ECOTRIN) 81 MG EC tablet Take 81 mg by mouth once daily.    CALCIUM ACETATE ORAL Take 1 tablet by mouth 2 (two) times daily. Every day    cholecalciferol, vitamin D3, 2,000 unit Tab Take by mouth.    FLUoxetine 20 MG capsule Take 10 mg by mouth every evening.     ibuprofen (ADVIL,MOTRIN) 200 MG tablet Take 200 mg by mouth every 6 (six) hours as needed for Pain.    metFORMIN (GLUCOPHAGE) 500 MG tablet Take 500 mg by mouth 2 (two) times daily.    multivitamin-minerals-lutein (CENTRUM SILVER) Tab Take 1 tablet by mouth once daily. 1 Tablet(s) Oral Every day.    omega-3 acid ethyl esters (LOVAZA) 1 gram capsule Take 2 g by mouth once daily.    omeprazole (PRILOSEC) 20 MG capsule Take 20 mg by mouth once daily.    QUEtiapine (SEROQUEL) 100 MG Tab Take 100-200 mg by mouth nightly as needed.     venlafaxine (EFFEXOR-XR) 150 MG Cp24 2 (two) times daily.     immun glob G,IgG,-pro-IgA 0-50 (HIZENTRA) 10 gram/50 mL (20 %) Soln Inject 12 g into the skin every 14 (fourteen) days.    lovastatin (MEVACOR) 20 MG  tablet Take 1 tablet (20 mg total) by mouth every evening.     Family History     Problem Relation (Age of Onset)    Dementia Mother    Heart disease Father (60), Paternal Uncle, Maternal Grandfather (70), Paternal Grandfather    Hyperlipidemia Mother    Hypertension Mother, Sister    No Known Problems Daughter, Daughter        Tobacco Use    Smoking status: Never Smoker    Smokeless tobacco: Never Used   Substance and Sexual Activity    Alcohol use: No    Drug use: No    Sexual activity: Yes     Partners: Male     Review of Systems +foot  pain no chest pain no abdominal pain no shortness of breath no fever no headache  Objective:     Vital Signs (Most Recent):  Temp: 98.5 °F (36.9 °C) (09/07/19 1200)  Pulse: 93 (09/07/19 1200)  Resp: 18 (09/07/19 1200)  BP: (!) 150/85 (09/07/19 1200)  SpO2: 97 % (09/07/19 1200) Vital Signs (24h Range):  Temp:  [97.4 °F (36.3 °C)-98.5 °F (36.9 °C)] 98.5 °F (36.9 °C)  Pulse:  [] 93  Resp:  [13-20] 18  SpO2:  [92 %-99 %] 97 %  BP: (136-160)/(77-93) 150/85     Weight: 62 kg (136 lb 11.2 oz)  Body mass index is 22.75 kg/m².    Physical Exam   Patient is lying in bed appears comfortable  HEENT sclerae nonicteric right eye conjunctiva with erythema  Neck is supple  Lungs are clear to auscultation  Heart is regular  Abdomen is soft nontender positive bowel sounds  Extremities no edema  Left foot as per Podiatry  Neuro patient is alert oriented x3    Significant Labs:   BMP: No results for input(s): GLU, NA, K, CL, CO2, BUN, CREATININE, CALCIUM, MG in the last 48 hours.  CBC: No results for input(s): WBC, HGB, HCT, PLT in the last 48 hours.  CMP: No results for input(s): NA, K, CL, CO2, GLU, BUN, CREATININE, CALCIUM, PROT, ALBUMIN, BILITOT, ALKPHOS, AST, ALT, ANIONGAP, EGFRNONAA in the last 48 hours.    Invalid input(s): ESTGFAFRICA  Troponin: No results for input(s): TROPONINI in the last 48 hours.  TSH:   Recent Labs   Lab 04/08/19  0910   TSH 3.065            Assessment/Plan:      Hypothyroidism  Continue home meds      Closed dislocation of tarsometatarsal joint of left foot  As per Podiatry      VTE Risk Mitigation (From admission, onward)        Ordered     Place sequential compression device  Until discontinued      09/06/19 1809     IP VTE HIGH RISK PATIENT  Once      09/06/19 1809              Thank you for your consult. I will follow-up with patient. Please contact us if you have any additional questions.    Cristopher Shah DO  Department of Hospital Medicine   [facility]

## 2019-09-07 NOTE — PLAN OF CARE
Problem: Adult Inpatient Plan of Care  Goal: Plan of Care Review  Outcome: Ongoing (interventions implemented as appropriate)     09/07/19 1236   Plan of Care Review   Plan of Care Reviewed With patient   Progress no change   Pt understands non weight bearing status

## 2019-09-07 NOTE — SUBJECTIVE & OBJECTIVE
Subjective:     Interval History: Pt sitting up in bed, working with PT. States moderate pain to the left foot, but improving. She states the oral medicine is working better today than last night. She states she is ready to go home.    Follow-up For: Procedure(s) (LRB):  FUSION, JOINT, MIDFOOT (Left)    Post-Operative Day: 1 Day Post-Op    Scheduled Meds:   levalbuterol  1.25 mg Nebulization TID WAKE     Continuous Infusions:   bupivacaine 0.1% ON-Q-BLOC 500 mL 8 mL/hr at 09/06/19 1753    bupivacaine 0.1% ON-Q-BLOC 500 mL 6 mL/hr at 09/06/19 1750     PRN Meds:HYDROcodone-acetaminophen, ondansetron, ondansetron, sodium chloride 0.9%    Review of Systems  Objective:     Vital Signs (Most Recent):  Temp: 98.2 °F (36.8 °C) (09/07/19 0758)  Pulse: 88 (09/07/19 0758)  Resp: 18 (09/07/19 0758)  BP: (!) 146/81 (09/07/19 0758)  SpO2: 97 % (09/07/19 0758) Vital Signs (24h Range):  Temp:  [97.4 °F (36.3 °C)-98.5 °F (36.9 °C)] 98.2 °F (36.8 °C)  Pulse:  [] 88  Resp:  [13-20] 18  SpO2:  [92 %-99 %] 97 %  BP: (129-160)/(77-93) 146/81     Weight: 62 kg (136 lb 11.2 oz)  Body mass index is 22.75 kg/m².    Foot Exam    Left Foot/Ankle      Comments  Surgical boot and dressing intact to the left foot. Neurovascular status intact to all digits.

## 2019-09-07 NOTE — PLAN OF CARE
TRANSPORTED TO ROOM 1203 IN STABLE CONDITION.  FAMILY AT BEDSIDE.  PT TRANSFERRED TO BED WITHOUT INCIDENT.  FOOT WARM WITH GOOD CAPILLARY REFILL.  ON QUE BALLS INFUSING TO BLOCKS.  REPORT TO RAMON OLVERA

## 2019-09-07 NOTE — ASSESSMENT & PLAN NOTE
Pt ok for d/c once pain adequately controlled on PO medication, and cleared by anesthesia and medicine. I am sending in prescriptions for pain and antibiotic. Pt has appointment to f/u in clinic next week. She is to keep the surgical dressing and boot clean, dry, and intact. NWB to the left lower extremity.

## 2019-09-07 NOTE — PT/OT/SLP EVAL
Physical Therapy Evaluation    Patient Name:  Babita Thomas   MRN:  [id number]    Recommendations:     Discharge Recommendations:  home   Discharge Equipment Recommendations: none   Barriers to discharge: None    Assessment:     Babita Thomas is a 69 y.o. female admitted with a medical diagnosis of <principal problem not specified>.  She presents with the following impairments/functional limitations:  weakness, gait instability, impaired endurance, impaired balance, decreased lower extremity function, decreased safety awareness, impaired self care skills, orthopedic precautions, impaired functional mobilty.     Rehab Prognosis: Good; patient would benefit from acute skilled PT services to address these deficits and reach maximum level of function.    Recent Surgery: Procedure(s) (LRB):  FUSION, JOINT, MIDFOOT (Left) 1 Day Post-Op    Plan:     During this hospitalization, patient to be seen daily to address the identified rehab impairments via gait training, therapeutic activities, therapeutic exercises and progress toward the following goals:    · Plan of Care Expires:  10/07/19    Subjective     Chief Complaint: L foot pain  Patient/Family Comments/goals: home  Pain/Comfort:  · Pain Rating 1: 10/10  · Location - Side 1: Left  · Location 1: foot  · Pain Addressed 1: Nurse notified    Patients cultural, spiritual, Jain conflicts given the current situation:      Living Environment:  Pt lives in a single story home with her .  Prior to admission, patients level of function was independent. She has MS and contributes 2 previous falls to her MS.   Equipment used at home: none.  DME owned (not currently used): rolling walker and knee scooter.  Upon discharge, patient will have assistance from .    Objective:     Communicated with RAMON Santamaria prior to session.  Patient found supine with cryotherapy, peripheral IV  upon PT entry to room.    General Precautions: Standard, fall   Orthopedic Precautions:LLE non  weight bearing   Braces:       Exams:  · Cognitive Exam:  Patient is oriented to Person, Place, Time and Situation  · RUE Strength: WFL  · LUE Strength: WFL  · RLE Strength: WFL except significant weakness with hip EXT    Functional Mobility:  · Bed Mobility:     · Supine to Sit: supervision  · Transfers:     · Sit to Stand:  minimum assistance with rolling walker  · Gait: 20' RW Todd NWB on left LE  · Balance: Sitting: good, standing: fair      Therapeutic Activities and Exercises:   educated on NWB precautions, ambulation with RW, transfers    AM-PAC 6 CLICK MOBILITY  Total Score:19     Patient left supine with call button in reach and  present.    GOALS:   Multidisciplinary Problems     Physical Therapy Goals        Problem: Physical Therapy Goal    Goal Priority Disciplines Outcome Goal Variances Interventions   Physical Therapy Goal     PT, PT/OT      Description:  Goals to be met by: D/C     Patient will increase functional independence with mobility by performin. Sit to stand transfer with Supervision  2. Bed to chair transfer with Stand-by Assistance using Rolling Walker  3. Gait  x 30 feet with Supervision using Rolling Walker.                       History:     Past Medical History:   Diagnosis Date    Allergy     Arthritis     hands    Asthma 2016    no meds    Depression 2017    Fracture of left foot 2019    Dr Cantu    GERD (gastroesophageal reflux disease)     on meds    High cholesterol     on meds    Hyperlipidemia     Hypothyroidism     Immune deficiency disorder 2017    on meds infusion    Insomnia 2017    MS (multiple sclerosis)     very well controlled, no symptoms    Shortness of breath     Sinusitis     Thyroid disease     hypothyroidism- no med       Past Surgical History:   Procedure Laterality Date    BREAST SURGERY Left 15 yrs ago    biopsy, benign    CHOLECYSTECTOMY      COLONOSCOPY  3-5-2010    Dr Cotter 10 year  bal     CYSTOSCOPY N/A 6/10/2019    Performed by [facility]iza Coreas [facility] at [facility] OR    ESOPHAGUS SURGERY      6 year old, peanut stuck in throat     ETHMOIDECTOMY N/A 12/19/2016    Performed by Parish Aleman MD at [facility] OR    MULTIPLE TOOTH EXTRACTIONS      SEPTOPLASTY N/A 4/28/2015    Performed by Angelo Winkler MD at [facility] OR    SINUS SURGERY  2015    x3    SINUS SURGERY FUNCTIONAL ENDOSCOPIC WITH NAVIGATION N/A 12/19/2016    Performed by Parish Aleman MD at [facility] OR    SINUS SURGERY FUNCTIONAL ENDOSCOPIC WITH NAVIGATION Bilateral 4/28/2015    Performed by Angelo Winkler MD at [facility] OR    TRACHEOSTOMY CLOSURE      age 6 for peanut in throat    TYMPANOSTOMY TUBE PLACEMENT      x2    WISDOM TOOTH EXTRACTION         Time Tracking:     PT Received On: 09/07/19  PT Start Time: 0923     PT Stop Time: 0958  PT Total Time (min): 35 min     Billable Minutes: Evaluation 5, Gait Training 15 and Therapeutic Activity 15      Ade Fan, PT  09/07/2019

## 2019-09-07 NOTE — PLAN OF CARE
09/07/19 1710   BRASWELL Message   Medicare Outpatient and Observation Notification regarding financial responsibility Given to patient/caregiver;Explained to patient/caregiver;Signed/date by patient/caregiver   Date BRASWELL was signed 09/07/19   Time BRASWELL was signed 0957

## 2019-09-07 NOTE — PLAN OF CARE
ARRIVED TO PACU S/P LEFT FOOT JOINT FUSION.  HAS DRESSING WITH ADAPTIC, 4X4, FLORA, CAST PADDING, ACE AND WALKING BOOT.  CAN WIGGLE TOES SLIGHTLY BUT HAS HAD BLOCK.  TOES WARM WITH GOOD CAPILLARY REFILL.  HAS ADDUCTOR BLOCK WITH ON QUE BALL PUT IN PLACE BY DR BAUER SET AT 6ML/HR AND POPLITEAL BLOCK WITH ON QUE SET AT 8ML/HR.  HEMATOMA TO RIGHT EYE FROM REMOVAL OF CONTACT PRIOR TO SURGERY.  LUNGS DIMINISHED IN BASES WITH CRACKLES AND WHEEZING.

## 2019-09-07 NOTE — PLAN OF CARE
Problem: Adult Inpatient Plan of Care  Goal: Optimal Comfort and Wellbeing    Intervention: Monitor Pain and Promote Comfort  No complaints of pain during shift  Intervention: Provide Person-Centered Care     09/06/19 4079   Support Dyspnea Relief   Trust Relationship/Rapport care explained;emotional support provided;questions answered

## 2019-09-07 NOTE — PROGRESS NOTES
[facility]  Podiatry  Progress Note    Patient Name: Babita Thomas  MRN: [id number]  Admission Date: 9/6/2019  Hospital Length of Stay: 1 days  Attending Physician: Damian Cantu DPM  Primary Care Provider: Primary Doctor No     Subjective:     Interval History: Pt sitting up in bed, working with PT. States moderate pain to the left foot, but improving. She states the oral medicine is working better today than last night. She states she is ready to go home.    Follow-up For: Procedure(s) (LRB):  FUSION, JOINT, MIDFOOT (Left)    Post-Operative Day: 1 Day Post-Op    Scheduled Meds:   levalbuterol  1.25 mg Nebulization TID WAKE     Continuous Infusions:   bupivacaine 0.1% ON-Q-BLOC 500 mL 8 mL/hr at 09/06/19 1753    bupivacaine 0.1% ON-Q-BLOC 500 mL 6 mL/hr at 09/06/19 1750     PRN Meds:HYDROcodone-acetaminophen, ondansetron, ondansetron, sodium chloride 0.9%    Review of Systems  Objective:     Vital Signs (Most Recent):  Temp: 98.2 °F (36.8 °C) (09/07/19 0758)  Pulse: 88 (09/07/19 0758)  Resp: 18 (09/07/19 0758)  BP: (!) 146/81 (09/07/19 0758)  SpO2: 97 % (09/07/19 0758) Vital Signs (24h Range):  Temp:  [97.4 °F (36.3 °C)-98.5 °F (36.9 °C)] 98.2 °F (36.8 °C)  Pulse:  [] 88  Resp:  [13-20] 18  SpO2:  [92 %-99 %] 97 %  BP: (129-160)/(77-93) 146/81     Weight: 62 kg (136 lb 11.2 oz)  Body mass index is 22.75 kg/m².    Foot Exam    Left Foot/Ankle      Comments  Surgical boot and dressing intact to the left foot. Neurovascular status intact to all digits.           Assessment/Plan:     Closed dislocation of tarsometatarsal joint of left foot  Pt ok for d/c once pain adequately controlled on PO medication, and cleared by anesthesia and medicine. I am sending in prescriptions for pain and antibiotic. Pt has appointment to f/u in clinic next week. She is to keep the surgical dressing and boot clean, dry, and intact. NWB to the left lower extremity.         Homer Roberts DPM  Podiatry  [place]  [facility]

## 2019-09-08 VITALS
SYSTOLIC BLOOD PRESSURE: 119 MMHG | RESPIRATION RATE: 18 BRPM | HEIGHT: 65 IN | HEART RATE: 87 BPM | BODY MASS INDEX: 22.77 KG/M2 | TEMPERATURE: 98 F | DIASTOLIC BLOOD PRESSURE: 81 MMHG | OXYGEN SATURATION: 100 % | WEIGHT: 136.69 LBS

## 2019-09-08 PROBLEM — S93.325A CLOSED DISLOCATION OF TARSOMETATARSAL JOINT OF LEFT FOOT: Status: RESOLVED | Noted: 2019-09-06 | Resolved: 2019-09-08

## 2019-09-08 PROCEDURE — 97110 THERAPEUTIC EXERCISES: CPT

## 2019-09-08 PROCEDURE — 99900035 HC TECH TIME PER 15 MIN (STAT)

## 2019-09-08 PROCEDURE — 94770 HC EXHALED C02 TEST: CPT

## 2019-09-08 PROCEDURE — G0378 HOSPITAL OBSERVATION PER HR: HCPCS

## 2019-09-08 PROCEDURE — 94640 AIRWAY INHALATION TREATMENT: CPT

## 2019-09-08 PROCEDURE — 63600175 PHARM REV CODE 636 W HCPCS: Performed by: ANESTHESIOLOGY

## 2019-09-08 PROCEDURE — 25000242 PHARM REV CODE 250 ALT 637 W/ HCPCS: Performed by: PODIATRIST

## 2019-09-08 PROCEDURE — 94761 N-INVAS EAR/PLS OXIMETRY MLT: CPT

## 2019-09-08 PROCEDURE — 97530 THERAPEUTIC ACTIVITIES: CPT

## 2019-09-08 PROCEDURE — 25000003 PHARM REV CODE 250: Performed by: PODIATRIST

## 2019-09-08 PROCEDURE — 27000221 HC OXYGEN, UP TO 24 HOURS

## 2019-09-08 RX ORDER — PROMETHAZINE HYDROCHLORIDE 25 MG/1
25 TABLET ORAL EVERY 4 HOURS
Qty: 30 TABLET | Refills: 0 | Status: SHIPPED | OUTPATIENT
Start: 2019-09-08 | End: 2019-09-26 | Stop reason: SDUPTHER

## 2019-09-08 RX ORDER — OXYCODONE AND ACETAMINOPHEN 10; 325 MG/1; MG/1
1 TABLET ORAL EVERY 4 HOURS PRN
Qty: 30 TABLET | Refills: 0 | Status: SHIPPED | OUTPATIENT
Start: 2019-09-08 | End: 2019-09-08 | Stop reason: HOSPADM

## 2019-09-08 RX ORDER — OXYCODONE AND ACETAMINOPHEN 10; 325 MG/1; MG/1
1 TABLET ORAL EVERY 6 HOURS PRN
Qty: 30 TABLET | Refills: 0 | Status: SHIPPED | OUTPATIENT
Start: 2019-09-08 | End: 2019-09-26 | Stop reason: SDUPTHER

## 2019-09-08 RX ORDER — CEPHALEXIN 500 MG/1
500 CAPSULE ORAL EVERY 12 HOURS
Qty: 20 CAPSULE | Refills: 0 | Status: SHIPPED | OUTPATIENT
Start: 2019-09-08 | End: 2019-09-18

## 2019-09-08 RX ADMIN — LEVALBUTEROL HYDROCHLORIDE 1.25 MG: 1.25 SOLUTION, CONCENTRATE RESPIRATORY (INHALATION) at 01:09

## 2019-09-08 RX ADMIN — ONDANSETRON 8 MG: 4 TABLET, ORALLY DISINTEGRATING ORAL at 11:09

## 2019-09-08 RX ADMIN — LEVALBUTEROL HYDROCHLORIDE 1.25 MG: 1.25 SOLUTION, CONCENTRATE RESPIRATORY (INHALATION) at 08:09

## 2019-09-08 RX ADMIN — ACETAMINOPHEN 1000 MG: 10 INJECTION, SOLUTION INTRAVENOUS at 03:09

## 2019-09-08 RX ADMIN — ACETAMINOPHEN 1000 MG: 10 INJECTION, SOLUTION INTRAVENOUS at 05:09

## 2019-09-08 NOTE — NURSING
Patient given discharge instructions, patient verbalized understanding. IV discontinued, patient tolerated well. Patient discharged via wheelchair to personal vehicle.

## 2019-09-08 NOTE — DISCHARGE INSTRUCTIONS

## 2019-09-08 NOTE — PLAN OF CARE
Problem: Physical Therapy Goal  Goal: Physical Therapy Goal  Goals to be met by: D/C     Patient will increase functional independence with mobility by performin. Sit to stand transfer with Supervision  2. Bed to chair transfer with Stand-by Assistance using Rolling Walker  3. Gait  x 30 feet with Supervision using Rolling Walker.      Outcome: Ongoing (interventions implemented as appropriate)  Patient participated in gait training, therapeutic exercise and therapeutic activities to improve and strength functional mobility and improve ADL's to reduce fall risk.

## 2019-09-08 NOTE — ANESTHESIA POST-OP PAIN MANAGEMENT
Acute Pain Service Progress Note    Babita Thomas is a 69 y.o., female, [id number].    Surgery: FUSION, JOINT, MIDFOOT (Left Foot)     Post Op Day #: 2    Catheter type: perineural  popliteal  & adductor canal  Infusion type: Bupivacaine 0.1%    Problem List:    Active Hospital Problems    Diagnosis  POA    Hypothyroidism [E03.9]  Yes    Closed dislocation of tarsometatarsal joint of left foot [S93.325A]  Yes      Resolved Hospital Problems   No resolved problems to display.        Vitals   Vitals:    09/08/19 0324   BP: 139/84   Pulse: 83   Resp: 18   Temp: 36.6 °C (97.9 °F)        Labs    Admission on 09/06/2019   Component Date Value Ref Range Status    RBC, UA 09/06/2019 2  0 - 4 /hpf Final    WBC, UA 09/06/2019 3  0 - 5 /hpf Final    Bacteria 09/06/2019 Negative  None-Occ /hpf Final    Squam Epithel, UA 09/06/2019 6  /hpf Final    Hyaline Casts, UA 09/06/2019 2* 0-1/lpf /lpf Final    Ca Oxalate Lise, UA 09/06/2019 Many* None-Moderate Final    Microscopic Comment 09/06/2019 SEE COMMENT   Final        Meds   Current Facility-Administered Medications   Medication Dose Route Frequency Provider Last Rate Last Dose    0.9%  NaCl infusion   Intravenous Continuous Christiano Collins MD 50 mL/hr at 09/07/19 1842      acetaminophen (10 mg/mL) injection 1,000 mg  1,000 mg Intravenous Q8H Christiano Collins  mL/hr at 09/07/19 2139 1,000 mg at 09/07/19 2139    Followed by    acetaminophen tablet 1,000 mg  1,000 mg Oral Q8H Christiano Collins MD        bupivacaine 0.1% ON-Q-BLOC 500 mL   Perineural Continuous Naveed Smith MD 8 mL/hr at 09/06/19 1753      bupivacaine 0.1% ON-Q-BLOC 500 mL   Perineural Continuous Naveed Smith MD 6 mL/hr at 09/06/19 1750      diphenhydrAMINE injection 12.5 mg  12.5 mg Intravenous Q4H PRN Christiano Collins MD        HYDROcodone-acetaminophen  mg per tablet 1 tablet  1 tablet Oral Q4H PRN Homer Roberts DPM   1 tablet at 09/07/19 5570    HYDROmorphone PCA 0.5 mg/mL in 30mL  0.9% Sodium Chloride in a 35mL MONOject Detach Syringe   Intravenous Continuous Damian LORENZOCARRINGTON FoleyLI        levalbuterol nebulizer solution 1.25 mg  1.25 mg Nebulization TID WAKE Damian LORENZOAzul Cantu DPM   1.25 mg at 09/07/19 2001    naloxone 0.4 mg/mL injection 0.02 mg  0.02 mg Intravenous PRN Damian LORENZOAzul Cantu, DPLI        ondansetron disintegrating tablet 8 mg  8 mg Oral Q8H PRN Damian Cantu DPM        ondansetron injection 4 mg  4 mg Intravenous Q12H PRN Naveed Smith MD        ondansetron injection 4 mg  4 mg Intravenous Q12H PRN Christiano Collins MD        sodium chloride 0.9% flush 10 mL  10 mL Intravenous PRN Luis Miguel Hunter MD           .    Assessment:  The patient was found to be awake alert and oriented ×4 without evidence of over sedation, confusion or respiratory depression. The patient was without evidence of local anesthetic systemic toxicity. She states that her pain is adequately controlled using the Dilaudid PCA provided to supplement the popliteal nerve block and adductor canal nerve block. The patient denies any nausea/vomiting or pruritus associated with use the PCA. Continuous nerve block catheter site dressings were intact. The nerve block site were without evidence of bleeding, infection or hematoma formation. The dressings were removed and the catheter was discontinued without difficulty. The tips of both catheters were found to be intact. The sites were cleaned with alcohol and a sterile bandage applied to each site. The patient demonstrated continued numbness to her foot by alcohol prep. The patient demonstrates no anesthesia complications at this time.      Plan:  The patient was instructed to continue to use the PCA as needed relief and she was informed that she may experience additional discomfort as the effects of the local anesthetic infusion resolve. She was also encouraged to continue to use the oral analgesics provided. The patient is to be reassessed for full  resolution and return to normal sensation to her surgical foot.  The patient shall be seen in the hospital or if discharged she will receive a follow-up telephone call at the contact number provided. The patient is to notify the department of anesthesiology with any questions, problems or concerns and to follow all discharge instructions provided.  She is to notify the department of anesthesiology if her pain control was insufficient at any time.      Evaluator Christiano Collins

## 2019-09-08 NOTE — CARE UPDATE
09/07/19 2001   Patient Assessment/Suction   Level of Consciousness (AVPU) alert   Respiratory Effort Unlabored   Expansion/Accessory Muscles/Retractions accessory muscle use   All Lung Fields Breath Sounds clear   Cough Frequency infrequent   Cough Type weak   PRE-TX-O2   Flow (L/min) 2   SpO2 97 %   Pulse Oximetry Type Intermittent   $ Pulse Oximetry - Single Charge Pulse Oximetry - Single   Pulse 85   Resp 18   Positioning Supine   ETCO2   ETCO2 (mmHg) 34 mmHg   Aerosol Therapy   $ Aerosol Therapy Charges Aerosol Treatment   Daily Review of Necessity (SVN) completed   Respiratory Treatment Status (SVN) given   Treatment Route (SVN) mask   Patient Position (SVN) semi-Daugherty's   Post Treatment Assessment (SVN) breath sounds improved   Signs of Intolerance (SVN) none   Breath Sounds Post-Respiratory Treatment   Throughout All Fields Post-Treatment All Fields   Throughout All Fields Post-Treatment equal bilaterally   Post-treatment Heart Rate (beats/min) 85   Post-treatment Resp Rate (breaths/min) 18

## 2019-09-08 NOTE — PT/OT/SLP PROGRESS
Physical Therapy Treatment    Patient Name:  Babita Thomas   MRN:  [id number]    Recommendations:     Discharge Recommendations:  home   Discharge Equipment Recommendations: none   Barriers to discharge: None    Assessment:     Babita Thomas is a 69 y.o. female admitted with a medical diagnosis of <principal problem not specified>.  She presents with the following impairments/functional limitations:  weakness, gait instability, impaired endurance, impaired balance, decreased upper extremity function, decreased lower extremity function, impaired joint extensibility, impaired functional mobilty, pain, orthopedic precautions, decreased safety awareness, impaired self care skills. Patient in pain and nauseous so not using pain pump. RAMON Santamaria was notified and administered meds for nausea. Patient had limited tolerance for gt today due to pain and nausea. She did well with exercise and therapeutic activities without increasing pain. Patient refused OOB today due to fatigue and nausea. Continue with PT's POC.    Rehab Prognosis: Good; patient would benefit from acute skilled PT services to address these deficits and reach maximum level of function.    Recent Surgery: Procedure(s) (LRB):  FUSION, JOINT, MIDFOOT (Left) 2 Days Post-Op    Plan:     During this hospitalization, patient to be seen daily to address the identified rehab impairments via gait training, therapeutic activities, therapeutic exercises and progress toward the following goals:    · Plan of Care Expires:  10/07/19    Subjective     Chief Complaint: nausea and not willing to use pain pump due to increases nausea  Patient/Family Comments/goals: for nausea to stop and pain be less  Pain/Comfort:  · Pain Rating 1: 5/10  · Location - Side 1: Left  · Location 1: foot  · Pain Addressed 1: Nurse notified, Reposition, Distraction, Cessation of Activity  · Pain Rating Post-Intervention 1: 5/10      Objective:     Communicated with RAMON Santamaria prior to session.  Patient  found HOB elevated with pulse ox (continuous), peripheral IV(pain pump) upon PT entry to room.     General Precautions: Standard, fall   Orthopedic Precautions:LLE non weight bearing   Braces:       Functional Mobility:  · Bed Mobility:     · Scooting: minimum assistance and moderate assistance  · Bridging: stand by assistance  · Supine to Sit: moderate assistance  · Sit to Supine: moderate assistance  · Gait: 10' with RW and CGA with VC's and TC's for NWB status with LLE.      AM-PAC 6 CLICK MOBILITY          Therapeutic Activities and Exercises:  Training with NWB status to LLE during transfers and gait. Partial sit to stand from EOB x 10.  Supine exercises to increase lower extremity strength to decrease fall risk for return to PLOF: Ankle pumps RLE, Heel slides AAROM LLE, Hip Ab/ADD, Qs, Gs, SLR, single left bridge with RLE x 10.     Patient left HOB elevated with all lines intact, call button in reach, bed alarm on, RN Hina notified and spouse present..    GOALS:   Multidisciplinary Problems     Physical Therapy Goals        Problem: Physical Therapy Goal    Goal Priority Disciplines Outcome Goal Variances Interventions   Physical Therapy Goal     PT, PT/OT Ongoing (interventions implemented as appropriate)     Description:  Goals to be met by: D/C     Patient will increase functional independence with mobility by performin. Sit to stand transfer with Supervision  2. Bed to chair transfer with Stand-by Assistance using Rolling Walker  3. Gait  x 30 feet with Supervision using Rolling Walker.                       Time Tracking:     PT Received On: 19  PT Start Time: 1057     PT Stop Time: 1122  PT Total Time (min): 25 min     Billable Minutes: Therapeutic Activity 12 and Therapeutic Exercise 13    Treatment Type: Treatment  PT/PTA: PTA     PTA Visit Number: 1     Faiza Pruett PTA  2019

## 2019-09-08 NOTE — PLAN OF CARE
Problem: Adult Inpatient Plan of Care  Goal: Optimal Comfort and Wellbeing    Intervention: Monitor Pain and Promote Comfort  Patient has only received 2 doses of PCA  Intervention: Provide Person-Centered Care     09/06/19 2034   Support Dyspnea Relief   Trust Relationship/Rapport care explained;emotional support provided;questions answered

## 2019-09-08 NOTE — PLAN OF CARE
09/08/19 0811   Patient Assessment/Suction   Level of Consciousness (AVPU) alert   Respiratory Effort Normal;Unlabored   Expansion/Accessory Muscles/Retractions no use of accessory muscles;no retractions   All Lung Fields Breath Sounds clear   Rhythm/Pattern, Respiratory no shortness of breath reported;unlabored;pattern regular;depth regular   PRE-TX-O2   O2 Device (Oxygen Therapy) nasal cannula   $ Is the patient on Low Flow Oxygen? Yes   Flow (L/min) 2   SpO2 100 %   Pulse 89   Resp 15   ETCO2   $ ETCO2 Charge Exhaled CO2 Monitoring   $ ETCO2 Usage Currently wearing   ETCO2 (mmHg) 35 mmHg   ETCO2 Device Type Bedside Monitor;Nasal Cannula   ETCO2 High Alarm 55   ETCO2 Low Alarm 20   Aerosol Therapy   $ Aerosol Therapy Charges Aerosol Treatment   Respiratory Treatment Status (SVN) given   Treatment Route (SVN) mask;oxygen   Patient Position (SVN) semi-Daugherty's   Post Treatment Assessment (SVN) breath sounds unchanged   Signs of Intolerance (SVN) none   Breath Sounds Post-Respiratory Treatment   Post-treatment Heart Rate (beats/min) 84   Post-treatment Resp Rate (breaths/min) 18

## 2019-09-08 NOTE — PROGRESS NOTES
Chart reviewed patient appears medically stable.  Patient not seen today by IM  Podiatry managing pain control  Will sign-off call if needed  Thanks for consult

## 2019-09-08 NOTE — HOSPITAL COURSE
Patient presented to hospital on 9/7/19 for ORIF of her left foot lisfranc fracture dislocation. She subsequently underwent ORIF of the left foot without complication. She was admitted for observation and post operative pain control. Patient initially was on oral pain medicine with a local block, however due to increased pain she was placed on a PCA. She was seen by PT and respiratory. Pain is currently rated 5/10. Patient will be discharged home to follow up outpatient on later this week for continued care. She will be given prescriptions for PO pain medicine, antibiotic, and antiemetic.

## 2019-09-08 NOTE — DISCHARGE SUMMARY
[facility]  Podiatry  Discharge Summary      Patient Name: Babita Thomas  MRN: [id number]  Admission Date: 9/6/2019  Hospital Length of Stay: 1 days  Discharge Date and Time:  09/08/2019 3:31 PM  Attending Physician: Damian Molina DPM   Discharging Provider: Homer Roberts DPM  Primary Care Provider: Primary Doctor No    HPI:  No notes on file    Procedure(s) (LRB):  FUSION, JOINT, MIDFOOT (Left)      Hospital Course:  Patient presented to hospital on 9/7/19 for ORIF of her left foot lisfranc fracture dislocation. She subsequently underwent ORIF of the left foot without complication. She was admitted for observation and post operative pain control. Patient initially was on oral pain medicine with a local block, however due to increased pain she was placed on a PCA. She was seen by PT and respiratory. Pain is currently rated 5/10. Patient will be discharged home to follow up outpatient on later this week for continued care. She will be given prescriptions for PO pain medicine, antibiotic, and antiemetic.     Consults (From admission, onward)        Status Ordering Provider     Inpatient consult to Anesthesiology for PCA  Once     Provider:  Luis Miguel Hunter MD    Acknowledged DAMIAN MOLINA     Inpatient consult to Hospitalist  Once     Provider:  Maninder Ruelas MD    Acknowledged DAMIAN MOLINA          Significant Diagnostic Studies: Radiology: X-Ray:intra operative radiographs.     Pending Diagnostic Studies:     None        Final Active Diagnoses:    Diagnosis Date Noted POA    Hypothyroidism [E03.9] 09/07/2019 Yes      Problems Resolved During this Admission:    Diagnosis Date Noted Date Resolved POA    PRINCIPAL PROBLEM:  Closed dislocation of tarsometatarsal joint of left foot [S93.325A] 09/06/2019 09/08/2019 Yes      Discharged Condition: good    Disposition: Home or Self Care    Follow Up:  Follow-up Information     Homer Roberts DPM In 3 days.    Specialties:  Podiatry,  Surgery  Contact information:  [address]  [address] [place] [address]  [phone]                 Patient Instructions:      Keep surgical extremity elevated     Notify your health care provider if you experience any of the following:  temperature >100.4     Notify your health care provider if you experience any of the following:  persistent nausea and vomiting or diarrhea     Notify your health care provider if you experience any of the following:  severe uncontrolled pain     Notify your health care provider if you experience any of the following:  redness, tenderness, or signs of infection (pain, swelling, redness, odor or green/yellow discharge around incision site)     Notify your health care provider if you experience any of the following:  difficulty breathing or increased cough     Notify your health care provider if you experience any of the following:  severe persistent headache     Notify your health care provider if you experience any of the following:  worsening rash     Notify your health care provider if you experience any of the following:  persistent dizziness, light-headedness, or visual disturbances     Notify your health care provider if you experience any of the following:  increased confusion or weakness     Leave dressing on - Keep it clean, dry, and intact until clinic visit     Weight bearing restrictions (specify):   Order Comments: Non weight bearing to left foot     Medications:  Reconciled Home Medications:      Medication List      START taking these medications    cephALEXin 500 MG capsule  Commonly known as:  KEFLEX  Take 1 capsule (500 mg total) by mouth every 12 (twelve) hours. for 10 days     oxyCODONE-acetaminophen  mg per tablet  Commonly known as:  PERCOCET  Take 1 tablet by mouth every 4 (four) hours as needed for Pain.     promethazine 25 MG tablet  Commonly known as:  PHENERGAN  Take 1 tablet (25 mg total) by mouth every 4 (four) hours.        CONTINUE taking  these medications    aspirin 81 MG EC tablet  Commonly known as:  ECOTRIN  Take 81 mg by mouth once daily.     CALCIUM ACETATE ORAL  Take 1 tablet by mouth 2 (two) times daily. Every day     CENTRUM SILVER Tab  Generic drug:  multivitamin-minerals-lutein  Take 1 tablet by mouth once daily. 1 Tablet(s) Oral Every day.     cholecalciferol (vitamin D3) 2,000 unit Tab  Commonly known as:  VITAMIN D3  Take by mouth.     FLUoxetine 20 MG capsule  Take 10 mg by mouth every evening.     ibuprofen 200 MG tablet  Commonly known as:  ADVIL,MOTRIN  Take 200 mg by mouth every 6 (six) hours as needed for Pain.     immun glob G(IgG)-pro-IgA 0-50 10 gram/50 mL (20 %) Soln  Commonly known as:  HIZENTRA  Inject 12 g into the skin every 14 (fourteen) days.     lovastatin 20 MG tablet  Commonly known as:  MEVACOR  Take 1 tablet (20 mg total) by mouth every evening.     metFORMIN 500 MG tablet  Commonly known as:  GLUCOPHAGE  Take 500 mg by mouth 2 (two) times daily.     omega-3 acid ethyl esters 1 gram capsule  Commonly known as:  LOVAZA  Take 2 g by mouth once daily.     omeprazole 20 MG capsule  Commonly known as:  PRILOSEC  Take 20 mg by mouth once daily.     QUEtiapine 100 MG Tab  Commonly known as:  SEROQUEL  Take 100-200 mg by mouth nightly as needed.     venlafaxine 150 MG Cp24  Commonly known as:  EFFEXOR-XR  2 (two) times daily.          Time spent on the discharge of patient: 30 minutes    Homer Roberts DPM  Podiatry  [facility]

## 2019-09-09 NOTE — PROGRESS NOTES
The patient is operative day #3 status post right foot fusion with postop pain relief evaluate with a continuous popliteal nerve block and continuous adductor canal nerve block. A continuous nerve block catheters were discontinued yesterday and the patient has regained normal sensation to her right foot and the toes of her right foot. The patient notes some decreased sensation to the dorsal surface of her right foot in the immediate area of the incision which was decreased by loosening the Ace bandage. The patient was able to demonstrate good motor to her right foot prior to discharge. The patient states that she was very pleased with the nerve blocks provided. The patient demonstrates no anesthesia complications at this time. She was instructed to notify the department of anesthesiology with any questions, problems or concerns.

## 2019-09-11 ENCOUNTER — OFFICE VISIT (OUTPATIENT)
Dept: PODIATRY | Facility: CLINIC | Age: 69
End: 2019-09-11
Payer: MEDICARE

## 2019-09-11 VITALS
SYSTOLIC BLOOD PRESSURE: 120 MMHG | HEART RATE: 62 BPM | HEIGHT: 65 IN | DIASTOLIC BLOOD PRESSURE: 62 MMHG | BODY MASS INDEX: 22.66 KG/M2 | WEIGHT: 136 LBS

## 2019-09-11 DIAGNOSIS — S92.342D CLOSED DISPLACED FRACTURE OF FOURTH METATARSAL BONE OF LEFT FOOT WITH ROUTINE HEALING, SUBSEQUENT ENCOUNTER: ICD-10-CM

## 2019-09-11 DIAGNOSIS — S92.315D CLOSED NONDISPLACED FRACTURE OF FIRST METATARSAL BONE OF LEFT FOOT WITH ROUTINE HEALING, SUBSEQUENT ENCOUNTER: ICD-10-CM

## 2019-09-11 DIAGNOSIS — S92.332D CLOSED DISPLACED FRACTURE OF THIRD METATARSAL BONE OF LEFT FOOT WITH ROUTINE HEALING, SUBSEQUENT ENCOUNTER: ICD-10-CM

## 2019-09-11 DIAGNOSIS — S92.352D CLOSED DISPLACED FRACTURE OF FIFTH METATARSAL BONE OF LEFT FOOT WITH ROUTINE HEALING, SUBSEQUENT ENCOUNTER: ICD-10-CM

## 2019-09-11 DIAGNOSIS — S92.322D CLOSED DISPLACED FRACTURE OF SECOND METATARSAL BONE OF LEFT FOOT WITH ROUTINE HEALING, SUBSEQUENT ENCOUNTER: ICD-10-CM

## 2019-09-11 DIAGNOSIS — M79.672 FOOT PAIN, LEFT: Primary | ICD-10-CM

## 2019-09-11 PROCEDURE — 99999 PR PBB SHADOW E&M-EST. PATIENT-LVL III: ICD-10-PCS | Mod: PBBFAC,,, | Performed by: PODIATRIST

## 2019-09-11 PROCEDURE — 99024 PR POST-OP FOLLOW-UP VISIT: ICD-10-PCS | Mod: S$GLB,,, | Performed by: PODIATRIST

## 2019-09-11 PROCEDURE — 99024 POSTOP FOLLOW-UP VISIT: CPT | Mod: S$GLB,,, | Performed by: PODIATRIST

## 2019-09-11 PROCEDURE — 99999 PR PBB SHADOW E&M-EST. PATIENT-LVL III: CPT | Mod: PBBFAC,,, | Performed by: PODIATRIST

## 2019-09-11 RX ORDER — HYDROCODONE BITARTRATE AND ACETAMINOPHEN 7.5; 325 MG/1; MG/1
1 TABLET ORAL EVERY 6 HOURS PRN
Qty: 28 TABLET | Refills: 0 | Status: SHIPPED | OUTPATIENT
Start: 2019-09-11 | End: 2019-09-19 | Stop reason: SDUPTHER

## 2019-09-12 ENCOUNTER — TELEPHONE (OUTPATIENT)
Dept: FAMILY MEDICINE | Facility: CLINIC | Age: 69
End: 2019-09-12

## 2019-09-12 NOTE — TELEPHONE ENCOUNTER
Called and spoke to patient regarding rescheduled appointment. Per patient request appointment change to 1/7/2020

## 2019-09-12 NOTE — TELEPHONE ENCOUNTER
----- Message from Mikey Dorman sent at 9/12/2019  8:48 AM CDT -----  Contact: Patient  Type:  Sooner Apoointment Request    Caller is requesting a sooner appointment.  Caller declined first available appointment listed below.  Caller will not accept being placed on the waitlist and is requesting a message be sent to doctor.    Name of Caller:  Patient  When is the first available appointment?  05/2020    Best Call Back Number:  [phone]  Additional Information:  Patient requesting to reschedule her 9/17 appointment until January of 2020. Alec call back to schedule

## 2019-09-19 ENCOUNTER — OFFICE VISIT (OUTPATIENT)
Dept: PODIATRY | Facility: CLINIC | Age: 69
End: 2019-09-19
Payer: MEDICARE

## 2019-09-19 VITALS
DIASTOLIC BLOOD PRESSURE: 87 MMHG | HEART RATE: 112 BPM | SYSTOLIC BLOOD PRESSURE: 132 MMHG | BODY MASS INDEX: 22.66 KG/M2 | HEIGHT: 65 IN | WEIGHT: 136 LBS

## 2019-09-19 DIAGNOSIS — S92.322D CLOSED DISPLACED FRACTURE OF SECOND METATARSAL BONE OF LEFT FOOT WITH ROUTINE HEALING, SUBSEQUENT ENCOUNTER: ICD-10-CM

## 2019-09-19 DIAGNOSIS — S92.352D CLOSED DISPLACED FRACTURE OF FIFTH METATARSAL BONE OF LEFT FOOT WITH ROUTINE HEALING, SUBSEQUENT ENCOUNTER: ICD-10-CM

## 2019-09-19 DIAGNOSIS — M79.672 FOOT PAIN, LEFT: ICD-10-CM

## 2019-09-19 DIAGNOSIS — S92.315D CLOSED NONDISPLACED FRACTURE OF FIRST METATARSAL BONE OF LEFT FOOT WITH ROUTINE HEALING, SUBSEQUENT ENCOUNTER: Primary | ICD-10-CM

## 2019-09-19 DIAGNOSIS — S92.342D CLOSED DISPLACED FRACTURE OF FOURTH METATARSAL BONE OF LEFT FOOT WITH ROUTINE HEALING, SUBSEQUENT ENCOUNTER: ICD-10-CM

## 2019-09-19 DIAGNOSIS — S92.332D CLOSED DISPLACED FRACTURE OF THIRD METATARSAL BONE OF LEFT FOOT WITH ROUTINE HEALING, SUBSEQUENT ENCOUNTER: ICD-10-CM

## 2019-09-19 PROCEDURE — 99024 PR POST-OP FOLLOW-UP VISIT: ICD-10-PCS | Mod: S$GLB,,, | Performed by: PODIATRIST

## 2019-09-19 PROCEDURE — 99999 PR PBB SHADOW E&M-EST. PATIENT-LVL III: ICD-10-PCS | Mod: PBBFAC,,, | Performed by: PODIATRIST

## 2019-09-19 PROCEDURE — 99999 PR PBB SHADOW E&M-EST. PATIENT-LVL III: CPT | Mod: PBBFAC,,, | Performed by: PODIATRIST

## 2019-09-19 PROCEDURE — 99024 POSTOP FOLLOW-UP VISIT: CPT | Mod: S$GLB,,, | Performed by: PODIATRIST

## 2019-09-19 RX ORDER — HYDROCODONE BITARTRATE AND ACETAMINOPHEN 7.5; 325 MG/1; MG/1
1 TABLET ORAL EVERY 6 HOURS PRN
Qty: 28 TABLET | Refills: 0 | Status: SHIPPED | OUTPATIENT
Start: 2019-09-19 | End: 2019-10-19

## 2019-09-19 NOTE — PROGRESS NOTES
[address]  [place] [address]  Phone: [phone]   [phone]    Patient's PCP:Primary Doctor No  Referring Provider:No ref. provider found    Subjective:      Chief Complaint: Post-Op    Date of Surgery: 9-6-19  Procedure:Fusion left foot lis franc joint      HPI:   Babita Thomas is a 69 y.o. female who returns to the clinic today for her pre-operative visit. Babita Thomas rates pain a 5/10 on a pain scale. Compliance of Care:  NWB boot cast, dressing dry and ace wrap intact.    There were no vitals filed for this visit.    Past Surgical History:   Procedure Laterality Date    BREAST SURGERY Left 15 yrs ago    biopsy, benign    CHOLECYSTECTOMY      COLONOSCOPY  3-5-2010    Dr Cotter 11 year bal     CYSTOSCOPY N/A 6/10/2019    Performed [facility] at [facility] OR    ESOPHAGUS SURGERY      6 year old, peanut stuck in throat     ETHMOIDECTOMY N/A 12/19/2016    Performed by Parish [facility]man [facility] at [facility]    FUSION, JOINT, MIDFOOT Left 9/6/2019    [facility] D[facility] OR    MULTIPLE TOOTH EXTRACTIONS      SEPTOPLASTY N/A 4/28/2015    Performed by [facility]o [facility]ler [facility] at [facility]    SINUS SURGERY  2015    x3    SINUS SURGERY FUNCTIONAL ENDOSCOPIC WITH NAVIGATION N/A 12/19/2016    Performed by Parish [facility]man [facility] at [facility]    SINUS SURGERY FUNCTIONAL ENDOSCOPIC WITH NAVIGATION Bilateral 4/28/2015    [facility] by [facility]ler [facility] at [facility]    TRACHEOSTOMY CLOSURE      age 6 for peanut in throat    TYMPANOSTOMY TUBE PLACEMENT      x2    WISDOM TOOTH EXTRACTION       Past Medical History:   Diagnosis Date    Allergy     Arthritis 1990    hands    Asthma 02/2016    no meds    Depression 5/19/2017    Fracture of left foot 09/04/2019    Dr Cantu    GERD (gastroesophageal reflux disease) 2009    on meds    High cholesterol 2014    on meds    Hyperlipidemia     Hypothyroidism     Immune deficiency disorder 05/19/2017    on meds  infusion    Insomnia 1/31/2017    MS (multiple sclerosis) 2005    very well controlled, no symptoms    Shortness of breath     Sinusitis     Thyroid disease 2005    hypothyroidism- no med     Family History   Problem Relation Age of Onset    Hypertension Mother     Dementia Mother     Hyperlipidemia Mother     Heart disease Father 60        MI    Hypertension Sister     No Known Problems Daughter     Heart disease Paternal Uncle     Heart disease Maternal Grandfather 70        MI    Heart disease Paternal Grandfather     No Known Problems Daughter     Allergic rhinitis Neg Hx     Allergies Neg Hx     Angioedema Neg Hx     Asthma Neg Hx     Atopy Neg Hx     Eczema Neg Hx     Immunodeficiency Neg Hx     Rhinitis Neg Hx     Urticaria Neg Hx     Melanoma Neg Hx     Psoriasis Neg Hx     Lupus Neg Hx         Social History:   Marital Status:   Alcohol History:  reports that she does not drink alcohol.  Tobacco History:  reports that she has never smoked. She has never used smokeless tobacco.  Drug History:  reports that she does not use drugs.    Review of patient's allergies indicates:   Allergen Reactions    Pravastatin Other (See Comments)     cramping    Tecfidera [dimethyl fumarate] Swelling       Current Outpatient Medications   Medication Sig Dispense Refill    aspirin (ECOTRIN) 81 MG EC tablet Take 81 mg by mouth once daily.      CALCIUM ACETATE ORAL Take 1 tablet by mouth 2 (two) times daily. Every day      cholecalciferol, vitamin D3, 2,000 unit Tab Take by mouth.      FLUoxetine 20 MG capsule Take 10 mg by mouth every evening.   0    HYDROcodone-acetaminophen (NORCO) 7.5-325 mg per tablet Take 1 tablet by mouth every 6 (six) hours as needed for Pain. 28 tablet 0    ibuprofen (ADVIL,MOTRIN) 200 MG tablet Take 200 mg by mouth every 6 (six) hours as needed for Pain.      immun glob G,IgG,-pro-IgA 0-50 (HIZENTRA) 10 gram/50 mL (20 %) Soln Inject 12 g into the skin every 14  (fourteen) days. 4 vial 11    lovastatin (MEVACOR) 20 MG tablet Take 1 tablet (20 mg total) by mouth every evening. 90 tablet 3    metFORMIN (GLUCOPHAGE) 500 MG tablet Take 500 mg by mouth 2 (two) times daily.  2    multivitamin-minerals-lutein (CENTRUM SILVER) Tab Take 1 tablet by mouth once daily. 1 Tablet(s) Oral Every day.      omega-3 acid ethyl esters (LOVAZA) 1 gram capsule Take 2 g by mouth once daily.      omeprazole (PRILOSEC) 20 MG capsule Take 20 mg by mouth once daily.      oxyCODONE-acetaminophen (PERCOCET)  mg per tablet Take 1 tablet by mouth every 6 (six) hours as needed for Pain (4-6 hours as needed). 30 tablet 0    promethazine (PHENERGAN) 25 MG tablet Take 1 tablet (25 mg total) by mouth every 4 (four) hours. 30 tablet 0    QUEtiapine (SEROQUEL) 100 MG Tab Take 100-200 mg by mouth nightly as needed.       venlafaxine (EFFEXOR-XR) 150 MG Cp24 2 (two) times daily.        No current facility-administered medications for this visit.        Review of Systems      Objective:        Post-op surgery of the ORIF Lisfranc fracture dislocation left foot with normal healing, no signs of infection or dehiscence of wound. Hardware in place. Normal post op exam today. No redness, no drainage, no increase in local temperature, no significant swelling, sutures.steri-strips are intact.     Imaging:     Physical Exam:   Foot Exam  Physical Exam       Assessment:       1. Closed nondisplaced fracture of first metatarsal bone of left foot with routine healing, subsequent encounter    2. Foot pain, left    3. Closed displaced fracture of second metatarsal bone of left foot with routine healing, subsequent encounter    4. Closed displaced fracture of third metatarsal bone of left foot with routine healing, subsequent encounter    5. Closed displaced fracture of fourth metatarsal bone of left foot with routine healing, subsequent encounter    6. Closed displaced fracture of fifth metatarsal bone of left  foot with routine healing, subsequent encounter      Plan:   Closed nondisplaced fracture of first metatarsal bone of left foot with routine healing, subsequent encounter    Foot pain, left    Closed displaced fracture of second metatarsal bone of left foot with routine healing, subsequent encounter    Closed displaced fracture of third metatarsal bone of left foot with routine healing, subsequent encounter    Closed displaced fracture of fourth metatarsal bone of left foot with routine healing, subsequent encounter    Closed displaced fracture of fifth metatarsal bone of left foot with routine healing, subsequent encounter     Area was redressed today she is to continue nonweightbearing with Cam Walker boot cast and knee roller, ice the area as needed daily.  Pain medication prescribed today.  Follow up in 1 week for suture removal.  No follow-ups on file.    Procedures - None    The surgical dressing was removed showing no signs of infection, excess edema or malalignment. A new dry dressing was applied and patient was instructed to leave dressing intact until next visit or until instructed to remove.     This note was created using Dragon voice recognition software that occasionally misinterpreted phrases or words.

## 2019-09-26 ENCOUNTER — OFFICE VISIT (OUTPATIENT)
Dept: PODIATRY | Facility: CLINIC | Age: 69
End: 2019-09-26
Payer: MEDICARE

## 2019-09-26 VITALS
DIASTOLIC BLOOD PRESSURE: 97 MMHG | BODY MASS INDEX: 22.82 KG/M2 | WEIGHT: 137 LBS | SYSTOLIC BLOOD PRESSURE: 155 MMHG | HEIGHT: 65 IN | HEART RATE: 105 BPM

## 2019-09-26 DIAGNOSIS — S92.322D CLOSED DISPLACED FRACTURE OF SECOND METATARSAL BONE OF LEFT FOOT WITH ROUTINE HEALING, SUBSEQUENT ENCOUNTER: ICD-10-CM

## 2019-09-26 DIAGNOSIS — S92.332D CLOSED DISPLACED FRACTURE OF THIRD METATARSAL BONE OF LEFT FOOT WITH ROUTINE HEALING, SUBSEQUENT ENCOUNTER: ICD-10-CM

## 2019-09-26 DIAGNOSIS — S92.315D CLOSED NONDISPLACED FRACTURE OF FIRST METATARSAL BONE OF LEFT FOOT WITH ROUTINE HEALING, SUBSEQUENT ENCOUNTER: ICD-10-CM

## 2019-09-26 DIAGNOSIS — S92.245D CLOSED NONDISPLACED FRACTURE OF MEDIAL CUNEIFORM OF LEFT FOOT WITH ROUTINE HEALING, SUBSEQUENT ENCOUNTER: Primary | ICD-10-CM

## 2019-09-26 DIAGNOSIS — M79.672 FOOT PAIN, LEFT: ICD-10-CM

## 2019-09-26 DIAGNOSIS — S92.352D CLOSED DISPLACED FRACTURE OF FIFTH METATARSAL BONE OF LEFT FOOT WITH ROUTINE HEALING, SUBSEQUENT ENCOUNTER: ICD-10-CM

## 2019-09-26 DIAGNOSIS — S92.215A CLOSED NONDISPLACED FRACTURE OF CUBOID OF LEFT FOOT, INITIAL ENCOUNTER: ICD-10-CM

## 2019-09-26 DIAGNOSIS — S92.342D CLOSED DISPLACED FRACTURE OF FOURTH METATARSAL BONE OF LEFT FOOT WITH ROUTINE HEALING, SUBSEQUENT ENCOUNTER: ICD-10-CM

## 2019-09-26 PROCEDURE — 99024 POSTOP FOLLOW-UP VISIT: CPT | Mod: S$GLB,,, | Performed by: PODIATRIST

## 2019-09-26 PROCEDURE — 99024 PR POST-OP FOLLOW-UP VISIT: ICD-10-PCS | Mod: S$GLB,,, | Performed by: PODIATRIST

## 2019-09-26 PROCEDURE — 99999 PR PBB SHADOW E&M-EST. PATIENT-LVL III: CPT | Mod: PBBFAC,,, | Performed by: PODIATRIST

## 2019-09-26 PROCEDURE — 99999 PR PBB SHADOW E&M-EST. PATIENT-LVL III: ICD-10-PCS | Mod: PBBFAC,,, | Performed by: PODIATRIST

## 2019-09-26 RX ORDER — CLONAZEPAM 1 MG/1
TABLET ORAL
COMMUNITY
End: 2020-07-30 | Stop reason: ALTCHOICE

## 2019-09-26 RX ORDER — PROMETHAZINE HYDROCHLORIDE 25 MG/1
25 TABLET ORAL EVERY 4 HOURS
Qty: 30 TABLET | Refills: 0 | Status: SHIPPED | OUTPATIENT
Start: 2019-09-26 | End: 2019-10-26

## 2019-09-26 RX ORDER — OXYCODONE AND ACETAMINOPHEN 10; 325 MG/1; MG/1
1 TABLET ORAL EVERY 6 HOURS PRN
Qty: 30 TABLET | Refills: 0 | Status: SHIPPED | OUTPATIENT
Start: 2019-09-26 | End: 2019-10-26

## 2019-09-26 NOTE — TELEPHONE ENCOUNTER
----- Message from Marcie Tim sent at 9/26/2019 10:16 AM CDT -----  Contact: self  Patient is requesting a refill on her pain meds. Please send to [facility], not [facility] and call her when sent.  Thank you,  Marcie

## 2019-09-26 NOTE — PROGRESS NOTES
[address]  [place] [address]  Phone: [phone]   [phone]    Patient's PCP:Primary Doctor No  Referring Provider:No ref. provider found    Subjective:      Chief Complaint: Post-Op      Date of Surgery: 9/6/2019  Procedure: Fusion of left foot lis franc joint    HPI:   Babita Thomas is a 69 y.o. female who returns to the clinic today for her three week post-operative visit. Babita Thomas rates pain a 1/10 on a pain scale. Compliance of Care: Boot cast, rest, and elevation.     Vitals:    09/26/19 0933   BP: (!) 155/97   Pulse: 105       Past Surgical History:   Procedure Laterality Date    BREAST SURGERY Left 15 yrs ago    biopsy, benign    CHOLECYSTECTOMY      COLONOSCOPY  3-5-2010    Dr Cotter 10 year bal     CYSTOSCOPY N/A 6/10/2019    Procedure: CYSTOSCOPY;  Surgeon: Faiza Coreas MD;  Location: [facility] OR;  Service: Urology;  Laterality: N/A;    ESOPHAGUS SURGERY      6 year old, peanut stuck in throat     MIDFOOT ARTHRODESIS Left 9/6/2019    Procedure: FUSION, JOINT, MIDFOOT;  Surgeon: Damian Cantu DPM;  Location: [facility] OR;  Service: Podiatry;  Laterality: Left;  SEDA NORRIS NOTIFIED    MULTIPLE TOOTH EXTRACTIONS      SINUS SURGERY  2015    x3    TRACHEOSTOMY CLOSURE      age 6 for peanut in throat    TYMPANOSTOMY TUBE PLACEMENT      x2    WISDOM TOOTH EXTRACTION       Past Medical History:   Diagnosis Date    Allergy     Arthritis 1990    hands    Asthma 02/2016    no meds    Depression 5/19/2017    Fracture of left foot 09/04/2019    Dr Cantu    GERD (gastroesophageal reflux disease) 2009    on meds    High cholesterol 2014    on meds    Hyperlipidemia     Hypothyroidism     Immune deficiency disorder 05/19/2017    on meds infusion    Insomnia 1/31/2017    MS (multiple sclerosis) 2005    very well controlled, no symptoms    Shortness of breath     Sinusitis     Thyroid disease 2005    hypothyroidism- no med     Family History   Problem  Relation Age of Onset    Hypertension Mother     Dementia Mother     Hyperlipidemia Mother     Heart disease Father 60        MI    Hypertension Sister     No Known Problems Daughter     Heart disease Paternal Uncle     Heart disease Maternal Grandfather 70        MI    Heart disease Paternal Grandfather     No Known Problems Daughter     Allergic rhinitis Neg Hx     Allergies Neg Hx     Angioedema Neg Hx     Asthma Neg Hx     Atopy Neg Hx     Eczema Neg Hx     Immunodeficiency Neg Hx     Rhinitis Neg Hx     Urticaria Neg Hx     Melanoma Neg Hx     Psoriasis Neg Hx     Lupus Neg Hx         Social History:   Marital Status:   Alcohol History:  reports that she does not drink alcohol.  Tobacco History:  reports that she has never smoked. She has never used smokeless tobacco.  Drug History:  reports that she does not use drugs.    Review of patient's allergies indicates:   Allergen Reactions    Pravastatin Other (See Comments)     cramping    Tecfidera [dimethyl fumarate] Swelling       Current Outpatient Medications   Medication Sig Dispense Refill    aspirin (ECOTRIN) 81 MG EC tablet Take 81 mg by mouth once daily.      CALCIUM ACETATE ORAL Take 1 tablet by mouth 2 (two) times daily. Every day      cholecalciferol, vitamin D3, 2,000 unit Tab Take by mouth.      clonazePAM (KLONOPIN) 1 MG tablet Klonopin 1 mg tablet   TAKE ONE TO TWO TABLETS BY MOUTH AT BEDTIME AS NEEDED      FLUoxetine 20 MG capsule Take 10 mg by mouth every evening.   0    HYDROcodone-acetaminophen (NORCO) 7.5-325 mg per tablet Take 1 tablet by mouth every 6 (six) hours as needed for Pain. 28 tablet 0    ibuprofen (ADVIL,MOTRIN) 200 MG tablet Take 200 mg by mouth every 6 (six) hours as needed for Pain.      immun glob G,IgG,-pro-IgA 0-50 (HIZENTRA) 10 gram/50 mL (20 %) Soln Inject 12 g into the skin every 14 (fourteen) days. 4 vial 11    lovastatin (MEVACOR) 20 MG tablet Take 1 tablet (20 mg total) by mouth  every evening. 90 tablet 3    metFORMIN (GLUCOPHAGE) 500 MG tablet Take 500 mg by mouth 2 (two) times daily.  2    multivitamin-minerals-lutein (CENTRUM SILVER) Tab Take 1 tablet by mouth once daily. 1 Tablet(s) Oral Every day.      omega-3 acid ethyl esters (LOVAZA) 1 gram capsule Take 2 g by mouth once daily.      omeprazole (PRILOSEC) 20 MG capsule Take 20 mg by mouth once daily.      oxyCODONE-acetaminophen (PERCOCET)  mg per tablet Take 1 tablet by mouth every 6 (six) hours as needed for Pain (4-6 hours as needed). 30 tablet 0    promethazine (PHENERGAN) 25 MG tablet Take 1 tablet (25 mg total) by mouth every 4 (four) hours. 30 tablet 0    QUEtiapine (SEROQUEL) 100 MG Tab Take 100-200 mg by mouth nightly as needed.       venlafaxine (EFFEXOR-XR) 150 MG Cp24 2 (two) times daily.        No current facility-administered medications for this visit.        Review of Systems      Objective:        Post-op surgery of the open reduction internal fixation of displaced Lisfranc fracture left foot with normal healing, no signs of infection or dehiscence of wound. Hardware in place. Normal post op exam today. No redness, no drainage, no increase in local temperature, no significant swelling, sutures.steri-strips are intact.     Imaging:     Physical Exam:   Foot Exam  Physical Exam       Assessment:       1. Closed nondisplaced fracture of medial cuneiform of left foot with routine healing, subsequent encounter    2. Closed nondisplaced fracture of first metatarsal bone of left foot with routine healing, subsequent encounter    3. Closed nondisplaced fracture of cuboid of left foot, initial encounter    4. Closed displaced fracture of third metatarsal bone of left foot with routine healing, subsequent encounter    5. Closed displaced fracture of second metatarsal bone of left foot with routine healing, subsequent encounter    6. Closed displaced fracture of fourth metatarsal bone of left foot with routine  healing, subsequent encounter    7. Closed displaced fracture of fifth metatarsal bone of left foot with routine healing, subsequent encounter      Plan:   Closed nondisplaced fracture of medial cuneiform of left foot with routine healing, subsequent encounter    Closed nondisplaced fracture of first metatarsal bone of left foot with routine healing, subsequent encounter    Closed nondisplaced fracture of cuboid of left foot, initial encounter    Closed displaced fracture of third metatarsal bone of left foot with routine healing, subsequent encounter    Closed displaced fracture of second metatarsal bone of left foot with routine healing, subsequent encounter    Closed displaced fracture of fourth metatarsal bone of left foot with routine healing, subsequent encounter    Closed displaced fracture of fifth metatarsal bone of left foot with routine healing, subsequent encounter     Today remove the sutures from the foot.  There was no signs of dehiscence or infection.  I removed the K-wire some the foot 4th and 5th metatarsal cuboid joint. She is return to see me in 3 weeks for x-rays.  Continue nonweightbearing with Cam Walker boot cast. May begin range of motion of the ankle and toes. May get foot wet in 48 hr.  No follow-ups on file.    Procedures - None        This note was created using Dragon voice recognition software that occasionally misinterpreted phrases or words.

## 2019-10-17 ENCOUNTER — OFFICE VISIT (OUTPATIENT)
Dept: PODIATRY | Facility: CLINIC | Age: 69
End: 2019-10-17
Payer: MEDICARE

## 2019-10-17 ENCOUNTER — HOSPITAL ENCOUNTER (OUTPATIENT)
Dept: RADIOLOGY | Facility: CLINIC | Age: 69
Discharge: HOME | End: 2019-10-17
Attending: PODIATRIST
Payer: MEDICARE

## 2019-10-17 VITALS
DIASTOLIC BLOOD PRESSURE: 91 MMHG | SYSTOLIC BLOOD PRESSURE: 136 MMHG | BODY MASS INDEX: 22.82 KG/M2 | HEIGHT: 65 IN | HEART RATE: 94 BPM | WEIGHT: 137 LBS

## 2019-10-17 DIAGNOSIS — S93.325A LISFRANC DISLOCATION, LEFT, INITIAL ENCOUNTER: Primary | ICD-10-CM

## 2019-10-17 DIAGNOSIS — S92.332D CLOSED DISPLACED FRACTURE OF THIRD METATARSAL BONE OF LEFT FOOT WITH ROUTINE HEALING, SUBSEQUENT ENCOUNTER: ICD-10-CM

## 2019-10-17 DIAGNOSIS — S92.352D CLOSED DISPLACED FRACTURE OF FIFTH METATARSAL BONE OF LEFT FOOT WITH ROUTINE HEALING, SUBSEQUENT ENCOUNTER: ICD-10-CM

## 2019-10-17 DIAGNOSIS — S92.245D CLOSED NONDISPLACED FRACTURE OF MEDIAL CUNEIFORM OF LEFT FOOT WITH ROUTINE HEALING, SUBSEQUENT ENCOUNTER: ICD-10-CM

## 2019-10-17 DIAGNOSIS — S92.315D CLOSED NONDISPLACED FRACTURE OF FIRST METATARSAL BONE OF LEFT FOOT WITH ROUTINE HEALING, SUBSEQUENT ENCOUNTER: ICD-10-CM

## 2019-10-17 DIAGNOSIS — S92.215A CLOSED NONDISPLACED FRACTURE OF CUBOID OF LEFT FOOT, INITIAL ENCOUNTER: ICD-10-CM

## 2019-10-17 DIAGNOSIS — S92.342D CLOSED DISPLACED FRACTURE OF FOURTH METATARSAL BONE OF LEFT FOOT WITH ROUTINE HEALING, SUBSEQUENT ENCOUNTER: ICD-10-CM

## 2019-10-17 DIAGNOSIS — S92.322D CLOSED DISPLACED FRACTURE OF SECOND METATARSAL BONE OF LEFT FOOT WITH ROUTINE HEALING, SUBSEQUENT ENCOUNTER: ICD-10-CM

## 2019-10-17 PROCEDURE — 73630 X-RAY EXAM OF FOOT: CPT | Mod: LT,S$GLB,, | Performed by: PODIATRIST

## 2019-10-17 PROCEDURE — 99024 POSTOP FOLLOW-UP VISIT: CPT | Mod: S$GLB,,, | Performed by: PODIATRIST

## 2019-10-17 PROCEDURE — 99024 PR POST-OP FOLLOW-UP VISIT: ICD-10-PCS | Mod: S$GLB,,, | Performed by: PODIATRIST

## 2019-10-17 PROCEDURE — 73630 XR FOOT COMPLETE 3 VIEW LEFT: ICD-10-PCS | Mod: LT,S$GLB,, | Performed by: PODIATRIST

## 2019-10-17 NOTE — PROGRESS NOTES
[address] [facility] [address]  [place] [address]  Phone: [phone]   [phone]    Patient's PCP:Primary Doctor No  Referring Provider:No ref. provider found    Subjective:      Chief Complaint: Post-Op    Date of Surgery: Fusion of left foot lis franc joint  Procedure: 09/06/2019    HPI:   Babita Thomas is a 69 y.o. female who returns to the clinic today for her post-operative visit. Babita Thomas rates pain a 0/10 on a pain scale. Compliance of Care: Knee scooter, boot cast, rest, and elevation.    Vitals:    10/17/19 0941   BP: (!) 145/91   Pulse: 97       Past Surgical History:   Procedure Laterality Date    BREAST SURGERY Left 15 yrs ago    biopsy, benign    CHOLECYSTECTOMY      COLONOSCOPY  3-5-2010    Dr Cotter 10 year bal     CYSTOSCOPY N/A 6/10/2019    Procedure: CYSTOSCOPY;  Surgeon: Faiza Coreas MD;  Location: [facility] OR;  Service: Urology;  Laterality: N/A;    ESOPHAGUS SURGERY      6 year old, peanut stuck in throat     MIDFOOT ARTHRODESIS Left 9/6/2019    Procedure: FUSION, JOINT, MIDFOOT;  Surgeon: Damian Cantu DPM;  Location: [facility] OR;  Service: Podiatry;  Laterality: Left;  SEDA NORRIS NOTIFIED    MULTIPLE TOOTH EXTRACTIONS      SINUS SURGERY  2015    x3    TRACHEOSTOMY CLOSURE      age 6 for peanut in throat    TYMPANOSTOMY TUBE PLACEMENT      x2    WISDOM TOOTH EXTRACTION       Past Medical History:   Diagnosis Date    Allergy     Arthritis 1990    hands    Asthma 02/2016    no meds    Depression 5/19/2017    Fracture of left foot 09/04/2019    Dr Cantu    GERD (gastroesophageal reflux disease) 2009    on meds    High cholesterol 2014    on meds    Hyperlipidemia     Hypothyroidism     Immune deficiency disorder 05/19/2017    on meds infusion    Insomnia 1/31/2017    MS (multiple sclerosis) 2005    very well controlled, no symptoms    Shortness of breath     Sinusitis     Thyroid disease 2005    hypothyroidism- no med     Family History   Problem  Relation Age of Onset    Hypertension Mother     Dementia Mother     Hyperlipidemia Mother     Heart disease Father 60        MI    Hypertension Sister     No Known Problems Daughter     Heart disease Paternal Uncle     Heart disease Maternal Grandfather 70        MI    Heart disease Paternal Grandfather     No Known Problems Daughter     Allergic rhinitis Neg Hx     Allergies Neg Hx     Angioedema Neg Hx     Asthma Neg Hx     Atopy Neg Hx     Eczema Neg Hx     Immunodeficiency Neg Hx     Rhinitis Neg Hx     Urticaria Neg Hx     Melanoma Neg Hx     Psoriasis Neg Hx     Lupus Neg Hx         Social History:   Marital Status:   Alcohol History:  reports that she does not drink alcohol.  Tobacco History:  reports that she has never smoked. She has never used smokeless tobacco.  Drug History:  reports that she does not use drugs.    Review of patient's allergies indicates:   Allergen Reactions    Pravastatin Other (See Comments)     cramping    Tecfidera [dimethyl fumarate] Swelling       Current Outpatient Medications   Medication Sig Dispense Refill    aspirin (ECOTRIN) 81 MG EC tablet Take 81 mg by mouth once daily.      CALCIUM ACETATE ORAL Take 1 tablet by mouth 2 (two) times daily. Every day      cholecalciferol, vitamin D3, 2,000 unit Tab Take by mouth.      clonazePAM (KLONOPIN) 1 MG tablet Klonopin 1 mg tablet   TAKE ONE TO TWO TABLETS BY MOUTH AT BEDTIME AS NEEDED      FLUoxetine 20 MG capsule Take 10 mg by mouth every evening.   0    HYDROcodone-acetaminophen (NORCO) 7.5-325 mg per tablet Take 1 tablet by mouth every 6 (six) hours as needed for Pain. 28 tablet 0    ibuprofen (ADVIL,MOTRIN) 200 MG tablet Take 200 mg by mouth every 6 (six) hours as needed for Pain.      immun glob G,IgG,-pro-IgA 0-50 (HIZENTRA) 10 gram/50 mL (20 %) Soln Inject 12 g into the skin every 14 (fourteen) days. 4 vial 11    lovastatin (MEVACOR) 20 MG tablet Take 1 tablet (20 mg total) by mouth  every evening. 90 tablet 3    metFORMIN (GLUCOPHAGE) 500 MG tablet Take 500 mg by mouth 2 (two) times daily.  2    multivitamin-minerals-lutein (CENTRUM SILVER) Tab Take 1 tablet by mouth once daily. 1 Tablet(s) Oral Every day.      omega-3 acid ethyl esters (LOVAZA) 1 gram capsule Take 2 g by mouth once daily.      omeprazole (PRILOSEC) 20 MG capsule Take 20 mg by mouth once daily.      oxyCODONE-acetaminophen (PERCOCET)  mg per tablet Take 1 tablet by mouth every 6 (six) hours as needed for Pain (4-6 hours as needed). 30 tablet 0    promethazine (PHENERGAN) 25 MG tablet Take 1 tablet (25 mg total) by mouth every 4 (four) hours. 30 tablet 0    QUEtiapine (SEROQUEL) 100 MG Tab Take 100-200 mg by mouth nightly as needed.       venlafaxine (EFFEXOR-XR) 150 MG Cp24 2 (two) times daily.        No current facility-administered medications for this visit.        Review of Systems      Objective:        Post-op surgery of the open reduction internal fixation of multiple displaced metatarsal fractures left foot with normal healing, no signs of infection or dehiscence of wound. Hardware in place. Normal post op exam today. No redness, no drainage, no increase in local temperature, no significant swelling, sutures.steri-strips are intact.     Imaging:   AP, lateral, lateral oblique weight-bearing x-rays left foot:  Fractures are healing normal there is still good alignment hardware and plates are in place with no complication.  Fracture sites are not completely healed yet.  Physical Exam:   Foot Exam  Physical Exam       Assessment:       1. Lisfranc dislocation, left, initial encounter    2. Closed nondisplaced fracture of medial cuneiform of left foot with routine healing, subsequent encounter    3. Closed nondisplaced fracture of first metatarsal bone of left foot with routine healing, subsequent encounter    4. Closed nondisplaced fracture of cuboid of left foot, initial encounter    5. Closed displaced  fracture of third metatarsal bone of left foot with routine healing, subsequent encounter    6. Closed displaced fracture of second metatarsal bone of left foot with routine healing, subsequent encounter    7. Closed displaced fracture of fourth metatarsal bone of left foot with routine healing, subsequent encounter    8. Closed displaced fracture of fifth metatarsal bone of left foot with routine healing, subsequent encounter      Plan:   Lisfranc dislocation, left, initial encounter  -     X-Ray Foot Complete Left    Closed nondisplaced fracture of medial cuneiform of left foot with routine healing, subsequent encounter  -     X-Ray Foot Complete Left    Closed nondisplaced fracture of first metatarsal bone of left foot with routine healing, subsequent encounter  -     X-Ray Foot Complete Left    Closed nondisplaced fracture of cuboid of left foot, initial encounter  -     X-Ray Foot Complete Left    Closed displaced fracture of third metatarsal bone of left foot with routine healing, subsequent encounter  -     X-Ray Foot Complete Left    Closed displaced fracture of second metatarsal bone of left foot with routine healing, subsequent encounter  -     X-Ray Foot Complete Left    Closed displaced fracture of fourth metatarsal bone of left foot with routine healing, subsequent encounter  -     X-Ray Foot Complete Left    Closed displaced fracture of fifth metatarsal bone of left foot with routine healing, subsequent encounter  -     X-Ray Foot Complete Left     patient will continue Ace wrap ice Cam Walker boot cast nonweightbearing for 4 weeks return for x-rays.  May get foot wet.  Patient will call me if she needs me sooner.  No follow-ups on file.    Procedures - None    The surgical dressing was removed showing no signs of infection, excess edema or malalignment. A new dry dressing was applied and patient was instructed to leave dressing intact until next visit or until instructed to remove.     This note was  created using Dragon voice recognition software that occasionally misinterpreted phrases or words.

## 2019-11-18 ENCOUNTER — HOSPITAL ENCOUNTER (OUTPATIENT)
Dept: RADIOLOGY | Facility: CLINIC | Age: 69
Discharge: HOME | End: 2019-11-18
Attending: PODIATRIST
Payer: MEDICARE

## 2019-11-18 ENCOUNTER — OFFICE VISIT (OUTPATIENT)
Dept: PODIATRY | Facility: CLINIC | Age: 69
End: 2019-11-18
Payer: MEDICARE

## 2019-11-18 VITALS
BODY MASS INDEX: 22.82 KG/M2 | DIASTOLIC BLOOD PRESSURE: 88 MMHG | SYSTOLIC BLOOD PRESSURE: 119 MMHG | HEIGHT: 65 IN | HEART RATE: 109 BPM | WEIGHT: 137 LBS

## 2019-11-18 DIAGNOSIS — S92.322A CLOSED DISPLACED FRACTURE OF SECOND METATARSAL BONE OF LEFT FOOT, INITIAL ENCOUNTER: ICD-10-CM

## 2019-11-18 DIAGNOSIS — S92.332D CLOSED DISPLACED FRACTURE OF THIRD METATARSAL BONE OF LEFT FOOT WITH ROUTINE HEALING, SUBSEQUENT ENCOUNTER: ICD-10-CM

## 2019-11-18 DIAGNOSIS — S93.325A LISFRANC DISLOCATION, LEFT, INITIAL ENCOUNTER: Primary | ICD-10-CM

## 2019-11-18 DIAGNOSIS — S92.352D CLOSED DISPLACED FRACTURE OF FIFTH METATARSAL BONE OF LEFT FOOT WITH ROUTINE HEALING, SUBSEQUENT ENCOUNTER: ICD-10-CM

## 2019-11-18 DIAGNOSIS — S92.342D CLOSED DISPLACED FRACTURE OF FOURTH METATARSAL BONE OF LEFT FOOT WITH ROUTINE HEALING, SUBSEQUENT ENCOUNTER: ICD-10-CM

## 2019-11-18 DIAGNOSIS — S92.322D CLOSED DISPLACED FRACTURE OF SECOND METATARSAL BONE OF LEFT FOOT WITH ROUTINE HEALING, SUBSEQUENT ENCOUNTER: ICD-10-CM

## 2019-11-18 DIAGNOSIS — S92.215A CLOSED NONDISPLACED FRACTURE OF CUBOID OF LEFT FOOT, INITIAL ENCOUNTER: ICD-10-CM

## 2019-11-18 PROCEDURE — 1101F PR PT FALLS ASSESS DOC 0-1 FALLS W/OUT INJ PAST YR: ICD-10-PCS | Mod: S$GLB,,, | Performed by: PODIATRIST

## 2019-11-18 PROCEDURE — 1101F PT FALLS ASSESS-DOCD LE1/YR: CPT | Mod: S$GLB,,, | Performed by: PODIATRIST

## 2019-11-18 PROCEDURE — 73630 XR FOOT COMPLETE 3 VIEW LEFT: ICD-10-PCS | Mod: LT,S$GLB,, | Performed by: PODIATRIST

## 2019-11-18 PROCEDURE — 99024 PR POST-OP FOLLOW-UP VISIT: ICD-10-PCS | Mod: S$GLB,,, | Performed by: PODIATRIST

## 2019-11-18 PROCEDURE — 3079F PR MOST RECENT DIASTOLIC BLOOD PRESSURE 80-89 MM HG: ICD-10-PCS | Mod: S$GLB,,, | Performed by: PODIATRIST

## 2019-11-18 PROCEDURE — 3074F SYST BP LT 130 MM HG: CPT | Mod: S$GLB,,, | Performed by: PODIATRIST

## 2019-11-18 PROCEDURE — 99024 POSTOP FOLLOW-UP VISIT: CPT | Mod: S$GLB,,, | Performed by: PODIATRIST

## 2019-11-18 PROCEDURE — 73630 X-RAY EXAM OF FOOT: CPT | Mod: LT,S$GLB,, | Performed by: PODIATRIST

## 2019-11-18 PROCEDURE — 3079F DIAST BP 80-89 MM HG: CPT | Mod: S$GLB,,, | Performed by: PODIATRIST

## 2019-11-18 PROCEDURE — 3074F PR MOST RECENT SYSTOLIC BLOOD PRESSURE < 130 MM HG: ICD-10-PCS | Mod: S$GLB,,, | Performed by: PODIATRIST

## 2019-11-18 NOTE — PROGRESS NOTES
[address] [facility] [address]  [place] [address]  Phone: [phone]   [phone]    Patient's PCP:Primary Doctor No  Referring Provider:No ref. provider found    Subjective:      Chief Complaint: Post-Op    Date of Surgery: Fusion of left foot lis franc joint  Procedure: 09/06/2019    HPI:   Babita Thomas is a 69 y.o. female who returns to the clinic today for her post-operative visit and xrays. Babita Thomas rates pain a 2/10 on a pain scale. Compliance of Care: knee scooter, boot cast, ace wrap, rest and elevation.     Vitals:    11/18/19 1400   BP: 119/88   Pulse: 109       Past Surgical History:   Procedure Laterality Date    BREAST SURGERY Left 15 yrs ago    biopsy, benign    CHOLECYSTECTOMY      COLONOSCOPY  3-5-2010    Dr Cotter 10 year bal     CYSTOSCOPY N/A 6/10/2019    Procedure: CYSTOSCOPY;  Surgeon: Faiza Coreas MD;  Location: [facility] OR;  Service: Urology;  Laterality: N/A;    ESOPHAGUS SURGERY      6 year old, peanut stuck in throat     MIDFOOT ARTHRODESIS Left 9/6/2019    Procedure: FUSION, JOINT, MIDFOOT;  Surgeon: Damian Cantu DPM;  Location: [facility] OR;  Service: Podiatry;  Laterality: Left;  SEDA NORRIS NOTIFIED    MULTIPLE TOOTH EXTRACTIONS      SINUS SURGERY  2015    x3    TRACHEOSTOMY CLOSURE      age 6 for peanut in throat    TYMPANOSTOMY TUBE PLACEMENT      x2    WISDOM TOOTH EXTRACTION       Past Medical History:   Diagnosis Date    Allergy     Arthritis 1990    hands    Asthma 02/2016    no meds    Depression 5/19/2017    Fracture of left foot 09/04/2019    Dr Cantu    GERD (gastroesophageal reflux disease) 2009    on meds    High cholesterol 2014    on meds    Hyperlipidemia     Hypothyroidism     Immune deficiency disorder 05/19/2017    on meds infusion    Insomnia 1/31/2017    MS (multiple sclerosis) 2005    very well controlled, no symptoms    Shortness of breath     Sinusitis     Thyroid disease 2005    hypothyroidism- no med     Family  History   Problem Relation Age of Onset    Hypertension Mother     Dementia Mother     Hyperlipidemia Mother     Heart disease Father 60        MI    Hypertension Sister     No Known Problems Daughter     Heart disease Paternal Uncle     Heart disease Maternal Grandfather 70        MI    Heart disease Paternal Grandfather     No Known Problems Daughter     Allergic rhinitis Neg Hx     Allergies Neg Hx     Angioedema Neg Hx     Asthma Neg Hx     Atopy Neg Hx     Eczema Neg Hx     Immunodeficiency Neg Hx     Rhinitis Neg Hx     Urticaria Neg Hx     Melanoma Neg Hx     Psoriasis Neg Hx     Lupus Neg Hx         Social History:   Marital Status:   Alcohol History:  reports that she does not drink alcohol.  Tobacco History:  reports that she has never smoked. She has never used smokeless tobacco.  Drug History:  reports that she does not use drugs.    Review of patient's allergies indicates:   Allergen Reactions    Pravastatin Other (See Comments)     cramping    Tecfidera [dimethyl fumarate] Swelling       Current Outpatient Medications   Medication Sig Dispense Refill    aspirin (ECOTRIN) 81 MG EC tablet Take 81 mg by mouth once daily.      CALCIUM ACETATE ORAL Take 1 tablet by mouth 2 (two) times daily. Every day      cholecalciferol, vitamin D3, 2,000 unit Tab Take by mouth.      clonazePAM (KLONOPIN) 1 MG tablet Klonopin 1 mg tablet   TAKE ONE TO TWO TABLETS BY MOUTH AT BEDTIME AS NEEDED      FLUoxetine 20 MG capsule Take 10 mg by mouth every evening.   0    ibuprofen (ADVIL,MOTRIN) 200 MG tablet Take 200 mg by mouth every 6 (six) hours as needed for Pain.      immun glob G,IgG,-pro-IgA 0-50 (HIZENTRA) 10 gram/50 mL (20 %) Soln Inject 12 g into the skin every 14 (fourteen) days. 4 vial 11    lovastatin (MEVACOR) 20 MG tablet Take 1 tablet (20 mg total) by mouth every evening. 90 tablet 3    metFORMIN (GLUCOPHAGE) 500 MG tablet Take 500 mg by mouth 2 (two) times daily.  2     multivitamin-minerals-lutein (CENTRUM SILVER) Tab Take 1 tablet by mouth once daily. 1 Tablet(s) Oral Every day.      omega-3 acid ethyl esters (LOVAZA) 1 gram capsule Take 2 g by mouth once daily.      omeprazole (PRILOSEC) 20 MG capsule Take 20 mg by mouth once daily.      QUEtiapine (SEROQUEL) 100 MG Tab Take 100-200 mg by mouth nightly as needed.       venlafaxine (EFFEXOR-XR) 150 MG Cp24 2 (two) times daily.        No current facility-administered medications for this visit.        Review of Systems      Objective:        Post-op surgery of the open reduction internal fixation of displaced fractures and Lisfranc fracture dislocation left foot with normal healing, no signs of infection or dehiscence of wound. Hardware in place. Normal post op exam today. No redness, no drainage, no increase in local temperature, no significant swelling, sutures.steri-strips are intact.     Imaging:   AP, lateral, lateral oblique weight-bearing x-rays left foot:  Normal healing comminuted fractures with open reduction internal fixation and dislocation Lisfranc joint left foot.  Hardware in place with no complications.  The Lisfranc joint appears to be arthrodesis seen with the plates.  The plate is slightly prominent on medial side.  Physical Exam:   Foot Exam  Physical Exam       Assessment:       1. Lisfranc dislocation, left, initial encounter    2. Closed nondisplaced fracture of cuboid of left foot, initial encounter    3. Closed displaced fracture of fourth metatarsal bone of left foot with routine healing, subsequent encounter    4. Closed displaced fracture of third metatarsal bone of left foot with routine healing, subsequent encounter    5. Closed displaced fracture of fifth metatarsal bone of left foot with routine healing, subsequent encounter    6. Closed displaced fracture of second metatarsal bone of left foot, initial encounter    7. Closed displaced fracture of second metatarsal bone of left foot with routine  healing, subsequent encounter      Plan:   Lisfranc dislocation, left, initial encounter  -     X-Ray Foot Complete Left    Closed nondisplaced fracture of cuboid of left foot, initial encounter    Closed displaced fracture of fourth metatarsal bone of left foot with routine healing, subsequent encounter    Closed displaced fracture of third metatarsal bone of left foot with routine healing, subsequent encounter    Closed displaced fracture of fifth metatarsal bone of left foot with routine healing, subsequent encounter    Closed displaced fracture of second metatarsal bone of left foot, initial encounter    Closed displaced fracture of second metatarsal bone of left foot with routine healing, subsequent encounter      No follow-ups on file.    Procedures - None  Patient will transition to weight-bearing as tolerated with Cam Walker boot cast. Continue knee roller as needed.  Return in 3 weeks for x-rays for possibility of transition running shoe with Spenco insert.  Spenco insert being placed in the Cam Walker boot cast help control the stress on the high arch foot.    This note was created using Dragon voice recognition software that occasionally misinterpreted phrases or words.

## 2019-12-09 ENCOUNTER — HOSPITAL ENCOUNTER (OUTPATIENT)
Dept: RADIOLOGY | Facility: CLINIC | Age: 69
Discharge: HOME | End: 2019-12-09
Attending: PODIATRIST
Payer: MEDICARE

## 2019-12-09 ENCOUNTER — OFFICE VISIT (OUTPATIENT)
Dept: PODIATRY | Facility: CLINIC | Age: 69
End: 2019-12-09
Payer: MEDICARE

## 2019-12-09 VITALS
SYSTOLIC BLOOD PRESSURE: 129 MMHG | HEART RATE: 98 BPM | BODY MASS INDEX: 22.82 KG/M2 | DIASTOLIC BLOOD PRESSURE: 85 MMHG | HEIGHT: 65 IN | WEIGHT: 137 LBS

## 2019-12-09 DIAGNOSIS — S92.315D CLOSED NONDISPLACED FRACTURE OF FIRST METATARSAL BONE OF LEFT FOOT WITH ROUTINE HEALING, SUBSEQUENT ENCOUNTER: ICD-10-CM

## 2019-12-09 DIAGNOSIS — S93.325A LISFRANC DISLOCATION, LEFT, INITIAL ENCOUNTER: Primary | ICD-10-CM

## 2019-12-09 DIAGNOSIS — M79.672 FOOT PAIN, LEFT: ICD-10-CM

## 2019-12-09 DIAGNOSIS — S92.322D CLOSED DISPLACED FRACTURE OF SECOND METATARSAL BONE OF LEFT FOOT WITH ROUTINE HEALING, SUBSEQUENT ENCOUNTER: ICD-10-CM

## 2019-12-09 DIAGNOSIS — S92.342D CLOSED DISPLACED FRACTURE OF FOURTH METATARSAL BONE OF LEFT FOOT WITH ROUTINE HEALING, SUBSEQUENT ENCOUNTER: ICD-10-CM

## 2019-12-09 DIAGNOSIS — S92.245D CLOSED NONDISPLACED FRACTURE OF MEDIAL CUNEIFORM OF LEFT FOOT WITH ROUTINE HEALING, SUBSEQUENT ENCOUNTER: ICD-10-CM

## 2019-12-09 DIAGNOSIS — S92.332D CLOSED DISPLACED FRACTURE OF THIRD METATARSAL BONE OF LEFT FOOT WITH ROUTINE HEALING, SUBSEQUENT ENCOUNTER: ICD-10-CM

## 2019-12-09 DIAGNOSIS — S92.352D CLOSED DISPLACED FRACTURE OF FIFTH METATARSAL BONE OF LEFT FOOT WITH ROUTINE HEALING, SUBSEQUENT ENCOUNTER: ICD-10-CM

## 2019-12-09 PROCEDURE — 99024 POSTOP FOLLOW-UP VISIT: CPT | Mod: S$GLB,,, | Performed by: PODIATRIST

## 2019-12-09 PROCEDURE — 73630 XR FOOT COMPLETE 3 VIEW LEFT: ICD-10-PCS | Mod: LT,S$GLB,, | Performed by: PODIATRIST

## 2019-12-09 PROCEDURE — 99024 PR POST-OP FOLLOW-UP VISIT: ICD-10-PCS | Mod: S$GLB,,, | Performed by: PODIATRIST

## 2019-12-09 PROCEDURE — 73630 X-RAY EXAM OF FOOT: CPT | Mod: LT,S$GLB,, | Performed by: PODIATRIST

## 2019-12-09 NOTE — PROGRESS NOTES
[address] [facility] [address]  [place] [address]  Phone: [phone]   [phone]    Patient's PCP:Primary Doctor No  Referring Provider:No ref. provider found    Subjective:      Chief Complaint: Post-Op      Date of Surgery: 09/06/2019  Procedure: Fusion of left foot lis franc joint    HPI:   Babita Thomas is a 69 y.o. female who returns to the clinic today for her post-operative visit and X-rays. Babita Thomas rates pain a 0/10 on a pain scale. Compliance of Care: Knee scooter, ace wrap non weight barring in boot cast, elevation and rest      Vitals:    12/09/19 0845   BP: 129/85   Pulse: 98       Past Surgical History:   Procedure Laterality Date    BREAST SURGERY Left 15 yrs ago    biopsy, benign    CHOLECYSTECTOMY      COLONOSCOPY  3-5-2010    Dr Cotter 10 year bal     CYSTOSCOPY N/A 6/10/2019    Procedure: CYSTOSCOPY;  Surgeon: Faiza Coreas MD;  Location: [facility] OR;  Service: Urology;  Laterality: N/A;    ESOPHAGUS SURGERY      6 year old, peanut stuck in throat     MIDFOOT ARTHRODESIS Left 9/6/2019    Procedure: FUSION, JOINT, MIDFOOT;  Surgeon: Damian Cantu DPM;  Location: [facility] OR;  Service: Podiatry;  Laterality: Left;  SEDA NORRIS NOTIFIED    MULTIPLE TOOTH EXTRACTIONS      SINUS SURGERY  2015    x3    TRACHEOSTOMY CLOSURE      age 6 for peanut in throat    TYMPANOSTOMY TUBE PLACEMENT      x2    WISDOM TOOTH EXTRACTION       Past Medical History:   Diagnosis Date    Allergy     Arthritis 1990    hands    Asthma 02/2016    no meds    Depression 5/19/2017    Fracture of left foot 09/04/2019    Dr Cantu    GERD (gastroesophageal reflux disease) 2009    on meds    High cholesterol 2014    on meds    Hyperlipidemia     Hypothyroidism     Immune deficiency disorder 05/19/2017    on meds infusion    Insomnia 1/31/2017    MS (multiple sclerosis) 2005    very well controlled, no symptoms    Shortness of breath     Sinusitis     Thyroid disease 2005     hypothyroidism- no med     Family History   Problem Relation Age of Onset    Hypertension Mother     Dementia Mother     Hyperlipidemia Mother     Heart disease Father 60        MI    Hypertension Sister     No Known Problems Daughter     Heart disease Paternal Uncle     Heart disease Maternal Grandfather 70        MI    Heart disease Paternal Grandfather     No Known Problems Daughter     Allergic rhinitis Neg Hx     Allergies Neg Hx     Angioedema Neg Hx     Asthma Neg Hx     Atopy Neg Hx     Eczema Neg Hx     Immunodeficiency Neg Hx     Rhinitis Neg Hx     Urticaria Neg Hx     Melanoma Neg Hx     Psoriasis Neg Hx     Lupus Neg Hx         Social History:   Marital Status:   Alcohol History:  reports that she does not drink alcohol.  Tobacco History:  reports that she has never smoked. She has never used smokeless tobacco.  Drug History:  reports that she does not use drugs.    Review of patient's allergies indicates:   Allergen Reactions    Pravastatin Other (See Comments)     cramping    Tecfidera [dimethyl fumarate] Swelling       Current Outpatient Medications   Medication Sig Dispense Refill    aspirin (ECOTRIN) 81 MG EC tablet Take 81 mg by mouth once daily.      CALCIUM ACETATE ORAL Take 1 tablet by mouth 2 (two) times daily. Every day      cholecalciferol, vitamin D3, 2,000 unit Tab Take by mouth.      clonazePAM (KLONOPIN) 1 MG tablet Klonopin 1 mg tablet   TAKE ONE TO TWO TABLETS BY MOUTH AT BEDTIME AS NEEDED      FLUoxetine 20 MG capsule Take 10 mg by mouth every evening.   0    ibuprofen (ADVIL,MOTRIN) 200 MG tablet Take 200 mg by mouth every 6 (six) hours as needed for Pain.      immun glob G,IgG,-pro-IgA 0-50 (HIZENTRA) 10 gram/50 mL (20 %) Soln Inject 12 g into the skin every 14 (fourteen) days. 4 vial 11    lovastatin (MEVACOR) 20 MG tablet Take 1 tablet (20 mg total) by mouth every evening. 90 tablet 3    metFORMIN (GLUCOPHAGE) 500 MG tablet Take 500 mg by  mouth 2 (two) times daily.  2    multivitamin-minerals-lutein (CENTRUM SILVER) Tab Take 1 tablet by mouth once daily. 1 Tablet(s) Oral Every day.      omega-3 acid ethyl esters (LOVAZA) 1 gram capsule Take 2 g by mouth once daily.      omeprazole (PRILOSEC) 20 MG capsule Take 20 mg by mouth once daily.      QUEtiapine (SEROQUEL) 100 MG Tab Take 100-200 mg by mouth nightly as needed.       venlafaxine (EFFEXOR-XR) 150 MG Cp24 2 (two) times daily.        No current facility-administered medications for this visit.        Review of Systems      Objective:        Post-op surgery of the ORIF of multiple fractures of Lisfranc joint left foot with normal healing, no signs of infection or dehiscence of wound. Hardware in place. Normal post op exam today. No redness, no drainage, no increase in local temperature, no significant swelling, sutures.steri-strips are intact.     Imaging:   AP, lateral, lateral oblique weight-bearing x-rays left foot:  Normal postoperative open reduction internal fixation of multiple fractures to Lisfranc joint left foot.  There appears to be a spontaneous arthrodesis of the metatarsal cuneiform joints of 1, 2, 3.  No loss of  of the foot.  Physical Exam:   Foot Exam    General  General Appearance: appears stated age and healthy   Orientation: alert and oriented to person, place, and time   Affect: appropriate   Gait: antalgic           Physical Exam       Assessment:       1. Lisfranc dislocation, left, initial encounter    2. Foot pain, left    3. Closed nondisplaced fracture of medial cuneiform of left foot with routine healing, subsequent encounter    4. Closed nondisplaced fracture of first metatarsal bone of left foot with routine healing, subsequent encounter    5. Closed displaced fracture of third metatarsal bone of left foot with routine healing, subsequent encounter    6. Closed displaced fracture of second metatarsal bone of left foot with routine healing, subsequent  encounter    7. Closed displaced fracture of fourth metatarsal bone of left foot with routine healing, subsequent encounter    8. Closed displaced fracture of fifth metatarsal bone of left foot with routine healing, subsequent encounter      Plan:   Lisfranc dislocation, left, initial encounter  -     X-Ray Foot Complete Left    Foot pain, left    Closed nondisplaced fracture of medial cuneiform of left foot with routine healing, subsequent encounter    Closed nondisplaced fracture of first metatarsal bone of left foot with routine healing, subsequent encounter    Closed displaced fracture of third metatarsal bone of left foot with routine healing, subsequent encounter    Closed displaced fracture of second metatarsal bone of left foot with routine healing, subsequent encounter    Closed displaced fracture of fourth metatarsal bone of left foot with routine healing, subsequent encounter    Closed displaced fracture of fifth metatarsal bone of left foot with routine healing, subsequent encounter     Begin full weight-bearing with Cam Walker boot cast with Spenco cross  inserts.  In 3 weeks transition running shoe with cross .  Ice range of motion exercises muscle strengthening.  Return to see me in 2 months or as needed.  Follow up in about 2 months (around 2/9/2020) for X-ray.    Procedures - None  I discussed with the pt. the type of fracture and the treatment and time required for healing of the the specific type fracture.  The surgical dressing was removed showing no signs of infection, excess edema or malalignment. A new dry dressing was applied and patient was instructed to leave dressing intact until next visit or until instructed to remove.     This note was created using Dragon voice recognition software that occasionally misinterpreted phrases or words.

## 2019-12-20 ENCOUNTER — TELEPHONE (OUTPATIENT)
Dept: ALLERGY | Facility: CLINIC | Age: 69
End: 2019-12-20

## 2019-12-20 DIAGNOSIS — D83.9 CVID (COMMON VARIABLE IMMUNODEFICIENCY): Primary | ICD-10-CM

## 2019-12-20 NOTE — TELEPHONE ENCOUNTER
Spoke to pt, she scheduled appt for March 3, but will need annual labs. Message sent to Dr Liang    ----- Message from Lisette Degroot sent at 12/19/2019  2:39 PM CST -----  Type: Needs Medical Advice    Who Called:  [facility]/Pam Saeed Call Back Number: [phone]  Additional Information: calling concerning immun glob G,IgG,-pro-IgA 0-50 (HIZENTRA) 10 gram/50 mL (20 %) Soln and needs to know if it is being dispensed from pharmacy to patient or pharmacy to doctor. Please call to advise.

## 2019-12-30 ENCOUNTER — TELEPHONE (OUTPATIENT)
Dept: ALLERGY | Facility: CLINIC | Age: 69
End: 2019-12-30

## 2019-12-30 NOTE — TELEPHONE ENCOUNTER
Spoke to pt, she states insurance denies hizentra, told her I would investigate to find out if it's just the medication or the entire therapy.   Will call her back.       ----- Message from Carol Ortiz sent at 12/30/2019  9:47 AM CST -----  Contact: self [phone]  She said she received a letter from her insurance company stating that the immun glob G,IgG,-pro-IgA 0-50 (HIZENTRA) 10 gram/50 mL (20 %) Soln is not covered.  She wants to know what to do now.  They also stated that they sent you a letter as well.  Please call her. Thank you!

## 2020-01-15 ENCOUNTER — OFFICE VISIT (OUTPATIENT)
Dept: FAMILY MEDICINE | Facility: CLINIC | Age: 70
End: 2020-01-15
Payer: MEDICARE

## 2020-01-15 VITALS
SYSTOLIC BLOOD PRESSURE: 137 MMHG | WEIGHT: 142.63 LBS | BODY MASS INDEX: 23.76 KG/M2 | TEMPERATURE: 98 F | HEART RATE: 95 BPM | OXYGEN SATURATION: 95 % | DIASTOLIC BLOOD PRESSURE: 80 MMHG | HEIGHT: 65 IN | RESPIRATION RATE: 12 BRPM

## 2020-01-15 DIAGNOSIS — D84.9 IMMUNE DEFICIENCY DISORDER: ICD-10-CM

## 2020-01-15 DIAGNOSIS — T46.6X5A MYALGIA DUE TO STATIN: ICD-10-CM

## 2020-01-15 DIAGNOSIS — E78.2 HYPERLIPIDEMIA, MIXED: ICD-10-CM

## 2020-01-15 DIAGNOSIS — E03.9 HYPOTHYROIDISM, UNSPECIFIED TYPE: ICD-10-CM

## 2020-01-15 DIAGNOSIS — G35 MULTIPLE SCLEROSIS: ICD-10-CM

## 2020-01-15 DIAGNOSIS — E04.1 THYROID NODULE: Primary | ICD-10-CM

## 2020-01-15 DIAGNOSIS — M79.10 MYALGIA DUE TO STATIN: ICD-10-CM

## 2020-01-15 PROCEDURE — 3079F DIAST BP 80-89 MM HG: CPT | Mod: HCNC,CPTII,S$GLB, | Performed by: FAMILY MEDICINE

## 2020-01-15 PROCEDURE — 99214 OFFICE O/P EST MOD 30 MIN: CPT | Mod: HCNC,S$GLB,, | Performed by: FAMILY MEDICINE

## 2020-01-15 PROCEDURE — 99999 PR PBB SHADOW E&M-EST. PATIENT-LVL IV: ICD-10-PCS | Mod: PBBFAC,HCNC,, | Performed by: FAMILY MEDICINE

## 2020-01-15 PROCEDURE — 3079F PR MOST RECENT DIASTOLIC BLOOD PRESSURE 80-89 MM HG: ICD-10-PCS | Mod: HCNC,CPTII,S$GLB, | Performed by: FAMILY MEDICINE

## 2020-01-15 PROCEDURE — 99499 RISK ADDL DX/OHS AUDIT: ICD-10-PCS | Mod: HCNC,S$GLB,, | Performed by: FAMILY MEDICINE

## 2020-01-15 PROCEDURE — 99214 PR OFFICE/OUTPT VISIT, EST, LEVL IV, 30-39 MIN: ICD-10-PCS | Mod: HCNC,S$GLB,, | Performed by: FAMILY MEDICINE

## 2020-01-15 PROCEDURE — 99999 PR PBB SHADOW E&M-EST. PATIENT-LVL IV: CPT | Mod: PBBFAC,HCNC,, | Performed by: FAMILY MEDICINE

## 2020-01-15 PROCEDURE — 99499 UNLISTED E&M SERVICE: CPT | Mod: HCNC,S$GLB,, | Performed by: FAMILY MEDICINE

## 2020-01-15 PROCEDURE — 1159F MED LIST DOCD IN RCRD: CPT | Mod: HCNC,S$GLB,, | Performed by: FAMILY MEDICINE

## 2020-01-15 PROCEDURE — 3075F SYST BP GE 130 - 139MM HG: CPT | Mod: HCNC,CPTII,S$GLB, | Performed by: FAMILY MEDICINE

## 2020-01-15 PROCEDURE — 1159F PR MEDICATION LIST DOCUMENTED IN MEDICAL RECORD: ICD-10-PCS | Mod: HCNC,S$GLB,, | Performed by: FAMILY MEDICINE

## 2020-01-15 PROCEDURE — 1101F PR PT FALLS ASSESS DOC 0-1 FALLS W/OUT INJ PAST YR: ICD-10-PCS | Mod: HCNC,CPTII,S$GLB, | Performed by: FAMILY MEDICINE

## 2020-01-15 PROCEDURE — 1126F PR PAIN SEVERITY QUANTIFIED, NO PAIN PRESENT: ICD-10-PCS | Mod: HCNC,S$GLB,, | Performed by: FAMILY MEDICINE

## 2020-01-15 PROCEDURE — 3075F PR MOST RECENT SYSTOLIC BLOOD PRESS GE 130-139MM HG: ICD-10-PCS | Mod: HCNC,CPTII,S$GLB, | Performed by: FAMILY MEDICINE

## 2020-01-15 PROCEDURE — 1101F PT FALLS ASSESS-DOCD LE1/YR: CPT | Mod: HCNC,CPTII,S$GLB, | Performed by: FAMILY MEDICINE

## 2020-01-15 PROCEDURE — 1126F AMNT PAIN NOTED NONE PRSNT: CPT | Mod: HCNC,S$GLB,, | Performed by: FAMILY MEDICINE

## 2020-01-15 NOTE — PROGRESS NOTES
Subjective:       Patient ID: Babita Thomas is a 69 y.o. female.    Chief Complaint: Establish Care    HPI  Review of Systems   Constitutional: Negative for fatigue and unexpected weight change.   Respiratory: Negative for chest tightness and shortness of breath.    Cardiovascular: Negative for chest pain, palpitations and leg swelling.   Gastrointestinal: Negative for abdominal pain.   Musculoskeletal: Negative for arthralgias.   Neurological: Negative for dizziness, syncope, light-headedness and headaches.       Patient Active Problem List   Diagnosis    Multiple sclerosis    GERD (gastroesophageal reflux disease)    Thyroid nodule    Tortuous aorta    Asthma    Chronic pansinusitis    Hypertrophy of inferior nasal turbinate    Insomnia    Immune deficiency disorder    Eosinophilia    Recurrent major depressive disorder    Gross hematuria    Closed nondisplaced fracture of medial cuneiform of left foot    Closed nondisplaced fracture of cuboid bone of left foot    Closed displaced fracture of fifth metatarsal bone of left foot    Closed displaced fracture of fourth metatarsal bone of left foot    Closed displaced fracture of third metatarsal bone of left foot    Closed displaced fracture of second metatarsal bone of left foot    Closed nondisplaced fracture of first left metatarsal bone    Lisfranc dislocation, left, initial encounter    Hypothyroidism    Myalgia due to statin    Hyperlipidemia, mixed     Patient is here to establish care  Dr. Cantu podiatry-lisfranc fracture  Dr. Cotter GI GERD, gastroparesis, lactose intolerance    Immunology Dr. 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 Not seeing a current neurologist  Dr. Zhu PSych prescribing metformin for weight mgmt    HPL- stopped mevacor due to muscle pain.  Has not tried crestor    Asthma in remission for years.      H/o thyroid nodule last u/s 2/19 no change in small nodules  and cysts compared to u/s 2018. Had been on thyroid meds in past but told to stop that she didn't need them anymore    Had trach at 5-6 years old due to peanut stuck in throat  Objective:      Physical Exam   Constitutional: She is oriented to person, place, and time. She appears well-developed and well-nourished.   Cardiovascular: Normal rate, regular rhythm and normal heart sounds.   Pulmonary/Chest: Effort normal and breath sounds normal.   Musculoskeletal: She exhibits no edema.   Neurological: She is alert and oriented to person, place, and time.   Skin: Skin is warm and dry.   Psychiatric: She has a normal mood and affect.   Nursing note and vitals reviewed.      Assessment:       1. Thyroid nodule    2. Myalgia due to statin    3. Hypothyroidism, unspecified type    4. Hyperlipidemia, mixed    5. Immune deficiency disorder    6. Multiple sclerosis        Plan:          1. Myalgia due to statin  Hold statin for now    2. Thyroid nodule  Stable. Cont to monitor q2 years    3. Hypothyroidism, unspecified type  Screen and treat as indicated:    - TSH; Future  - Comprehensive metabolic panel; Future  - CBC auto differential; Future  - T4, free; Future    4. Hyperlipidemia, mixed  Screen and treat as indicated:    - Lipid panel; Future    5. Immune deficiency disorder  Cont immunology care    6. Multiple sclerosis  In remission. Monitor and treat as needed        Time spent with patient: 20 minutes    Patient with be reevaluated in 6 months or sooner prn    Greater than 50% of this visit was spent counseling as described in above documentation:Yes

## 2020-01-15 NOTE — PATIENT INSTRUCTIONS
Recommend the new shingles vaccine SHingrix 2 shots 2 months apart and a tetanus booster from pharmacy

## 2020-01-22 ENCOUNTER — LAB VISIT (OUTPATIENT)
Dept: LAB | Facility: HOSPITAL | Age: 70
End: 2020-01-22
Attending: FAMILY MEDICINE
Payer: MEDICARE

## 2020-01-22 DIAGNOSIS — E78.2 HYPERLIPIDEMIA, MIXED: ICD-10-CM

## 2020-01-22 DIAGNOSIS — E03.9 HYPOTHYROIDISM, UNSPECIFIED TYPE: ICD-10-CM

## 2020-01-22 LAB
ALBUMIN SERPL BCP-MCNC: 3.5 G/DL (ref 3.5–5.2)
ALP SERPL-CCNC: 115 U/L (ref 55–135)
ALT SERPL W/O P-5'-P-CCNC: 13 U/L (ref 10–44)
ANION GAP SERPL CALC-SCNC: 9 MMOL/L (ref 8–16)
AST SERPL-CCNC: 21 U/L (ref 10–40)
BASOPHILS # BLD AUTO: 0.02 K/UL (ref 0–0.2)
BASOPHILS NFR BLD: 0.4 % (ref 0–1.9)
BILIRUB SERPL-MCNC: 0.3 MG/DL (ref 0.1–1)
BUN SERPL-MCNC: 11 MG/DL (ref 8–23)
CALCIUM SERPL-MCNC: 9 MG/DL (ref 8.7–10.5)
CHLORIDE SERPL-SCNC: 99 MMOL/L (ref 95–110)
CHOLEST SERPL-MCNC: 328 MG/DL (ref 120–199)
CHOLEST/HDLC SERPL: 4.6 {RATIO} (ref 2–5)
CO2 SERPL-SCNC: 29 MMOL/L (ref 23–29)
CREAT SERPL-MCNC: 0.6 MG/DL (ref 0.5–1.4)
DIFFERENTIAL METHOD: ABNORMAL
EOSINOPHIL # BLD AUTO: 0 K/UL (ref 0–0.5)
EOSINOPHIL NFR BLD: 0.2 % (ref 0–8)
ERYTHROCYTE [DISTWIDTH] IN BLOOD BY AUTOMATED COUNT: 12.8 % (ref 11.5–14.5)
EST. GFR  (AFRICAN AMERICAN): >60 ML/MIN/1.73 M^2
EST. GFR  (NON AFRICAN AMERICAN): >60 ML/MIN/1.73 M^2
GLUCOSE SERPL-MCNC: 86 MG/DL (ref 70–110)
HCT VFR BLD AUTO: 42.7 % (ref 37–48.5)
HDLC SERPL-MCNC: 71 MG/DL (ref 40–75)
HDLC SERPL: 21.6 % (ref 20–50)
HGB BLD-MCNC: 13 G/DL (ref 12–16)
IMM GRANULOCYTES # BLD AUTO: 0.01 K/UL (ref 0–0.04)
IMM GRANULOCYTES NFR BLD AUTO: 0.2 % (ref 0–0.5)
LDLC SERPL CALC-MCNC: 239.4 MG/DL (ref 63–159)
LYMPHOCYTES # BLD AUTO: 1.8 K/UL (ref 1–4.8)
LYMPHOCYTES NFR BLD: 40.9 % (ref 18–48)
MCH RBC QN AUTO: 29.4 PG (ref 27–31)
MCHC RBC AUTO-ENTMCNC: 30.4 G/DL (ref 32–36)
MCV RBC AUTO: 97 FL (ref 82–98)
MONOCYTES # BLD AUTO: 0.8 K/UL (ref 0.3–1)
MONOCYTES NFR BLD: 18.7 % (ref 4–15)
NEUTROPHILS # BLD AUTO: 1.8 K/UL (ref 1.8–7.7)
NEUTROPHILS NFR BLD: 39.6 % (ref 38–73)
NONHDLC SERPL-MCNC: 257 MG/DL
NRBC BLD-RTO: 0 /100 WBC
PLATELET # BLD AUTO: 380 K/UL (ref 150–350)
PMV BLD AUTO: 9.3 FL (ref 9.2–12.9)
POTASSIUM SERPL-SCNC: 4.2 MMOL/L (ref 3.5–5.1)
PROT SERPL-MCNC: 7.5 G/DL (ref 6–8.4)
RBC # BLD AUTO: 4.42 M/UL (ref 4–5.4)
SODIUM SERPL-SCNC: 137 MMOL/L (ref 136–145)
T4 FREE SERPL-MCNC: 0.82 NG/DL (ref 0.71–1.51)
TRIGL SERPL-MCNC: 88 MG/DL (ref 30–150)
TSH SERPL DL<=0.005 MIU/L-ACNC: 3.29 UIU/ML (ref 0.4–4)
WBC # BLD AUTO: 4.5 K/UL (ref 3.9–12.7)

## 2020-01-22 PROCEDURE — 85025 COMPLETE CBC W/AUTO DIFF WBC: CPT | Mod: HCNC

## 2020-01-22 PROCEDURE — 80061 LIPID PANEL: CPT | Mod: HCNC

## 2020-01-22 PROCEDURE — 36415 COLL VENOUS BLD VENIPUNCTURE: CPT | Mod: HCNC,PO

## 2020-01-22 PROCEDURE — 84443 ASSAY THYROID STIM HORMONE: CPT | Mod: HCNC

## 2020-01-22 PROCEDURE — 80053 COMPREHEN METABOLIC PANEL: CPT | Mod: HCNC

## 2020-01-22 PROCEDURE — 84439 ASSAY OF FREE THYROXINE: CPT | Mod: HCNC

## 2020-02-01 DIAGNOSIS — E78.5 HYPERLIPIDEMIA, UNSPECIFIED HYPERLIPIDEMIA TYPE: Primary | ICD-10-CM

## 2020-02-01 RX ORDER — ROSUVASTATIN CALCIUM 10 MG/1
10 TABLET, COATED ORAL DAILY
Qty: 90 TABLET | Refills: 3 | Status: SHIPPED | OUTPATIENT
Start: 2020-02-01 | End: 2021-04-06

## 2020-02-06 ENCOUNTER — TELEPHONE (OUTPATIENT)
Dept: FAMILY MEDICINE | Facility: CLINIC | Age: 70
End: 2020-02-06

## 2020-02-06 NOTE — TELEPHONE ENCOUNTER
Patient has been advised that this medication was sent to [facility] pharmacy. Patient verbalized understanding.

## 2020-02-06 NOTE — TELEPHONE ENCOUNTER
----- Message from Janette Olsen sent at 2/6/2020  9:14 AM CST -----  Type: Needs Medication ordered    Who Called:  Patient  Best Call Back Number: [phone]  Additional Information: Patient calling back concerning cholesterol medication/stated [facility] does not have/please order and call patient back to advise.

## 2020-02-10 ENCOUNTER — HOSPITAL ENCOUNTER (OUTPATIENT)
Dept: RADIOLOGY | Facility: CLINIC | Age: 70
Discharge: HOME | End: 2020-02-10
Attending: PODIATRIST
Payer: MEDICARE

## 2020-02-10 ENCOUNTER — OFFICE VISIT (OUTPATIENT)
Dept: PODIATRY | Facility: CLINIC | Age: 70
End: 2020-02-10
Payer: MEDICARE

## 2020-02-10 VITALS
SYSTOLIC BLOOD PRESSURE: 128 MMHG | HEART RATE: 94 BPM | DIASTOLIC BLOOD PRESSURE: 88 MMHG | HEIGHT: 65 IN | WEIGHT: 142 LBS | BODY MASS INDEX: 23.66 KG/M2

## 2020-02-10 DIAGNOSIS — M79.672 FOOT PAIN, LEFT: ICD-10-CM

## 2020-02-10 DIAGNOSIS — S92.245D CLOSED NONDISPLACED FRACTURE OF MEDIAL CUNEIFORM OF LEFT FOOT WITH ROUTINE HEALING, SUBSEQUENT ENCOUNTER: Primary | ICD-10-CM

## 2020-02-10 DIAGNOSIS — S92.315D CLOSED NONDISPLACED FRACTURE OF FIRST METATARSAL BONE OF LEFT FOOT WITH ROUTINE HEALING, SUBSEQUENT ENCOUNTER: ICD-10-CM

## 2020-02-10 PROCEDURE — 99213 PR OFFICE/OUTPT VISIT, EST, LEVL III, 20-29 MIN: ICD-10-PCS | Mod: 25,S$GLB,, | Performed by: PODIATRIST

## 2020-02-10 PROCEDURE — 3074F PR MOST RECENT SYSTOLIC BLOOD PRESSURE < 130 MM HG: ICD-10-PCS | Mod: S$GLB,,, | Performed by: PODIATRIST

## 2020-02-10 PROCEDURE — 1125F PR PAIN SEVERITY QUANTIFIED, PAIN PRESENT: ICD-10-PCS | Mod: S$GLB,,, | Performed by: PODIATRIST

## 2020-02-10 PROCEDURE — 3079F PR MOST RECENT DIASTOLIC BLOOD PRESSURE 80-89 MM HG: ICD-10-PCS | Mod: S$GLB,,, | Performed by: PODIATRIST

## 2020-02-10 PROCEDURE — 99213 OFFICE O/P EST LOW 20 MIN: CPT | Mod: 25,S$GLB,, | Performed by: PODIATRIST

## 2020-02-10 PROCEDURE — 1101F PR PT FALLS ASSESS DOC 0-1 FALLS W/OUT INJ PAST YR: ICD-10-PCS | Mod: S$GLB,,, | Performed by: PODIATRIST

## 2020-02-10 PROCEDURE — 1159F MED LIST DOCD IN RCRD: CPT | Mod: S$GLB,,, | Performed by: PODIATRIST

## 2020-02-10 PROCEDURE — 3074F SYST BP LT 130 MM HG: CPT | Mod: S$GLB,,, | Performed by: PODIATRIST

## 2020-02-10 PROCEDURE — 73630 X-RAY EXAM OF FOOT: CPT | Mod: LT,S$GLB,, | Performed by: PODIATRIST

## 2020-02-10 PROCEDURE — 73630 XR FOOT COMPLETE 3 VIEW LEFT: ICD-10-PCS | Mod: LT,S$GLB,, | Performed by: PODIATRIST

## 2020-02-10 PROCEDURE — 3079F DIAST BP 80-89 MM HG: CPT | Mod: S$GLB,,, | Performed by: PODIATRIST

## 2020-02-10 PROCEDURE — 1125F AMNT PAIN NOTED PAIN PRSNT: CPT | Mod: S$GLB,,, | Performed by: PODIATRIST

## 2020-02-10 PROCEDURE — 1159F PR MEDICATION LIST DOCUMENTED IN MEDICAL RECORD: ICD-10-PCS | Mod: S$GLB,,, | Performed by: PODIATRIST

## 2020-02-10 PROCEDURE — 1101F PT FALLS ASSESS-DOCD LE1/YR: CPT | Mod: S$GLB,,, | Performed by: PODIATRIST

## 2020-02-10 NOTE — PROGRESS NOTES
[address]  [place] [address]  Phone: [phone]   [phone]    Patient's PCP:Meme Sanchez MD  Referring Provider: No ref. provider found    Subjective:      Chief Complaint:: Follow-up ([facility] 09/06/2019 Fusion of left foot lis franc joint)    HPI  Babita Thomas is a 70 y.o. female who returns to clinic today for her follow up visit and X-rays for Fusion of left foot lis franc joint. Babita Thomas rates pain a 1/10 on a pain scale. Transitioned to running shoe back in December. She is doing well with minor pain.    Vitals:    02/10/20 0849   BP: 128/88   Pulse: 94     Shoe Size:  7    Past Surgical History:   Procedure Laterality Date    BREAST SURGERY Left 15 yrs ago    biopsy, benign    CHOLECYSTECTOMY      COLONOSCOPY  3-5-2010    Dr Cotter 10 year bal     CYSTOSCOPY N/A 6/10/2019    Procedure: CYSTOSCOPY;  Surgeon: Faiza Coreas MD;  [facility] OR;  Service: Urology;  Laterality: N/A;    ESOPHAGUS SURGERY      6 year old, peanut stuck in throat     MIDFOOT ARTHRODESIS Left 9/6/2019    Procedure: FUSION, JOINT, MIDFOOT;  Surgeon: Damian Cantu DPM;  Location: [facility] OR;  Service: Podiatry;  Laterality: Left;  SEDA NORRIS NOTIFIED    MULTIPLE TOOTH EXTRACTIONS      SINUS SURGERY  2015    x3    TRACHEOSTOMY CLOSURE      age 6 for peanut in throat    TYMPANOSTOMY TUBE PLACEMENT      x2    WISDOM TOOTH EXTRACTION       Past Medical History:   Diagnosis Date    Allergy     Arthritis 1990    hands    Asthma 02/2016    no meds    Depression 5/19/2017    Fracture of left foot 09/04/2019    Dr Cantu    GERD (gastroesophageal reflux disease) 2009    on meds    High cholesterol 2014    on meds    Hyperlipidemia     Hypothyroidism     Immune deficiency disorder 05/19/2017    on meds infusion    Insomnia 1/31/2017    MS (multiple sclerosis) 2005    very well controlled, no symptoms    Shortness of breath     Sinusitis     Thyroid disease 2005     hypothyroidism- no med     Family History   Problem Relation Age of Onset    Hypertension Mother     Dementia Mother     Hyperlipidemia Mother     Heart disease Father 60        MI    Hypertension Sister     No Known Problems Daughter     Heart disease Paternal Uncle     Heart disease Maternal Grandfather 70        MI    Heart disease Paternal Grandfather     No Known Problems Daughter     Allergic rhinitis Neg Hx     Allergies Neg Hx     Angioedema Neg Hx     Asthma Neg Hx     Atopy Neg Hx     Eczema Neg Hx     Immunodeficiency Neg Hx     Rhinitis Neg Hx     Urticaria Neg Hx     Melanoma Neg Hx     Psoriasis Neg Hx     Lupus Neg Hx         Social History:   Marital Status:   Alcohol History:  reports that she does not drink alcohol.  Tobacco History:  reports that she has never smoked. She has never used smokeless tobacco.  Drug History:  reports that she does not use drugs.    Review of patient's allergies indicates:   Allergen Reactions    Pravastatin Other (See Comments)     cramping    Tecfidera [dimethyl fumarate] Swelling       Current Outpatient Medications   Medication Sig Dispense Refill    CALCIUM ACETATE ORAL Take 1 tablet by mouth 2 (two) times daily. Every day      cholecalciferol, vitamin D3, 2,000 unit Tab Take by mouth.      clonazePAM (KLONOPIN) 1 MG tablet Klonopin 1 mg tablet   TAKE ONE TO TWO TABLETS BY MOUTH AT BEDTIME AS NEEDED      immun glob G,IgG,-pro-IgA 0-50 (HIZENTRA) 10 gram/50 mL (20 %) Soln Inject 12 g into the skin every 14 (fourteen) days. 4 vial 11    metFORMIN (GLUCOPHAGE) 500 MG tablet Take 500 mg by mouth 2 (two) times daily.  2    multivitamin-minerals-lutein (CENTRUM SILVER) Tab Take 1 tablet by mouth once daily. 1 Tablet(s) Oral Every day.      omeprazole (PRILOSEC) 20 MG capsule Take 20 mg by mouth once daily.      QUEtiapine (SEROQUEL) 100 MG Tab Take 100-200 mg by mouth nightly as needed.       rosuvastatin (CRESTOR) 10 MG tablet  Take 1 tablet (10 mg total) by mouth once daily. 90 tablet 3    venlafaxine (EFFEXOR-XR) 150 MG Cp24 2 (two) times daily.        No current facility-administered medications for this visit.        Review of Systems      Objective:        Physical Exam:   Foot Exam    General  General Appearance: appears stated age and healthy   Orientation: alert and oriented to person, place, and time   Affect: appropriate   Gait: antalgic       Left Foot/Ankle      Inspection and Palpation  Ecchymosis: none  Tenderness: (Decreased and pain along the surgical site for the Lisfranc fracture dislocations and ORIF.)  Swelling: (Decreased swelling throughout Lisfranc joint since last visit.  Still some swelling present.)  Arch: normal  Skin Exam: skin intact;           Physical Exam    Imaging:   AP, lateral, lateral oblique weight-bearing x-rays left foot:  There is normal healing and arthrodesis of Lisfranc joint from previous ORIF primary fusion with plates and screws.  No complication hardware good alignment of bones and good alignment of arch.         Assessment:       1. Closed nondisplaced fracture of medial cuneiform of left foot with routine healing, subsequent encounter    2. Foot pain, left    3. Closed nondisplaced fracture of first metatarsal bone of left foot with routine healing, subsequent encounter      Plan:   Closed nondisplaced fracture of medial cuneiform of left foot with routine healing, subsequent encounter  -     X-Ray Foot Complete Left    Foot pain, left  -     X-Ray Foot Complete Left    Closed nondisplaced fracture of first metatarsal bone of left foot with routine healing, subsequent encounter     I am discharging the patient today she gradually increase activity levels as tolerated.  I explained to her that she will never have a normal foot she will always have some pain and swelling but should gradually become less.  I told her I think it will take a year to reach maximal medical improvement.  She is  return to see me as needed.  No follow-ups on file.    Procedures - None    Counseling:     I provided patient education verbally regarding:   Patient diagnosis, treatment options, as well as alternatives, risks, and benefits.     This note was created using Dragon voice recognition software that occasionally misinterpreted phrases or words.

## 2020-02-18 DIAGNOSIS — Z12.31 SCREENING MAMMOGRAM FOR HIGH-RISK PATIENT: Primary | ICD-10-CM

## 2020-02-24 NOTE — TELEPHONE ENCOUNTER
----- Message from Tabitha Chilel sent at 3/21/2017  8:35 AM CDT -----  Contact: gabe Aparicio, increased dosage doesn't seem to be working   lexapro   Call back    
Patient states since increase in lexapro she is still having depression/anxiety symptoms. Patient states she can not sleep  
0 = independent

## 2020-03-02 ENCOUNTER — LAB VISIT (OUTPATIENT)
Dept: LAB | Facility: HOSPITAL | Age: 70
End: 2020-03-02
Attending: ALLERGY & IMMUNOLOGY
Payer: MEDICARE

## 2020-03-02 ENCOUNTER — PATIENT OUTREACH (OUTPATIENT)
Dept: ADMINISTRATIVE | Facility: OTHER | Age: 70
End: 2020-03-02

## 2020-03-02 DIAGNOSIS — D83.9 CVID (COMMON VARIABLE IMMUNODEFICIENCY): ICD-10-CM

## 2020-03-02 LAB
BASOPHILS # BLD AUTO: 0.01 K/UL (ref 0–0.2)
BASOPHILS NFR BLD: 0.2 % (ref 0–1.9)
DIFFERENTIAL METHOD: ABNORMAL
EOSINOPHIL # BLD AUTO: 0 K/UL (ref 0–0.5)
EOSINOPHIL NFR BLD: 0.2 % (ref 0–8)
ERYTHROCYTE [DISTWIDTH] IN BLOOD BY AUTOMATED COUNT: 12.4 % (ref 11.5–14.5)
HCT VFR BLD AUTO: 41.4 % (ref 37–48.5)
HGB BLD-MCNC: 13 G/DL (ref 12–16)
IMM GRANULOCYTES # BLD AUTO: 0.01 K/UL (ref 0–0.04)
IMM GRANULOCYTES NFR BLD AUTO: 0.2 % (ref 0–0.5)
LYMPHOCYTES # BLD AUTO: 1.7 K/UL (ref 1–4.8)
LYMPHOCYTES NFR BLD: 38.9 % (ref 18–48)
MCH RBC QN AUTO: 30 PG (ref 27–31)
MCHC RBC AUTO-ENTMCNC: 31.4 G/DL (ref 32–36)
MCV RBC AUTO: 96 FL (ref 82–98)
MONOCYTES # BLD AUTO: 0.7 K/UL (ref 0.3–1)
MONOCYTES NFR BLD: 16.6 % (ref 4–15)
NEUTROPHILS # BLD AUTO: 1.9 K/UL (ref 1.8–7.7)
NEUTROPHILS NFR BLD: 43.9 % (ref 38–73)
NRBC BLD-RTO: 0 /100 WBC
PLATELET # BLD AUTO: 351 K/UL (ref 150–350)
PMV BLD AUTO: 9.3 FL (ref 9.2–12.9)
RBC # BLD AUTO: 4.33 M/UL (ref 4–5.4)
WBC # BLD AUTO: 4.27 K/UL (ref 3.9–12.7)

## 2020-03-02 PROCEDURE — 82784 ASSAY IGA/IGD/IGG/IGM EACH: CPT | Mod: HCNC

## 2020-03-02 PROCEDURE — 36415 COLL VENOUS BLD VENIPUNCTURE: CPT | Mod: HCNC,PO

## 2020-03-02 PROCEDURE — 80053 COMPREHEN METABOLIC PANEL: CPT | Mod: HCNC

## 2020-03-02 PROCEDURE — 85025 COMPLETE CBC W/AUTO DIFF WBC: CPT | Mod: HCNC

## 2020-03-03 ENCOUNTER — OFFICE VISIT (OUTPATIENT)
Dept: ALLERGY | Facility: CLINIC | Age: 70
End: 2020-03-03
Payer: MEDICARE

## 2020-03-03 DIAGNOSIS — J45.40 MODERATE PERSISTENT ASTHMA WITHOUT COMPLICATION: ICD-10-CM

## 2020-03-03 DIAGNOSIS — K21.9 GASTROESOPHAGEAL REFLUX DISEASE WITHOUT ESOPHAGITIS: ICD-10-CM

## 2020-03-03 DIAGNOSIS — J31.0 RHINITIS, CHRONIC: ICD-10-CM

## 2020-03-03 DIAGNOSIS — D83.9 CVID (COMMON VARIABLE IMMUNODEFICIENCY): Primary | ICD-10-CM

## 2020-03-03 LAB
ALBUMIN SERPL BCP-MCNC: 3.5 G/DL (ref 3.5–5.2)
ALP SERPL-CCNC: 101 U/L (ref 55–135)
ALT SERPL W/O P-5'-P-CCNC: 18 U/L (ref 10–44)
ANION GAP SERPL CALC-SCNC: 9 MMOL/L (ref 8–16)
AST SERPL-CCNC: 26 U/L (ref 10–40)
BILIRUB SERPL-MCNC: 0.3 MG/DL (ref 0.1–1)
BUN SERPL-MCNC: 11 MG/DL (ref 8–23)
CALCIUM SERPL-MCNC: 9 MG/DL (ref 8.7–10.5)
CHLORIDE SERPL-SCNC: 100 MMOL/L (ref 95–110)
CO2 SERPL-SCNC: 27 MMOL/L (ref 23–29)
CREAT SERPL-MCNC: 0.8 MG/DL (ref 0.5–1.4)
EST. GFR  (AFRICAN AMERICAN): >60 ML/MIN/1.73 M^2
EST. GFR  (NON AFRICAN AMERICAN): >60 ML/MIN/1.73 M^2
GLUCOSE SERPL-MCNC: 94 MG/DL (ref 70–110)
IGG SERPL-MCNC: 1262 MG/DL (ref 650–1600)
POTASSIUM SERPL-SCNC: 4.2 MMOL/L (ref 3.5–5.1)
PROT SERPL-MCNC: 7.1 G/DL (ref 6–8.4)
SODIUM SERPL-SCNC: 136 MMOL/L (ref 136–145)

## 2020-03-03 PROCEDURE — 99999 PR PBB SHADOW E&M-EST. PATIENT-LVL II: ICD-10-PCS | Mod: PBBFAC,HCNC,, | Performed by: ALLERGY & IMMUNOLOGY

## 2020-03-03 PROCEDURE — 1159F PR MEDICATION LIST DOCUMENTED IN MEDICAL RECORD: ICD-10-PCS | Mod: HCNC,S$GLB,, | Performed by: ALLERGY & IMMUNOLOGY

## 2020-03-03 PROCEDURE — 99999 PR PBB SHADOW E&M-EST. PATIENT-LVL II: CPT | Mod: PBBFAC,HCNC,, | Performed by: ALLERGY & IMMUNOLOGY

## 2020-03-03 PROCEDURE — 99214 OFFICE O/P EST MOD 30 MIN: CPT | Mod: HCNC,S$GLB,, | Performed by: ALLERGY & IMMUNOLOGY

## 2020-03-03 PROCEDURE — 1101F PT FALLS ASSESS-DOCD LE1/YR: CPT | Mod: HCNC,CPTII,S$GLB, | Performed by: ALLERGY & IMMUNOLOGY

## 2020-03-03 PROCEDURE — 1101F PR PT FALLS ASSESS DOC 0-1 FALLS W/OUT INJ PAST YR: ICD-10-PCS | Mod: HCNC,CPTII,S$GLB, | Performed by: ALLERGY & IMMUNOLOGY

## 2020-03-03 PROCEDURE — 1159F MED LIST DOCD IN RCRD: CPT | Mod: HCNC,S$GLB,, | Performed by: ALLERGY & IMMUNOLOGY

## 2020-03-03 PROCEDURE — 99214 PR OFFICE/OUTPT VISIT, EST, LEVL IV, 30-39 MIN: ICD-10-PCS | Mod: HCNC,S$GLB,, | Performed by: ALLERGY & IMMUNOLOGY

## 2020-03-03 NOTE — PROGRESS NOTES
ALLERGY & IMMUNOLOGY CLINIC - FOLLOW UP      HISTORY OF PRESENT ILLNESS      Patient ID: Babita Thomas is a 70 y.o. 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 She was last seen by me in Dec 2018.     She has been on Hizentra 12g every 2 weeks. [facility] is delivering, and a nurse comes to the house to administer. She has been on the Hizentra for one year.  She has never been on any other formulations of IgG.     No antibiotics or infections in the past 12 months. She notes an improvement in her rhinitis symptoms, asthma symptoms, stomach symptoms, and ear symptoms since starting Hizentra. No signficant side effects from Hizentra.  She is asking about spacing injections to q3 weeks.      Has a history of asthma, has been prescribed Breo. She takes this as needed, and has not needed in many months. Unsure when last PFTs were done (at least one year ago). Got flu shot this year.      Has a history of rhinitis, but is not currently on any nasal sprays or allergy medications. Symptoms are not bothersome at this time.      She has a history of eosinophilia in the past, none with recent CBCs.      REVIEW OF SYSTEMS      CONST: no F/C/NS, no unintentional weight changes  NEURO: no H/A, no weakness, no paresthesias  EYES: no discharge, no pruritus, no erythema, wears contacts  EARS: + hearing loss, no sensation of fullness  NOSE: no congestion, no rhinorrhea, no discharge, no itching, no sneezing  PULM: no SOB, no wheezing, no cough, no snoring  CV: no CP, no palpitations, no leg swelling  GI: no dysphagia, + heartburn, no pain, no N/V/D, no BRBPR/melena  MSK: no joint pain, no muscle pain  DERM: no rashes, no skin breaks      MEDICAL HISTORY      MedHx: active problems reviewed  Allergies: Pravastatin, Tecfidera  Medications: MAR reviewed  Vaccines: has completed pneumonia series, flu shot one  "month ago     H/o Asthma: yes  H/o Eczema: denies  H/o Rhinitis: yes  Oral Allergy:  denies  Food Allergy: denies  Venom Allergy: denies      PHYSICAL EXAM      VS: Pulse 82   Ht 5' 5" (1.651 m)   Wt 63.8 kg (140 lb 10.5 oz)   SpO2 96%   BMI 23.41 kg/m²   GENERAL: AAOx4, NAD, well-appearing, cooperative  EYES: PERRL, EOMI, no conjunctival injection, no discharge, no infraorbital shiners, wears contacts  EARS: external auditory canals normal B/L, TM normal B/L, wears hearing aids  NOSE: NT 2+ and pink B/L, no stringing mucous, no polyps  ORAL: MMM, no ulcers, no thrush, no cobblestoning  NECK: supple, trachea midline, no thyromegaly, no LAD  LUNGS: CTAB, no w/r/c, no increased WOB  HEART: RRR, normal S1/S2, no m/g/r  EXTREMITIES: +2 distal pulses, no c/c/e  LYMPHATICS: no cervical/submandibular LAD  DERM: no rashes, no skin breaks, no dystrophic fingernails  NEURO: normal gait, no facial asymmetry      LABORATORY STUDIES      7/2017: IgG2 low, pneumococcal titers protective to 1/14.   AEC most recently 400, has been 1100 in the past    3/2020: CBC with monocytosis, no eosinophils  CMP normal  IgG 1262      PULMONARY FUNCTION      PFTs 12/2017: moderate obstruction without response to bronchodilator  1/2018: normal      IMAGING & OTHER DIAGNOSTICS      CT Chest 2014: linear scarring vs atelectasis in RML, no mention of bronchiectasis in the read    CXR Fall 2019 with 6mm possible artifact - needs repeat in the future      ASSESSMENT & PLAN      Babita Thomas is a 68 y.o. 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               -Renew Rx for hizentra 12g  - Space to q21 days, monitor clinically              -Recommend annual flu shot              -Has had all annual "CVID labs" in the past 12 months.     History of asthma, with most recent PFT normal   -Recommend " annual flu shot   -Return to clinic if pulmonary symptoms develop   -Plan to repeat next year   -Consider repeat CXR next year given possible finding vs artifact on most recent CXR     Chronic rhinitis              -Low threshhold to re-start flonase and azelastine if symptoms recur     History of Eosinophilia              -Not with most recent CBC, will follow     Follow up annually or sooner if symptoms change.

## 2020-03-05 DIAGNOSIS — D83.9 CVID (COMMON VARIABLE IMMUNODEFICIENCY): ICD-10-CM

## 2020-03-27 RX ORDER — HYDROCODONE BITARTRATE AND ACETAMINOPHEN 7.5; 325 MG/1; MG/1
1 TABLET ORAL EVERY 6 HOURS PRN
Qty: 28 TABLET | Refills: 0 | Status: SHIPPED | OUTPATIENT
Start: 2020-03-27 | End: 2020-07-30 | Stop reason: ALTCHOICE

## 2020-03-27 NOTE — TELEPHONE ENCOUNTER
Patient called said she had sx in September and said she is still having a lot of pain when she is walking.  She has been taking tylenol but it is not helping.  Asked if there is anything else that she could take.      Pain medicine is attached, please sign.

## 2020-03-27 NOTE — TELEPHONE ENCOUNTER
Called patient to inform Rx was sent in.  Patient asked if she could take the rest of the Percocet she still has from after the sx because it did help.  Informed pt this was OK.

## 2020-06-01 ENCOUNTER — HOSPITAL ENCOUNTER (OUTPATIENT)
Dept: RADIOLOGY | Facility: CLINIC | Age: 70
Discharge: HOME | End: 2020-06-01
Payer: MEDICARE

## 2020-06-01 ENCOUNTER — PATIENT OUTREACH (OUTPATIENT)
Dept: ADMINISTRATIVE | Facility: HOSPITAL | Age: 70
End: 2020-06-01

## 2020-06-01 DIAGNOSIS — Z12.31 SCREENING MAMMOGRAM FOR HIGH-RISK PATIENT: ICD-10-CM

## 2020-06-01 PROCEDURE — 77063 MAMMO DIGITAL SCREENING BILAT WITH TOMOSYNTHESIS_CAD: ICD-10-PCS | Mod: 26,HCNC,, | Performed by: RADIOLOGY

## 2020-06-01 PROCEDURE — 77067 MAMMO DIGITAL SCREENING BILAT WITH TOMOSYNTHESIS_CAD: ICD-10-PCS | Mod: 26,HCNC,, | Performed by: RADIOLOGY

## 2020-06-01 PROCEDURE — 77067 SCR MAMMO BI INCL CAD: CPT | Mod: TC,HCNC,PO

## 2020-06-01 PROCEDURE — 77063 BREAST TOMOSYNTHESIS BI: CPT | Mod: 26,HCNC,, | Performed by: RADIOLOGY

## 2020-06-01 PROCEDURE — 77067 SCR MAMMO BI INCL CAD: CPT | Mod: 26,HCNC,, | Performed by: RADIOLOGY

## 2020-06-01 NOTE — PROGRESS NOTES
Chart review completed 06/01/2020.  Care Everywhere updates requested and reviewed.  Immunizations reconciled. Media reviewed.     DIS, Lab lei, and quest reviewed    Requested COLONOSCOPY  records (DR. CHINCHILLA)    LETTER MAILED TO PT

## 2020-06-01 NOTE — LETTER
June 1, 2020    Babita Thomas  [address] [address]             [facility]  [address]  [facility] [address]  Phone: [phone] [facility] is committed to your overall health.  To help you get the most out of each of your visits, we will review your information to make sure you are up to date on all of your recommended tests and/or procedures.      Dr. Meme Sanchez MD has found that your chart shows you may be due for a:    COLORECTAL CANCER SCREENING       If you have had any of the above done at another facility, please bring the records or information with you so that your record at [facility] will be complete.  If you would like to schedule any of these, please contact the clinic at [phone].    If you are currently taking medication, please bring it with you to your appointment for review.    Also, if you have any type of Advanced Directives, please bring them with you to your office visit so we may scan them into your chart.    Thank You,    Your [facility] Team,  MD Estefani De La Paz LPN Clinical Care Coordinator  [facility]  [address] [address]  Phone [phone]  Fax [phone]

## 2020-06-01 NOTE — LETTER
AUTHORIZATION FOR RELEASE OF   CONFIDENTIAL INFORMATION    Dear DR. CHINCHILLA,    We are seeing Babita Thomas, date of birth 1950, in the clinic at [facility]. Meme Sanchez MD is the patient's PCP. Babita Thomas has an outstanding lab/procedure at the time we reviewed her chart. In order to help keep her health information updated, she has authorized us to request the following medical record(s):        (  )  MAMMOGRAM                                      ( X )  COLONOSCOPY      (  )  PAP SMEAR                                          (  )  OUTSIDE LAB RESULTS     (  )  DEXA SCAN                                          (  )  EYE EXAM            (  )  FOOT EXAM                                          (  )  ENTIRE RECORD     (  )  OUTSIDE IMMUNIZATIONS                 (  )  _______________         Please fax records to [facility], Amy L Hammons, MD, [phone]    Estefani Cheng LPN  Clinical Care Coordinator  [address] [address]  P: [phone]  F: [phone]            Patient Name: Babita Thomas  : 1950  Patient Phone #: [phone]

## 2020-06-09 ENCOUNTER — LAB VISIT (OUTPATIENT)
Dept: LAB | Facility: HOSPITAL | Age: 70
End: 2020-06-09
Attending: FAMILY MEDICINE
Payer: MEDICARE

## 2020-06-09 DIAGNOSIS — E78.5 HYPERLIPIDEMIA, UNSPECIFIED HYPERLIPIDEMIA TYPE: ICD-10-CM

## 2020-06-09 LAB
ALBUMIN SERPL BCP-MCNC: 3.8 G/DL (ref 3.5–5.2)
ALP SERPL-CCNC: 98 U/L (ref 55–135)
ALT SERPL W/O P-5'-P-CCNC: 17 U/L (ref 10–44)
ANION GAP SERPL CALC-SCNC: 7 MMOL/L (ref 8–16)
AST SERPL-CCNC: 25 U/L (ref 10–40)
BILIRUB SERPL-MCNC: 0.3 MG/DL (ref 0.1–1)
BUN SERPL-MCNC: 11 MG/DL (ref 8–23)
CALCIUM SERPL-MCNC: 9.4 MG/DL (ref 8.7–10.5)
CHLORIDE SERPL-SCNC: 99 MMOL/L (ref 95–110)
CHOLEST SERPL-MCNC: 205 MG/DL (ref 120–199)
CHOLEST/HDLC SERPL: 2.8 {RATIO} (ref 2–5)
CO2 SERPL-SCNC: 28 MMOL/L (ref 23–29)
CREAT SERPL-MCNC: 0.7 MG/DL (ref 0.5–1.4)
EST. GFR  (AFRICAN AMERICAN): >60 ML/MIN/1.73 M^2
EST. GFR  (NON AFRICAN AMERICAN): >60 ML/MIN/1.73 M^2
GLUCOSE SERPL-MCNC: 103 MG/DL (ref 70–110)
HDLC SERPL-MCNC: 73 MG/DL (ref 40–75)
HDLC SERPL: 35.6 % (ref 20–50)
LDLC SERPL CALC-MCNC: 119.8 MG/DL (ref 63–159)
NONHDLC SERPL-MCNC: 132 MG/DL
POTASSIUM SERPL-SCNC: 4 MMOL/L (ref 3.5–5.1)
PROT SERPL-MCNC: 7.6 G/DL (ref 6–8.4)
SODIUM SERPL-SCNC: 134 MMOL/L (ref 136–145)
TRIGL SERPL-MCNC: 61 MG/DL (ref 30–150)

## 2020-06-09 PROCEDURE — 80061 LIPID PANEL: CPT | Mod: HCNC

## 2020-06-09 PROCEDURE — 36415 COLL VENOUS BLD VENIPUNCTURE: CPT | Mod: HCNC,PO

## 2020-06-09 PROCEDURE — 80053 COMPREHEN METABOLIC PANEL: CPT | Mod: HCNC

## 2020-06-11 ENCOUNTER — TELEPHONE (OUTPATIENT)
Dept: ALLERGY | Facility: CLINIC | Age: 70
End: 2020-06-11

## 2020-06-11 NOTE — TELEPHONE ENCOUNTER
Spoke to Jesse at [facility] [phone], confirmed Rx for Hizentra 12g every 21 days per Dr Tesfaye's last office note.       ----- Message from Francesca Bryan LPN sent at 6/10/2020  4:57 PM CDT -----  Contact: [employer]      ----- Message -----  From: Elizabeth A Bosworth, LPN  Sent: 6/10/2020  11:01 AM CDT  To: Francesca Bryan LPN        ----- Message -----  From: Jenni Jordan  Sent: 6/10/2020  10:56 AM CDT  To: Brien Weston Staff    Jesse from [facility] is calling to confirm if there were any changes to pt hizentra. Please give Adilson a call back at [phone]

## 2020-07-16 ENCOUNTER — PATIENT OUTREACH (OUTPATIENT)
Dept: ADMINISTRATIVE | Facility: HOSPITAL | Age: 70
End: 2020-07-16

## 2020-07-16 NOTE — PROGRESS NOTES
CHART RECENTLY REVIEWED 6/1/20  IMMUNIZATIONS UPDATED.    SECOND REQUEST FOR COLONOSCOPY SENT TO DR CHINCHILLA

## 2020-07-16 NOTE — LETTER
AUTHORIZATION FOR RELEASE OF   CONFIDENTIAL INFORMATION    DR SANCHEZ    We are seeing Babita Thomas, date of birth 1950, in the clinic at [facility]. Meme Sanchez MD is the patient's PCP. Babita Thomas has an outstanding lab/procedure at the time we reviewed her chart. In order to help keep her health information updated, she has authorized us to request the following medical record(s):        (  )  MAMMOGRAM                                      ( X )  COLONOSCOPY      (  )  PAP SMEAR                                          (  )  OUTSIDE LAB RESULTS     (  )  DEXA SCAN                                          (  )  EYE EXAM            (  )  FOOT EXAM                                          (  )  ENTIRE RECORD     (  )  OUTSIDE IMMUNIZATIONS                 (  )  _______________         Please fax records to [facility], Amy L Hammons, MD, [phone]     Thank you in advance,      China CERON  Panel Care Coordinator  [place] [facility] [address] [place] [address]  Phone [phone]  Fax [phone]          Patient Name: Babita Thomas  : 1950  Patient Phone #: [phone]

## 2020-07-17 ENCOUNTER — PATIENT OUTREACH (OUTPATIENT)
Dept: ADMINISTRATIVE | Facility: HOSPITAL | Age: 70
End: 2020-07-17

## 2020-07-30 ENCOUNTER — OFFICE VISIT (OUTPATIENT)
Dept: FAMILY MEDICINE | Facility: CLINIC | Age: 70
End: 2020-07-30
Payer: MEDICARE

## 2020-07-30 VITALS
DIASTOLIC BLOOD PRESSURE: 82 MMHG | WEIGHT: 142.88 LBS | HEIGHT: 65 IN | SYSTOLIC BLOOD PRESSURE: 146 MMHG | TEMPERATURE: 98 F | HEART RATE: 88 BPM | OXYGEN SATURATION: 97 % | BODY MASS INDEX: 23.81 KG/M2

## 2020-07-30 DIAGNOSIS — F33.9 EPISODE OF RECURRENT MAJOR DEPRESSIVE DISORDER, UNSPECIFIED DEPRESSION EPISODE SEVERITY: ICD-10-CM

## 2020-07-30 DIAGNOSIS — E04.1 THYROID NODULE: ICD-10-CM

## 2020-07-30 DIAGNOSIS — E03.9 HYPOTHYROIDISM, UNSPECIFIED TYPE: ICD-10-CM

## 2020-07-30 DIAGNOSIS — E78.2 HYPERLIPIDEMIA, MIXED: ICD-10-CM

## 2020-07-30 DIAGNOSIS — E78.5 HYPERLIPIDEMIA, UNSPECIFIED HYPERLIPIDEMIA TYPE: Primary | ICD-10-CM

## 2020-07-30 DIAGNOSIS — I77.1 TORTUOUS AORTA: ICD-10-CM

## 2020-07-30 PROCEDURE — 1126F PR PAIN SEVERITY QUANTIFIED, NO PAIN PRESENT: ICD-10-PCS | Mod: HCNC,S$GLB,, | Performed by: NURSE PRACTITIONER

## 2020-07-30 PROCEDURE — 1101F PT FALLS ASSESS-DOCD LE1/YR: CPT | Mod: HCNC,CPTII,S$GLB, | Performed by: NURSE PRACTITIONER

## 2020-07-30 PROCEDURE — 99214 OFFICE O/P EST MOD 30 MIN: CPT | Mod: HCNC,S$GLB,, | Performed by: NURSE PRACTITIONER

## 2020-07-30 PROCEDURE — 3079F DIAST BP 80-89 MM HG: CPT | Mod: HCNC,CPTII,S$GLB, | Performed by: NURSE PRACTITIONER

## 2020-07-30 PROCEDURE — 99499 RISK ADDL DX/OHS AUDIT: ICD-10-PCS | Mod: HCNC,S$GLB,, | Performed by: NURSE PRACTITIONER

## 2020-07-30 PROCEDURE — 1101F PR PT FALLS ASSESS DOC 0-1 FALLS W/OUT INJ PAST YR: ICD-10-PCS | Mod: HCNC,CPTII,S$GLB, | Performed by: NURSE PRACTITIONER

## 2020-07-30 PROCEDURE — 3077F PR MOST RECENT SYSTOLIC BLOOD PRESSURE >= 140 MM HG: ICD-10-PCS | Mod: HCNC,CPTII,S$GLB, | Performed by: NURSE PRACTITIONER

## 2020-07-30 PROCEDURE — 1159F MED LIST DOCD IN RCRD: CPT | Mod: HCNC,S$GLB,, | Performed by: NURSE PRACTITIONER

## 2020-07-30 PROCEDURE — 1159F PR MEDICATION LIST DOCUMENTED IN MEDICAL RECORD: ICD-10-PCS | Mod: HCNC,S$GLB,, | Performed by: NURSE PRACTITIONER

## 2020-07-30 PROCEDURE — 3077F SYST BP >= 140 MM HG: CPT | Mod: HCNC,CPTII,S$GLB, | Performed by: NURSE PRACTITIONER

## 2020-07-30 PROCEDURE — 99999 PR PBB SHADOW E&M-EST. PATIENT-LVL IV: ICD-10-PCS | Mod: PBBFAC,HCNC,, | Performed by: NURSE PRACTITIONER

## 2020-07-30 PROCEDURE — 1126F AMNT PAIN NOTED NONE PRSNT: CPT | Mod: HCNC,S$GLB,, | Performed by: NURSE PRACTITIONER

## 2020-07-30 PROCEDURE — 3008F PR BODY MASS INDEX (BMI) DOCUMENTED: ICD-10-PCS | Mod: HCNC,CPTII,S$GLB, | Performed by: NURSE PRACTITIONER

## 2020-07-30 PROCEDURE — 3079F PR MOST RECENT DIASTOLIC BLOOD PRESSURE 80-89 MM HG: ICD-10-PCS | Mod: HCNC,CPTII,S$GLB, | Performed by: NURSE PRACTITIONER

## 2020-07-30 PROCEDURE — 3008F BODY MASS INDEX DOCD: CPT | Mod: HCNC,CPTII,S$GLB, | Performed by: NURSE PRACTITIONER

## 2020-07-30 PROCEDURE — 99214 PR OFFICE/OUTPT VISIT, EST, LEVL IV, 30-39 MIN: ICD-10-PCS | Mod: HCNC,S$GLB,, | Performed by: NURSE PRACTITIONER

## 2020-07-30 PROCEDURE — 99499 UNLISTED E&M SERVICE: CPT | Mod: HCNC,S$GLB,, | Performed by: NURSE PRACTITIONER

## 2020-07-30 PROCEDURE — 99999 PR PBB SHADOW E&M-EST. PATIENT-LVL IV: CPT | Mod: PBBFAC,HCNC,, | Performed by: NURSE PRACTITIONER

## 2020-07-30 RX ORDER — FLUOXETINE HYDROCHLORIDE 40 MG/1
40 CAPSULE ORAL DAILY
COMMUNITY

## 2020-07-30 NOTE — PROGRESS NOTES
Subjective:       Patient ID: Babita Thomas is a 70 y.o. female.    Chief Complaint: Hyperlipidemia    HPI    Patient presents today for chronic condition follow up. Patient is new to me. She will have labs done on Monday. She has no complaints today.    Dr. Cantu podiatry-lisfranc fracture  Dr. Cotter GI GERD, gastroparesis, lactose intolerance     Immunology Dr. 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 Not seeing a current neurologist  Dr. Zhu PSych prescribing   HPL- stopped mevacor due to muscle pain.  Has not tried crestor     Asthma in remission for years.       H/o thyroid nodule last u/s 2/19 no change in small nodules and cysts compared to u/s 2018. Had been on thyroid meds in past but told to stop that she didn't need them anymore     Had trach at 5-6 years old due to peanut stuck in throat  Past Medical History:   Diagnosis Date    Allergy     Arthritis 1990    hands    Asthma 02/2016    no meds    Depression 5/19/2017    Fracture of left foot 09/04/2019    Dr Cantu    GERD (gastroesophageal reflux disease) 2009    on meds    High cholesterol 2014    on meds    Hyperlipidemia     Hypothyroidism     Immune deficiency disorder 05/19/2017    on meds infusion    Insomnia 1/31/2017    MS (multiple sclerosis) 2005    very well controlled, no symptoms    Shortness of breath     Sinusitis     Thyroid disease 2005    hypothyroidism- no med       Review of patient's allergies indicates:   Allergen Reactions    Pravastatin Other (See Comments)     cramping    Tecfidera [dimethyl fumarate] Swelling         Current Outpatient Medications:     cholecalciferol, vitamin D3, 2,000 unit Tab, Take by mouth., Disp: , Rfl:     FLUoxetine 40 MG capsule, Take 40 mg by mouth once daily., Disp: , Rfl:     immun glob G,IgG,-pro-IgA 0-50 (HIZENTRA) 10 gram/50 mL (20 %) Soln, Inject 60 mLs (12 g total) into the skin every 21 days., Disp: 4 vial, Rfl: 11     "multivitamin-minerals-lutein (CENTRUM SILVER) Tab, Take 1 tablet by mouth once daily. 1 Tablet(s) Oral Every day., Disp: , Rfl:     omeprazole (PRILOSEC) 20 MG capsule, Take 20 mg by mouth once daily., Disp: , Rfl:     QUEtiapine (SEROQUEL) 100 MG Tab, Take 100-200 mg by mouth nightly as needed. , Disp: , Rfl:     rosuvastatin (CRESTOR) 10 MG tablet, Take 1 tablet (10 mg total) by mouth once daily., Disp: 90 tablet, Rfl: 3    Review of Systems   Constitutional: Negative for unexpected weight change.   HENT: Negative for trouble swallowing.    Eyes: Negative for visual disturbance.   Respiratory: Negative for shortness of breath.    Cardiovascular: Negative for chest pain, palpitations and leg swelling.   Gastrointestinal: Negative for blood in stool.   Genitourinary: Negative for hematuria.   Skin: Negative for rash.   Allergic/Immunologic: Negative for immunocompromised state.   Neurological: Negative for headaches.   Hematological: Does not bruise/bleed easily.   Psychiatric/Behavioral: Negative for agitation. The patient is not nervous/anxious.        Objective:      BP (!) 146/82 (BP Location: Left arm, Patient Position: Sitting, BP Method: Medium (Manual))   Pulse 88   Temp 98.1 °F (36.7 °C) (Other (see comments))   Ht 5' 5" (1.651 m)   Wt 64.8 kg (142 lb 13.7 oz)   SpO2 97%   BMI 23.77 kg/m²   Physical Exam  Constitutional:       Appearance: She is well-developed.   Eyes:      Pupils: Pupils are equal, round, and reactive to light.   Neck:      Musculoskeletal: Normal range of motion.   Cardiovascular:      Rate and Rhythm: Normal rate and regular rhythm.      Heart sounds: Normal heart sounds.   Pulmonary:      Effort: Pulmonary effort is normal.      Breath sounds: Normal breath sounds.   Abdominal:      General: Bowel sounds are normal.      Palpations: Abdomen is soft.   Musculoskeletal: Normal range of motion.   Skin:     General: Skin is warm and dry.   Neurological:      Mental Status: She is " alert and oriented to person, place, and time.   Psychiatric:         Behavior: Behavior normal.         Thought Content: Thought content normal.         Judgment: Judgment normal.         Assessment:       1. Hyperlipidemia, unspecified hyperlipidemia type    2. Hyperlipidemia, mixed    3. Thyroid nodule    4. Episode of recurrent major depressive disorder, unspecified depression episode severity    5. Hypothyroidism, unspecified type    6. Tortuous aorta    7. BMI 23.0-23.9, adult        Plan:       Hyperlipidemia, unspecified hyperlipidemia type  -     Lipid Panel; Future; Expected date: 07/30/2020  -     Comprehensive metabolic panel; Future; Expected date: 07/30/2020  -     CBC W/ AUTO DIFFERENTIAL; Future; Expected date: 07/30/2020  On Crestor  The 10-year ASCVD risk score (Alix CARPIO Jr., et al., 2013) is: 10.3%    Values used to calculate the score:      Age: 70 years      Sex: Female      Is Non- : No      Diabetic: No      Tobacco smoker: No      Systolic Blood Pressure: 146 mmHg      Is BP treated: No      HDL Cholesterol: 73 mg/dL      Total Cholesterol: 205 mg/dL  Thyroid nodule  -     US Soft Tissue Head Neck Thyroid; Future; Expected date: 07/30/2020    Episode of recurrent major depressive disorder, unspecified depression episode severity  Stable, continue medication  Hypothyroidism, unspecified type  Screen and treat as indicated  Tortuous aorta  stable, continue to monitor  BMI 23.0-23.9, adult  Healthy weight for patient        Time spent with patient: 20 minutes    Patient with be reevaluated in 6 months or sooner prn    Greater than 50% of this visit was spent counseling as described in above documentation:Yes

## 2020-07-30 NOTE — PATIENT INSTRUCTIONS
Lifestyle Changes to Control Cholesterol  You can control your cholesterol through diet, exercise, weight management, quitting smoking, stress management, and taking your medicines right. These things can also lower your risk for cardiovascular disease.    Eating healthy  Your healthcare provider will give you information on diet changes you may need to make. Your provider may recommend that you see a registered dietitian for help with diet changes. Changes may include:  · Cutting back on the amount of fat and cholesterol in your meals  · Eating less salt (sodium). This is especially important if you have high blood pressure.  · Eating more fresh vegetables and fruits  · Eating lean proteins such as fish, poultry, beans, and peas  · Eating less red meat and processed meats  · Using low-fat dairy products  · Using vegetable and nut oils in limited amounts  · Limiting how many sweets and processed foods like chips, cookies, and baked goods that you eat   · Limiting how many sugar-sweetened beverages you drink  · Limiting how often you eat out  Getting exercise  Regular exercise is a good way to help your body control cholesterol. Regular exercise can help in many ways. It can:  · Raise your good cholesterol  · Help lower your bad cholesterol  · Let blood flow better through your body  · Give more oxygen to your muscles and tissues  · Help you manage your weight  · Help your heart pump better  · Lower your blood pressure  Your healthcare provider may recommend that you get more physical activity if you haven't been active. Your provider may recommend that you get moderate to vigorous physical activity for at least 40 minutes each day. You should do this for at least 3 to 4 days each week. A few examples of moderate to vigorous activity are:  · Walking at a brisk pace. This is about 3 to 4 miles per hour.  · Jogging or running  · Swimming or water aerobics  · Hiking  · Dancing  · Martial arts  · Tennis  · Riding a  bicycle or stationary bike  · Dancing  Managing your weight  If you are overweight or obese, your healthcare provider will work with you to help you lose weight and lower your BMI (body mass index). Making diet changes and getting more physical activity can help. Changing your diet will help you lose weight more easily than adding exercise.  Quitting smoking  Smoking and other tobacco use can raise cholesterol and make it harder to control. Quitting is tough. But millions of people have given up tobacco for good. You can quit, too! Think about some of the reasons below to quit smoking. Do any of them make you think twice about your smoking habit?  Stop smoking because it:  · Keeps your cholesterol high, even if you make all the other changes youre supposed to  · Damages your body. It especially harms your heart, lungs, skin, and blood vessels.  · Makes you more likely to have a heart attack (acute myocardial infarction), stroke, or cancer  · Stains your teeth  · Makes your skin, clothes, and breath smell bad  · Costs a lot of money  Controlling stress   Learn ways to control stress. This will help you deal with stress in your home and work life. Controlling stress can greatly lower your risk of getting cardiovascular disease.  Making the most of medicines  Healthy eating and exercise are a good start to keeping your cholesterol down. But you may need some extra help from medicine. If your doctor prescribes medicine, be sure to take it exactly as directed. Remember:  · Tell your healthcare provider about all other medicines you take. This includes vitamins and herbs.  · Tell your healthcare provider if you have any side effects after starting to take a medicine. Examples of side effects to watch for include muscle aches, weakness, blurred vision, rust-colored urine, yellowing of eyes or skin (jaundice), and headache.  · Dont skip a dose or stop taking your medicine because you feel better or because  your cholesterol numbers go down. Never stop taking your medicine unless your healthcare provider has told you its OK.  · Ask your healthcare provider if you have any questions about your medicines.  High risk groups  Some people may need to take medicines called statins to control their cholesterol. This is in addition to eating a healthy diet and getting regular exercise.  Statins can help you stay healthy. They can also help prevent a heart attack or stroke. You may need to take a statin if you are in one of these groups:  · Adults who have had a heart attack or stroke. Or adults who have had peripheral vascular disease, a ministroke (transient ischemic attack), or stable or unstable angina. This group also includes people who have had a procedure to restore blood flow through a blocked artery. These procedures include percutaneous coronary intervention, angioplasty, stent, and open-heart bypass surgery.  · Adults who have diabetes. Or adults who are at higher risk of having a heart attack or stroke and have an LDL cholesterol level of 70 to 189 mg/dL  · Adults who are 21 years old or older and have an LDL cholesterol level of 190 mg/dL or higher.  If you are in a high-risk group, talk with your healthcare provider about your treatment goals. Make sure you understand why these goals are important, based on your own health history and your family history of heart disease or high cholesterol.  Make a plan to have regular cholesterol checks. You may need to fast before getting this test. Also ask your provider about any side effects your medicines may cause. Let your provider know about any side effects you have. You may need to take more than one medicine to reach the cholesterol goals that you and your provider decide on.  Date Last Reviewed: 10/1/2016  © 3812-5688 [employer]. [address] [place] [address], [place] [address]. All rights reserved. This information is not intended as a substitute for  professional medical care. Always follow your healthcare professional's instructions.

## 2020-08-11 ENCOUNTER — PES CALL (OUTPATIENT)
Dept: ADMINISTRATIVE | Facility: CLINIC | Age: 70
End: 2020-08-11

## 2020-09-04 DIAGNOSIS — N95.0 POSTMENOPAUSAL BLEEDING: Primary | ICD-10-CM

## 2020-09-11 ENCOUNTER — HOSPITAL ENCOUNTER (OUTPATIENT)
Dept: RADIOLOGY | Facility: HOSPITAL | Age: 70
Discharge: HOME | End: 2020-09-11
Attending: OBSTETRICS & GYNECOLOGY
Payer: MEDICARE

## 2020-09-11 DIAGNOSIS — N95.0 POSTMENOPAUSAL BLEEDING: ICD-10-CM

## 2020-09-11 PROCEDURE — 76830 TRANSVAGINAL US NON-OB: CPT | Mod: TC,PO

## 2020-10-01 ENCOUNTER — OFFICE VISIT (OUTPATIENT)
Dept: FAMILY MEDICINE | Facility: CLINIC | Age: 70
End: 2020-10-01
Payer: MEDICARE

## 2020-10-01 DIAGNOSIS — E78.2 HYPERLIPIDEMIA, MIXED: Primary | ICD-10-CM

## 2020-10-01 DIAGNOSIS — E03.9 HYPOTHYROIDISM, UNSPECIFIED TYPE: ICD-10-CM

## 2020-10-01 DIAGNOSIS — M19.049 HAND ARTHRITIS: ICD-10-CM

## 2020-10-01 PROCEDURE — 99442 PR PHYSICIAN TELEPHONE EVALUATION 11-20 MIN: CPT | Mod: HCNC,95,, | Performed by: FAMILY MEDICINE

## 2020-10-01 PROCEDURE — 99442 PR PHYSICIAN TELEPHONE EVALUATION 11-20 MIN: ICD-10-PCS | Mod: HCNC,95,, | Performed by: FAMILY MEDICINE

## 2020-10-01 RX ORDER — CELECOXIB 200 MG/1
200 CAPSULE ORAL DAILY
Qty: 30 CAPSULE | Refills: 5 | Status: SHIPPED | OUTPATIENT
Start: 2020-10-01 | End: 2021-04-06

## 2020-10-01 NOTE — PROGRESS NOTES
Subjective:       Patient ID: Babita Thomas is a 70 y.o. female.    Chief Complaint: No chief complaint on file.    HPI  Review of Systems   Constitutional: Negative for fatigue and unexpected weight change.   Respiratory: Negative for chest tightness and shortness of breath.    Cardiovascular: Negative for chest pain, palpitations and leg swelling.   Gastrointestinal: Negative for abdominal pain.   Musculoskeletal: Negative for arthralgias.   Neurological: Negative for dizziness, syncope, light-headedness and headaches.       Patient Active Problem List   Diagnosis    Multiple sclerosis    GERD (gastroesophageal reflux disease)    Thyroid nodule    Tortuous aorta    Asthma    Chronic pansinusitis    Hypertrophy of inferior nasal turbinate    Insomnia    Immune deficiency disorder    Eosinophilia    Recurrent major depressive disorder    Gross hematuria    Closed nondisplaced fracture of medial cuneiform of left foot    Closed nondisplaced fracture of cuboid bone of left foot    Closed displaced fracture of fifth metatarsal bone of left foot    Closed displaced fracture of fourth metatarsal bone of left foot    Closed displaced fracture of third metatarsal bone of left foot    Closed displaced fracture of second metatarsal bone of left foot    Closed nondisplaced fracture of first left metatarsal bone    Lisfranc dislocation, left, initial encounter    Hypothyroidism    Myalgia due to statin    Hyperlipidemia, mixed     Patient is here for telemedicine visit  The patient location is: [place], [place]  The chief complaint leading to consultation is: chronic conditions  Visit type: Virtual visit with synchronous audio and video  Total time spent with patient: 10-20 minutes  Each patient to whom he or she provides medical services by telemedicine is:  (1) informed of the relationship between the physician and patient and the respective role of any other health care provider with respect to management  of the patient; and (2) notified that he or she may decline to receive medical services by telemedicine and may withdraw from such care at any time.    Notes: see below   AUDIO VISIT ONLY? yes  Reviewed labs 7/20 tolerating crestor well    Hand arthritis -OTC IB, tylenol, voltaren gel. 3/10   Objective:      Physical Exam  Vitals signs and nursing note reviewed.   Constitutional:       Appearance: She is well-developed.   Cardiovascular:      Rate and Rhythm: Normal rate and regular rhythm.      Heart sounds: Normal heart sounds.   Pulmonary:      Effort: Pulmonary effort is normal.      Breath sounds: Normal breath sounds.   Skin:     General: Skin is warm and dry.   Neurological:      Mental Status: She is alert and oriented to person, place, and time.         Assessment:       1. Hyperlipidemia, mixed    2. Hypothyroidism, unspecified type    3. Hand arthritis        Plan:         1. Hyperlipidemia, mixed  I recommend a heart healthy diet rich in fiber, fresh vegetables and fruit and low in saturated fats (fried foods, red meat, etc.).  I also recommend regular exercise including a minimum of 150 minutes of cardio exercise per week and 2-30 minute workouts of strength training like light weights, yoga, pilates, etc.  You should work toward a body mass index of < 25.      Stable condition.  Continue current medications.  Will adjust based on lab findings or if condition changes.    - Lipid Panel; Future  - Comprehensive metabolic panel; Future    2. Hypothyroidism, unspecified type  Stable condition.  Continue current medications.  Will adjust based on lab findings or if condition changes.    - CBC auto differential; Future  - TSH; Future  - T4, free; Future    3. Hand arthritis  Cont current mgmt        Time spent with patient: 20 minutes    Patient with be reevaluated in 6 months or sooner prn    Greater than 50% of this visit was spent counseling as described in above documentation:Yes

## 2020-10-08 NOTE — PROGRESS NOTES
6mth f/u and labs scheduled for 4/6/2021      Established Patient - Audio Only Telehealth Visit     The patient location is: The chief complaint leading to consultation is:   Visit type: Virtual visit with audio only (telephone)  Total time spent with patient:        The reason for the audio only service rather than synchronous audio and video virtual visit was related to technical difficulties or patient preference/necessity.     Each patient to whom I provide medical services by telemedicine is:  (1) informed of the relationship between the physician and patient and the respective role of any other health care provider with respect to management of the patient; and (2) notified that they may decline to receive medical services by telemedicine and may withdraw from such care at any time. Patient verbally consented to receive this service via voice-only telephone call.       HPI:      Assessment and plan:                        This service was not originating from a related E/M service provided within the previous 7 days nor will  to an E/M service or procedure within the next 24 hours or my soonest available appointment.  Prevailing standard of care was able to be met in this audio-only visit.

## 2020-10-11 ENCOUNTER — PATIENT OUTREACH (OUTPATIENT)
Dept: ADMINISTRATIVE | Facility: OTHER | Age: 70
End: 2020-10-11

## 2020-10-11 DIAGNOSIS — Z12.11 ENCOUNTER FOR FIT (FECAL IMMUNOCHEMICAL TEST) SCREENING: Primary | ICD-10-CM

## 2020-10-12 NOTE — PROGRESS NOTES
Chart was reviewed for overdue Proactive [facility] Encounters ([facility])  topics  Updates were requested from care everywhere  Health Maintenance has been updated  LINKS immunization registry triggered  Fit kit ordered

## 2020-10-13 ENCOUNTER — OFFICE VISIT (OUTPATIENT)
Dept: ALLERGY | Facility: CLINIC | Age: 70
End: 2020-10-13
Payer: MEDICARE

## 2020-10-13 VITALS — WEIGHT: 138.25 LBS | OXYGEN SATURATION: 98 % | HEIGHT: 65 IN | BODY MASS INDEX: 23.03 KG/M2 | HEART RATE: 86 BPM

## 2020-10-13 DIAGNOSIS — D83.9 CVID (COMMON VARIABLE IMMUNODEFICIENCY): Primary | ICD-10-CM

## 2020-10-13 DIAGNOSIS — K21.9 GASTROESOPHAGEAL REFLUX DISEASE WITHOUT ESOPHAGITIS: ICD-10-CM

## 2020-10-13 DIAGNOSIS — J31.0 RHINITIS, CHRONIC: ICD-10-CM

## 2020-10-13 DIAGNOSIS — J32.9 RECURRENT SINUS INFECTIONS: ICD-10-CM

## 2020-10-13 PROCEDURE — 3008F PR BODY MASS INDEX (BMI) DOCUMENTED: ICD-10-PCS | Mod: HCNC,CPTII,S$GLB, | Performed by: ALLERGY & IMMUNOLOGY

## 2020-10-13 PROCEDURE — 99999 PR PBB SHADOW E&M-EST. PATIENT-LVL III: CPT | Mod: PBBFAC,HCNC,, | Performed by: ALLERGY & IMMUNOLOGY

## 2020-10-13 PROCEDURE — 99214 PR OFFICE/OUTPT VISIT, EST, LEVL IV, 30-39 MIN: ICD-10-PCS | Mod: HCNC,S$GLB,, | Performed by: ALLERGY & IMMUNOLOGY

## 2020-10-13 PROCEDURE — 1159F PR MEDICATION LIST DOCUMENTED IN MEDICAL RECORD: ICD-10-PCS | Mod: HCNC,S$GLB,, | Performed by: ALLERGY & IMMUNOLOGY

## 2020-10-13 PROCEDURE — 1159F MED LIST DOCD IN RCRD: CPT | Mod: HCNC,S$GLB,, | Performed by: ALLERGY & IMMUNOLOGY

## 2020-10-13 PROCEDURE — 99999 PR PBB SHADOW E&M-EST. PATIENT-LVL III: ICD-10-PCS | Mod: PBBFAC,HCNC,, | Performed by: ALLERGY & IMMUNOLOGY

## 2020-10-13 PROCEDURE — 99214 OFFICE O/P EST MOD 30 MIN: CPT | Mod: HCNC,S$GLB,, | Performed by: ALLERGY & IMMUNOLOGY

## 2020-10-13 PROCEDURE — 1101F PR PT FALLS ASSESS DOC 0-1 FALLS W/OUT INJ PAST YR: ICD-10-PCS | Mod: HCNC,CPTII,S$GLB, | Performed by: ALLERGY & IMMUNOLOGY

## 2020-10-13 PROCEDURE — 3008F BODY MASS INDEX DOCD: CPT | Mod: HCNC,CPTII,S$GLB, | Performed by: ALLERGY & IMMUNOLOGY

## 2020-10-13 PROCEDURE — 1101F PT FALLS ASSESS-DOCD LE1/YR: CPT | Mod: HCNC,CPTII,S$GLB, | Performed by: ALLERGY & IMMUNOLOGY

## 2020-10-13 NOTE — PROGRESS NOTES
ALLERGY & IMMUNOLOGY CLINIC - FOLLOW UP      HISTORY OF PRESENT ILLNESS      Patient ID: Babita Thomas is a 70 y.o. 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 She was last seen by me in March 2020. She is referred by her gynecologist Dr. Burnett because she needs a hysteroscopy and D&C and gynecology would like outpatient pre-op optimization.      She has been on Hizentra 12g every 3 weeks. Infusing in 4 sites. No premedications. No side effects. [facility] is delivering, and a nurse comes to the house to administer. She has been on the Hizentra for several years.  She has never been on any other formulations of IgG.     She was treated for a sinus infection in August 2020 and completed a course of antibiotics for that. She notes an improvement in her rhinitis symptoms, asthma symptoms, stomach symptoms, and ear symptoms since starting Hizentra. No signficant side effects from Hizentra.       Has a history of asthma, has been prescribed Breo. She takes this as needed, and has not needed in years. Last PFTs were done 2019 and were normal. Getting her flu shot this week with her mother.      Has a history of rhinitis, but is not currently on any nasal sprays or allergy medications. Symptoms are not bothersome at this time.      She has a history of eosinophilia in the past, none with recent CBCs.      REVIEW OF SYSTEMS      CONST: no F/C/NS, +weight gain  NEURO: no H/A, no weakness, no paresthesias  EYES: no discharge, no pruritus, no erythema, wears contacts  EARS: + hearing loss, wears hearing aids, no sensation of fullness  NOSE: no congestion, no rhinorrhea, no discharge, no itching, no sneezing  PULM: no SOB, no wheezing, no cough, no snoring  CV: no CP, no palpitations, no leg swelling  GI: no dysphagia, + heartburn, no pain, no N/V/D, no BRBPR/melena  MSK: +hand joint pain on  "celebrex, no muscle pain  DERM: no rashes, no skin breaks      MEDICAL HISTORY      MedHx: active problems reviewed; she has been discharged from MS clinic  Allergies: Pravastatin, Tecfidera  Medications: MAR reviewed  Vaccines: has completed pneumonia series, flu shot one month ago     H/o Asthma: yes  H/o Eczema: denies  H/o Rhinitis: yes  Oral Allergy:  denies  Food Allergy: denies  Venom Allergy: denies      PHYSICAL EXAM      VS: Pulse 86   Ht 5' 5" (1.651 m)   Wt 62.7 kg (138 lb 3.7 oz)   SpO2 98%   BMI 23.00 kg/m²   GENERAL: AAO, NAD, well-appearing, cooperative  EYES: PERRL, EOMI, no conjunctival injection, no discharge, no infraorbital shiners, wears contacts  EARS: external auditory canals normal B/L, TM normal B/L, wears hearing aids  NOSE: NT 2+ and pink B/L, no stringing mucous, no polyps  ORAL: MMM, no ulcers, no thrush, no cobblestoning  NECK: supple, trachea midline, no thyromegaly, no LAD  LUNGS: CTAB, no w/r/c, no increased WOB  HEART: RRR, normal S1/S2, no m/g/r  EXTREMITIES: +2 distal pulses, no c/c/e, +arthritis of DIPs  LYMPHATICS: no cervical/submandibular LAD  DERM: no rashes, no skin breaks, no dystrophic fingernails  NEURO: normal gait, no facial asymmetry      LABORATORY STUDIES      7/2017: IgG2 low, pneumococcal titers protective to 1/14.   AEC most recently 400, has been 1100 in the past     3/2020: CBC with monocytosis, no eosinophils  CMP normal  IgG 1262      PULMONARY FUNCTION      PFTs 12/2017: moderate obstruction without response to bronchodilator  1/2019: normal      IMAGING & OTHER DIAGNOSTICS      CT Chest 2014: linear scarring vs atelectasis in RML, no mention of bronchiectasis in the read     CXR Fall 2019 with 6mm possible artifact - needs repeat in the future      ASSESSMENT & PLAN      Babita Thomas is a 70 y.o. 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 She is interested in potentially taking a break from her hizentra given how well she has been doing.    -Continue for hizentra 12g q21 days. No changes in hizentra until after her upcoming procedure. Can consider stopping or spacing to q4 weeks in upcoming months. Reviewed risks of stopping, including more frequent infections, which may not be something she desires during a pandemic.     -Labs due March 2021.    -For her upcoming hysteroscopy / D&C, there are no recommended changes to her current routine; she is considered optimized for pre-operative purposes. She does not need to make any modifications to her Hizentra. She should ambulate as soon as possible after her procedure.              -Recommend annual flu shot - patient plans to get this later this week     History of asthma, with most recent PFT normal              -Recommend annual flu shot              -Return to clinic if pulmonary symptoms develop              -Plan to repeat PFTs in 2021              Chronic rhinitis              -Recommend re-start flonase and azelastine if symptoms recur     History of Eosinophilia              -Not with most recent CBC, will follow     Follow up annually or sooner if symptoms change.

## 2020-10-20 DIAGNOSIS — M79.604 PAIN IN BOTH LOWER EXTREMITIES: Primary | ICD-10-CM

## 2020-10-20 DIAGNOSIS — M79.605 PAIN IN BOTH LOWER EXTREMITIES: Primary | ICD-10-CM

## 2020-10-20 NOTE — PROGRESS NOTES
Spoke to MsAzul Martha, who reports bilateral lower extremity soreness. She is concerned about DVT. After speaking with her, I think statin-induced myalgia or myopathy is more likely, but given that she is on Hizentra, which is associated with thrombosis, will check bilateral lower extremity venous doppler to rule out DVT.

## 2020-10-26 LAB — HCV IGG SERPL QL IA: NEGATIVE

## 2020-11-02 ENCOUNTER — PATIENT OUTREACH (OUTPATIENT)
Dept: ADMINISTRATIVE | Facility: HOSPITAL | Age: 70
End: 2020-11-02

## 2020-11-02 NOTE — LETTER
AUTHORIZATION FOR RELEASE OF   CONFIDENTIAL INFORMATION    Dear Dr. Cotter,    We are seeing Babita Thomas, date of birth 1950, in the clinic at [facility]. Meme Sanchez MD is the patient's PCP. Babita Thomas has an outstanding lab/procedure at the time we reviewed her chart. In order to help keep her health information updated, she has authorized us to request the following medical record(s):        (  )  MAMMOGRAM                                      ( X )  COLONOSCOPY      (  )  PAP SMEAR                                          (  )  OUTSIDE LAB RESULTS     (  )  DEXA SCAN                                          (  )  EYE EXAM            (  )  FOOT EXAM                                          (  )  ENTIRE RECORD     (  )  OUTSIDE IMMUNIZATIONS                 (  )  _______________         Please fax records to [facility], Amy L Hammons, MD, [phone]    Thank you in advance,  Maine Castro LPN, Clinical Care Coordinator  [address] [address]  P: [phone]  F: [phone]              Patient Name: Babita Thomas  : 1950  Patient Phone #: [phone]

## 2020-11-03 ENCOUNTER — PATIENT OUTREACH (OUTPATIENT)
Dept: ADMINISTRATIVE | Facility: HOSPITAL | Age: 70
End: 2020-11-03

## 2020-11-04 NOTE — TELEPHONE ENCOUNTER
----- Message from Yue Billy sent at 11/21/2017 12:19 PM CST -----  Contact: Patient  Patient had to cancel her appointment on 12/15, but she still has her breathing appointment on 12/12; patient would like to know if she should schedule the breathing treatment closer to the next available appointment in January.  Call Back#[phone]  Thanks   
Advised the patient that her appt in Jan is ok to keep.  
Pt lives in a private house, 5 steps to enter with bilat handrails (far apart). Pt states wife & son always home with him and they provide supervision/assist as needed, especially for stairs. Pt uses straight cane indoors, rollator outdoors, and a wheelchair for long distance mobility. Pt was attending outpatient PT prior to admission.    CHEST XRAY 11/2: Clear lungs.

## 2020-11-05 ENCOUNTER — HOSPITAL ENCOUNTER (EMERGENCY)
Facility: HOSPITAL | Age: 70
Discharge: HOME | End: 2020-11-05
Attending: EMERGENCY MEDICINE
Payer: MEDICARE

## 2020-11-05 VITALS
HEIGHT: 65 IN | OXYGEN SATURATION: 98 % | DIASTOLIC BLOOD PRESSURE: 82 MMHG | WEIGHT: 138.25 LBS | BODY MASS INDEX: 23.03 KG/M2 | SYSTOLIC BLOOD PRESSURE: 148 MMHG | TEMPERATURE: 99 F | HEART RATE: 74 BPM | RESPIRATION RATE: 18 BRPM

## 2020-11-05 DIAGNOSIS — N93.9 VAGINAL BLEEDING: Primary | ICD-10-CM

## 2020-11-05 LAB
ABO + RH BLD: NORMAL
ALBUMIN SERPL BCP-MCNC: 4.2 G/DL (ref 3.5–5.2)
ALP SERPL-CCNC: 96 U/L (ref 55–135)
ALT SERPL W/O P-5'-P-CCNC: 19 U/L (ref 10–44)
ANION GAP SERPL CALC-SCNC: 14 MMOL/L (ref 8–16)
AST SERPL-CCNC: 24 U/L (ref 10–40)
BACTERIA #/AREA URNS HPF: ABNORMAL /HPF
BASOPHILS # BLD AUTO: 0.03 K/UL (ref 0–0.2)
BASOPHILS NFR BLD: 0.7 % (ref 0–1.9)
BILIRUB SERPL-MCNC: 0.6 MG/DL (ref 0.1–1)
BILIRUB UR QL STRIP: NEGATIVE
BLD GP AB SCN CELLS X3 SERPL QL: NORMAL
BUN SERPL-MCNC: 13 MG/DL (ref 8–23)
CALCIUM SERPL-MCNC: 9.5 MG/DL (ref 8.7–10.5)
CHLORIDE SERPL-SCNC: 97 MMOL/L (ref 95–110)
CLARITY UR: CLEAR
CO2 SERPL-SCNC: 26 MMOL/L (ref 23–29)
COLOR UR: YELLOW
CREAT SERPL-MCNC: 0.6 MG/DL (ref 0.5–1.4)
DIFFERENTIAL METHOD: ABNORMAL
EOSINOPHIL # BLD AUTO: 0.4 K/UL (ref 0–0.5)
EOSINOPHIL NFR BLD: 10.2 % (ref 0–8)
ERYTHROCYTE [DISTWIDTH] IN BLOOD BY AUTOMATED COUNT: 12.2 % (ref 11.5–14.5)
EST. GFR  (AFRICAN AMERICAN): >60 ML/MIN/1.73 M^2
EST. GFR  (NON AFRICAN AMERICAN): >60 ML/MIN/1.73 M^2
GLUCOSE SERPL-MCNC: 119 MG/DL (ref 70–110)
GLUCOSE UR QL STRIP: NEGATIVE
HCT VFR BLD AUTO: 43.3 % (ref 37–48.5)
HGB BLD-MCNC: 14.2 G/DL (ref 12–16)
HGB UR QL STRIP: ABNORMAL
HYALINE CASTS #/AREA URNS LPF: 1 /LPF
IMM GRANULOCYTES # BLD AUTO: 0.01 K/UL (ref 0–0.04)
IMM GRANULOCYTES NFR BLD AUTO: 0.2 % (ref 0–0.5)
KETONES UR QL STRIP: NEGATIVE
LEUKOCYTE ESTERASE UR QL STRIP: ABNORMAL
LYMPHOCYTES # BLD AUTO: 1.2 K/UL (ref 1–4.8)
LYMPHOCYTES NFR BLD: 30.2 % (ref 18–48)
MCH RBC QN AUTO: 29.7 PG (ref 27–31)
MCHC RBC AUTO-ENTMCNC: 32.8 G/DL (ref 32–36)
MCV RBC AUTO: 91 FL (ref 82–98)
MICROSCOPIC COMMENT: ABNORMAL
MONOCYTES # BLD AUTO: 0.6 K/UL (ref 0.3–1)
MONOCYTES NFR BLD: 15.6 % (ref 4–15)
NEUTROPHILS # BLD AUTO: 1.8 K/UL (ref 1.8–7.7)
NEUTROPHILS NFR BLD: 43.1 % (ref 38–73)
NITRITE UR QL STRIP: NEGATIVE
NRBC BLD-RTO: 0 /100 WBC
PH UR STRIP: 8 [PH] (ref 5–8)
PLATELET # BLD AUTO: 353 K/UL (ref 150–350)
PMV BLD AUTO: 9 FL (ref 9.2–12.9)
POTASSIUM SERPL-SCNC: 4.1 MMOL/L (ref 3.5–5.1)
PROT SERPL-MCNC: 7.7 G/DL (ref 6–8.4)
PROT UR QL STRIP: NEGATIVE
RBC # BLD AUTO: 4.78 M/UL (ref 4–5.4)
RBC #/AREA URNS HPF: 18 /HPF (ref 0–4)
SODIUM SERPL-SCNC: 137 MMOL/L (ref 136–145)
SP GR UR STRIP: 1 (ref 1–1.03)
SQUAMOUS #/AREA URNS HPF: 3 /HPF
URN SPEC COLLECT METH UR: ABNORMAL
UROBILINOGEN UR STRIP-ACNC: NEGATIVE EU/DL
WBC # BLD AUTO: 4.11 K/UL (ref 3.9–12.7)
WBC #/AREA URNS HPF: 7 /HPF (ref 0–5)

## 2020-11-05 PROCEDURE — 25500020 PHARM REV CODE 255: Performed by: EMERGENCY MEDICINE

## 2020-11-05 PROCEDURE — 25000003 PHARM REV CODE 250: Performed by: EMERGENCY MEDICINE

## 2020-11-05 PROCEDURE — 99284 EMERGENCY DEPT VISIT MOD MDM: CPT | Mod: 25

## 2020-11-05 PROCEDURE — 81001 URINALYSIS AUTO W/SCOPE: CPT

## 2020-11-05 PROCEDURE — 85025 COMPLETE CBC W/AUTO DIFF WBC: CPT

## 2020-11-05 PROCEDURE — 63600175 PHARM REV CODE 636 W HCPCS: Performed by: EMERGENCY MEDICINE

## 2020-11-05 PROCEDURE — 96376 TX/PRO/DX INJ SAME DRUG ADON: CPT

## 2020-11-05 PROCEDURE — 96374 THER/PROPH/DIAG INJ IV PUSH: CPT

## 2020-11-05 PROCEDURE — 86850 RBC ANTIBODY SCREEN: CPT

## 2020-11-05 PROCEDURE — 36415 COLL VENOUS BLD VENIPUNCTURE: CPT

## 2020-11-05 PROCEDURE — 80053 COMPREHEN METABOLIC PANEL: CPT

## 2020-11-05 RX ORDER — MORPHINE SULFATE 4 MG/ML
4 INJECTION, SOLUTION INTRAMUSCULAR; INTRAVENOUS
Status: COMPLETED | OUTPATIENT
Start: 2020-11-05 | End: 2020-11-05

## 2020-11-05 RX ADMIN — IOHEXOL 100 ML: 350 INJECTION, SOLUTION INTRAVENOUS at 08:11

## 2020-11-05 RX ADMIN — MORPHINE SULFATE 4 MG: 4 INJECTION INTRAVENOUS at 07:11

## 2020-11-05 RX ADMIN — MORPHINE SULFATE 4 MG: 4 INJECTION INTRAVENOUS at 09:11

## 2020-11-05 RX ADMIN — SODIUM CHLORIDE 1000 ML: 0.9 INJECTION, SOLUTION INTRAVENOUS at 07:11

## 2020-11-05 NOTE — ED PROVIDER NOTES
Encounter Date: 11/5/2020       History     Chief Complaint   Patient presents with    Vaginal Bleeding     One episode of bright red vaginal bleeding this morning; d/c last friday; denies any blood thinners, CP/SOB    Pelvic Pain     Intermittent pelvic cramping since x 2 days.     70-year-old female had a D&C done on Friday and had biopsies done.  Patient started cramping over the past 2 days and this morning started having vaginal bleeding.  Bleeding did stop at this time.  Patient concern as still having cramping.  Denies fever or chills or nausea vomiting or chest pain or shortness of breath.  Patient complains of lower abdominal pain diffusely.        Review of patient's allergies indicates:   Allergen Reactions    Pravastatin Other (See Comments)     cramping    Tecfidera [dimethyl fumarate] Swelling     Past Medical History:   Diagnosis Date    Allergy     Arthritis 1990    hands    Asthma 02/2016    no meds    Depression 5/19/2017    Fracture of left foot 09/04/2019    Dr Cantu    GERD (gastroesophageal reflux disease) 2009    on meds    High cholesterol 2014    on meds    Hyperlipidemia     Hypothyroidism     Immune deficiency disorder 05/19/2017    on meds infusion    Insomnia 1/31/2017    MS (multiple sclerosis) 2005    very well controlled, no symptoms    Shortness of breath     Sinusitis     Thyroid disease 2005    hypothyroidism- no med     Past Surgical History:   Procedure Laterality Date    BREAST BIOPSY Left     benign    BREAST SURGERY Left 15 yrs ago    biopsy, benign    CHOLECYSTECTOMY      COLONOSCOPY  3-5-2010    Dr Cotter 10 year bal     CYSTOSCOPY N/A 6/10/2019    Procedure: CYSTOSCOPY;  Surgeon: Faiza Coreas MD;  Location: [facility];  Service: Urology;  Laterality: N/A;    ESOPHAGUS SURGERY      6 year old, peanut stuck in throat     MIDFOOT ARTHRODESIS Left 9/6/2019    Procedure: FUSION, JOINT, MIDFOOT;  Surgeon: Damian Cantu DPM;  Location:  [facility] OR;  Service: Podiatry;  Laterality: Left;  SEDA NORRIS NOTIFIED    MULTIPLE TOOTH EXTRACTIONS      SINUS SURGERY  2015    x3    TRACHEOSTOMY CLOSURE      age 6 for peanut in throat    TYMPANOSTOMY TUBE PLACEMENT      x2    WISDOM TOOTH EXTRACTION       Family History   Problem Relation Age of Onset    Hypertension Mother     Dementia Mother     Hyperlipidemia Mother     Heart disease Father 60        MI    Hypertension Sister     No Known Problems Daughter     Heart disease Paternal Uncle     Heart disease Maternal Grandfather 70        MI    Heart disease Paternal Grandfather     No Known Problems Daughter     Allergic rhinitis Neg Hx     Allergies Neg Hx     Angioedema Neg Hx     Asthma Neg Hx     Atopy Neg Hx     Eczema Neg Hx     Immunodeficiency Neg Hx     Rhinitis Neg Hx     Urticaria Neg Hx     Melanoma Neg Hx     Psoriasis Neg Hx     Lupus Neg Hx      Social History     Tobacco Use    Smoking status: Never Smoker    Smokeless tobacco: Never Used   Substance Use Topics    Alcohol use: No    Drug use: No     Review of Systems   Constitutional: Negative.    HENT: Negative.    Eyes: Negative.    Respiratory: Negative.    Cardiovascular: Negative.  Negative for chest pain.   Gastrointestinal: Positive for abdominal pain.   Endocrine: Negative.    Genitourinary: Positive for vaginal bleeding.   Musculoskeletal: Negative.    Skin: Negative.    Allergic/Immunologic: Negative.    Neurological: Negative.    Hematological: Negative.    Psychiatric/Behavioral: Negative.    All other systems reviewed and are negative.      Physical Exam     Initial Vitals [11/05/20 0641]   BP Pulse Resp Temp SpO2   (!) 184/87 87 20 98.7 °F (37.1 °C) 96 %      MAP       --         Physical Exam    Nursing note and vitals reviewed.  Constitutional: She appears well-developed and well-nourished.   HENT:   Head: Normocephalic and atraumatic.   Nose: Nose normal.   Mouth/Throat: Oropharynx is clear and  moist.   Eyes: Conjunctivae and EOM are normal. Pupils are equal, round, and reactive to light.   Neck: Normal range of motion. Neck supple. No thyromegaly present. No tracheal deviation present. No JVD present.   Cardiovascular: Normal rate, regular rhythm, normal heart sounds and intact distal pulses.   No murmur heard.  Pulmonary/Chest: Breath sounds normal. No stridor. No respiratory distress. She has no wheezes. She has no rales.   Abdominal: Soft. Bowel sounds are normal. She exhibits no distension. There is abdominal tenderness. Hernia confirmed negative in the right inguinal area and confirmed negative in the left inguinal area.   Mild lower abdominal tenderness   Genitourinary: There is no injury on the right labia. There is no injury on the left labia. Uterus is tender.    Vaginal bleeding present.      No vaginal discharge or erythema.   There is bleeding in the vagina. No erythema in the vagina.    No signs of injury in the vagina.      Genitourinary Comments: Patient with very mild vaginal bleeding noted and no active bleeding noted at this time.  Patient does have evidence of recent cauterization of the cervix.  Patient had slight blood in the vaginal Parker from recent bleeding but not actively bleeding at this time.  Patient does have mild tenderness in that area     Musculoskeletal: Normal range of motion. No edema.   Neurological: She is alert and oriented to person, place, and time. She has normal strength. GCS score is 15. GCS eye subscore is 4. GCS verbal subscore is 5. GCS motor subscore is 6.   Skin: Skin is warm. Capillary refill takes less than 2 seconds.   Psychiatric: She has a normal mood and affect. Thought content normal.         ED Course   Procedures  Labs Reviewed   CBC W/ AUTO DIFFERENTIAL - Abnormal; Notable for the following components:       Result Value    Platelets 353 (*)     MPV 9.0 (*)     Mono % 15.6 (*)     Eosinophil % 10.2 (*)     All other components within normal limits    COMPREHENSIVE METABOLIC PANEL - Abnormal; Notable for the following components:    Glucose 119 (*)     All other components within normal limits   URINALYSIS, REFLEX TO URINE CULTURE - Abnormal; Notable for the following components:    Occult Blood UA 3+ (*)     Leukocytes, UA 2+ (*)     All other components within normal limits    Narrative:     Specimen Source->Urine   URINALYSIS MICROSCOPIC - Abnormal; Notable for the following components:    RBC, UA 18 (*)     WBC, UA 7 (*)     Bacteria Many (*)     All other components within normal limits    Narrative:     Specimen Source->Urine   TYPE & SCREEN          Imaging Results          CT Abdomen Pelvis With Contrast (Final result)  Result time 11/05/20 09:04:29    Final result by Damian Mims MD (11/05/20 09:04:29)                 Narrative:    CMS MANDATED QUALITY DATA - CT RADIATION  436    All CT scans at this facility utilize dose modulation, iterative  reconstruction, and/or weight based dosing when appropriate to reduce  radiation dose to as low as reasonably achievable.    CLINICAL HISTORY:  70 years (1950) Female Abdominal pain, acute, nonlocalized    TECHNIQUE:  CT ABDOMEN PELVIS WITH CONTRAST. 232 images obtained. Axial CT images  of the abdomen and pelvis were obtained from the dome of the diaphragm  to the proximal thigh.    CONTRAST:  IV contrast was administered uneventfully.    COMPARISON:  Ultrasound of the pelvis from September 11, 2020 . CT of the abdomen  and pelvis from May 23, 2019    FINDINGS:.  Lower Thorax:  The lungs are essentially clear noting linear dependent atelectasis  versus scarring. There is no pleural or pericardial effusion on these  images. The heart is normal in size.    CT Abdomen:  Liver: The liver is normal in size and imaging appearance.  Gallbladder: The gallbladder is surgically absent.  Biliary Tree: Very mild central intrahepatic and extrahepatic biliary  ductal dilation, consider correlation for history of  sphincterotomy  after cholecystectomy. The CBD measures up to 7 mm in diameter.  Spleen: Within normal limits.  Pancreas: The pancreas is normal.  Adrenal Glands: The adrenal glands appear within normal limits.  Kidneys: The kidneys are normal in imaging appearance without  hydronephrosis or hydroureter.  Vasculature: Scattered atheromatous calcifications in the abdominal  aorta with no aneurysm.  Lymph nodes: No abdominal lymphadenopathy is seen.  Intraperitoneal structures: There is no ascites.  Bowel: Large volume of stool and gas throughout the colon with a  nonobstructive bowel gas pattern, consider correlation for  constipation.  Abdominal wall: The abdominal wall and musculature are normal.  Musculoskeletal: No acute osseous abnormality is identified .  Ill-defined sclerotic foci in the iliac bones for example in the right  (image 148 and 138, and in the right pubic symphysis (image 203)  possibly representing small bone islands noting that malignancy is not  entirely excluded. There is a transitional lumbosacral vertebral body  (L5) which is partially sacralized on the right.    CT Pelvis:  Bladder: The urinary bladder is within normal limits.  Reproductive Organs: Retroflexed uterus with abnormal heterogeneous  thickening of the endometrial stripe measuring approximately 2.2 cm in  thickness.  Pelvic Lymph nodes: No pelvic lymphadenopathy or pelvic mass is  identified.    IMPRESSION:  1. Moderate to large volume of stool and gas throughout the colon with  a nonobstructive bowel gas pattern, consider correlation for  constipation.  2. Abnormal heterogeneous thickening of the endometrial stripe,  consider clinical correlation as CT has low sensitivity and in this  age group endometrial carcinoma is not excluded.  3. Prior cholecystectomy with very mild central intrahepatic  extrahepatic biliary ductal prominence, consider correlation with  liver function tests and for history of sphincterotomy  after  cholecystectomy.  4. Additional/incidental findings as above.                    RESULT NOTIFICATION: These observations were discussed by the  dictating physician by phone with, and acknowledged by BHAVIN VARMA at  11/5/2020 9:17 AM, further clinical history reveals that the patient  had a recent endometrial biopsy (approximately one week ago) which may  be concordant with the CT findings.  .    Electronically Signed by AL Kenney on 11/5/2020 9:19 AM                               Medical Decision Making:   Differential Diagnosis:   Patient with recent procedure and D&C and biopsies with vaginal bleeding and lower abdominal cramping.  Case discussed with Dr. Burnett patient's gynecologist and as recommended by gynecologist will do CT scan for further evaluation and to rule out perforation.  Patient not actively bleeding at this time and pain control in the emergency department.  Screening labs done.  Clinical Tests:   Lab Tests: Reviewed  Radiological Study: Reviewed  ED Management:  CT scan reviewed.  No evidence of perforation.  Case discussed with patient's gynecologist and as recommended by gynecologist discharged with instructions                             Clinical Impression:     ICD-10-CM ICD-9-CM   1. Vaginal bleeding  N93.9 623.8                          ED Disposition Condition    Discharge Stable        ED Prescriptions     None        Follow-up Information     Follow up With Specialties Details Why Contact Info    Kahtleen Burnett MD Obstetrics and Gynecology In 2 days  [address] [place] [id number]  [phone]                                         Bhavin Varma MD  11/05/20 [id number]

## 2020-11-05 NOTE — DISCHARGE INSTRUCTIONS
Continue Ultram for pain.  Drink lots of fluids.  Rest.  Follow-up with your OBGYN in 1-2 days and return to emergency department if he have worsening pain or bleeding

## 2020-11-13 NOTE — TELEPHONE ENCOUNTER
----- Message from Rocky Galdamez sent at 10/18/2017  8:48 AM CDT -----  Contact: self   Patient want to speak with a nurse regarding her medication. Please call back at [phone] (home)      Comprehensive Nutrition Assessment    Type and Reason for Visit: Initial(dysphagia)    Nutrition Recommendations/Plan:   Continue regular/ NTL diet     Advance diet per SLP recs    Document PO intakes      Nutrition Assessment:  Present to ED with fall. Admitted for A. Fib with RVR. +Tachycardia, Sepsis, closed head injury, facial laceration, CADE, hypernatremia 2/2 dehydration, elevated troponin. Suspected pneumonia, cholelithiasis, ascites, suspected metastatic disease vs myeloma vs metabolic bone disease per cardiology. Noted COVID negative. Noted pt on NTL pta; Pt to continue NH diet regimen until SLP eval per MD note. Noted no PO documentation yet. DI to f/u with nsg post SLP eval. Labs: Na 154, Cl 126, BUN 46, AST 75, , Alk Phos 132, T. Bili 5.6. Meds: IVF, Augmentin, eliquis, digoxin, miralax, Lopressor. Malnutrition Assessment:  Malnutrition Status:  Insufficient data    Context:  Acute illness         Estimated Daily Nutrient Needs:  Energy (kcal): 2350kcal (27kcal/kg adj bw); Weight Used for Energy Requirements: Adjusted  Protein (g): 113g (1.3g/kg adj BW); Weight Used for Protein Requirements: Adjusted  Fluid (ml/day): 2175ml(25ml/kg adj bw); Method Used for Fluid Requirements: ml/kg  Needs based on Sepsis,  Cancer and wt maintenance    Nutrition Related Findings:  Unable to perform NFPE 2/2 precautions, and pt on COVID floor. No N/V/C/D documented. Last BM: 11/13 per EMR. Hx of dysphagia; SLP eval rec'd. No edema documented. Wounds:    Moisture associate skin damage(groin)       Current Nutrition Therapies:  DIET REGULAR 2 [place]/2 Mildly Thick    Anthropometric Measures:  · Height:  6' (182.9 cm)  · Current Body Wt:  103.4 kg (227 lb 15.3 oz)(11/11)   · Admission Body Wt:  227 lb 15.3 oz    · Usual Body Wt:  (ABHI)     · Ideal Body Wt:  178 lbs:  128.1 %   · Adjusted Body Weight:  (86.6 kg adj BW for obesity)   · BMI Category:  Obese class 1 (BMI 30.0-34. 9)     Wt Hx: ABHI    Nutrition Diagnosis:   · Swallowing difficulty related to (mechanical dysfunction) as evidenced by (hx dysphagia diet)      Nutrition Interventions:   Food and/or Nutrient Delivery: Continue current diet(modify per SLP recs)  Nutrition Education and Counseling: No recommendations at this time  Coordination of Nutrition Care: Continue to monitor while inpatient    Goals:  Intake > 75% PO > 5 days,   Wt maintenance +/- 0.5kg/wk while acutely ill       Nutrition Monitoring and Evaluation:   Behavioral-Environmental Outcomes: None identified  Food/Nutrient Intake Outcomes: Diet advancement/tolerance, Food and nutrient intake  Physical Signs/Symptoms Outcomes: Chewing or swallowing, Weight    Discharge Planning:     Too soon to determine     Electronically signed by Roxanne Correia on 11/13/2020 at 3:14 PM    Contact:

## 2020-11-17 ENCOUNTER — TELEPHONE (OUTPATIENT)
Dept: FAMILY MEDICINE | Facility: CLINIC | Age: 70
End: 2020-11-17

## 2020-12-09 DIAGNOSIS — J45.909 UNCOMPLICATED ASTHMA, UNSPECIFIED ASTHMA SEVERITY, UNSPECIFIED WHETHER PERSISTENT: Primary | ICD-10-CM

## 2020-12-09 RX ORDER — ALBUTEROL SULFATE 90 UG/1
2 AEROSOL, METERED RESPIRATORY (INHALATION) EVERY 6 HOURS PRN
Qty: 18 G | Refills: 0 | Status: SHIPPED | OUTPATIENT
Start: 2020-12-09 | End: 2022-05-05

## 2020-12-13 ENCOUNTER — PATIENT OUTREACH (OUTPATIENT)
Dept: ADMINISTRATIVE | Facility: OTHER | Age: 70
End: 2020-12-13

## 2020-12-14 ENCOUNTER — OFFICE VISIT (OUTPATIENT)
Dept: PULMONOLOGY | Facility: CLINIC | Age: 70
End: 2020-12-14
Payer: MEDICARE

## 2020-12-14 VITALS
OXYGEN SATURATION: 91 % | HEIGHT: 65 IN | WEIGHT: 142.5 LBS | SYSTOLIC BLOOD PRESSURE: 161 MMHG | BODY MASS INDEX: 23.74 KG/M2 | DIASTOLIC BLOOD PRESSURE: 99 MMHG | HEART RATE: 88 BPM

## 2020-12-14 DIAGNOSIS — J45.21 MILD INTERMITTENT ASTHMA WITH ACUTE EXACERBATION: Primary | ICD-10-CM

## 2020-12-14 PROCEDURE — 99999 PR PBB SHADOW E&M-EST. PATIENT-LVL IV: CPT | Mod: PBBFAC,HCNC,, | Performed by: NURSE PRACTITIONER

## 2020-12-14 PROCEDURE — 1159F PR MEDICATION LIST DOCUMENTED IN MEDICAL RECORD: ICD-10-PCS | Mod: HCNC,S$GLB,, | Performed by: NURSE PRACTITIONER

## 2020-12-14 PROCEDURE — 3080F DIAST BP >= 90 MM HG: CPT | Mod: HCNC,CPTII,S$GLB, | Performed by: NURSE PRACTITIONER

## 2020-12-14 PROCEDURE — 99214 OFFICE O/P EST MOD 30 MIN: CPT | Mod: HCNC,S$GLB,, | Performed by: NURSE PRACTITIONER

## 2020-12-14 PROCEDURE — 1101F PR PT FALLS ASSESS DOC 0-1 FALLS W/OUT INJ PAST YR: ICD-10-PCS | Mod: HCNC,CPTII,S$GLB, | Performed by: NURSE PRACTITIONER

## 2020-12-14 PROCEDURE — 3077F SYST BP >= 140 MM HG: CPT | Mod: HCNC,CPTII,S$GLB, | Performed by: NURSE PRACTITIONER

## 2020-12-14 PROCEDURE — 3080F PR MOST RECENT DIASTOLIC BLOOD PRESSURE >= 90 MM HG: ICD-10-PCS | Mod: HCNC,CPTII,S$GLB, | Performed by: NURSE PRACTITIONER

## 2020-12-14 PROCEDURE — 3288F FALL RISK ASSESSMENT DOCD: CPT | Mod: HCNC,CPTII,S$GLB, | Performed by: NURSE PRACTITIONER

## 2020-12-14 PROCEDURE — 1159F MED LIST DOCD IN RCRD: CPT | Mod: HCNC,S$GLB,, | Performed by: NURSE PRACTITIONER

## 2020-12-14 PROCEDURE — 3008F PR BODY MASS INDEX (BMI) DOCUMENTED: ICD-10-PCS | Mod: HCNC,CPTII,S$GLB, | Performed by: NURSE PRACTITIONER

## 2020-12-14 PROCEDURE — 99999 PR PBB SHADOW E&M-EST. PATIENT-LVL IV: ICD-10-PCS | Mod: PBBFAC,HCNC,, | Performed by: NURSE PRACTITIONER

## 2020-12-14 PROCEDURE — 3077F PR MOST RECENT SYSTOLIC BLOOD PRESSURE >= 140 MM HG: ICD-10-PCS | Mod: HCNC,CPTII,S$GLB, | Performed by: NURSE PRACTITIONER

## 2020-12-14 PROCEDURE — 1101F PT FALLS ASSESS-DOCD LE1/YR: CPT | Mod: HCNC,CPTII,S$GLB, | Performed by: NURSE PRACTITIONER

## 2020-12-14 PROCEDURE — 99214 PR OFFICE/OUTPT VISIT, EST, LEVL IV, 30-39 MIN: ICD-10-PCS | Mod: HCNC,S$GLB,, | Performed by: NURSE PRACTITIONER

## 2020-12-14 PROCEDURE — 3288F PR FALLS RISK ASSESSMENT DOCUMENTED: ICD-10-PCS | Mod: HCNC,CPTII,S$GLB, | Performed by: NURSE PRACTITIONER

## 2020-12-14 PROCEDURE — 3008F BODY MASS INDEX DOCD: CPT | Mod: HCNC,CPTII,S$GLB, | Performed by: NURSE PRACTITIONER

## 2020-12-14 PROCEDURE — 1126F PR PAIN SEVERITY QUANTIFIED, NO PAIN PRESENT: ICD-10-PCS | Mod: HCNC,S$GLB,, | Performed by: NURSE PRACTITIONER

## 2020-12-14 PROCEDURE — 1126F AMNT PAIN NOTED NONE PRSNT: CPT | Mod: HCNC,S$GLB,, | Performed by: NURSE PRACTITIONER

## 2020-12-14 RX ORDER — FLUTICASONE FUROATE AND VILANTEROL TRIFENATATE 200; 25 UG/1; UG/1
1 POWDER RESPIRATORY (INHALATION) DAILY
Qty: 1 EACH | Refills: 11 | Status: SHIPPED | OUTPATIENT
Start: 2020-12-14 | End: 2020-12-18

## 2020-12-14 RX ORDER — PREDNISONE 10 MG/1
TABLET ORAL
Qty: 12 TABLET | Refills: 0 | Status: SHIPPED | OUTPATIENT
Start: 2020-12-14 | End: 2021-04-06

## 2020-12-14 RX ORDER — A/SINGAPORE/GP1908/2015 IVR-180 (AN A/MICHIGAN/45/2015 (H1N1)PDM09-LIKE VIRUS, A/HONG KONG/4801/2014, NYMC X-263B (H3N2) (AN A/HONG KONG/4801/2014-LIKE VIRUS), AND B/BRISBANE/60/2008, WILD TYPE (A B/BRISBANE/60/2008-LIKE VIRUS) 15; 15; 15 UG/.5ML; UG/.5ML; UG/.5ML
INJECTION, SUSPENSION INTRAMUSCULAR
COMMUNITY
Start: 2020-10-19 | End: 2022-05-05

## 2020-12-14 NOTE — PATIENT INSTRUCTIONS
Breo 200 1 puff once a day every day, rinse mouth after using due to risk for thrush if mouth or tongue has white sores contact clinic    Take one-two pills a day for three days, repeat for shortness of breath

## 2020-12-14 NOTE — PROGRESS NOTES
12/14/2020    Babita Thomas  Office Note  Established Patient of Dr. Heard. Patient is new to me.    Chief Complaint   Patient presents with    Shortness of Breath    Cough    Breathing Problem       HPI:  12/14/2020- states asthma symptoms dramatically improved following pneumonia vaccine and hirxentra injections for past 2 years.   Complaint of SOB, cough, and wheeze- onset 1 month, improved after 5 days, states she took PPI therapy with no improvement. Received refill for albuterol rescue with benefit for one week.   Cough- non productive, onset weeks, worse at night, nocturnal arousals 1x weekly.      Previous Note Dr. Heard  jan 8, 2018-- started hirxentra just 2 doses - having problems with sinus infections.  Got off and now back on sinus rinse with baby shampoo - abx ordered.  Coughs light daily - much better than initially.  Weight gained and stable.  Mood still a problem - to see Dr Kapadia. Uses effexor and clonopin-      June 20,  Breathing fine , still sinus problems, mood good/appetite good- sleep poorly.  Had rash was pustular on prednisone and azithromycin.  Still having gerd with severe disease.  Slept well with remeron and effexor.  Sinus problem still.          May 19, 2017HPI:has had breathing problems last 2 yrs, chr nasal congestion and smell ok, had no nasal polyps, had sinus surg x 3 - one balloon and 2 other - may have helped 1/3.  Has no good response to abx, chr yg mucous.  Has had sinus problem last 3 yrs, has had bad reflux with freq taste in mouth. Uses zantac prn , tried ppi with no good result, no major swallow problem.     Appetite off, megace occ, poor sleep for yrs- no good response.       Ms initially feet numb , has lesions and in remission- 5-10 yrs.       Depressed a bit - just started effexor  2 weeks,     Lost 30 lbs, coughs mainly noct.       The chief compliant  problem is new to me  PFSH:  Past Medical History:   Diagnosis Date    Allergy     Arthritis 1990    hands     Asthma 02/2016    no meds    Depression 5/19/2017    Fracture of left foot 09/04/2019    Dr Cantu    GERD (gastroesophageal reflux disease) 2009    on meds    High cholesterol 2014    on meds    Hyperlipidemia     Hypothyroidism     Immune deficiency disorder 05/19/2017    on meds infusion    Insomnia 1/31/2017    MS (multiple sclerosis) 2005    very well controlled, no symptoms    Shortness of breath     Sinusitis     Thyroid disease 2005    hypothyroidism- no med         Past Surgical History:   Procedure Laterality Date    BREAST BIOPSY Left     benign    BREAST SURGERY Left 15 yrs ago    biopsy, benign    CHOLECYSTECTOMY      COLONOSCOPY  3-5-2010    Dr Cotter 10 year bal     CYSTOSCOPY N/A 6/10/2019    Procedure: CYSTOSCOPY;  Surgeon: Faiza Coreas MD;  Location: [facility] OR;  Service: Urology;  Laterality: N/A;    ESOPHAGUS SURGERY      6 year old, peanut stuck in throat     MIDFOOT ARTHRODESIS Left 9/6/2019    Procedure: FUSION, JOINT, MIDFOOT;  Surgeon: Damian Cantu DPM;  Location: [facility] OR;  Service: Podiatry;  Laterality: Left;  SEDA NORRIS NOTIFIED    MULTIPLE TOOTH EXTRACTIONS      SINUS SURGERY  2015    x3    TRACHEOSTOMY CLOSURE      age 6 for peanut in throat    TYMPANOSTOMY TUBE PLACEMENT      x2    WISDOM TOOTH EXTRACTION       Social History     Tobacco Use    Smoking status: Never Smoker    Smokeless tobacco: Never Used   Substance Use Topics    Alcohol use: No    Drug use: No     Family History   Problem Relation Age of Onset    Hypertension Mother     Dementia Mother     Hyperlipidemia Mother     Heart disease Father 60        MI    Hypertension Sister     No Known Problems Daughter     Heart disease Paternal Uncle     Heart disease Maternal Grandfather 70        MI    Heart disease Paternal Grandfather     No Known Problems Daughter     Allergic rhinitis Neg Hx     Allergies Neg Hx     Angioedema Neg Hx     Asthma Neg Hx     Atopy Neg Hx   "   Eczema Neg Hx     Immunodeficiency Neg Hx     Rhinitis Neg Hx     Urticaria Neg Hx     Melanoma Neg Hx     Psoriasis Neg Hx     Lupus Neg Hx      Review of patient's allergies indicates:   Allergen Reactions    Pravastatin Other (See Comments)     cramping    Tecfidera [dimethyl fumarate] Swelling     I have reviewed past medical, family, and social history. I have reviewed previous nurse notes.    Performance Status:The patient's activity level is no limits with regular activity.          Review of Systems   Constitutional: Negative for activity change, appetite change, chills, diaphoresis, fatigue, fever and unexpected weight change.   HENT: Negative for dental problem, postnasal drip, rhinorrhea, sinus pressure, sinus pain, sneezing, sore throat, trouble swallowing and voice change.    Respiratory: Negative for apnea, cough, chest tightness, shortness of breath, wheezing and stridor.    Cardiovascular: Negative for chest pain, palpitations and leg swelling.   Gastrointestinal: Negative for abdominal distention, abdominal pain, constipation and nausea.   Musculoskeletal: Negative for gait problem, myalgias and neck pain.   Skin: Negative for color change and pallor.   Allergic/Immunologic: Negative for environmental allergies and food allergies.   Neurological: Negative for dizziness, speech difficulty, weakness, light-headedness, numbness and headaches.   Hematological: Negative for adenopathy. Does not bruise/bleed easily.   Psychiatric/Behavioral: Negative for dysphoric mood and sleep disturbance. The patient is not nervous/anxious.            Exam:Comprehensive exam done. BP (!) 161/99 (BP Location: Right arm, Patient Position: Sitting, BP Method: Medium (Automatic))   Pulse 88   Ht 5' 5" (1.651 m)   Wt 64.7 kg (142 lb 8.4 oz)   SpO2 (!) 91% Comment: on room air at rest  BMI 23.72 kg/m²   Exam included Vitals as listed, and patient's appearance and affect and alertness and mood, oral exam for " yeast and hygiene and pharynx lesions and Mallapatti (M) score, neck with inspection for jvd and masses and thyroid abnormalities and lymph nodes (supraclavicular and infraclavicular nodes and axillary also examined and noted if abn), chest exam included symmetry and effort and fremitus and percussion and auscultation, cardiac exam included rhythm and gallops and murmur and rubs and jvd and edema, abdominal exam for mass and hepatosplenomegaly and tenderness and hernias and bowel sounds, Musculoskeletal exam with muscle tone and posture and mobility/gait and  strength, and skin for rashes and cyanosis and pallor and turgor, extremity for clubbing.  Findings were normal except for pertinent findings listed below:  Slender M1, breath sounds bilateral wheeze, all else good exam      Radiographs (ct chest and cxr) reviewed: view by direct vision   CT Abdomen Pelvis With Contrast 11/5/2020   The lungs are essentially clear noting linear dependent atelectasis  versus scarring. There is no pleural or pericardial effusion on these  images. The heart is normal in size.      X-Ray Chest PA And Lateral 09/04/2019   6 mm left apical nodular opacity could be artifactual in nature.  Follow-up repeat PA chest with apical lordotic view is recommended to differentiate artifactual etiology for pulmonary nodule or neoplasm which would require further evaluation with CT thorax.       Labs reviewed      Lab Results   Component Value Date    WBC 4.11 11/05/2020    RBC 4.78 11/05/2020    HGB 14.2 11/05/2020    HCT 43.3 11/05/2020    MCV 91 11/05/2020    MCH 29.7 11/05/2020    MCHC 32.8 11/05/2020    RDW 12.2 11/05/2020     (H) 11/05/2020    MPV 9.0 (L) 11/05/2020    GRAN 1.8 11/05/2020    GRAN 43.1 11/05/2020    LYMPH 1.2 11/05/2020    LYMPH 30.2 11/05/2020    MONO 0.6 11/05/2020    MONO 15.6 (H) 11/05/2020    EOS 0.4 11/05/2020    BASO 0.03 11/05/2020    EOSINOPHIL 10.2 (H) 11/05/2020    BASOPHIL 0.7 11/05/2020     Results for  AMBER RODRIGUEZ (MRN [id number]) as of 12/14/2020 15:56   Ref. Range 11/5/2020 07:15   Eos # Latest Ref Range: 0.0 - 0.5 K/uL 0.4     PFT results reviewed  Pulmonary Functions Testing Results:     Spirometry bronchodilator, lung volume gas dilution was done January 30, 2019.  The FEV1 FVC ratio was 74%, there is no airflow obstruction.  The FEV1 itself measured 80% of predicted.  There is no bronchodilator response.  Total lung capacity is   normal.     Spirometry with bronchodilator and lung volumes by gas dilution fall within normal range.       Plan:  Clinical impression is apparently straight forward and impression with management as below.    Babita was seen today for shortness of breath, cough and breathing problem.    Diagnoses and all orders for this visit:    Mild intermittent asthma with acute exacerbation  -     fluticasone furoate-vilanteroL (BREO ELLIPTA) 200-25 mcg/dose DsDv diskus inhaler; Inhale 1 puff into the lungs once daily. Controller  -     predniSONE (DELTASONE) 10 MG tablet; 1-2 pills for 3 days, repeat for shortness of breath        Follow up in about 6 months (around 6/14/2021), or if symptoms worsen or fail to improve.    Discussed with patient above for education the following:      Patient Instructions   Breo 200 1 puff once a day every day, rinse mouth after using due to risk for thrush if mouth or tongue has white sores contact clinic    Take one-two pills a day for three days, repeat for shortness of breath

## 2020-12-14 NOTE — PROGRESS NOTES
Chart was reviewed for overdue Proactive [facility] Encounters (CHAY)  topics  Updates were requested from care everywhere  Health Maintenance has been updated  LINKS immunization registry triggered

## 2020-12-17 ENCOUNTER — TELEPHONE (OUTPATIENT)
Dept: PULMONOLOGY | Facility: CLINIC | Age: 70
End: 2020-12-17

## 2020-12-17 NOTE — TELEPHONE ENCOUNTER
Spoke with patient and let her know I sent a message over to Jaja letting her know the Breo was to expensive and the patient would like for her to send something else in.     ----- Message from Janette Perez sent at 12/17/2020  2:22 PM CST -----  Contact: darrius ramirez  Type: Needs Medical Advice  Who Called:  patient     Pharmacy name and phone #:    [facility] - [place], [place] - [address]  [address] [address]  Phone: [phone] Fax: [phone]  Best Call Back Number: [phone] (home)     Additional Information: copay for fluticasone furoate-vilanteroL (BREO ELLIPTA) 200-25 mcg/dose DsDv diskus inhaler and predniSONE (DELTASONE) 10 MG tablet combined is well over $100.00, patient is requesting an alternative, please contact to advise.

## 2020-12-18 DIAGNOSIS — J45.40 MODERATE PERSISTENT ASTHMA WITHOUT COMPLICATION: Primary | ICD-10-CM

## 2020-12-18 RX ORDER — FLUTICASONE PROPIONATE AND SALMETEROL XINAFOATE 230; 21 UG/1; UG/1
2 AEROSOL, METERED RESPIRATORY (INHALATION) 2 TIMES DAILY
Qty: 36 G | Refills: 3 | Status: SHIPPED | OUTPATIENT
Start: 2020-12-18 | End: 2022-05-05

## 2021-01-12 ENCOUNTER — TELEPHONE (OUTPATIENT)
Dept: ALLERGY | Facility: CLINIC | Age: 71
End: 2021-01-12

## 2021-01-22 ENCOUNTER — HOSPITAL ENCOUNTER (OUTPATIENT)
Dept: RADIOLOGY | Facility: HOSPITAL | Age: 71
Discharge: HOME | End: 2021-01-22
Attending: NURSE PRACTITIONER
Payer: MEDICARE

## 2021-01-22 DIAGNOSIS — E04.1 THYROID NODULE: ICD-10-CM

## 2021-01-22 PROCEDURE — 76536 US SOFT TISSUE HEAD NECK THYROID: ICD-10-PCS | Mod: 26,HCNC,, | Performed by: RADIOLOGY

## 2021-01-22 PROCEDURE — 76536 US EXAM OF HEAD AND NECK: CPT | Mod: 26,HCNC,, | Performed by: RADIOLOGY

## 2021-01-22 PROCEDURE — 76536 US EXAM OF HEAD AND NECK: CPT | Mod: TC,HCNC

## 2021-02-08 ENCOUNTER — OFFICE VISIT (OUTPATIENT)
Dept: FAMILY MEDICINE | Facility: CLINIC | Age: 71
End: 2021-02-08
Payer: MEDICARE

## 2021-02-08 VITALS
SYSTOLIC BLOOD PRESSURE: 148 MMHG | OXYGEN SATURATION: 97 % | DIASTOLIC BLOOD PRESSURE: 86 MMHG | HEIGHT: 65 IN | HEART RATE: 82 BPM | WEIGHT: 145.94 LBS | BODY MASS INDEX: 24.32 KG/M2 | TEMPERATURE: 98 F

## 2021-02-08 DIAGNOSIS — M79.605 PAIN IN BOTH LOWER EXTREMITIES: Primary | ICD-10-CM

## 2021-02-08 DIAGNOSIS — M79.604 PAIN IN BOTH LOWER EXTREMITIES: Primary | ICD-10-CM

## 2021-02-08 DIAGNOSIS — E03.9 HYPOTHYROIDISM, UNSPECIFIED TYPE: ICD-10-CM

## 2021-02-08 DIAGNOSIS — E78.2 HYPERLIPIDEMIA, MIXED: ICD-10-CM

## 2021-02-08 PROCEDURE — 3008F BODY MASS INDEX DOCD: CPT | Mod: CPTII,S$GLB,, | Performed by: NURSE PRACTITIONER

## 2021-02-08 PROCEDURE — 99213 OFFICE O/P EST LOW 20 MIN: CPT | Mod: S$GLB,,, | Performed by: NURSE PRACTITIONER

## 2021-02-08 PROCEDURE — 3079F PR MOST RECENT DIASTOLIC BLOOD PRESSURE 80-89 MM HG: ICD-10-PCS | Mod: CPTII,S$GLB,, | Performed by: NURSE PRACTITIONER

## 2021-02-08 PROCEDURE — 3077F SYST BP >= 140 MM HG: CPT | Mod: CPTII,S$GLB,, | Performed by: NURSE PRACTITIONER

## 2021-02-08 PROCEDURE — 3008F PR BODY MASS INDEX (BMI) DOCUMENTED: ICD-10-PCS | Mod: CPTII,S$GLB,, | Performed by: NURSE PRACTITIONER

## 2021-02-08 PROCEDURE — 3288F PR FALLS RISK ASSESSMENT DOCUMENTED: ICD-10-PCS | Mod: CPTII,S$GLB,, | Performed by: NURSE PRACTITIONER

## 2021-02-08 PROCEDURE — 3077F PR MOST RECENT SYSTOLIC BLOOD PRESSURE >= 140 MM HG: ICD-10-PCS | Mod: CPTII,S$GLB,, | Performed by: NURSE PRACTITIONER

## 2021-02-08 PROCEDURE — 3288F FALL RISK ASSESSMENT DOCD: CPT | Mod: CPTII,S$GLB,, | Performed by: NURSE PRACTITIONER

## 2021-02-08 PROCEDURE — 1159F MED LIST DOCD IN RCRD: CPT | Mod: S$GLB,,, | Performed by: NURSE PRACTITIONER

## 2021-02-08 PROCEDURE — 1159F PR MEDICATION LIST DOCUMENTED IN MEDICAL RECORD: ICD-10-PCS | Mod: S$GLB,,, | Performed by: NURSE PRACTITIONER

## 2021-02-08 PROCEDURE — 3079F DIAST BP 80-89 MM HG: CPT | Mod: CPTII,S$GLB,, | Performed by: NURSE PRACTITIONER

## 2021-02-08 PROCEDURE — 1101F PT FALLS ASSESS-DOCD LE1/YR: CPT | Mod: CPTII,S$GLB,, | Performed by: NURSE PRACTITIONER

## 2021-02-08 PROCEDURE — 99999 PR PBB SHADOW E&M-EST. PATIENT-LVL IV: CPT | Mod: PBBFAC,,, | Performed by: NURSE PRACTITIONER

## 2021-02-08 PROCEDURE — 1101F PR PT FALLS ASSESS DOC 0-1 FALLS W/OUT INJ PAST YR: ICD-10-PCS | Mod: CPTII,S$GLB,, | Performed by: NURSE PRACTITIONER

## 2021-02-08 PROCEDURE — 99999 PR PBB SHADOW E&M-EST. PATIENT-LVL IV: ICD-10-PCS | Mod: PBBFAC,,, | Performed by: NURSE PRACTITIONER

## 2021-02-08 PROCEDURE — 99213 PR OFFICE/OUTPT VISIT, EST, LEVL III, 20-29 MIN: ICD-10-PCS | Mod: S$GLB,,, | Performed by: NURSE PRACTITIONER

## 2021-02-08 RX ORDER — LINACLOTIDE 145 UG/1
CAPSULE, GELATIN COATED ORAL
COMMUNITY
Start: 2020-12-21 | End: 2021-10-07

## 2021-02-15 ENCOUNTER — HOSPITAL ENCOUNTER (OUTPATIENT)
Dept: RADIOLOGY | Facility: HOSPITAL | Age: 71
Discharge: HOME | End: 2021-02-15
Attending: NURSE PRACTITIONER
Payer: MEDICARE

## 2021-02-15 DIAGNOSIS — M79.605 PAIN IN BOTH LOWER EXTREMITIES: ICD-10-CM

## 2021-02-15 DIAGNOSIS — M79.604 PAIN IN BOTH LOWER EXTREMITIES: ICD-10-CM

## 2021-02-15 PROCEDURE — 93970 US LOWER EXTREMITY VEINS BILATERAL: ICD-10-PCS | Mod: 26,,, | Performed by: RADIOLOGY

## 2021-02-15 PROCEDURE — 93970 EXTREMITY STUDY: CPT | Mod: 26,,, | Performed by: RADIOLOGY

## 2021-02-15 PROCEDURE — 93970 EXTREMITY STUDY: CPT | Mod: TC

## 2021-03-05 ENCOUNTER — PES CALL (OUTPATIENT)
Dept: ADMINISTRATIVE | Facility: CLINIC | Age: 71
End: 2021-03-05

## 2021-03-11 ENCOUNTER — IMMUNIZATION (OUTPATIENT)
Dept: PRIMARY CARE CLINIC | Facility: CLINIC | Age: 71
End: 2021-03-11
Payer: MEDICARE

## 2021-03-11 DIAGNOSIS — Z23 NEED FOR VACCINATION: Primary | ICD-10-CM

## 2021-03-11 PROCEDURE — 0001A COVID-19, MRNA, LNP-S, PF, 30 MCG/0.3 ML DOSE VACCINE: ICD-10-PCS | Mod: CV19,S$GLB,, | Performed by: FAMILY MEDICINE

## 2021-03-11 PROCEDURE — 0001A COVID-19, MRNA, LNP-S, PF, 30 MCG/0.3 ML DOSE VACCINE: CPT | Mod: CV19,S$GLB,, | Performed by: FAMILY MEDICINE

## 2021-03-11 PROCEDURE — 91300 COVID-19, MRNA, LNP-S, PF, 30 MCG/0.3 ML DOSE VACCINE: CPT | Mod: S$GLB,,, | Performed by: FAMILY MEDICINE

## 2021-03-11 PROCEDURE — 91300 COVID-19, MRNA, LNP-S, PF, 30 MCG/0.3 ML DOSE VACCINE: ICD-10-PCS | Mod: S$GLB,,, | Performed by: FAMILY MEDICINE

## 2021-04-01 ENCOUNTER — IMMUNIZATION (OUTPATIENT)
Dept: PRIMARY CARE CLINIC | Facility: CLINIC | Age: 71
End: 2021-04-01
Payer: MEDICARE

## 2021-04-01 ENCOUNTER — LAB VISIT (OUTPATIENT)
Dept: LAB | Facility: HOSPITAL | Age: 71
End: 2021-04-01
Attending: NURSE PRACTITIONER
Payer: MEDICARE

## 2021-04-01 DIAGNOSIS — E78.5 HYPERLIPIDEMIA, UNSPECIFIED HYPERLIPIDEMIA TYPE: ICD-10-CM

## 2021-04-01 DIAGNOSIS — Z23 NEED FOR VACCINATION: Primary | ICD-10-CM

## 2021-04-01 LAB
ALBUMIN SERPL BCP-MCNC: 3.9 G/DL (ref 3.5–5.2)
ALP SERPL-CCNC: 109 U/L (ref 55–135)
ALT SERPL W/O P-5'-P-CCNC: 16 U/L (ref 10–44)
ANION GAP SERPL CALC-SCNC: 9 MMOL/L (ref 8–16)
AST SERPL-CCNC: 24 U/L (ref 10–40)
BASOPHILS # BLD AUTO: 0.04 K/UL (ref 0–0.2)
BASOPHILS NFR BLD: 0.9 % (ref 0–1.9)
BILIRUB SERPL-MCNC: 0.3 MG/DL (ref 0.1–1)
BUN SERPL-MCNC: 10 MG/DL (ref 8–23)
CALCIUM SERPL-MCNC: 9.4 MG/DL (ref 8.7–10.5)
CHLORIDE SERPL-SCNC: 99 MMOL/L (ref 95–110)
CHOLEST SERPL-MCNC: 207 MG/DL (ref 120–199)
CHOLEST/HDLC SERPL: 2.8 {RATIO} (ref 2–5)
CO2 SERPL-SCNC: 29 MMOL/L (ref 23–29)
CREAT SERPL-MCNC: 0.8 MG/DL (ref 0.5–1.4)
DIFFERENTIAL METHOD: ABNORMAL
EOSINOPHIL # BLD AUTO: 0.2 K/UL (ref 0–0.5)
EOSINOPHIL NFR BLD: 3.9 % (ref 0–8)
ERYTHROCYTE [DISTWIDTH] IN BLOOD BY AUTOMATED COUNT: 12.8 % (ref 11.5–14.5)
EST. GFR  (AFRICAN AMERICAN): >60 ML/MIN/1.73 M^2
EST. GFR  (NON AFRICAN AMERICAN): >60 ML/MIN/1.73 M^2
GLUCOSE SERPL-MCNC: 87 MG/DL (ref 70–110)
HCT VFR BLD AUTO: 46.5 % (ref 37–48.5)
HDLC SERPL-MCNC: 73 MG/DL (ref 40–75)
HDLC SERPL: 35.3 % (ref 20–50)
HGB BLD-MCNC: 14.8 G/DL (ref 12–16)
IMM GRANULOCYTES # BLD AUTO: 0.01 K/UL (ref 0–0.04)
IMM GRANULOCYTES NFR BLD AUTO: 0.2 % (ref 0–0.5)
LDLC SERPL CALC-MCNC: 119 MG/DL (ref 63–159)
LYMPHOCYTES # BLD AUTO: 1.8 K/UL (ref 1–4.8)
LYMPHOCYTES NFR BLD: 39.7 % (ref 18–48)
MCH RBC QN AUTO: 29.9 PG (ref 27–31)
MCHC RBC AUTO-ENTMCNC: 31.8 G/DL (ref 32–36)
MCV RBC AUTO: 94 FL (ref 82–98)
MONOCYTES # BLD AUTO: 0.7 K/UL (ref 0.3–1)
MONOCYTES NFR BLD: 16.1 % (ref 4–15)
NEUTROPHILS # BLD AUTO: 1.8 K/UL (ref 1.8–7.7)
NEUTROPHILS NFR BLD: 39.2 % (ref 38–73)
NONHDLC SERPL-MCNC: 134 MG/DL
NRBC BLD-RTO: 0 /100 WBC
PLATELET # BLD AUTO: 383 K/UL (ref 150–450)
PMV BLD AUTO: 9.3 FL (ref 9.2–12.9)
POTASSIUM SERPL-SCNC: 4.2 MMOL/L (ref 3.5–5.1)
PROT SERPL-MCNC: 7.7 G/DL (ref 6–8.4)
RBC # BLD AUTO: 4.95 M/UL (ref 4–5.4)
SODIUM SERPL-SCNC: 137 MMOL/L (ref 136–145)
TRIGL SERPL-MCNC: 75 MG/DL (ref 30–150)
WBC # BLD AUTO: 4.61 K/UL (ref 3.9–12.7)

## 2021-04-01 PROCEDURE — 0002A COVID-19, MRNA, LNP-S, PF, 30 MCG/0.3 ML DOSE VACCINE: ICD-10-PCS | Mod: CV19,S$GLB,, | Performed by: FAMILY MEDICINE

## 2021-04-01 PROCEDURE — 91300 COVID-19, MRNA, LNP-S, PF, 30 MCG/0.3 ML DOSE VACCINE: ICD-10-PCS | Mod: S$GLB,,, | Performed by: FAMILY MEDICINE

## 2021-04-01 PROCEDURE — 80061 LIPID PANEL: CPT | Performed by: NURSE PRACTITIONER

## 2021-04-01 PROCEDURE — 80053 COMPREHEN METABOLIC PANEL: CPT | Performed by: NURSE PRACTITIONER

## 2021-04-01 PROCEDURE — 91300 COVID-19, MRNA, LNP-S, PF, 30 MCG/0.3 ML DOSE VACCINE: CPT | Mod: S$GLB,,, | Performed by: FAMILY MEDICINE

## 2021-04-01 PROCEDURE — 0002A COVID-19, MRNA, LNP-S, PF, 30 MCG/0.3 ML DOSE VACCINE: CPT | Mod: CV19,S$GLB,, | Performed by: FAMILY MEDICINE

## 2021-04-01 PROCEDURE — 85025 COMPLETE CBC W/AUTO DIFF WBC: CPT | Performed by: NURSE PRACTITIONER

## 2021-04-01 PROCEDURE — 36415 COLL VENOUS BLD VENIPUNCTURE: CPT | Mod: PO | Performed by: NURSE PRACTITIONER

## 2021-04-06 ENCOUNTER — OFFICE VISIT (OUTPATIENT)
Dept: FAMILY MEDICINE | Facility: CLINIC | Age: 71
End: 2021-04-06
Payer: MEDICARE

## 2021-04-06 VITALS
WEIGHT: 146.63 LBS | OXYGEN SATURATION: 97 % | TEMPERATURE: 98 F | DIASTOLIC BLOOD PRESSURE: 77 MMHG | SYSTOLIC BLOOD PRESSURE: 120 MMHG | HEART RATE: 87 BPM | RESPIRATION RATE: 14 BRPM | BODY MASS INDEX: 24.43 KG/M2 | HEIGHT: 65 IN

## 2021-04-06 DIAGNOSIS — F33.1 MAJOR DEPRESSIVE DISORDER, RECURRENT, MODERATE: ICD-10-CM

## 2021-04-06 DIAGNOSIS — Z12.31 ENCOUNTER FOR SCREENING MAMMOGRAM FOR MALIGNANT NEOPLASM OF BREAST: ICD-10-CM

## 2021-04-06 DIAGNOSIS — G35 MULTIPLE SCLEROSIS: ICD-10-CM

## 2021-04-06 DIAGNOSIS — N95.9 MENOPAUSAL AND POSTMENOPAUSAL DISORDER: ICD-10-CM

## 2021-04-06 DIAGNOSIS — D84.9 IMMUNE DEFICIENCY DISORDER: ICD-10-CM

## 2021-04-06 DIAGNOSIS — J44.9 CHRONIC OBSTRUCTIVE PULMONARY DISEASE, UNSPECIFIED COPD TYPE: ICD-10-CM

## 2021-04-06 DIAGNOSIS — D83.9 CVID (COMMON VARIABLE IMMUNODEFICIENCY): ICD-10-CM

## 2021-04-06 DIAGNOSIS — E03.9 HYPOTHYROIDISM, UNSPECIFIED TYPE: ICD-10-CM

## 2021-04-06 DIAGNOSIS — I27.20 PULMONARY HYPERTENSION: ICD-10-CM

## 2021-04-06 DIAGNOSIS — E78.2 HYPERLIPIDEMIA, MIXED: Primary | ICD-10-CM

## 2021-04-06 DIAGNOSIS — I77.1 TORTUOUS AORTA: ICD-10-CM

## 2021-04-06 PROCEDURE — 99214 PR OFFICE/OUTPT VISIT, EST, LEVL IV, 30-39 MIN: ICD-10-PCS | Mod: S$GLB,,, | Performed by: FAMILY MEDICINE

## 2021-04-06 PROCEDURE — 3074F SYST BP LT 130 MM HG: CPT | Mod: CPTII,S$GLB,, | Performed by: FAMILY MEDICINE

## 2021-04-06 PROCEDURE — 99999 PR PBB SHADOW E&M-EST. PATIENT-LVL IV: ICD-10-PCS | Mod: PBBFAC,,, | Performed by: FAMILY MEDICINE

## 2021-04-06 PROCEDURE — 1125F PR PAIN SEVERITY QUANTIFIED, PAIN PRESENT: ICD-10-PCS | Mod: S$GLB,,, | Performed by: FAMILY MEDICINE

## 2021-04-06 PROCEDURE — 3074F PR MOST RECENT SYSTOLIC BLOOD PRESSURE < 130 MM HG: ICD-10-PCS | Mod: CPTII,S$GLB,, | Performed by: FAMILY MEDICINE

## 2021-04-06 PROCEDURE — 1125F AMNT PAIN NOTED PAIN PRSNT: CPT | Mod: S$GLB,,, | Performed by: FAMILY MEDICINE

## 2021-04-06 PROCEDURE — 1159F PR MEDICATION LIST DOCUMENTED IN MEDICAL RECORD: ICD-10-PCS | Mod: S$GLB,,, | Performed by: FAMILY MEDICINE

## 2021-04-06 PROCEDURE — 99499 RISK ADDL DX/OHS AUDIT: ICD-10-PCS | Mod: HCNC,S$GLB,, | Performed by: FAMILY MEDICINE

## 2021-04-06 PROCEDURE — 99499 UNLISTED E&M SERVICE: CPT | Mod: HCNC,S$GLB,, | Performed by: FAMILY MEDICINE

## 2021-04-06 PROCEDURE — 99999 PR PBB SHADOW E&M-EST. PATIENT-LVL IV: CPT | Mod: PBBFAC,,, | Performed by: FAMILY MEDICINE

## 2021-04-06 PROCEDURE — 1159F MED LIST DOCD IN RCRD: CPT | Mod: S$GLB,,, | Performed by: FAMILY MEDICINE

## 2021-04-06 PROCEDURE — 3288F FALL RISK ASSESSMENT DOCD: CPT | Mod: CPTII,S$GLB,, | Performed by: FAMILY MEDICINE

## 2021-04-06 PROCEDURE — 3078F DIAST BP <80 MM HG: CPT | Mod: CPTII,S$GLB,, | Performed by: FAMILY MEDICINE

## 2021-04-06 PROCEDURE — 3078F PR MOST RECENT DIASTOLIC BLOOD PRESSURE < 80 MM HG: ICD-10-PCS | Mod: CPTII,S$GLB,, | Performed by: FAMILY MEDICINE

## 2021-04-06 PROCEDURE — 99214 OFFICE O/P EST MOD 30 MIN: CPT | Mod: S$GLB,,, | Performed by: FAMILY MEDICINE

## 2021-04-06 PROCEDURE — 3008F BODY MASS INDEX DOCD: CPT | Mod: CPTII,S$GLB,, | Performed by: FAMILY MEDICINE

## 2021-04-06 PROCEDURE — 1101F PT FALLS ASSESS-DOCD LE1/YR: CPT | Mod: CPTII,S$GLB,, | Performed by: FAMILY MEDICINE

## 2021-04-06 PROCEDURE — 3008F PR BODY MASS INDEX (BMI) DOCUMENTED: ICD-10-PCS | Mod: CPTII,S$GLB,, | Performed by: FAMILY MEDICINE

## 2021-04-06 PROCEDURE — 1101F PR PT FALLS ASSESS DOC 0-1 FALLS W/OUT INJ PAST YR: ICD-10-PCS | Mod: CPTII,S$GLB,, | Performed by: FAMILY MEDICINE

## 2021-04-06 PROCEDURE — 3288F PR FALLS RISK ASSESSMENT DOCUMENTED: ICD-10-PCS | Mod: CPTII,S$GLB,, | Performed by: FAMILY MEDICINE

## 2021-04-06 RX ORDER — ROSUVASTATIN CALCIUM 10 MG/1
10 TABLET, COATED ORAL DAILY
COMMUNITY
End: 2021-06-02 | Stop reason: SDUPTHER

## 2021-06-02 DIAGNOSIS — E78.2 HYPERLIPIDEMIA, MIXED: Primary | ICD-10-CM

## 2021-06-02 RX ORDER — ROSUVASTATIN CALCIUM 10 MG/1
10 TABLET, COATED ORAL DAILY
Qty: 90 TABLET | Refills: 3 | Status: SHIPPED | OUTPATIENT
Start: 2021-06-02 | End: 2021-06-15 | Stop reason: SDUPTHER

## 2021-06-15 DIAGNOSIS — E78.2 HYPERLIPIDEMIA, MIXED: ICD-10-CM

## 2021-06-16 RX ORDER — ROSUVASTATIN CALCIUM 10 MG/1
10 TABLET, COATED ORAL DAILY
Qty: 90 TABLET | Refills: 3 | Status: SHIPPED | OUTPATIENT
Start: 2021-06-16 | End: 2021-12-23 | Stop reason: SDUPTHER

## 2021-07-30 ENCOUNTER — PATIENT OUTREACH (OUTPATIENT)
Dept: ADMINISTRATIVE | Facility: HOSPITAL | Age: 71
End: 2021-07-30

## 2021-10-01 ENCOUNTER — LAB VISIT (OUTPATIENT)
Dept: LAB | Facility: HOSPITAL | Age: 71
End: 2021-10-01
Attending: FAMILY MEDICINE
Payer: MEDICARE

## 2021-10-01 DIAGNOSIS — E78.2 HYPERLIPIDEMIA, MIXED: ICD-10-CM

## 2021-10-01 DIAGNOSIS — E03.9 HYPOTHYROIDISM, UNSPECIFIED TYPE: ICD-10-CM

## 2021-10-01 LAB
ALBUMIN SERPL BCP-MCNC: 3.5 G/DL (ref 3.5–5.2)
ALP SERPL-CCNC: 87 U/L (ref 55–135)
ALT SERPL W/O P-5'-P-CCNC: 17 U/L (ref 10–44)
ANION GAP SERPL CALC-SCNC: 13 MMOL/L (ref 8–16)
AST SERPL-CCNC: 19 U/L (ref 10–40)
BASOPHILS # BLD AUTO: 0.08 K/UL (ref 0–0.2)
BASOPHILS NFR BLD: 1.6 % (ref 0–1.9)
BILIRUB SERPL-MCNC: 0.3 MG/DL (ref 0.1–1)
BUN SERPL-MCNC: 11 MG/DL (ref 8–23)
CALCIUM SERPL-MCNC: 9 MG/DL (ref 8.7–10.5)
CHLORIDE SERPL-SCNC: 102 MMOL/L (ref 95–110)
CHOLEST SERPL-MCNC: 163 MG/DL (ref 120–199)
CHOLEST/HDLC SERPL: 2.4 {RATIO} (ref 2–5)
CO2 SERPL-SCNC: 25 MMOL/L (ref 23–29)
CREAT SERPL-MCNC: 0.7 MG/DL (ref 0.5–1.4)
DIFFERENTIAL METHOD: ABNORMAL
EOSINOPHIL # BLD AUTO: 0.6 K/UL (ref 0–0.5)
EOSINOPHIL NFR BLD: 12.2 % (ref 0–8)
ERYTHROCYTE [DISTWIDTH] IN BLOOD BY AUTOMATED COUNT: 12.5 % (ref 11.5–14.5)
EST. GFR  (AFRICAN AMERICAN): >60 ML/MIN/1.73 M^2
EST. GFR  (NON AFRICAN AMERICAN): >60 ML/MIN/1.73 M^2
GLUCOSE SERPL-MCNC: 79 MG/DL (ref 70–110)
HCT VFR BLD AUTO: 41.3 % (ref 37–48.5)
HDLC SERPL-MCNC: 68 MG/DL (ref 40–75)
HDLC SERPL: 41.7 % (ref 20–50)
HGB BLD-MCNC: 13.4 G/DL (ref 12–16)
IMM GRANULOCYTES # BLD AUTO: 0.01 K/UL (ref 0–0.04)
IMM GRANULOCYTES NFR BLD AUTO: 0.2 % (ref 0–0.5)
LDLC SERPL CALC-MCNC: 85.8 MG/DL (ref 63–159)
LYMPHOCYTES # BLD AUTO: 1.5 K/UL (ref 1–4.8)
LYMPHOCYTES NFR BLD: 29.9 % (ref 18–48)
MCH RBC QN AUTO: 30.1 PG (ref 27–31)
MCHC RBC AUTO-ENTMCNC: 32.4 G/DL (ref 32–36)
MCV RBC AUTO: 93 FL (ref 82–98)
MONOCYTES # BLD AUTO: 0.8 K/UL (ref 0.3–1)
MONOCYTES NFR BLD: 15.3 % (ref 4–15)
NEUTROPHILS # BLD AUTO: 2.1 K/UL (ref 1.8–7.7)
NEUTROPHILS NFR BLD: 40.8 % (ref 38–73)
NONHDLC SERPL-MCNC: 95 MG/DL
NRBC BLD-RTO: 0 /100 WBC
PLATELET # BLD AUTO: 329 K/UL (ref 150–450)
PMV BLD AUTO: 9.4 FL (ref 9.2–12.9)
POTASSIUM SERPL-SCNC: 3.9 MMOL/L (ref 3.5–5.1)
PROT SERPL-MCNC: 6.9 G/DL (ref 6–8.4)
RBC # BLD AUTO: 4.45 M/UL (ref 4–5.4)
SODIUM SERPL-SCNC: 140 MMOL/L (ref 136–145)
T4 FREE SERPL-MCNC: 0.77 NG/DL (ref 0.71–1.51)
TRIGL SERPL-MCNC: 46 MG/DL (ref 30–150)
TSH SERPL DL<=0.005 MIU/L-ACNC: 3.69 UIU/ML (ref 0.4–4)
WBC # BLD AUTO: 5.02 K/UL (ref 3.9–12.7)

## 2021-10-01 PROCEDURE — 84439 ASSAY OF FREE THYROXINE: CPT | Mod: HCNC | Performed by: FAMILY MEDICINE

## 2021-10-01 PROCEDURE — 80061 LIPID PANEL: CPT | Mod: HCNC | Performed by: FAMILY MEDICINE

## 2021-10-01 PROCEDURE — 80053 COMPREHEN METABOLIC PANEL: CPT | Mod: HCNC | Performed by: FAMILY MEDICINE

## 2021-10-01 PROCEDURE — 84443 ASSAY THYROID STIM HORMONE: CPT | Mod: HCNC | Performed by: FAMILY MEDICINE

## 2021-10-01 PROCEDURE — 36415 COLL VENOUS BLD VENIPUNCTURE: CPT | Mod: HCNC,PO | Performed by: FAMILY MEDICINE

## 2021-10-01 PROCEDURE — 85025 COMPLETE CBC W/AUTO DIFF WBC: CPT | Mod: HCNC | Performed by: FAMILY MEDICINE

## 2021-10-07 ENCOUNTER — OFFICE VISIT (OUTPATIENT)
Dept: FAMILY MEDICINE | Facility: CLINIC | Age: 71
End: 2021-10-07
Payer: MEDICARE

## 2021-10-07 VITALS
SYSTOLIC BLOOD PRESSURE: 139 MMHG | HEART RATE: 80 BPM | BODY MASS INDEX: 24.43 KG/M2 | HEIGHT: 65 IN | TEMPERATURE: 99 F | WEIGHT: 146.63 LBS | RESPIRATION RATE: 12 BRPM | OXYGEN SATURATION: 97 % | DIASTOLIC BLOOD PRESSURE: 88 MMHG

## 2021-10-07 DIAGNOSIS — Z12.31 ENCOUNTER FOR SCREENING MAMMOGRAM FOR MALIGNANT NEOPLASM OF BREAST: ICD-10-CM

## 2021-10-07 DIAGNOSIS — E78.2 HYPERLIPIDEMIA, MIXED: Primary | ICD-10-CM

## 2021-10-07 DIAGNOSIS — R35.0 FREQUENCY OF URINATION: ICD-10-CM

## 2021-10-07 DIAGNOSIS — N95.9 MENOPAUSAL AND POSTMENOPAUSAL DISORDER: ICD-10-CM

## 2021-10-07 DIAGNOSIS — K59.09 CHRONIC CONSTIPATION: ICD-10-CM

## 2021-10-07 DIAGNOSIS — E03.9 HYPOTHYROIDISM, UNSPECIFIED TYPE: ICD-10-CM

## 2021-10-07 LAB
BILIRUB SERPL-MCNC: NEGATIVE MG/DL
BLOOD URINE, POC: NEGATIVE
COLOR, POC UA: ABNORMAL
GLUCOSE UR QL STRIP: NEGATIVE
KETONES UR QL STRIP: NEGATIVE
LEUKOCYTE ESTERASE URINE, POC: POSITIVE
NITRITE, POC UA: NEGATIVE
PH, POC UA: 6
PROTEIN, POC: ABNORMAL
SPECIFIC GRAVITY, POC UA: 1
UROBILINOGEN, POC UA: NORMAL

## 2021-10-07 PROCEDURE — 3008F PR BODY MASS INDEX (BMI) DOCUMENTED: ICD-10-PCS | Mod: HCNC,CPTII,S$GLB, | Performed by: FAMILY MEDICINE

## 2021-10-07 PROCEDURE — 99999 PR PBB SHADOW E&M-EST. PATIENT-LVL III: ICD-10-PCS | Mod: PBBFAC,HCNC,, | Performed by: FAMILY MEDICINE

## 2021-10-07 PROCEDURE — 99499 UNLISTED E&M SERVICE: CPT | Mod: S$GLB,,, | Performed by: FAMILY MEDICINE

## 2021-10-07 PROCEDURE — 81001 POCT URINALYSIS, DIPSTICK OR TABLET REAGENT, AUTOMATED, WITH MICROSCOP: ICD-10-PCS | Mod: HCNC,S$GLB,, | Performed by: FAMILY MEDICINE

## 2021-10-07 PROCEDURE — 99214 PR OFFICE/OUTPT VISIT, EST, LEVL IV, 30-39 MIN: ICD-10-PCS | Mod: HCNC,S$GLB,, | Performed by: FAMILY MEDICINE

## 2021-10-07 PROCEDURE — 99214 OFFICE O/P EST MOD 30 MIN: CPT | Mod: HCNC,S$GLB,, | Performed by: FAMILY MEDICINE

## 2021-10-07 PROCEDURE — 3008F BODY MASS INDEX DOCD: CPT | Mod: HCNC,CPTII,S$GLB, | Performed by: FAMILY MEDICINE

## 2021-10-07 PROCEDURE — 3079F DIAST BP 80-89 MM HG: CPT | Mod: HCNC,CPTII,S$GLB, | Performed by: FAMILY MEDICINE

## 2021-10-07 PROCEDURE — 3075F PR MOST RECENT SYSTOLIC BLOOD PRESS GE 130-139MM HG: ICD-10-PCS | Mod: HCNC,CPTII,S$GLB, | Performed by: FAMILY MEDICINE

## 2021-10-07 PROCEDURE — 81001 URINALYSIS AUTO W/SCOPE: CPT | Mod: HCNC,S$GLB,, | Performed by: FAMILY MEDICINE

## 2021-10-07 PROCEDURE — 3075F SYST BP GE 130 - 139MM HG: CPT | Mod: HCNC,CPTII,S$GLB, | Performed by: FAMILY MEDICINE

## 2021-10-07 PROCEDURE — 1126F AMNT PAIN NOTED NONE PRSNT: CPT | Mod: HCNC,CPTII,S$GLB, | Performed by: FAMILY MEDICINE

## 2021-10-07 PROCEDURE — 1160F PR REVIEW ALL MEDS BY PRESCRIBER/CLIN PHARMACIST DOCUMENTED: ICD-10-PCS | Mod: HCNC,CPTII,S$GLB, | Performed by: FAMILY MEDICINE

## 2021-10-07 PROCEDURE — 1159F PR MEDICATION LIST DOCUMENTED IN MEDICAL RECORD: ICD-10-PCS | Mod: HCNC,CPTII,S$GLB, | Performed by: FAMILY MEDICINE

## 2021-10-07 PROCEDURE — 1159F MED LIST DOCD IN RCRD: CPT | Mod: HCNC,CPTII,S$GLB, | Performed by: FAMILY MEDICINE

## 2021-10-07 PROCEDURE — 1160F RVW MEDS BY RX/DR IN RCRD: CPT | Mod: HCNC,CPTII,S$GLB, | Performed by: FAMILY MEDICINE

## 2021-10-07 PROCEDURE — 87086 URINE CULTURE/COLONY COUNT: CPT | Mod: HCNC | Performed by: FAMILY MEDICINE

## 2021-10-07 PROCEDURE — 3079F PR MOST RECENT DIASTOLIC BLOOD PRESSURE 80-89 MM HG: ICD-10-PCS | Mod: HCNC,CPTII,S$GLB, | Performed by: FAMILY MEDICINE

## 2021-10-07 PROCEDURE — 1126F PR PAIN SEVERITY QUANTIFIED, NO PAIN PRESENT: ICD-10-PCS | Mod: HCNC,CPTII,S$GLB, | Performed by: FAMILY MEDICINE

## 2021-10-07 PROCEDURE — 99999 PR PBB SHADOW E&M-EST. PATIENT-LVL III: CPT | Mod: PBBFAC,HCNC,, | Performed by: FAMILY MEDICINE

## 2021-10-07 PROCEDURE — 99499 RISK ADDL DX/OHS AUDIT: ICD-10-PCS | Mod: S$GLB,,, | Performed by: FAMILY MEDICINE

## 2021-10-07 RX ORDER — FOLIC ACID 0.8 MG
800 TABLET ORAL DAILY
COMMUNITY

## 2021-10-09 LAB
BACTERIA UR CULT: NORMAL
BACTERIA UR CULT: NORMAL

## 2021-10-11 ENCOUNTER — TELEPHONE (OUTPATIENT)
Dept: FAMILY MEDICINE | Facility: CLINIC | Age: 71
End: 2021-10-11

## 2021-10-11 DIAGNOSIS — K59.09 CHRONIC CONSTIPATION: Primary | ICD-10-CM

## 2021-10-12 RX ORDER — LACTULOSE 10 G/15ML
20 SOLUTION ORAL 2 TIMES DAILY PRN
Qty: 900 ML | Refills: 5 | Status: SHIPPED | OUTPATIENT
Start: 2021-10-12

## 2021-10-14 ENCOUNTER — HOSPITAL ENCOUNTER (OUTPATIENT)
Dept: RADIOLOGY | Facility: CLINIC | Age: 71
Discharge: HOME | End: 2021-10-14
Attending: FAMILY MEDICINE
Payer: MEDICARE

## 2021-10-14 DIAGNOSIS — N95.9 MENOPAUSAL AND POSTMENOPAUSAL DISORDER: ICD-10-CM

## 2021-10-14 DIAGNOSIS — Z12.31 ENCOUNTER FOR SCREENING MAMMOGRAM FOR MALIGNANT NEOPLASM OF BREAST: ICD-10-CM

## 2021-10-14 PROCEDURE — 77067 MAMMO DIGITAL SCREENING BILAT WITH TOMO: ICD-10-PCS | Mod: 26,HCNC,, | Performed by: RADIOLOGY

## 2021-10-14 PROCEDURE — 77080 DXA BONE DENSITY AXIAL: CPT | Mod: 26,HCNC,, | Performed by: RADIOLOGY

## 2021-10-14 PROCEDURE — 77067 SCR MAMMO BI INCL CAD: CPT | Mod: TC,HCNC,PO

## 2021-10-14 PROCEDURE — 77067 SCR MAMMO BI INCL CAD: CPT | Mod: 26,HCNC,, | Performed by: RADIOLOGY

## 2021-10-14 PROCEDURE — 77080 DXA BONE DENSITY AXIAL: CPT | Mod: TC,HCNC,PO

## 2021-10-14 PROCEDURE — 77080 DEXA BONE DENSITY SPINE HIP: ICD-10-PCS | Mod: 26,HCNC,, | Performed by: RADIOLOGY

## 2021-10-14 PROCEDURE — 77063 BREAST TOMOSYNTHESIS BI: CPT | Mod: 26,HCNC,, | Performed by: RADIOLOGY

## 2021-10-14 PROCEDURE — 77063 MAMMO DIGITAL SCREENING BILAT WITH TOMO: ICD-10-PCS | Mod: 26,HCNC,, | Performed by: RADIOLOGY

## 2021-10-21 ENCOUNTER — TELEPHONE (OUTPATIENT)
Dept: FAMILY MEDICINE | Facility: CLINIC | Age: 71
End: 2021-10-21

## 2021-11-01 ENCOUNTER — TELEPHONE (OUTPATIENT)
Dept: FAMILY MEDICINE | Facility: CLINIC | Age: 71
End: 2021-11-01
Payer: MEDICARE

## 2021-11-09 ENCOUNTER — OFFICE VISIT (OUTPATIENT)
Dept: FAMILY MEDICINE | Facility: CLINIC | Age: 71
End: 2021-11-09
Payer: MEDICARE

## 2021-11-09 ENCOUNTER — TELEPHONE (OUTPATIENT)
Dept: FAMILY MEDICINE | Facility: CLINIC | Age: 71
End: 2021-11-09

## 2021-11-09 VITALS
OXYGEN SATURATION: 97 % | WEIGHT: 145.5 LBS | DIASTOLIC BLOOD PRESSURE: 100 MMHG | BODY MASS INDEX: 24.24 KG/M2 | HEART RATE: 74 BPM | RESPIRATION RATE: 18 BRPM | HEIGHT: 65 IN | TEMPERATURE: 99 F | SYSTOLIC BLOOD PRESSURE: 158 MMHG

## 2021-11-09 DIAGNOSIS — J44.9 CHRONIC OBSTRUCTIVE PULMONARY DISEASE, UNSPECIFIED COPD TYPE: ICD-10-CM

## 2021-11-09 DIAGNOSIS — E78.2 HYPERLIPIDEMIA, MIXED: ICD-10-CM

## 2021-11-09 DIAGNOSIS — G35 MULTIPLE SCLEROSIS: ICD-10-CM

## 2021-11-09 DIAGNOSIS — R93.89 THICKENED ENDOMETRIUM: Primary | ICD-10-CM

## 2021-11-09 DIAGNOSIS — E03.9 HYPOTHYROIDISM, UNSPECIFIED TYPE: ICD-10-CM

## 2021-11-09 DIAGNOSIS — F33.1 MAJOR DEPRESSIVE DISORDER, RECURRENT, MODERATE: ICD-10-CM

## 2021-11-09 DIAGNOSIS — D83.9 CVID (COMMON VARIABLE IMMUNODEFICIENCY): ICD-10-CM

## 2021-11-09 PROCEDURE — 1159F MED LIST DOCD IN RCRD: CPT | Mod: HCNC,CPTII,S$GLB, | Performed by: FAMILY MEDICINE

## 2021-11-09 PROCEDURE — 3288F PR FALLS RISK ASSESSMENT DOCUMENTED: ICD-10-PCS | Mod: HCNC,CPTII,S$GLB, | Performed by: FAMILY MEDICINE

## 2021-11-09 PROCEDURE — 99499 UNLISTED E&M SERVICE: CPT | Mod: S$GLB,,, | Performed by: FAMILY MEDICINE

## 2021-11-09 PROCEDURE — 99214 OFFICE O/P EST MOD 30 MIN: CPT | Mod: HCNC,S$GLB,, | Performed by: FAMILY MEDICINE

## 2021-11-09 PROCEDURE — 3077F SYST BP >= 140 MM HG: CPT | Mod: HCNC,CPTII,S$GLB, | Performed by: FAMILY MEDICINE

## 2021-11-09 PROCEDURE — 99214 PR OFFICE/OUTPT VISIT, EST, LEVL IV, 30-39 MIN: ICD-10-PCS | Mod: HCNC,S$GLB,, | Performed by: FAMILY MEDICINE

## 2021-11-09 PROCEDURE — 99499 RISK ADDL DX/OHS AUDIT: ICD-10-PCS | Mod: S$GLB,,, | Performed by: FAMILY MEDICINE

## 2021-11-09 PROCEDURE — 3080F DIAST BP >= 90 MM HG: CPT | Mod: HCNC,CPTII,S$GLB, | Performed by: FAMILY MEDICINE

## 2021-11-09 PROCEDURE — 1160F PR REVIEW ALL MEDS BY PRESCRIBER/CLIN PHARMACIST DOCUMENTED: ICD-10-PCS | Mod: HCNC,CPTII,S$GLB, | Performed by: FAMILY MEDICINE

## 2021-11-09 PROCEDURE — 3288F FALL RISK ASSESSMENT DOCD: CPT | Mod: HCNC,CPTII,S$GLB, | Performed by: FAMILY MEDICINE

## 2021-11-09 PROCEDURE — 3080F PR MOST RECENT DIASTOLIC BLOOD PRESSURE >= 90 MM HG: ICD-10-PCS | Mod: HCNC,CPTII,S$GLB, | Performed by: FAMILY MEDICINE

## 2021-11-09 PROCEDURE — 1101F PT FALLS ASSESS-DOCD LE1/YR: CPT | Mod: HCNC,CPTII,S$GLB, | Performed by: FAMILY MEDICINE

## 2021-11-09 PROCEDURE — 3008F PR BODY MASS INDEX (BMI) DOCUMENTED: ICD-10-PCS | Mod: HCNC,CPTII,S$GLB, | Performed by: FAMILY MEDICINE

## 2021-11-09 PROCEDURE — 3008F BODY MASS INDEX DOCD: CPT | Mod: HCNC,CPTII,S$GLB, | Performed by: FAMILY MEDICINE

## 2021-11-09 PROCEDURE — 3077F PR MOST RECENT SYSTOLIC BLOOD PRESSURE >= 140 MM HG: ICD-10-PCS | Mod: HCNC,CPTII,S$GLB, | Performed by: FAMILY MEDICINE

## 2021-11-09 PROCEDURE — 1160F RVW MEDS BY RX/DR IN RCRD: CPT | Mod: HCNC,CPTII,S$GLB, | Performed by: FAMILY MEDICINE

## 2021-11-09 PROCEDURE — 99999 PR PBB SHADOW E&M-EST. PATIENT-LVL III: ICD-10-PCS | Mod: PBBFAC,HCNC,, | Performed by: FAMILY MEDICINE

## 2021-11-09 PROCEDURE — 99999 PR PBB SHADOW E&M-EST. PATIENT-LVL III: CPT | Mod: PBBFAC,HCNC,, | Performed by: FAMILY MEDICINE

## 2021-11-09 PROCEDURE — 1126F AMNT PAIN NOTED NONE PRSNT: CPT | Mod: HCNC,CPTII,S$GLB, | Performed by: FAMILY MEDICINE

## 2021-11-09 PROCEDURE — 1159F PR MEDICATION LIST DOCUMENTED IN MEDICAL RECORD: ICD-10-PCS | Mod: HCNC,CPTII,S$GLB, | Performed by: FAMILY MEDICINE

## 2021-11-09 PROCEDURE — 1101F PR PT FALLS ASSESS DOC 0-1 FALLS W/OUT INJ PAST YR: ICD-10-PCS | Mod: HCNC,CPTII,S$GLB, | Performed by: FAMILY MEDICINE

## 2021-11-09 PROCEDURE — 1126F PR PAIN SEVERITY QUANTIFIED, NO PAIN PRESENT: ICD-10-PCS | Mod: HCNC,CPTII,S$GLB, | Performed by: FAMILY MEDICINE

## 2021-12-21 ENCOUNTER — PATIENT MESSAGE (OUTPATIENT)
Dept: NEUROLOGY | Facility: CLINIC | Age: 71
End: 2021-12-21
Payer: MEDICARE

## 2021-12-23 DIAGNOSIS — E78.2 HYPERLIPIDEMIA, MIXED: ICD-10-CM

## 2021-12-23 RX ORDER — ROSUVASTATIN CALCIUM 10 MG/1
10 TABLET, COATED ORAL DAILY
Qty: 90 TABLET | Refills: 0 | Status: SHIPPED | OUTPATIENT
Start: 2021-12-23 | End: 2022-05-05 | Stop reason: SDUPTHER

## 2022-02-28 ENCOUNTER — PATIENT MESSAGE (OUTPATIENT)
Dept: PSYCHIATRY | Facility: CLINIC | Age: 72
End: 2022-02-28
Payer: MEDICARE

## 2022-04-25 ENCOUNTER — TELEPHONE (OUTPATIENT)
Dept: FAMILY MEDICINE | Facility: CLINIC | Age: 72
End: 2022-04-25
Payer: MEDICARE

## 2022-05-05 ENCOUNTER — OFFICE VISIT (OUTPATIENT)
Dept: FAMILY MEDICINE | Facility: CLINIC | Age: 72
End: 2022-05-05
Payer: MEDICARE

## 2022-05-05 VITALS
OXYGEN SATURATION: 96 % | WEIGHT: 143.94 LBS | RESPIRATION RATE: 14 BRPM | SYSTOLIC BLOOD PRESSURE: 139 MMHG | TEMPERATURE: 99 F | BODY MASS INDEX: 23.98 KG/M2 | HEIGHT: 65 IN | DIASTOLIC BLOOD PRESSURE: 80 MMHG | HEART RATE: 100 BPM

## 2022-05-05 DIAGNOSIS — E04.1 THYROID NODULE: ICD-10-CM

## 2022-05-05 DIAGNOSIS — F33.9 EPISODE OF RECURRENT MAJOR DEPRESSIVE DISORDER, UNSPECIFIED DEPRESSION EPISODE SEVERITY: ICD-10-CM

## 2022-05-05 DIAGNOSIS — J44.9 CHRONIC OBSTRUCTIVE PULMONARY DISEASE, UNSPECIFIED COPD TYPE: ICD-10-CM

## 2022-05-05 DIAGNOSIS — E78.2 HYPERLIPIDEMIA, MIXED: Primary | ICD-10-CM

## 2022-05-05 DIAGNOSIS — K21.9 GASTROESOPHAGEAL REFLUX DISEASE WITHOUT ESOPHAGITIS: ICD-10-CM

## 2022-05-05 DIAGNOSIS — E03.9 HYPOTHYROIDISM, UNSPECIFIED TYPE: ICD-10-CM

## 2022-05-05 PROCEDURE — 3288F FALL RISK ASSESSMENT DOCD: CPT | Mod: CPTII,S$GLB,, | Performed by: FAMILY MEDICINE

## 2022-05-05 PROCEDURE — 99499 UNLISTED E&M SERVICE: CPT | Mod: HCNC,S$GLB,, | Performed by: FAMILY MEDICINE

## 2022-05-05 PROCEDURE — 3008F BODY MASS INDEX DOCD: CPT | Mod: CPTII,S$GLB,, | Performed by: FAMILY MEDICINE

## 2022-05-05 PROCEDURE — 3288F PR FALLS RISK ASSESSMENT DOCUMENTED: ICD-10-PCS | Mod: CPTII,S$GLB,, | Performed by: FAMILY MEDICINE

## 2022-05-05 PROCEDURE — 99499 RISK ADDL DX/OHS AUDIT: ICD-10-PCS | Mod: HCNC,S$GLB,, | Performed by: FAMILY MEDICINE

## 2022-05-05 PROCEDURE — 1101F PR PT FALLS ASSESS DOC 0-1 FALLS W/OUT INJ PAST YR: ICD-10-PCS | Mod: CPTII,S$GLB,, | Performed by: FAMILY MEDICINE

## 2022-05-05 PROCEDURE — 1126F PR PAIN SEVERITY QUANTIFIED, NO PAIN PRESENT: ICD-10-PCS | Mod: CPTII,S$GLB,, | Performed by: FAMILY MEDICINE

## 2022-05-05 PROCEDURE — 1101F PT FALLS ASSESS-DOCD LE1/YR: CPT | Mod: CPTII,S$GLB,, | Performed by: FAMILY MEDICINE

## 2022-05-05 PROCEDURE — 1159F MED LIST DOCD IN RCRD: CPT | Mod: CPTII,S$GLB,, | Performed by: FAMILY MEDICINE

## 2022-05-05 PROCEDURE — 3008F PR BODY MASS INDEX (BMI) DOCUMENTED: ICD-10-PCS | Mod: CPTII,S$GLB,, | Performed by: FAMILY MEDICINE

## 2022-05-05 PROCEDURE — 3075F SYST BP GE 130 - 139MM HG: CPT | Mod: CPTII,S$GLB,, | Performed by: FAMILY MEDICINE

## 2022-05-05 PROCEDURE — 99999 PR PBB SHADOW E&M-EST. PATIENT-LVL III: CPT | Mod: PBBFAC,,, | Performed by: FAMILY MEDICINE

## 2022-05-05 PROCEDURE — 99214 OFFICE O/P EST MOD 30 MIN: CPT | Mod: S$GLB,,, | Performed by: FAMILY MEDICINE

## 2022-05-05 PROCEDURE — 1126F AMNT PAIN NOTED NONE PRSNT: CPT | Mod: CPTII,S$GLB,, | Performed by: FAMILY MEDICINE

## 2022-05-05 PROCEDURE — 3075F PR MOST RECENT SYSTOLIC BLOOD PRESS GE 130-139MM HG: ICD-10-PCS | Mod: CPTII,S$GLB,, | Performed by: FAMILY MEDICINE

## 2022-05-05 PROCEDURE — 1160F RVW MEDS BY RX/DR IN RCRD: CPT | Mod: CPTII,S$GLB,, | Performed by: FAMILY MEDICINE

## 2022-05-05 PROCEDURE — 1160F PR REVIEW ALL MEDS BY PRESCRIBER/CLIN PHARMACIST DOCUMENTED: ICD-10-PCS | Mod: CPTII,S$GLB,, | Performed by: FAMILY MEDICINE

## 2022-05-05 PROCEDURE — 99214 PR OFFICE/OUTPT VISIT, EST, LEVL IV, 30-39 MIN: ICD-10-PCS | Mod: S$GLB,,, | Performed by: FAMILY MEDICINE

## 2022-05-05 PROCEDURE — 99999 PR PBB SHADOW E&M-EST. PATIENT-LVL III: ICD-10-PCS | Mod: PBBFAC,,, | Performed by: FAMILY MEDICINE

## 2022-05-05 PROCEDURE — 3079F DIAST BP 80-89 MM HG: CPT | Mod: CPTII,S$GLB,, | Performed by: FAMILY MEDICINE

## 2022-05-05 PROCEDURE — 3079F PR MOST RECENT DIASTOLIC BLOOD PRESSURE 80-89 MM HG: ICD-10-PCS | Mod: CPTII,S$GLB,, | Performed by: FAMILY MEDICINE

## 2022-05-05 PROCEDURE — 1159F PR MEDICATION LIST DOCUMENTED IN MEDICAL RECORD: ICD-10-PCS | Mod: CPTII,S$GLB,, | Performed by: FAMILY MEDICINE

## 2022-05-05 RX ORDER — ROSUVASTATIN CALCIUM 10 MG/1
10 TABLET, COATED ORAL DAILY
Qty: 90 TABLET | Refills: 3 | Status: SHIPPED | OUTPATIENT
Start: 2022-05-05 | End: 2023-01-09 | Stop reason: SDUPTHER

## 2022-05-05 NOTE — PROGRESS NOTES
Subjective:       Patient ID: Babita Thomas is a 72 y.o. female.    Chief Complaint: Follow-up (6mth f/u )    HPI  Review of Systems   Constitutional: Negative for fatigue and unexpected weight change.   Respiratory: Negative for chest tightness and shortness of breath.    Cardiovascular: Negative for chest pain, palpitations and leg swelling.   Gastrointestinal: Negative for abdominal pain.   Musculoskeletal: Negative for arthralgias.   Neurological: Negative for dizziness, syncope, light-headedness and headaches.       Patient Active Problem List   Diagnosis    Multiple sclerosis    GERD (gastroesophageal reflux disease)    Thyroid nodule    Tortuous aorta    Asthma    Chronic pansinusitis    Hypertrophy of inferior nasal turbinate    Insomnia    Immune deficiency disorder    Eosinophilia    Recurrent major depressive disorder    Gross hematuria    Closed nondisplaced fracture of medial cuneiform of left foot    Closed nondisplaced fracture of cuboid bone of left foot    Closed displaced fracture of fifth metatarsal bone of left foot    Closed displaced fracture of fourth metatarsal bone of left foot    Closed displaced fracture of third metatarsal bone of left foot    Closed displaced fracture of second metatarsal bone of left foot    Closed nondisplaced fracture of first left metatarsal bone    Lisfranc dislocation, left, initial encounter    Hypothyroidism    Myalgia due to statin    Hyperlipidemia, mixed    Pulmonary hypertension    Chronic obstructive pulmonary disease     Patient is here for a chronic conditions follow up.  Appt 11/2022 for yearly labs and check up  Reviewed labs 10/2021  mammo 10/2021 neg   dexa 10/2021 osteopenia     C/o chronic constipation- tried fiber, stool softeners, miralax and linzess 75mg bid . Tried trulance and caused cramping. Will try again qod or q 3 days.  Worked at first but then stopped. Last colonoscopy 7/2021 GI Dr. Cotter- bowel prep  unsatisfactory. 2010 no polyps. Dr. Cotter GI GERD, gastroparesis, lactose intolerance.     Pulm Dr. Heard asthma-rare use of albuterol     Dr. Cantu podiatry-h/o lisfranc fracture        Immunology Dr. 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 Not seeing a current neurologist     Dr. Zhu PSych prescribed metformin for weight mgmt-no longer taking. Mood well controlled on prozac     HPL- stopped mevacor due to muscle pain.  Now on crestor     GYN Dr. Huitron  eval for postmenopausal bleeding 10/20. U/s and EMB done Dr. Nazario Retroverted uterus with fluid within the endometrium measuring 7 mm.  This mildly thickened.  A follow-up ultrasound in 6 weeks or hysteroscopy is recommended to exclude malignancy   2.2 cm fundal fibroid which previously measured 1.9 cm. Had D &C and bx 11/20 . Has had f/u imaging with Dr. Huitron (not available) and has had no further bleeding episode        H/o thyroid nodule last u/s 2/19 and 1/2021 no change in small nodules and cysts compared to u/s 2018. Had been on thyroid meds in past but told to stop that she didn't need them anymore. Thyroid u/s 1/2021 Several thyroid lesions without detrimental change or indication for biopsy.     Had trach at 5-6 years old due to peanut stuck in throat  Objective:      Physical Exam  Vitals and nursing note reviewed.   Constitutional:       Appearance: She is well-developed.   Cardiovascular:      Rate and Rhythm: Normal rate and regular rhythm.      Heart sounds: Normal heart sounds.   Pulmonary:      Effort: Pulmonary effort is normal.      Breath sounds: Normal breath sounds.   Skin:     General: Skin is warm and dry.   Neurological:      Mental Status: She is alert and oriented to person, place, and time.         Assessment:       1. Hyperlipidemia, mixed    2. Chronic obstructive pulmonary disease, unspecified COPD type    3. Episode of recurrent major depressive disorder, unspecified  depression episode severity    4. Hypothyroidism, unspecified type    5. Gastroesophageal reflux disease without esophagitis    6. Thyroid nodule        Plan:         1. Hyperlipidemia, mixed  Controlled on current medications.  Continue current medications.    - rosuvastatin (CRESTOR) 10 MG tablet; Take 1 tablet (10 mg total) by mouth once daily.  Dispense: 90 tablet; Refill: 3    2. Chronic obstructive pulmonary disease, unspecified COPD type  Cont current mgmt    3. Episode of recurrent major depressive disorder, unspecified depression episode severity  Controlled on current medications.  Continue current medications.      4. Hypothyroidism, unspecified type  Controlled on current medications.  Continue current medications.      5. Gastroesophageal reflux disease without esophagitis  Cont current mgmt    6. Thyroid nodule  Cont monitoring        Time spent with patient: 20 minutes    Patient with be reevaluated in as scheduled or sooner prn    Greater than 50% of this visit was spent counseling as described in above documentation:Yes

## 2022-05-06 ENCOUNTER — TELEPHONE (OUTPATIENT)
Dept: FAMILY MEDICINE | Facility: CLINIC | Age: 72
End: 2022-05-06
Payer: MEDICARE

## 2022-05-06 NOTE — TELEPHONE ENCOUNTER
Returned call and spoke to patient regarding recordings from [facility]. Informed patient that records are being requested from [facility].    [facility] numbers [phone] fax [phone]    Test date 2/15/22  reference number #[id number]

## 2022-05-06 NOTE — TELEPHONE ENCOUNTER
----- Message from Joana Fajardo MA sent at 5/6/2022 10:11 AM CDT -----  Contact: DEBORAH RODRIGUEZ [[id number]]  Type: Needs Medical Advice    Who Called:DEBORAH RODRIGUEZ [[id number]]  Best Call Back Number: [phone]  Inquiry/Question: Would you kindly call DEBORAH RODRIGUEZ [[id number]] regarding concerns with labs  Thank you~

## 2022-05-19 ENCOUNTER — TELEPHONE (OUTPATIENT)
Dept: FAMILY MEDICINE | Facility: CLINIC | Age: 72
End: 2022-05-19
Payer: MEDICARE

## 2022-06-24 ENCOUNTER — PATIENT MESSAGE (OUTPATIENT)
Dept: PSYCHIATRY | Facility: CLINIC | Age: 72
End: 2022-06-24
Payer: MEDICARE

## 2022-11-01 ENCOUNTER — TELEPHONE (OUTPATIENT)
Dept: FAMILY MEDICINE | Facility: CLINIC | Age: 72
End: 2022-11-01
Payer: MEDICARE

## 2022-11-01 NOTE — TELEPHONE ENCOUNTER
Spoke with pt via phone. She advised that she would like labs sent to [facility] imaging. Current appt for labs at clinic canceled. Advised that pt can go to imaging center for labs, no appt necessary.

## 2022-11-01 NOTE — TELEPHONE ENCOUNTER
----- Message from Linda Brown sent at 11/1/2022  9:35 AM CDT -----  Regarding: pt call back  Name of Who is Calling: DEBORAH RODRIGUEZ [[id number]]      What is the request in detail: Pt is requesting a call back regarding lab orders. States she would like her orders be sent to [address] [address]         Can the clinic reply by [facility]: NO        What Number to Call Back if not in [facility]: [phone] (home)

## 2022-11-02 ENCOUNTER — LAB VISIT (OUTPATIENT)
Dept: LAB | Facility: HOSPITAL | Age: 72
End: 2022-11-02
Attending: FAMILY MEDICINE
Payer: MEDICARE

## 2022-11-02 DIAGNOSIS — E03.9 HYPOTHYROIDISM, UNSPECIFIED TYPE: ICD-10-CM

## 2022-11-02 DIAGNOSIS — E78.2 HYPERLIPIDEMIA, MIXED: ICD-10-CM

## 2022-11-02 LAB
ALBUMIN SERPL BCP-MCNC: 3.7 G/DL (ref 3.5–5.2)
ALP SERPL-CCNC: 83 U/L (ref 55–135)
ALT SERPL W/O P-5'-P-CCNC: 16 U/L (ref 10–44)
ANION GAP SERPL CALC-SCNC: 8 MMOL/L (ref 8–16)
AST SERPL-CCNC: 20 U/L (ref 10–40)
BASOPHILS # BLD AUTO: 0.04 K/UL (ref 0–0.2)
BASOPHILS NFR BLD: 0.8 % (ref 0–1.9)
BILIRUB SERPL-MCNC: 0.2 MG/DL (ref 0.1–1)
BUN SERPL-MCNC: 14 MG/DL (ref 8–23)
CALCIUM SERPL-MCNC: 9.4 MG/DL (ref 8.7–10.5)
CHLORIDE SERPL-SCNC: 99 MMOL/L (ref 95–110)
CHOLEST SERPL-MCNC: 167 MG/DL (ref 120–199)
CHOLEST/HDLC SERPL: 3.1 {RATIO} (ref 2–5)
CO2 SERPL-SCNC: 28 MMOL/L (ref 23–29)
CREAT SERPL-MCNC: 0.7 MG/DL (ref 0.5–1.4)
DIFFERENTIAL METHOD: ABNORMAL
EOSINOPHIL # BLD AUTO: 0.2 K/UL (ref 0–0.5)
EOSINOPHIL NFR BLD: 4.6 % (ref 0–8)
ERYTHROCYTE [DISTWIDTH] IN BLOOD BY AUTOMATED COUNT: 12.3 % (ref 11.5–14.5)
EST. GFR  (NO RACE VARIABLE): >60 ML/MIN/1.73 M^2
GLUCOSE SERPL-MCNC: 101 MG/DL (ref 70–110)
HCT VFR BLD AUTO: 41.4 % (ref 37–48.5)
HDLC SERPL-MCNC: 54 MG/DL (ref 40–75)
HDLC SERPL: 32.3 % (ref 20–50)
HGB BLD-MCNC: 13.6 G/DL (ref 12–16)
IMM GRANULOCYTES # BLD AUTO: 0.02 K/UL (ref 0–0.04)
IMM GRANULOCYTES NFR BLD AUTO: 0.4 % (ref 0–0.5)
LDLC SERPL CALC-MCNC: 84.2 MG/DL (ref 63–159)
LYMPHOCYTES # BLD AUTO: 1.9 K/UL (ref 1–4.8)
LYMPHOCYTES NFR BLD: 37 % (ref 18–48)
MCH RBC QN AUTO: 30.6 PG (ref 27–31)
MCHC RBC AUTO-ENTMCNC: 32.9 G/DL (ref 32–36)
MCV RBC AUTO: 93 FL (ref 82–98)
MONOCYTES # BLD AUTO: 0.8 K/UL (ref 0.3–1)
MONOCYTES NFR BLD: 15.2 % (ref 4–15)
NEUTROPHILS # BLD AUTO: 2.2 K/UL (ref 1.8–7.7)
NEUTROPHILS NFR BLD: 42 % (ref 38–73)
NONHDLC SERPL-MCNC: 113 MG/DL
NRBC BLD-RTO: 0 /100 WBC
PLATELET # BLD AUTO: 437 K/UL (ref 150–450)
PMV BLD AUTO: 8.8 FL (ref 9.2–12.9)
POTASSIUM SERPL-SCNC: 4.3 MMOL/L (ref 3.5–5.1)
PROT SERPL-MCNC: 7.1 G/DL (ref 6–8.4)
RBC # BLD AUTO: 4.45 M/UL (ref 4–5.4)
SODIUM SERPL-SCNC: 135 MMOL/L (ref 136–145)
T4 FREE SERPL-MCNC: 0.83 NG/DL (ref 0.71–1.51)
TRIGL SERPL-MCNC: 144 MG/DL (ref 30–150)
TSH SERPL DL<=0.005 MIU/L-ACNC: 3.87 UIU/ML (ref 0.34–5.6)
WBC # BLD AUTO: 5.25 K/UL (ref 3.9–12.7)

## 2022-11-02 PROCEDURE — 84443 ASSAY THYROID STIM HORMONE: CPT | Performed by: FAMILY MEDICINE

## 2022-11-02 PROCEDURE — 84439 ASSAY OF FREE THYROXINE: CPT | Performed by: FAMILY MEDICINE

## 2022-11-02 PROCEDURE — 80061 LIPID PANEL: CPT | Performed by: FAMILY MEDICINE

## 2022-11-02 PROCEDURE — 85025 COMPLETE CBC W/AUTO DIFF WBC: CPT | Performed by: FAMILY MEDICINE

## 2022-11-02 PROCEDURE — 80053 COMPREHEN METABOLIC PANEL: CPT | Performed by: FAMILY MEDICINE

## 2022-11-02 PROCEDURE — 36415 COLL VENOUS BLD VENIPUNCTURE: CPT | Performed by: FAMILY MEDICINE

## 2022-11-07 DIAGNOSIS — Z20.828 EXPOSURE TO INFLUENZA: ICD-10-CM

## 2022-11-07 DIAGNOSIS — J10.1 INFLUENZA A: Primary | ICD-10-CM

## 2022-11-07 RX ORDER — OSELTAMIVIR PHOSPHATE 75 MG/1
75 CAPSULE ORAL 2 TIMES DAILY
Qty: 10 CAPSULE | Refills: 0 | Status: SHIPPED | OUTPATIENT
Start: 2022-11-07 | End: 2022-11-12

## 2022-12-01 ENCOUNTER — PATIENT MESSAGE (OUTPATIENT)
Dept: PSYCHIATRY | Facility: CLINIC | Age: 72
End: 2022-12-01
Payer: MEDICARE

## 2023-01-09 ENCOUNTER — OFFICE VISIT (OUTPATIENT)
Dept: FAMILY MEDICINE | Facility: CLINIC | Age: 73
End: 2023-01-09
Payer: MEDICARE

## 2023-01-09 VITALS
TEMPERATURE: 98 F | HEIGHT: 65 IN | DIASTOLIC BLOOD PRESSURE: 78 MMHG | OXYGEN SATURATION: 96 % | BODY MASS INDEX: 24.46 KG/M2 | SYSTOLIC BLOOD PRESSURE: 132 MMHG | WEIGHT: 146.81 LBS | HEART RATE: 87 BPM

## 2023-01-09 DIAGNOSIS — G35 MULTIPLE SCLEROSIS: ICD-10-CM

## 2023-01-09 DIAGNOSIS — K59.09 CHRONIC CONSTIPATION: ICD-10-CM

## 2023-01-09 DIAGNOSIS — E78.2 HYPERLIPIDEMIA, MIXED: Primary | ICD-10-CM

## 2023-01-09 DIAGNOSIS — E03.9 HYPOTHYROIDISM, UNSPECIFIED TYPE: ICD-10-CM

## 2023-01-09 DIAGNOSIS — G47.00 INSOMNIA, UNSPECIFIED TYPE: ICD-10-CM

## 2023-01-09 DIAGNOSIS — F33.1 MAJOR DEPRESSIVE DISORDER, RECURRENT, MODERATE: ICD-10-CM

## 2023-01-09 DIAGNOSIS — K21.9 GASTROESOPHAGEAL REFLUX DISEASE WITHOUT ESOPHAGITIS: ICD-10-CM

## 2023-01-09 PROCEDURE — 99214 PR OFFICE/OUTPT VISIT, EST, LEVL IV, 30-39 MIN: ICD-10-PCS | Mod: HCNC,S$GLB,, | Performed by: NURSE PRACTITIONER

## 2023-01-09 PROCEDURE — 99999 PR PBB SHADOW E&M-EST. PATIENT-LVL IV: CPT | Mod: PBBFAC,HCNC,, | Performed by: NURSE PRACTITIONER

## 2023-01-09 PROCEDURE — 1126F PR PAIN SEVERITY QUANTIFIED, NO PAIN PRESENT: ICD-10-PCS | Mod: HCNC,CPTII,S$GLB, | Performed by: NURSE PRACTITIONER

## 2023-01-09 PROCEDURE — 1101F PR PT FALLS ASSESS DOC 0-1 FALLS W/OUT INJ PAST YR: ICD-10-PCS | Mod: HCNC,CPTII,S$GLB, | Performed by: NURSE PRACTITIONER

## 2023-01-09 PROCEDURE — 1101F PT FALLS ASSESS-DOCD LE1/YR: CPT | Mod: HCNC,CPTII,S$GLB, | Performed by: NURSE PRACTITIONER

## 2023-01-09 PROCEDURE — 3075F PR MOST RECENT SYSTOLIC BLOOD PRESS GE 130-139MM HG: ICD-10-PCS | Mod: HCNC,CPTII,S$GLB, | Performed by: NURSE PRACTITIONER

## 2023-01-09 PROCEDURE — 3075F SYST BP GE 130 - 139MM HG: CPT | Mod: HCNC,CPTII,S$GLB, | Performed by: NURSE PRACTITIONER

## 2023-01-09 PROCEDURE — 99214 OFFICE O/P EST MOD 30 MIN: CPT | Mod: HCNC,S$GLB,, | Performed by: NURSE PRACTITIONER

## 2023-01-09 PROCEDURE — 3008F BODY MASS INDEX DOCD: CPT | Mod: HCNC,CPTII,S$GLB, | Performed by: NURSE PRACTITIONER

## 2023-01-09 PROCEDURE — 1160F RVW MEDS BY RX/DR IN RCRD: CPT | Mod: HCNC,CPTII,S$GLB, | Performed by: NURSE PRACTITIONER

## 2023-01-09 PROCEDURE — 1160F PR REVIEW ALL MEDS BY PRESCRIBER/CLIN PHARMACIST DOCUMENTED: ICD-10-PCS | Mod: HCNC,CPTII,S$GLB, | Performed by: NURSE PRACTITIONER

## 2023-01-09 PROCEDURE — 3078F PR MOST RECENT DIASTOLIC BLOOD PRESSURE < 80 MM HG: ICD-10-PCS | Mod: HCNC,CPTII,S$GLB, | Performed by: NURSE PRACTITIONER

## 2023-01-09 PROCEDURE — 1159F PR MEDICATION LIST DOCUMENTED IN MEDICAL RECORD: ICD-10-PCS | Mod: HCNC,CPTII,S$GLB, | Performed by: NURSE PRACTITIONER

## 2023-01-09 PROCEDURE — 1126F AMNT PAIN NOTED NONE PRSNT: CPT | Mod: HCNC,CPTII,S$GLB, | Performed by: NURSE PRACTITIONER

## 2023-01-09 PROCEDURE — 3288F FALL RISK ASSESSMENT DOCD: CPT | Mod: HCNC,CPTII,S$GLB, | Performed by: NURSE PRACTITIONER

## 2023-01-09 PROCEDURE — 3288F PR FALLS RISK ASSESSMENT DOCUMENTED: ICD-10-PCS | Mod: HCNC,CPTII,S$GLB, | Performed by: NURSE PRACTITIONER

## 2023-01-09 PROCEDURE — 1159F MED LIST DOCD IN RCRD: CPT | Mod: HCNC,CPTII,S$GLB, | Performed by: NURSE PRACTITIONER

## 2023-01-09 PROCEDURE — 3008F PR BODY MASS INDEX (BMI) DOCUMENTED: ICD-10-PCS | Mod: HCNC,CPTII,S$GLB, | Performed by: NURSE PRACTITIONER

## 2023-01-09 PROCEDURE — 3078F DIAST BP <80 MM HG: CPT | Mod: HCNC,CPTII,S$GLB, | Performed by: NURSE PRACTITIONER

## 2023-01-09 PROCEDURE — 99999 PR PBB SHADOW E&M-EST. PATIENT-LVL IV: ICD-10-PCS | Mod: PBBFAC,HCNC,, | Performed by: NURSE PRACTITIONER

## 2023-01-09 RX ORDER — FOLIC ACID 1 MG/1
TABLET ORAL
COMMUNITY
Start: 2022-09-02

## 2023-01-09 RX ORDER — ROSUVASTATIN CALCIUM 10 MG/1
10 TABLET, COATED ORAL DAILY
Qty: 90 TABLET | Refills: 3 | Status: SHIPPED | OUTPATIENT
Start: 2023-01-09 | End: 2023-07-10 | Stop reason: SDUPTHER

## 2023-01-09 NOTE — PATIENT INSTRUCTIONS
Hi Babita,     If you are due for any health screening(s) below please notify me so we can arrange them to be ordered and scheduled to maintain your health. Most healthy patients complete it. Don't lose out on improving your health.     Tests to Keep You Healthy    Mammogram: DUE  Colon Cancer Screening: Met on 7/15/2021      Breast Cancer Screening    Breast cancer is the second most common cancer in women after skin cancer, and the second leading cause of death from cancer after lung cancer. Mammograms can detect breast cancer early, which significantly increases the chances of curing the cancer.      A screening mammogram is an x-ray image of the breasts used for early breast cancer detection. It can help reduce the number of deaths from breast cancer among women. To get a clear image, the breast is placed between two plastic plates to make it flat. How often a mammogram is needed depends on your age and your breast cancer risk.    Although you are still overdue for this important screening, due to the COVID-19 pandemic, we recommend scheduling it in the near future.

## 2023-01-09 NOTE — PROGRESS NOTES
Subjective:       Patient ID: Babita Thomas is a 72 y.o. female.    Chief Complaint: Follow-up (Lab Review)    HPI      Patient presents today for chronic conditions follow up. Last visit with PCP- on 5/5/22. Labs reviewed. Patient has no complaints at today's visit. She will have mammogram and women will visit with OBGYN  this month.    12/1/22 Psychology- Sofy Sheets, LCSW: [facility] [employer] meeting.    10/6/22 Psychiatry Balminder Audra: Major depressive disorder, recurrent moderate  Past Medical History:   Diagnosis Date    Allergy     Arthritis 1990    hands    Asthma 02/2016    no meds    Depression 5/19/2017    Fracture of left foot 09/04/2019    Dr Cantu    GERD (gastroesophageal reflux disease) 2009    on meds    High cholesterol 2014    on meds    Hyperlipidemia     Hypothyroidism     Immune deficiency disorder 05/19/2017    on meds infusion    Insomnia 1/31/2017    MS (multiple sclerosis) 2005    very well controlled, no symptoms    Shortness of breath     Sinusitis     Thyroid disease 2005    hypothyroidism- no med       Review of patient's allergies indicates:   Allergen Reactions    Pravastatin Other (See Comments)     cramping    Tecfidera [dimethyl fumarate] Swelling         Current Outpatient Medications:     cholecalciferol, vitamin D3, 2,000 unit Tab, Take by mouth., Disp: , Rfl:     FLUoxetine 40 MG capsule, Take 40 mg by mouth once daily., Disp: , Rfl:     folic acid (FOLVITE) 1 MG tablet, , Disp: , Rfl:     multivitamin-minerals-lutein Tab, Take 1 tablet by mouth once daily. 1 Tablet(s) Oral Every day., Disp: , Rfl:     omeprazole (PRILOSEC) 20 MG capsule, Take 20 mg by mouth once daily., Disp: , Rfl:     QUEtiapine (SEROQUEL) 100 MG Tab, Take 100-200 mg by mouth nightly as needed. , Disp: , Rfl:     folic acid (FOLVITE) 800 MCG Tab, Take 800 mcg by mouth once daily., Disp: , Rfl:     lactulose (CHRONULAC) 20 gram/30 mL Soln, Take 30 mLs (20 g total) by mouth 2  "(two) times daily as needed (constipation). (Patient not taking: Reported on 1/9/2023), Disp: 900 mL, Rfl: 5    linaCLOtide (LINZESS) 145 mcg Cap capsule, Take 1 capsule (145 mcg total) by mouth before breakfast., Disp: 90 capsule, Rfl: 0    rosuvastatin (CRESTOR) 10 MG tablet, Take 1 tablet (10 mg total) by mouth once daily., Disp: 90 tablet, Rfl: 3    Review of Systems   Constitutional:  Negative for unexpected weight change.   HENT:  Negative for trouble swallowing.    Eyes:  Negative for visual disturbance.   Respiratory:  Negative for shortness of breath.    Cardiovascular:  Negative for chest pain, palpitations and leg swelling.   Gastrointestinal:  Negative for blood in stool.   Genitourinary:  Negative for hematuria.   Skin:  Negative for rash.   Allergic/Immunologic: Negative for immunocompromised state.   Neurological:  Negative for headaches.   Hematological:  Does not bruise/bleed easily.   Psychiatric/Behavioral:  Negative for agitation. The patient is not nervous/anxious.      Objective:      /78 (BP Location: Left arm, Patient Position: Sitting, BP Method: Small (Manual))   Pulse 87   Temp 97.9 °F (36.6 °C) (Oral)   Ht 5' 5" (1.651 m)   Wt 66.6 kg (146 lb 13.2 oz)   SpO2 96%   BMI 24.43 kg/m²   Physical Exam  Constitutional:       Appearance: She is well-developed.   HENT:      Head: Normocephalic.   Eyes:      Pupils: Pupils are equal, round, and reactive to light.   Cardiovascular:      Rate and Rhythm: Normal rate and regular rhythm.      Heart sounds: Normal heart sounds.   Pulmonary:      Effort: Pulmonary effort is normal.      Breath sounds: Normal breath sounds.   Musculoskeletal:         General: Normal range of motion.      Cervical back: Normal range of motion.   Skin:     General: Skin is warm and dry.   Neurological:      Mental Status: She is alert and oriented to person, place, and time.   Psychiatric:         Behavior: Behavior normal.         Thought Content: Thought " content normal.         Judgment: Judgment normal.       Assessment:       1. Hyperlipidemia, mixed    2. Chronic constipation    3. Hypothyroidism, unspecified type    4. Major depressive disorder, recurrent, moderate    5. Multiple sclerosis    6. Insomnia, unspecified type    7. Gastroesophageal reflux disease without esophagitis    8. BMI 24.0-24.9, adult        Plan:       Hyperlipidemia, mixed  -     rosuvastatin (CRESTOR) 10 MG tablet; Take 1 tablet (10 mg total) by mouth once daily.  Dispense: 90 tablet; Refill: 3  -     Lipid Panel; Future; Expected date: 01/09/2023  -     CBC W/ AUTO DIFFERENTIAL; Future; Expected date: 01/09/2023  -     COMPREHENSIVE METABOLIC PANEL; Future; Expected date: 01/09/2023  The 10-year ASCVD risk score (Teddy SIMENTAL, et al., 2019) is: 10.7%    Values used to calculate the score:      Age: 72 years      Sex: Female      Is Non- : No      Diabetic: No      Tobacco smoker: No      Systolic Blood Pressure: 132 mmHg      Is BP treated: No      HDL Cholesterol: 54 mg/dL      Total Cholesterol: 167 mg/dL   Chronic constipation  -     linaCLOtide (LINZESS) 145 mcg Cap capsule; Take 1 capsule (145 mcg total) by mouth before breakfast.  Dispense: 90 capsule; Refill: 0  Stable, continue medication  Hypothyroidism, unspecified type  -     T4, FREE; Future; Expected date: 01/09/2023  -     TSH; Future; Expected date: 01/09/2023  Stable, continue medication  Major depressive disorder, recurrent, moderate  Stable, continue medication  Multiple sclerosis  Stable, continue Neurology follow up  Insomnia, unspecified type  Stable, continue medication  Gastroesophageal reflux disease without esophagitis  Stable, continue medication  BMI 24.0-24.9, adult  Stable, continue management    Time spent with patient: 20 minutes    Patient with be reevaluated in 6 months or sooner prn    Greater than 50% of this visit was spent counseling as described in above documentation:Yes

## 2023-02-09 DIAGNOSIS — Z00.00 ENCOUNTER FOR MEDICARE ANNUAL WELLNESS EXAM: ICD-10-CM

## 2023-02-20 ENCOUNTER — PATIENT MESSAGE (OUTPATIENT)
Dept: PSYCHIATRY | Facility: CLINIC | Age: 73
End: 2023-02-20
Payer: MEDICARE

## 2023-03-14 DIAGNOSIS — Z12.31 OTHER SCREENING MAMMOGRAM: Primary | ICD-10-CM

## 2023-03-16 ENCOUNTER — PES CALL (OUTPATIENT)
Dept: ADMINISTRATIVE | Facility: CLINIC | Age: 73
End: 2023-03-16
Payer: MEDICARE

## 2023-03-22 ENCOUNTER — HOSPITAL ENCOUNTER (OUTPATIENT)
Dept: RADIOLOGY | Facility: HOSPITAL | Age: 73
Discharge: HOME | End: 2023-03-22
Attending: SPECIALIST
Payer: MEDICARE

## 2023-03-22 DIAGNOSIS — Z12.31 OTHER SCREENING MAMMOGRAM: ICD-10-CM

## 2023-03-22 PROCEDURE — 77067 SCR MAMMO BI INCL CAD: CPT | Mod: TC,PO

## 2023-04-19 NOTE — LETTER
May 14, 2019      Mishel Huitron MD  [address] [facility]  [place] [address]           [place] [facility]  [address] [place] [address] [address]  [place] [address]  Phone: [phone]  Fax: [phone]          Patient: Babita Thomas   MR Number: [id number]   YOB: 1950   Date of Visit: 5/14/2019       Dear Dr. Mishel Huitron:    Thank you for referring Babita Thomas to me for evaluation. Attached you will find relevant portions of my assessment and plan of care.    If you have questions, please do not hesitate to call me. I look forward to following Babita Thomas along with you.    Sincerely,    Faiza Coreas MD    Enclosure  CC:  No Recipients    If you would like to receive this communication electronically, please contact [email or web address] or [phone] to request more information on [employer] Link access.    For providers and/or their staff who would like to refer a patient to [facility], please contact us through our one-stop-shop provider referral line, [facility], at [phone].    If you feel you have received this communication in error or would no longer like to receive these types of communications, please e-mail [email or web address]          n/a

## 2023-04-28 ENCOUNTER — OFFICE VISIT (OUTPATIENT)
Dept: DERMATOLOGY | Facility: CLINIC | Age: 73
End: 2023-04-28
Payer: MEDICARE

## 2023-04-28 DIAGNOSIS — L40.9 SCALP PSORIASIS: Primary | ICD-10-CM

## 2023-04-28 DIAGNOSIS — L82.1 SEBORRHEIC KERATOSES: ICD-10-CM

## 2023-04-28 DIAGNOSIS — L30.4 INTERTRIGO: ICD-10-CM

## 2023-04-28 DIAGNOSIS — L30.9 DERMATITIS: ICD-10-CM

## 2023-04-28 DIAGNOSIS — L72.0 EPIDERMAL INCLUSION CYST: ICD-10-CM

## 2023-04-28 DIAGNOSIS — D18.01 CHERRY ANGIOMA: ICD-10-CM

## 2023-04-28 DIAGNOSIS — Z12.83 SKIN CANCER SCREENING: ICD-10-CM

## 2023-04-28 PROCEDURE — 1160F PR REVIEW ALL MEDS BY PRESCRIBER/CLIN PHARMACIST DOCUMENTED: ICD-10-PCS | Mod: HCNC,CPTII,S$GLB, | Performed by: DERMATOLOGY

## 2023-04-28 PROCEDURE — 1159F MED LIST DOCD IN RCRD: CPT | Mod: HCNC,CPTII,S$GLB, | Performed by: DERMATOLOGY

## 2023-04-28 PROCEDURE — 1160F RVW MEDS BY RX/DR IN RCRD: CPT | Mod: HCNC,CPTII,S$GLB, | Performed by: DERMATOLOGY

## 2023-04-28 PROCEDURE — 99999 PR PBB SHADOW E&M-EST. PATIENT-LVL II: ICD-10-PCS | Mod: PBBFAC,HCNC,, | Performed by: DERMATOLOGY

## 2023-04-28 PROCEDURE — 1159F PR MEDICATION LIST DOCUMENTED IN MEDICAL RECORD: ICD-10-PCS | Mod: HCNC,CPTII,S$GLB, | Performed by: DERMATOLOGY

## 2023-04-28 PROCEDURE — 99204 OFFICE O/P NEW MOD 45 MIN: CPT | Mod: HCNC,S$GLB,, | Performed by: DERMATOLOGY

## 2023-04-28 PROCEDURE — 99999 PR PBB SHADOW E&M-EST. PATIENT-LVL II: CPT | Mod: PBBFAC,HCNC,, | Performed by: DERMATOLOGY

## 2023-04-28 PROCEDURE — 3288F PR FALLS RISK ASSESSMENT DOCUMENTED: ICD-10-PCS | Mod: HCNC,CPTII,S$GLB, | Performed by: DERMATOLOGY

## 2023-04-28 PROCEDURE — 1126F AMNT PAIN NOTED NONE PRSNT: CPT | Mod: HCNC,CPTII,S$GLB, | Performed by: DERMATOLOGY

## 2023-04-28 PROCEDURE — 3288F FALL RISK ASSESSMENT DOCD: CPT | Mod: HCNC,CPTII,S$GLB, | Performed by: DERMATOLOGY

## 2023-04-28 PROCEDURE — 1126F PR PAIN SEVERITY QUANTIFIED, NO PAIN PRESENT: ICD-10-PCS | Mod: HCNC,CPTII,S$GLB, | Performed by: DERMATOLOGY

## 2023-04-28 PROCEDURE — 1101F PR PT FALLS ASSESS DOC 0-1 FALLS W/OUT INJ PAST YR: ICD-10-PCS | Mod: HCNC,CPTII,S$GLB, | Performed by: DERMATOLOGY

## 2023-04-28 PROCEDURE — 99204 PR OFFICE/OUTPT VISIT, NEW, LEVL IV, 45-59 MIN: ICD-10-PCS | Mod: HCNC,S$GLB,, | Performed by: DERMATOLOGY

## 2023-04-28 PROCEDURE — 1101F PT FALLS ASSESS-DOCD LE1/YR: CPT | Mod: HCNC,CPTII,S$GLB, | Performed by: DERMATOLOGY

## 2023-04-28 RX ORDER — KETOCONAZOLE 20 MG/G
CREAM TOPICAL
Qty: 60 G | Refills: 3 | Status: SHIPPED | OUTPATIENT
Start: 2023-04-28

## 2023-04-28 RX ORDER — TRIAMCINOLONE ACETONIDE 0.25 MG/G
CREAM TOPICAL
Qty: 80 G | Refills: 3 | Status: SHIPPED | OUTPATIENT
Start: 2023-04-28

## 2023-04-28 RX ORDER — CLOBETASOL PROPIONATE 0.46 MG/ML
SOLUTION TOPICAL
Qty: 50 ML | Refills: 5 | Status: SHIPPED | OUTPATIENT
Start: 2023-04-28

## 2023-04-28 NOTE — PROGRESS NOTES
Pre-Visit Chart Review  For Appointment Scheduled on 5/2/17    Health Maintenance Due   Topic Date Due    TETANUS VACCINE  01/18/1969    DEXA SCAN  01/18/1991    Mammogram  02/10/2017                      no

## 2023-04-28 NOTE — PROGRESS NOTES
Subjective:      Patient ID:  Babita Thomas is a 73 y.o. female who presents for   Chief Complaint   Patient presents with    Rash     scalp     LOV 7/5/19 - Inflamed sebaceous cyst, angioma, SK    Patient here today for skin check TBSE   Complains of spot on chest     Complains of rash on scalp x approx 2 weeks.   Itches, no pain or stinging.   Washes with Nizoral, not much improvement  No known h/o psoriasis, no FH pso    H/o CVID, past IVIg infusions (Hysentra), last seen by Dr Tesfaye in 2020, no longer receiving    Denies Phx of NMSC  Denies Fhx of MM    Review of Systems   Constitutional:  Negative for fever, chills and fatigue.   Respiratory:  Negative for cough and shortness of breath.    Gastrointestinal:  Negative for nausea and vomiting.   Skin:  Positive for activity-related sunscreen use. Negative for daily sunscreen use.   Hematologic/Lymphatic: Does not bruise/bleed easily.     Objective:   Physical Exam   Constitutional: She appears well-developed and well-nourished. No distress.   Neurological: She is alert and oriented to person, place, and time. She is not disoriented.   Psychiatric: She has a normal mood and affect.   Skin:   Areas Examined (abnormalities noted in diagram):   Scalp / Hair Palpated and Inspected  Head / Face Inspection Performed  Neck Inspection Performed  Chest / Axilla Inspection Performed  Abdomen Inspection Performed  Genitals / Buttocks / Groin Inspection Performed  Back Inspection Performed  RUE Inspected  LUE Inspection Performed  RLE Inspected  LLE Inspection Performed  Nails and Digits Inspection Performed                   Diagram Legend     Erythematous scaling macule/papule c/w actinic keratosis       Vascular papule c/w angioma      Pigmented verrucoid papule/plaque c/w seborrheic keratosis      Yellow umbilicated papule c/w sebaceous hyperplasia      Irregularly shaped tan macule c/w lentigo     1-2 mm smooth white papules consistent with Milia      Movable  subcutaneous cyst with punctum c/w epidermal inclusion cyst      Subcutaneous movable cyst c/w pilar cyst      Firm pink to brown papule c/w dermatofibroma      Pedunculated fleshy papule(s) c/w skin tag(s)      Evenly pigmented macule c/w junctional nevus     Mildly variegated pigmented, slightly irregular-bordered macule c/w mildly atypical nevus      Flesh colored to evenly pigmented papule c/w intradermal nevus       Pink pearly papule/plaque c/w basal cell carcinoma      Erythematous hyperkeratotic cursted plaque c/w SCC      Surgical scar with no sign of skin cancer recurrence      Open and closed comedones      Inflammatory papules and pustules      Verrucoid papule consistent consistent with wart     Erythematous eczematous patches and plaques     Dystrophic onycholytic nail with subungual debris c/w onychomycosis     Umbilicated papule    Erythematous-base heme-crusted tan verrucoid plaque consistent with inflamed seborrheic keratosis     Erythematous Silvery Scaling Plaque c/w Psoriasis     See annotation      Assessment / Plan:        Scalp psoriasis  -     clobetasoL (TEMOVATE) 0.05 % external solution; Apply to AA on scalp daily to BID PRN flare  Dispense: 50 mL; Refill: 5  Isolated patch on R anterior scalp  Continue nizoral shampoo  Add topical steroid until flat and no longer itchy    Dermatitis  -     triamcinolone acetonide 0.025% (KENALOG) 0.025 % cream; AAA bid  Dispense: 80 g; Refill: 3  Subtle, lower chest, abdomen, eczematous, many SKs in vicinity, signs of excoriation, treat with low potency steroid PRN    Epidermal inclusion cyst  Central chest, desires excision, schedule    Cherry angioma  This is a benign vascular lesion. Reassurance given. No treatment required.     Seborrheic keratoses  These are benign inherited growths without a malignant potential. Reassurance given to patient. No treatment is necessary.     Skin cancer screening  Total body skin examination performed today including  at least 12 points as noted in physical examination. No lesions suspicious for malignancy noted.    Intertrigo, epidosidc, worse in summer, inframammary, treat PRN  -     ketoconazole (NIZORAL) 2 % cream; AAA bid  Dispense: 60 g; Refill: 3             No follow-ups on file.

## 2023-05-12 ENCOUNTER — PROCEDURE VISIT (OUTPATIENT)
Dept: DERMATOLOGY | Facility: CLINIC | Age: 73
End: 2023-05-12
Payer: MEDICARE

## 2023-05-12 VITALS — HEIGHT: 65 IN | WEIGHT: 146 LBS | BODY MASS INDEX: 24.32 KG/M2

## 2023-05-12 DIAGNOSIS — D49.2 NEOPLASM OF UNSPECIFIED BEHAVIOR OF BONE, SOFT TISSUE, AND SKIN: Primary | ICD-10-CM

## 2023-05-12 PROCEDURE — 11402 PR EXC SKIN BENIG 1.1-2 CM TRUNK,ARM,LEG: ICD-10-PCS | Mod: HCNC,S$GLB,, | Performed by: DERMATOLOGY

## 2023-05-12 PROCEDURE — 12031 INTMD RPR S/A/T/EXT 2.5 CM/<: CPT | Mod: HCNC,51,S$GLB, | Performed by: DERMATOLOGY

## 2023-05-12 PROCEDURE — 88304 PR  SURG PATH,LEVEL III: ICD-10-PCS | Mod: 26,HCNC,, | Performed by: PATHOLOGY

## 2023-05-12 PROCEDURE — 99499 UNLISTED E&M SERVICE: CPT | Mod: HCNC,S$GLB,, | Performed by: DERMATOLOGY

## 2023-05-12 PROCEDURE — 99499 NO LOS: ICD-10-PCS | Mod: HCNC,S$GLB,, | Performed by: DERMATOLOGY

## 2023-05-12 PROCEDURE — 88304 TISSUE EXAM BY PATHOLOGIST: CPT | Mod: 26,HCNC,, | Performed by: PATHOLOGY

## 2023-05-12 PROCEDURE — 11402 EXC TR-EXT B9+MARG 1.1-2 CM: CPT | Mod: HCNC,S$GLB,, | Performed by: DERMATOLOGY

## 2023-05-12 PROCEDURE — 12031 PR LAYR CLOS WND TRUNK,ARM,LEG <2.5 CM: ICD-10-PCS | Mod: HCNC,51,S$GLB, | Performed by: DERMATOLOGY

## 2023-05-12 PROCEDURE — 88304 TISSUE EXAM BY PATHOLOGIST: CPT | Mod: HCNC | Performed by: PATHOLOGY

## 2023-05-12 NOTE — PROGRESS NOTES
Subjective:      Patient ID:  Babita Thomas is a 73 y.o. female who presents for   Chief Complaint   Patient presents with    Spot     Chest     Pt here for definitive excision and suture of cyst to chest.  Feeling well today.   Denies pacemaker, defibrillator.  No blood thinners.   No additional cutaneous complaints.             Review of Systems   Constitutional:  Negative for fever, chills and fatigue.   Respiratory:  Negative for cough and shortness of breath.    Gastrointestinal:  Negative for nausea and vomiting.   Skin:  Positive for activity-related sunscreen use. Negative for daily sunscreen use.   Hematologic/Lymphatic: Does not bruise/bleed easily.     Objective:   Physical Exam   Constitutional: She appears well-developed and well-nourished. No distress.   Neurological: She is alert and oriented to person, place, and time. She is not disoriented.   Psychiatric: She has a normal mood and affect.   Skin:   Areas Examined (abnormalities noted in diagram):   Chest / Axilla Inspection Performed          Diagram Legend     Erythematous scaling macule/papule c/w actinic keratosis       Vascular papule c/w angioma      Pigmented verrucoid papule/plaque c/w seborrheic keratosis      Yellow umbilicated papule c/w sebaceous hyperplasia      Irregularly shaped tan macule c/w lentigo     1-2 mm smooth white papules consistent with Milia      Movable subcutaneous cyst with punctum c/w epidermal inclusion cyst      Subcutaneous movable cyst c/w pilar cyst      Firm pink to brown papule c/w dermatofibroma      Pedunculated fleshy papule(s) c/w skin tag(s)      Evenly pigmented macule c/w junctional nevus     Mildly variegated pigmented, slightly irregular-bordered macule c/w mildly atypical nevus      Flesh colored to evenly pigmented papule c/w intradermal nevus       Pink pearly papule/plaque c/w basal cell carcinoma      Erythematous hyperkeratotic cursted plaque c/w SCC      Surgical scar with no sign of skin  cancer recurrence      Open and closed comedones      Inflammatory papules and pustules      Verrucoid papule consistent consistent with wart     Erythematous eczematous patches and plaques     Dystrophic onycholytic nail with subungual debris c/w onychomycosis     Umbilicated papule    Erythematous-base heme-crusted tan verrucoid plaque consistent with inflamed seborrheic keratosis     Erythematous Silvery Scaling Plaque c/w Psoriasis     See annotation      Assessment / Plan:      Pathology Orders:       Normal Orders This Visit    Specimen to Pathology, Dermatology     Questions:    Procedure Type: Dermatology and skin neoplasms    Number of Specimens: 1    ------------------------: -------------------------    Spec 1 Procedure: Excision >2cm    Spec 1 Clinical Impression: eic vs other    Spec 1 Source: midline lower chest    Release to patient:           Neoplasm of unspecified behavior of bone, soft tissue, and skin  -     Specimen to Pathology, Dermatology    PREPARATION: The diagnosis, procedure, alternatives, benefits and risks, including but not limited to: infection, bleeding/bruising, drug reactions, pain, scar or cosmetic defect, local sensation disturbances, wound dehiscence (separation of wound edges after sutures removed) and/or recurrence of present condition were explained to the patient. The patient elected to proceed.  Patient's identity was verified using 2 patient identifiers and the side and site was verified.  Time out period with surgeon, assistant and patient in surgical suite was taken.    PROCEDURE: The location noted above was prepped and draped in the usual sterile fashion. The area was anesthetized with intradermal buffered xylocaine. An elliptiform excision with a #15 scalpel was performed to access the  cyst wall, and dissection was carried out around the cyst wall.  Electrocoagulation was used to obtain hemostasis. Blood loss was minimal. The wound was then approximated in a layered  fashion with 3 intradermal sutures of 5.0 Monocryl, and the wound was then superficially closed with simple interrupted sutures of 5.0 Prolene.   Lesion size: 2 cm  Final incision length: 2.5 cm          Follow up in about 10 days (around 5/22/2023) for suture removal.

## 2023-05-12 NOTE — PATIENT INSTRUCTIONS
Surgery Wound Care    Your doctor has performed an excision today.  A bandage and vaseline ointment has been placed over the site.  This should remain in place for 24 hours.  It is recommended that you keep the area dry for the first 24 hours.  After 24 hours, you may remove the band aid and wash the area with warm soap and water and apply Vaseline jelly or aquaphore.  Many patients prefer to use Neosporin or Bacitracin ointment.  This is acceptable; however know that you can develop an allergy to this medication even if you have used it safely for years.  It is important to keep the area moist.  Letting it dry out and get air slows healing time, will worsen the scar, and make it more difficult to remove the stitches if they were placed.        If you notice increasing redness, tenderness, pain, or yellow drainage at the biopsy or surgical site, please notify your doctor.  These are signs of an infection.    If your biopsy/surgical site is bleeding, apply firm pressure for 15 minutes straight.  Repeat for another 15 minutes, if it is still bleeding.   If the surgical site continues to bleed, then please contact your doctor.    [facility] - DERMATOLOGY  [address] [place] [address] [address]  [facility] [address]  Dept: [phone]

## 2023-05-19 LAB
FINAL PATHOLOGIC DIAGNOSIS: NORMAL
GROSS: NORMAL
Lab: NORMAL
MICROSCOPIC EXAM: NORMAL

## 2023-05-22 ENCOUNTER — CLINICAL SUPPORT (OUTPATIENT)
Dept: DERMATOLOGY | Facility: CLINIC | Age: 73
End: 2023-05-22
Payer: MEDICARE

## 2023-05-22 DIAGNOSIS — Z48.02 VISIT FOR SUTURE REMOVAL: Primary | ICD-10-CM

## 2023-05-22 PROCEDURE — 99024 PR POST-OP FOLLOW-UP VISIT: ICD-10-PCS | Mod: HCNC,S$GLB,, | Performed by: DERMATOLOGY

## 2023-05-22 PROCEDURE — 99024 POSTOP FOLLOW-UP VISIT: CPT | Mod: HCNC,S$GLB,, | Performed by: DERMATOLOGY

## 2023-06-20 ENCOUNTER — PES CALL (OUTPATIENT)
Dept: ADMINISTRATIVE | Facility: CLINIC | Age: 73
End: 2023-06-20
Payer: MEDICARE

## 2023-07-10 ENCOUNTER — OFFICE VISIT (OUTPATIENT)
Dept: FAMILY MEDICINE | Facility: CLINIC | Age: 73
End: 2023-07-10
Payer: MEDICARE

## 2023-07-10 ENCOUNTER — LAB VISIT (OUTPATIENT)
Dept: LAB | Facility: HOSPITAL | Age: 73
End: 2023-07-10
Payer: MEDICARE

## 2023-07-10 VITALS
TEMPERATURE: 99 F | HEART RATE: 71 BPM | OXYGEN SATURATION: 96 % | RESPIRATION RATE: 14 BRPM | WEIGHT: 136.88 LBS | HEIGHT: 65 IN | BODY MASS INDEX: 22.81 KG/M2 | DIASTOLIC BLOOD PRESSURE: 80 MMHG | SYSTOLIC BLOOD PRESSURE: 138 MMHG

## 2023-07-10 DIAGNOSIS — E78.2 HYPERLIPIDEMIA, MIXED: ICD-10-CM

## 2023-07-10 DIAGNOSIS — E03.9 HYPOTHYROIDISM, UNSPECIFIED TYPE: ICD-10-CM

## 2023-07-10 DIAGNOSIS — I77.1 TORTUOUS AORTA: ICD-10-CM

## 2023-07-10 DIAGNOSIS — G35 MULTIPLE SCLEROSIS: ICD-10-CM

## 2023-07-10 DIAGNOSIS — E04.1 THYROID NODULE: ICD-10-CM

## 2023-07-10 DIAGNOSIS — R93.89 INCREASED ENDOMETRIAL STRIPE THICKNESS: Primary | ICD-10-CM

## 2023-07-10 DIAGNOSIS — K21.9 GASTROESOPHAGEAL REFLUX DISEASE, UNSPECIFIED WHETHER ESOPHAGITIS PRESENT: ICD-10-CM

## 2023-07-10 DIAGNOSIS — D84.9 IMMUNE DEFICIENCY DISORDER: ICD-10-CM

## 2023-07-10 DIAGNOSIS — I27.20 PULMONARY HYPERTENSION: ICD-10-CM

## 2023-07-10 DIAGNOSIS — K59.09 CHRONIC CONSTIPATION: ICD-10-CM

## 2023-07-10 DIAGNOSIS — J44.9 CHRONIC OBSTRUCTIVE PULMONARY DISEASE, UNSPECIFIED COPD TYPE: ICD-10-CM

## 2023-07-10 LAB
ALBUMIN SERPL BCP-MCNC: 4 G/DL (ref 3.5–5.2)
ALP SERPL-CCNC: 100 U/L (ref 55–135)
ALT SERPL W/O P-5'-P-CCNC: 16 U/L (ref 10–44)
ANION GAP SERPL CALC-SCNC: 11 MMOL/L (ref 8–16)
AST SERPL-CCNC: 23 U/L (ref 10–40)
BILIRUB SERPL-MCNC: 0.4 MG/DL (ref 0.1–1)
BUN SERPL-MCNC: 9 MG/DL (ref 8–23)
CALCIUM SERPL-MCNC: 9.8 MG/DL (ref 8.7–10.5)
CHLORIDE SERPL-SCNC: 98 MMOL/L (ref 95–110)
CHOLEST SERPL-MCNC: 200 MG/DL (ref 120–199)
CHOLEST/HDLC SERPL: 2.9 {RATIO} (ref 2–5)
CO2 SERPL-SCNC: 27 MMOL/L (ref 23–29)
CREAT SERPL-MCNC: 0.7 MG/DL (ref 0.5–1.4)
EST. GFR  (NO RACE VARIABLE): >60 ML/MIN/1.73 M^2
GLUCOSE SERPL-MCNC: 92 MG/DL (ref 70–110)
HDLC SERPL-MCNC: 68 MG/DL (ref 40–75)
HDLC SERPL: 34 % (ref 20–50)
LDLC SERPL CALC-MCNC: 116.4 MG/DL (ref 63–159)
NONHDLC SERPL-MCNC: 132 MG/DL
POTASSIUM SERPL-SCNC: 3.9 MMOL/L (ref 3.5–5.1)
PROT SERPL-MCNC: 7.7 G/DL (ref 6–8.4)
SODIUM SERPL-SCNC: 136 MMOL/L (ref 136–145)
TRIGL SERPL-MCNC: 78 MG/DL (ref 30–150)

## 2023-07-10 PROCEDURE — 1159F PR MEDICATION LIST DOCUMENTED IN MEDICAL RECORD: ICD-10-PCS | Mod: HCNC,CPTII,S$GLB, | Performed by: FAMILY MEDICINE

## 2023-07-10 PROCEDURE — 36415 COLL VENOUS BLD VENIPUNCTURE: CPT | Mod: HCNC,PO | Performed by: FAMILY MEDICINE

## 2023-07-10 PROCEDURE — 1126F AMNT PAIN NOTED NONE PRSNT: CPT | Mod: HCNC,CPTII,S$GLB, | Performed by: FAMILY MEDICINE

## 2023-07-10 PROCEDURE — 1101F PR PT FALLS ASSESS DOC 0-1 FALLS W/OUT INJ PAST YR: ICD-10-PCS | Mod: HCNC,CPTII,S$GLB, | Performed by: FAMILY MEDICINE

## 2023-07-10 PROCEDURE — 99999 PR PBB SHADOW E&M-EST. PATIENT-LVL III: CPT | Mod: PBBFAC,HCNC,, | Performed by: FAMILY MEDICINE

## 2023-07-10 PROCEDURE — 3079F PR MOST RECENT DIASTOLIC BLOOD PRESSURE 80-89 MM HG: ICD-10-PCS | Mod: HCNC,CPTII,S$GLB, | Performed by: FAMILY MEDICINE

## 2023-07-10 PROCEDURE — 3075F PR MOST RECENT SYSTOLIC BLOOD PRESS GE 130-139MM HG: ICD-10-PCS | Mod: HCNC,CPTII,S$GLB, | Performed by: FAMILY MEDICINE

## 2023-07-10 PROCEDURE — 3075F SYST BP GE 130 - 139MM HG: CPT | Mod: HCNC,CPTII,S$GLB, | Performed by: FAMILY MEDICINE

## 2023-07-10 PROCEDURE — 99214 OFFICE O/P EST MOD 30 MIN: CPT | Mod: HCNC,S$GLB,, | Performed by: FAMILY MEDICINE

## 2023-07-10 PROCEDURE — 3079F DIAST BP 80-89 MM HG: CPT | Mod: HCNC,CPTII,S$GLB, | Performed by: FAMILY MEDICINE

## 2023-07-10 PROCEDURE — 3288F FALL RISK ASSESSMENT DOCD: CPT | Mod: HCNC,CPTII,S$GLB, | Performed by: FAMILY MEDICINE

## 2023-07-10 PROCEDURE — 1126F PR PAIN SEVERITY QUANTIFIED, NO PAIN PRESENT: ICD-10-PCS | Mod: HCNC,CPTII,S$GLB, | Performed by: FAMILY MEDICINE

## 2023-07-10 PROCEDURE — 1159F MED LIST DOCD IN RCRD: CPT | Mod: HCNC,CPTII,S$GLB, | Performed by: FAMILY MEDICINE

## 2023-07-10 PROCEDURE — 99214 PR OFFICE/OUTPT VISIT, EST, LEVL IV, 30-39 MIN: ICD-10-PCS | Mod: HCNC,S$GLB,, | Performed by: FAMILY MEDICINE

## 2023-07-10 PROCEDURE — 99999 PR PBB SHADOW E&M-EST. PATIENT-LVL III: ICD-10-PCS | Mod: PBBFAC,HCNC,, | Performed by: FAMILY MEDICINE

## 2023-07-10 PROCEDURE — 3008F PR BODY MASS INDEX (BMI) DOCUMENTED: ICD-10-PCS | Mod: HCNC,CPTII,S$GLB, | Performed by: FAMILY MEDICINE

## 2023-07-10 PROCEDURE — 1160F RVW MEDS BY RX/DR IN RCRD: CPT | Mod: HCNC,CPTII,S$GLB, | Performed by: FAMILY MEDICINE

## 2023-07-10 PROCEDURE — 1101F PT FALLS ASSESS-DOCD LE1/YR: CPT | Mod: HCNC,CPTII,S$GLB, | Performed by: FAMILY MEDICINE

## 2023-07-10 PROCEDURE — 1160F PR REVIEW ALL MEDS BY PRESCRIBER/CLIN PHARMACIST DOCUMENTED: ICD-10-PCS | Mod: HCNC,CPTII,S$GLB, | Performed by: FAMILY MEDICINE

## 2023-07-10 PROCEDURE — 80053 COMPREHEN METABOLIC PANEL: CPT | Mod: HCNC | Performed by: FAMILY MEDICINE

## 2023-07-10 PROCEDURE — 80061 LIPID PANEL: CPT | Mod: HCNC | Performed by: FAMILY MEDICINE

## 2023-07-10 PROCEDURE — 3008F BODY MASS INDEX DOCD: CPT | Mod: HCNC,CPTII,S$GLB, | Performed by: FAMILY MEDICINE

## 2023-07-10 PROCEDURE — 3288F PR FALLS RISK ASSESSMENT DOCUMENTED: ICD-10-PCS | Mod: HCNC,CPTII,S$GLB, | Performed by: FAMILY MEDICINE

## 2023-07-10 RX ORDER — TRAZODONE HYDROCHLORIDE 100 MG/1
100 TABLET ORAL NIGHTLY
COMMUNITY

## 2023-07-10 RX ORDER — OMEPRAZOLE 20 MG/1
20 CAPSULE, DELAYED RELEASE ORAL DAILY
Qty: 90 CAPSULE | Refills: 3 | Status: SHIPPED | OUTPATIENT
Start: 2023-07-10

## 2023-07-10 RX ORDER — ROSUVASTATIN CALCIUM 10 MG/1
10 TABLET, COATED ORAL DAILY
Qty: 90 TABLET | Refills: 3 | Status: SHIPPED | OUTPATIENT
Start: 2023-07-10

## 2023-07-10 NOTE — PROGRESS NOTES
Subjective:       Patient ID: Babita Thomas is a 73 y.o. female.    Chief Complaint: Follow-up (6mth f/u)    HPI  Review of Systems   Constitutional:  Negative for fatigue and unexpected weight change.   Respiratory:  Negative for chest tightness and shortness of breath.    Cardiovascular:  Negative for chest pain, palpitations and leg swelling.   Gastrointestinal:  Negative for abdominal pain.   Musculoskeletal:  Negative for arthralgias.   Neurological:  Negative for dizziness, syncope, light-headedness and headaches.     Patient Active Problem List   Diagnosis    Multiple sclerosis    GERD (gastroesophageal reflux disease)    Thyroid nodule    Tortuous aorta    Asthma    Chronic pansinusitis    Hypertrophy of inferior nasal turbinate    Insomnia    Immune deficiency disorder    Eosinophilia    Recurrent major depressive disorder    Gross hematuria    Closed nondisplaced fracture of medial cuneiform of left foot    Closed nondisplaced fracture of cuboid bone of left foot    Closed displaced fracture of fifth metatarsal bone of left foot    Closed displaced fracture of fourth metatarsal bone of left foot    Closed displaced fracture of third metatarsal bone of left foot    Closed displaced fracture of second metatarsal bone of left foot    Closed nondisplaced fracture of first left metatarsal bone    Lisfranc dislocation, left, initial encounter    Hypothyroidism    Myalgia due to statin    Hyperlipidemia, mixed    Pulmonary hypertension    Chronic obstructive pulmonary disease    Chronic constipation    Increased endometrial stripe thickness     Patient is here for a chronic conditions follow up.    Reviewed labs 11/22  mammo 3/23 neg   dexa 10/2021 osteopenia    Derm Dr. Oh scalp psoriasis     C/o chronic constipation- tried fiber, stool softeners, miralax and linzess 75mg bid . Tried trulance and caused cramping. Will try again qod or q 3 days.  Worked at first but then stopped. Last colonoscopy 7/2021 GI  Dr. Cotter- bowel prep unsatisfactory. 2010 no polyps. Dr. Cotter GI GERD, gastroparesis, lactose intolerance.     Pulm Dr. Heard asthma-rare use of albuterol     Dr. Cantu podiatry-h/o lisfranc fracture        Immunology Dr. 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 Not seeing a current neurologist     Dr. Zhu PSych prescribed metformin for weight mgmt-no longer taking. Mood well controlled on prozac     HPL- stopped mevacor due to muscle pain.  Now on crestor     GYN Dr. Huitron  eval for postmenopausal bleeding 10/20. U/s and EMB done Dr. Nazario Retroverted uterus with fluid within the endometrium measuring 7 mm.  This mildly thickened.  A follow-up ultrasound in 6 weeks or hysteroscopy is recommended to exclude malignancy   2.2 cm fundal fibroid which previously measured 1.9 cm. Had D &C and bx 11/20 . Has had f/u imaging with Dr. Huitron (not available) and has had no further bleeding episode        H/o thyroid nodule last u/s 2/19 and 1/2021 no change in small nodules and cysts compared to u/s 2018. Had been on thyroid meds in past but told to stop that she didn't need them anymore. Thyroid u/s 1/2021 Several thyroid lesions without detrimental change or indication for biopsy.     Had trach at 5-6 years old due to peanut stuck in throat  Objective:      Physical Exam  Vitals and nursing note reviewed.   Constitutional:       Appearance: She is well-developed.   Cardiovascular:      Rate and Rhythm: Normal rate and regular rhythm.      Heart sounds: Normal heart sounds.   Pulmonary:      Effort: Pulmonary effort is normal.      Breath sounds: Normal breath sounds.   Skin:     General: Skin is warm and dry.   Neurological:      Mental Status: She is alert and oriented to person, place, and time.       Assessment:       1. Increased endometrial stripe thickness    2. Hyperlipidemia, mixed    3. Hypothyroidism, unspecified type    4. Chronic obstructive  pulmonary disease, unspecified COPD type    5. Immune deficiency disorder    6. Pulmonary hypertension    7. Tortuous aorta    8. Multiple sclerosis    9. Thyroid nodule    10. Chronic constipation    11. Gastroesophageal reflux disease, unspecified whether esophagitis present        Plan:         1. Increased endometrial stripe thickness  Has had work up EMB and serial ultrasounds-no cancer. Is followed by gyn    2. Hyperlipidemia, mixed  Controlled on current medications.  Continue current medications.    - Comprehensive Metabolic Panel; Future  - Lipid Panel; Future  - rosuvastatin (CRESTOR) 10 MG tablet; Take 1 tablet (10 mg total) by mouth once daily.  Dispense: 90 tablet; Refill: 3  - Comprehensive Metabolic Panel; Future  - Lipid Panel; Future    3. Hypothyroidism, unspecified type  Stable condition.  Continue current medications.  Will adjust based on lab findings or if condition changes.    - CBC Auto Differential; Future  - TSH; Future  - T4, Free; Future    4. Chronic obstructive pulmonary disease, unspecified COPD type  Cont current mgmt    5. Immune deficiency disorder  Cont monitoring    6. Pulmonary hypertension  Cont monitoring     7. Tortuous aorta  Cont lower risk factors    8. Multiple sclerosis  In remission    9. Thyroid nodule  Stable and chronic    10. Chronic constipation  Patient was counseled that these lifestyle changes can help prevent constipation:  Diet. Eat a high-fiber diet, with fresh fruit and vegetables, and reduce dairy intake, meats, and processed foods.  An over the counter fiber supplement is recommended such as Fiberchoice chewables or Metamucil.  Fluids. It's important to get enough fluids each day. Drink plenty of water when you eat more fiber. If you are on diet that limits the amount of fluid you can have, talk about this with your health care provider.  Regular exercise. Check with your health care provider first.  I also advised use of over the counter stool softeners  like docusate as needed for persistent constipation.  OTC probiotics may also be of benefit like Align to help regulation.  If acutely constipated the patient was advised to use otc fleets enema(s) and/or magnesium citrate to relieve symptoms.    Cont specialty care    11. Gastroesophageal reflux disease, unspecified whether esophagitis present  Counseled patient on prevention of reflux with changes in diet and behavior.  I recommended avoidance of greasy and spicy foods, caffeine and eating within 3 hours of bedtime.  I counseled the patient to avoid eating large meals and instead eating more frequent small meals.  I also recommended weight loss and elevation of the head of the bed by 6 inches.  If symptoms persist after these changes medication may be needed to control GERD.  Controlled on current medications.  Continue current medications.    - omeprazole (PRILOSEC) 20 MG capsule; Take 1 capsule (20 mg total) by mouth once daily.  Dispense: 90 capsule; Refill: 3      Time spent with patient: 20 minutes    Patient with be reevaluated in 1 year or sooner prn    Greater than 50% of this visit was spent counseling as described in above documentation:Yes

## 2023-07-13 ENCOUNTER — TELEPHONE (OUTPATIENT)
Dept: FAMILY MEDICINE | Facility: CLINIC | Age: 73
End: 2023-07-13
Payer: MEDICARE

## 2023-07-21 ENCOUNTER — PATIENT MESSAGE (OUTPATIENT)
Dept: PSYCHIATRY | Facility: CLINIC | Age: 73
End: 2023-07-21
Payer: MEDICARE

## 2023-07-31 NOTE — PROGRESS NOTES
Release of medical records fax over to Dr. Huitron office for copies of patient last 2 years pelvic results.

## 2024-01-22 ENCOUNTER — PATIENT MESSAGE (OUTPATIENT)
Dept: PSYCHIATRY | Facility: CLINIC | Age: 74
End: 2024-01-22
Payer: MEDICARE

## 2024-04-22 DIAGNOSIS — Z12.31 ENCOUNTER FOR SCREENING MAMMOGRAM FOR MALIGNANT NEOPLASM OF BREAST: Primary | ICD-10-CM

## 2024-04-23 ENCOUNTER — HOSPITAL ENCOUNTER (OUTPATIENT)
Dept: RADIOLOGY | Facility: HOSPITAL | Age: 74
Discharge: HOME | End: 2024-04-23
Attending: SPECIALIST
Payer: MEDICARE

## 2024-04-23 DIAGNOSIS — Z12.31 ENCOUNTER FOR SCREENING MAMMOGRAM FOR MALIGNANT NEOPLASM OF BREAST: ICD-10-CM

## 2024-04-23 PROCEDURE — 77067 SCR MAMMO BI INCL CAD: CPT | Mod: 26,,, | Performed by: RADIOLOGY

## 2024-04-23 PROCEDURE — 77063 BREAST TOMOSYNTHESIS BI: CPT | Mod: TC,PO

## 2024-04-23 PROCEDURE — 77063 BREAST TOMOSYNTHESIS BI: CPT | Mod: 26,,, | Performed by: RADIOLOGY

## 2024-04-23 PROCEDURE — 77067 SCR MAMMO BI INCL CAD: CPT | Mod: TC,PO

## 2024-05-02 ENCOUNTER — PATIENT MESSAGE (OUTPATIENT)
Dept: PSYCHIATRY | Facility: CLINIC | Age: 74
End: 2024-05-02
Payer: MEDICARE

## 2024-05-20 ENCOUNTER — PATIENT MESSAGE (OUTPATIENT)
Dept: PSYCHIATRY | Facility: CLINIC | Age: 74
End: 2024-05-20
Payer: MEDICARE

## 2024-05-29 DIAGNOSIS — K21.9 GASTROESOPHAGEAL REFLUX DISEASE, UNSPECIFIED WHETHER ESOPHAGITIS PRESENT: ICD-10-CM

## 2024-05-29 RX ORDER — OMEPRAZOLE 20 MG/1
20 CAPSULE, DELAYED RELEASE ORAL
Qty: 90 CAPSULE | Refills: 0 | Status: SHIPPED | OUTPATIENT
Start: 2024-05-29

## 2024-05-29 NOTE — TELEPHONE ENCOUNTER
Care Due:                  Date            Visit Type   Department     Provider  --------------------------------------------------------------------------------                                EP -                              PRIMARY      [facility]  Last Visit: 07-      CARE ([facility])   SANDOR Sanchez                              EP -                              PRIMARY      [facility]  Next Visit: 07-      CARE ([facility])   MEDICINE       Meme Sanchez                                                            Last  Test          Frequency    Reason                     Performed    Due Date  --------------------------------------------------------------------------------    CMP.........  12 months..  rosuvastatin.............  07-   07-    Lipid Panel.  12 months..  rosuvastatin.............  07-   07-    [employer] Embedded Care Due Messages. Reference number: [id number].   5/29/2024 3:48:21 AM CDT

## 2024-05-29 NOTE — TELEPHONE ENCOUNTER
Refill Decision Note   Babita Patricmary jogiselle  is requesting a refill authorization.  Brief Assessment and Rationale for Refill:  Approve     Medication Therapy Plan:  FLOS 07/20/24      Comments:     Note composed:9:17 AM 05/29/2024

## 2024-06-05 NOTE — PROGRESS NOTES
[address] [facility] [address]  [place] [address]  Phone: [phone]   [phone]    Patient's PCP:Primary Doctor No  Referring Provider:No ref. provider found    Subjective:      Chief Complaint: Post-Op        Date of Surgery: 09/06/2019   Procedure: Open reduction internal fixation with bridge plating of 1st metatarsal cuneiform bridge plating of the comminuted 2nd metatarsal to metatarsal intermediate cuneiform joint, bridge plating of 3rd metatarsal 2 lateral cuneiform.  Percutaneous fixation of 4th metatarsal cuboid joint left foot.    HPI:   Babita Thomas is a 69 y.o. female who returns to the clinic today for her post-operative visit. Babita Thomas rates pain a 2/10 on a pain scale. Compliance of Care:  Elevation of foot. Using scooter.  Boot and dressing clean dry and intact. Nonweightbearing to the left foot.    Vitals:    09/11/19 1054   BP: 120/62   Pulse: 62       Past Surgical History:   Procedure Laterality Date    BREAST SURGERY Left 15 yrs ago    biopsy, benign    CHOLECYSTECTOMY      COLONOSCOPY  3-5-2010    Dr Cotter 11 year bal     CYSTOSCOPY N/A 6/10/2019    Performed by [facility]iza [facility]as [facility] at [facility] OR    ESOPHAGUS SURGERY      6 year old, peanut stuck in throat     ETHMOIDECTOMY N/A 12/19/2016    Performed by Parish Aleman MD at [facility] OR    FUSION, JOINT, MIDFOOT Left 9/6/2019    Performed by [facility] D[facility] at [facility] OR    MULTIPLE TOOTH EXTRACTIONS      SEPTOPLASTY N/A 4/28/2015    Performed by Angelo Winkler MD at [facility] OR    SINUS SURGERY  2015    x3    SINUS SURGERY FUNCTIONAL ENDOSCOPIC WITH NAVIGATION N/A 12/19/2016    Performed by Parish [facility]man MD at [facility] OR    SINUS SURGERY FUNCTIONAL ENDOSCOPIC WITH NAVIGATION Bilateral 4/28/2015    Performed by Angelo [facility]ler MD at [facility] OR    TRACHEOSTOMY CLOSURE      age 6 for peanut in throat    TYMPANOSTOMY TUBE PLACEMENT      x2    WISDOM TOOTH EXTRACTION       Past Medical History:   Diagnosis Date     Allergy     Arthritis 1990    hands    Asthma 02/2016    no meds    Depression 5/19/2017    Fracture of left foot 09/04/2019    Dr Cantu    GERD (gastroesophageal reflux disease) 2009    on meds    High cholesterol 2014    on meds    Hyperlipidemia     Hypothyroidism     Immune deficiency disorder 05/19/2017    on meds infusion    Insomnia 1/31/2017    MS (multiple sclerosis) 2005    very well controlled, no symptoms    Shortness of breath     Sinusitis     Thyroid disease 2005    hypothyroidism- no med     Family History   Problem Relation Age of Onset    Hypertension Mother     Dementia Mother     Hyperlipidemia Mother     Heart disease Father 60        MI    Hypertension Sister     No Known Problems Daughter     Heart disease Paternal Uncle     Heart disease Maternal Grandfather 70        MI    Heart disease Paternal Grandfather     No Known Problems Daughter     Allergic rhinitis Neg Hx     Allergies Neg Hx     Angioedema Neg Hx     Asthma Neg Hx     Atopy Neg Hx     Eczema Neg Hx     Immunodeficiency Neg Hx     Rhinitis Neg Hx     Urticaria Neg Hx     Melanoma Neg Hx     Psoriasis Neg Hx     Lupus Neg Hx         Social History:   Marital Status:   Alcohol History:  reports that she does not drink alcohol.  Tobacco History:  reports that she has never smoked. She has never used smokeless tobacco.  Drug History:  reports that she does not use drugs.    Review of patient's allergies indicates:   Allergen Reactions    Pravastatin Other (See Comments)     cramping    Tecfidera [dimethyl fumarate] Swelling       Current Outpatient Medications   Medication Sig Dispense Refill    aspirin (ECOTRIN) 81 MG EC tablet Take 81 mg by mouth once daily.      CALCIUM ACETATE ORAL Take 1 tablet by mouth 2 (two) times daily. Every day      cephALEXin (KEFLEX) 500 MG capsule Take 1 capsule (500 mg total) by mouth every 12 (twelve) hours. for 10 days 20 capsule 0     cholecalciferol, vitamin D3, 2,000 unit Tab Take by mouth.      FLUoxetine 20 MG capsule Take 10 mg by mouth every evening.   0    ibuprofen (ADVIL,MOTRIN) 200 MG tablet Take 200 mg by mouth every 6 (six) hours as needed for Pain.      immun glob G,IgG,-pro-IgA 0-50 (HIZENTRA) 10 gram/50 mL (20 %) Soln Inject 12 g into the skin every 14 (fourteen) days. 4 vial 11    metFORMIN (GLUCOPHAGE) 500 MG tablet Take 500 mg by mouth 2 (two) times daily.  2    multivitamin-minerals-lutein (CENTRUM SILVER) Tab Take 1 tablet by mouth once daily. 1 Tablet(s) Oral Every day.      omega-3 acid ethyl esters (LOVAZA) 1 gram capsule Take 2 g by mouth once daily.      omeprazole (PRILOSEC) 20 MG capsule Take 20 mg by mouth once daily.      oxyCODONE-acetaminophen (PERCOCET)  mg per tablet Take 1 tablet by mouth every 6 (six) hours as needed for Pain (4-6 hours as needed). 30 tablet 0    promethazine (PHENERGAN) 25 MG tablet Take 1 tablet (25 mg total) by mouth every 4 (four) hours. 30 tablet 0    QUEtiapine (SEROQUEL) 100 MG Tab Take 100-200 mg by mouth nightly as needed.       venlafaxine (EFFEXOR-XR) 150 MG Cp24 2 (two) times daily.       HYDROcodone-acetaminophen (NORCO) 7.5-325 mg per tablet Take 1 tablet by mouth every 6 (six) hours as needed for Pain. 28 tablet 0    lovastatin (MEVACOR) 20 MG tablet Take 1 tablet (20 mg total) by mouth every evening. 90 tablet 3     No current facility-administered medications for this visit.        Review of Systems      Objective:        Post-op surgery of the left foot with normal healing, no signs of infection or dehiscence of wound. Hardware in place. Normal post op exam today. No redness, no drainage, no increase in local temperature, no significant swelling, sutures.steri-strips are intact.     Imaging: none     Physical Exam:   Foot Exam  Physical Exam       Assessment:       1. Foot pain, left    2. Closed nondisplaced fracture of first metatarsal bone of left foot  with routine healing, subsequent encounter    3. Closed displaced fracture of second metatarsal bone of left foot with routine healing, subsequent encounter    4. Closed displaced fracture of third metatarsal bone of left foot with routine healing, subsequent encounter    5. Closed displaced fracture of fourth metatarsal bone of left foot with routine healing, subsequent encounter    6. Closed displaced fracture of fifth metatarsal bone of left foot with routine healing, subsequent encounter      Plan:   Foot pain, left  -     HYDROcodone-acetaminophen (NORCO) 7.5-325 mg per tablet; Take 1 tablet by mouth every 6 (six) hours as needed for Pain.  Dispense: 28 tablet; Refill: 0    Closed nondisplaced fracture of first metatarsal bone of left foot with routine healing, subsequent encounter  -     HYDROcodone-acetaminophen (NORCO) 7.5-325 mg per tablet; Take 1 tablet by mouth every 6 (six) hours as needed for Pain.  Dispense: 28 tablet; Refill: 0    Closed displaced fracture of second metatarsal bone of left foot with routine healing, subsequent encounter  -     HYDROcodone-acetaminophen (NORCO) 7.5-325 mg per tablet; Take 1 tablet by mouth every 6 (six) hours as needed for Pain.  Dispense: 28 tablet; Refill: 0    Closed displaced fracture of third metatarsal bone of left foot with routine healing, subsequent encounter  -     HYDROcodone-acetaminophen (NORCO) 7.5-325 mg per tablet; Take 1 tablet by mouth every 6 (six) hours as needed for Pain.  Dispense: 28 tablet; Refill: 0    Closed displaced fracture of fourth metatarsal bone of left foot with routine healing, subsequent encounter  -     HYDROcodone-acetaminophen (NORCO) 7.5-325 mg per tablet; Take 1 tablet by mouth every 6 (six) hours as needed for Pain.  Dispense: 28 tablet; Refill: 0    Closed displaced fracture of fifth metatarsal bone of left foot with routine healing, subsequent encounter  -     HYDROcodone-acetaminophen (NORCO) 7.5-325 mg per tablet; Take 1  tablet by mouth every 6 (six) hours as needed for Pain.  Dispense: 28 tablet; Refill: 0      Follow up in about 1 week (around 9/18/2019) for ORIF left foot.    Procedures - None    The surgical dressing was removed showing no signs of infection, excess edema or malalignment. A new dry dressing was applied and patient was instructed to leave dressing intact until next visit or until instructed to remove.     CAM walker boot with non-weight bearing  Ice to painful area, 15 minutes at a time  Ace-wrap to help with swelling  No barefoot   Keep affected extremity elevated while seated      This note was created using Dragon voice recognition software that occasionally misinterpreted phrases or words.         Fall Risk

## 2024-07-10 ENCOUNTER — OFFICE VISIT (OUTPATIENT)
Dept: FAMILY MEDICINE | Facility: CLINIC | Age: 74
End: 2024-07-10
Payer: MEDICARE

## 2024-07-10 VITALS
WEIGHT: 134.5 LBS | SYSTOLIC BLOOD PRESSURE: 130 MMHG | HEIGHT: 65 IN | TEMPERATURE: 98 F | RESPIRATION RATE: 15 BRPM | OXYGEN SATURATION: 98 % | HEART RATE: 76 BPM | BODY MASS INDEX: 22.41 KG/M2 | DIASTOLIC BLOOD PRESSURE: 60 MMHG

## 2024-07-10 DIAGNOSIS — D84.9 IMMUNE DEFICIENCY DISORDER: ICD-10-CM

## 2024-07-10 DIAGNOSIS — E55.9 VITAMIN D DEFICIENCY: ICD-10-CM

## 2024-07-10 DIAGNOSIS — J44.9 CHRONIC OBSTRUCTIVE PULMONARY DISEASE, UNSPECIFIED COPD TYPE: ICD-10-CM

## 2024-07-10 DIAGNOSIS — E04.1 THYROID NODULE: ICD-10-CM

## 2024-07-10 DIAGNOSIS — E78.2 HYPERLIPIDEMIA, MIXED: Primary | ICD-10-CM

## 2024-07-10 DIAGNOSIS — N95.9 MENOPAUSAL AND POSTMENOPAUSAL DISORDER: ICD-10-CM

## 2024-07-10 DIAGNOSIS — G35 MULTIPLE SCLEROSIS: ICD-10-CM

## 2024-07-10 DIAGNOSIS — E03.9 HYPOTHYROIDISM, UNSPECIFIED TYPE: ICD-10-CM

## 2024-07-10 DIAGNOSIS — R41.3 MEMORY PROBLEM: ICD-10-CM

## 2024-07-10 PROCEDURE — G2211 COMPLEX E/M VISIT ADD ON: HCPCS | Mod: HCNC,S$GLB,, | Performed by: FAMILY MEDICINE

## 2024-07-10 PROCEDURE — 1159F MED LIST DOCD IN RCRD: CPT | Mod: HCNC,CPTII,S$GLB, | Performed by: FAMILY MEDICINE

## 2024-07-10 PROCEDURE — 1160F RVW MEDS BY RX/DR IN RCRD: CPT | Mod: HCNC,CPTII,S$GLB, | Performed by: FAMILY MEDICINE

## 2024-07-10 PROCEDURE — 3078F DIAST BP <80 MM HG: CPT | Mod: HCNC,CPTII,S$GLB, | Performed by: FAMILY MEDICINE

## 2024-07-10 PROCEDURE — 99214 OFFICE O/P EST MOD 30 MIN: CPT | Mod: HCNC,S$GLB,, | Performed by: FAMILY MEDICINE

## 2024-07-10 PROCEDURE — 1126F AMNT PAIN NOTED NONE PRSNT: CPT | Mod: HCNC,CPTII,S$GLB, | Performed by: FAMILY MEDICINE

## 2024-07-10 PROCEDURE — 3008F BODY MASS INDEX DOCD: CPT | Mod: HCNC,CPTII,S$GLB, | Performed by: FAMILY MEDICINE

## 2024-07-10 PROCEDURE — 99999 PR PBB SHADOW E&M-EST. PATIENT-LVL V: CPT | Mod: PBBFAC,HCNC,, | Performed by: FAMILY MEDICINE

## 2024-07-10 PROCEDURE — 3288F FALL RISK ASSESSMENT DOCD: CPT | Mod: HCNC,CPTII,S$GLB, | Performed by: FAMILY MEDICINE

## 2024-07-10 PROCEDURE — 3075F SYST BP GE 130 - 139MM HG: CPT | Mod: HCNC,CPTII,S$GLB, | Performed by: FAMILY MEDICINE

## 2024-07-10 PROCEDURE — 1101F PT FALLS ASSESS-DOCD LE1/YR: CPT | Mod: HCNC,CPTII,S$GLB, | Performed by: FAMILY MEDICINE

## 2024-07-10 RX ORDER — NEOMYCIN SULFATE 500 MG/1
500 TABLET ORAL
COMMUNITY

## 2024-07-10 NOTE — PROGRESS NOTES
Subjective:       Patient ID: Babita Thomas is a 74 y.o. female.    Chief Complaint: Annual Exam    HPI  Review of Systems   Constitutional:  Negative for fatigue and unexpected weight change.   Respiratory:  Negative for chest tightness and shortness of breath.    Cardiovascular:  Negative for chest pain, palpitations and leg swelling.   Gastrointestinal:  Negative for abdominal pain.   Musculoskeletal:  Negative for arthralgias.   Neurological:  Negative for dizziness, syncope, light-headedness and headaches.       Patient Active Problem List   Diagnosis    Multiple sclerosis    GERD (gastroesophageal reflux disease)    Thyroid nodule    Tortuous aorta    Asthma    Chronic pansinusitis    Hypertrophy of inferior nasal turbinate    Insomnia    Immune deficiency disorder    Eosinophilia    Recurrent major depressive disorder    Gross hematuria    Closed nondisplaced fracture of medial cuneiform of left foot    Closed nondisplaced fracture of cuboid bone of left foot    Closed displaced fracture of fifth metatarsal bone of left foot    Closed displaced fracture of fourth metatarsal bone of left foot    Closed displaced fracture of third metatarsal bone of left foot    Closed displaced fracture of second metatarsal bone of left foot    Closed nondisplaced fracture of first left metatarsal bone    Lisfranc dislocation, left, initial encounter    Hypothyroidism    Myalgia due to statin    Hyperlipidemia, mixed    Pulmonary hypertension    Chronic obstructive pulmonary disease    Chronic constipation    Increased endometrial stripe thickness     Patient is here for a chronic conditions follow up.    Reviewed labs 7/23. Fasting labs scheduled 7/20/24  Has white coat htn. 130/60 at home yesterday  mammo 4/24 neg   dexa 10/2021 osteopenia    Eye Dr. Diez cataract     Derm Dr. Oh scalp psoriasis     GI Dr. Cotter treating gerd and  chronic constipation- tried fiber, stool softeners, miralax and linzess 75mg bid .  Tried trulance and caused cramping. Will try again qod or q 3 days.  Worked at first but then stopped. Last colonoscopy 7/2021 GI Dr. Cotter- bowel prep unsatisfactory. 2010 no polyps. Dr. Cotter GI GERD, gastroparesis, lactose intolerance.     Pulm Dr. Heard asthma-rare use of albuterol     Podiatry Dr. Cantu -h/o lisfranc fracture        Immunology Dr. 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 Not seeing a current neurologist. C/o new sx worsening gradually over time of forgetfulness such as misplacing things.  Denies problems with getting lost in familiar places, forgetting meds, leaving food on the stove , or forgetting to pay bills/appts.  No others notice problem. Concerned bc mother had alzheimers. Has trouble getting and staying asleep but helped with trazodone. Denies snoring and feels rested. Denies depression. Denies distraction.      Psych Dr. Zhu  prescribed metformin for weight mgmt-no longer taking. Mood well controlled on prozac     Card HPL- stopped mevacor due to muscle pain.  Now on crestor     GYN Dr. Elana sanderson for postmenopausal bleeding 10/20. U/s and EMB done Dr. Nazario Retroverted uterus with fluid within the endometrium measuring 7 mm.  This mildly thickened.  A follow-up ultrasound in 6 weeks or hysteroscopy is recommended to exclude malignancy   2.2 cm fundal fibroid which previously measured 1.9 cm. Had D &C and bx 11/20 . Has had f/u imaging with Dr. Huitron (not available) and has had no further bleeding episode        Endocrine H/o thyroid nodule last u/s 2/19 and 1/2021 no change in small nodules and cysts compared to u/s 2017. No further imagine needed. Had been on thyroid meds in past but told to stop that she didn't need them anymore. Thyroid u/s 1/2021 Several thyroid lesions without detrimental change or indication for biopsy.     ENT Had trach at 5-6 years old due to peanut stuck in throat  Objective:       Physical Exam  Vitals and nursing note reviewed.   Constitutional:       Appearance: She is well-developed.   Cardiovascular:      Rate and Rhythm: Normal rate and regular rhythm.      Heart sounds: Normal heart sounds.   Pulmonary:      Effort: Pulmonary effort is normal.      Breath sounds: Normal breath sounds.   Skin:     General: Skin is warm and dry.   Neurological:      Mental Status: She is alert and oriented to person, place, and time.         Assessment:       1. Hyperlipidemia, mixed    2. Hypothyroidism, unspecified type    3. Chronic obstructive pulmonary disease, unspecified COPD type    4. Multiple sclerosis    5. Thyroid nodule    6. Menopausal and postmenopausal disorder    7. Immune deficiency disorder    8. Memory problem    9. Vitamin D deficiency        Plan:         1. Hyperlipidemia, mixed  Controlled on current medications.  Continue current medications.      2. Hypothyroidism, unspecified type  Screen and treat as indicated:  Not on supplement    3. Chronic obstructive pulmonary disease, unspecified COPD type  Cont current mgmt    4. Multiple sclerosis  Asx. F/u neuro if sx. ? Contributing to current forgetfulness    5. Thyroid nodule  Stable since 2017. No further imaging needed    6. Menopausal and postmenopausal disorder  Screen and treat as indicated:    - DXA Bone Density Axial Skeleton 1 or more sites; Future    7. Immune deficiency disorder  Refer for consult and eval and treat  - Ambulatory referral/consult to Allergy; Future    8. Memory problem  Screen and treat as indicated:  Refer for eval and treat  - Vitamin B12; Future  - Calcitriol; Future  - Ambulatory referral/consult to Neurology; Future  - RPR (for monitoring); Future    9. Vitamin D deficiency  Screen and treat as indicated:    - Calcitriol; Future      Time spent with patient: 20 minutes    Patient with be reevaluated in 1 year or sooner prn    Greater than 50% of this visit was spent counseling as described in above  documentation:Yes

## 2024-07-11 ENCOUNTER — LAB VISIT (OUTPATIENT)
Dept: LAB | Facility: HOSPITAL | Age: 74
End: 2024-07-11
Attending: FAMILY MEDICINE
Payer: MEDICARE

## 2024-07-11 DIAGNOSIS — E55.9 VITAMIN D DEFICIENCY: ICD-10-CM

## 2024-07-11 DIAGNOSIS — E03.9 HYPOTHYROIDISM, UNSPECIFIED TYPE: ICD-10-CM

## 2024-07-11 DIAGNOSIS — E78.2 HYPERLIPIDEMIA, MIXED: ICD-10-CM

## 2024-07-11 DIAGNOSIS — R41.3 MEMORY PROBLEM: ICD-10-CM

## 2024-07-11 LAB
ALBUMIN SERPL BCP-MCNC: 4.2 G/DL (ref 3.5–5.2)
ALP SERPL-CCNC: 74 U/L (ref 55–135)
ALT SERPL W/O P-5'-P-CCNC: 19 U/L (ref 10–44)
ANION GAP SERPL CALC-SCNC: 5 MMOL/L (ref 8–16)
AST SERPL-CCNC: 23 U/L (ref 10–40)
BASOPHILS # BLD AUTO: 0.04 K/UL (ref 0–0.2)
BASOPHILS NFR BLD: 0.8 % (ref 0–1.9)
BILIRUB SERPL-MCNC: 0.4 MG/DL (ref 0.1–1)
BUN SERPL-MCNC: 11 MG/DL (ref 8–23)
CALCIUM SERPL-MCNC: 9.5 MG/DL (ref 8.7–10.5)
CHLORIDE SERPL-SCNC: 100 MMOL/L (ref 95–110)
CHOLEST SERPL-MCNC: 155 MG/DL (ref 120–199)
CHOLEST/HDLC SERPL: 2.5 {RATIO} (ref 2–5)
CO2 SERPL-SCNC: 31 MMOL/L (ref 23–29)
CREAT SERPL-MCNC: 0.7 MG/DL (ref 0.5–1.4)
DIFFERENTIAL METHOD BLD: ABNORMAL
EOSINOPHIL # BLD AUTO: 0.2 K/UL (ref 0–0.5)
EOSINOPHIL NFR BLD: 5 % (ref 0–8)
ERYTHROCYTE [DISTWIDTH] IN BLOOD BY AUTOMATED COUNT: 12.3 % (ref 11.5–14.5)
EST. GFR  (NO RACE VARIABLE): >60 ML/MIN/1.73 M^2
GLUCOSE SERPL-MCNC: 92 MG/DL (ref 70–110)
HCT VFR BLD AUTO: 44.1 % (ref 37–48.5)
HDLC SERPL-MCNC: 61 MG/DL (ref 40–75)
HDLC SERPL: 39.4 % (ref 20–50)
HGB BLD-MCNC: 14.3 G/DL (ref 12–16)
IMM GRANULOCYTES # BLD AUTO: 0.01 K/UL (ref 0–0.04)
IMM GRANULOCYTES NFR BLD AUTO: 0.2 % (ref 0–0.5)
LDLC SERPL CALC-MCNC: 80 MG/DL (ref 63–159)
LYMPHOCYTES # BLD AUTO: 1.6 K/UL (ref 1–4.8)
LYMPHOCYTES NFR BLD: 32.7 % (ref 18–48)
MCH RBC QN AUTO: 30 PG (ref 27–31)
MCHC RBC AUTO-ENTMCNC: 32.4 G/DL (ref 32–36)
MCV RBC AUTO: 93 FL (ref 82–98)
MONOCYTES # BLD AUTO: 0.8 K/UL (ref 0.3–1)
MONOCYTES NFR BLD: 15.8 % (ref 4–15)
NEUTROPHILS # BLD AUTO: 2.2 K/UL (ref 1.8–7.7)
NEUTROPHILS NFR BLD: 45.5 % (ref 38–73)
NONHDLC SERPL-MCNC: 94 MG/DL
NRBC BLD-RTO: 0 /100 WBC
PLATELET # BLD AUTO: 315 K/UL (ref 150–450)
PMV BLD AUTO: 9.2 FL (ref 9.2–12.9)
POTASSIUM SERPL-SCNC: 4.1 MMOL/L (ref 3.5–5.1)
PROT SERPL-MCNC: 7.1 G/DL (ref 6–8.4)
RBC # BLD AUTO: 4.76 M/UL (ref 4–5.4)
SODIUM SERPL-SCNC: 136 MMOL/L (ref 136–145)
T4 FREE SERPL-MCNC: 0.87 NG/DL (ref 0.71–1.51)
TRIGL SERPL-MCNC: 70 MG/DL (ref 30–150)
TSH SERPL DL<=0.005 MIU/L-ACNC: 2.58 UIU/ML (ref 0.34–5.6)
VIT B12 SERPL-MCNC: 673 PG/ML (ref 210–950)
WBC # BLD AUTO: 4.8 K/UL (ref 3.9–12.7)

## 2024-07-11 PROCEDURE — 82607 VITAMIN B-12: CPT | Performed by: FAMILY MEDICINE

## 2024-07-11 PROCEDURE — 80061 LIPID PANEL: CPT | Performed by: FAMILY MEDICINE

## 2024-07-11 PROCEDURE — 82652 VIT D 1 25-DIHYDROXY: CPT | Performed by: FAMILY MEDICINE

## 2024-07-11 PROCEDURE — 85025 COMPLETE CBC W/AUTO DIFF WBC: CPT | Performed by: FAMILY MEDICINE

## 2024-07-11 PROCEDURE — 84439 ASSAY OF FREE THYROXINE: CPT | Performed by: FAMILY MEDICINE

## 2024-07-11 PROCEDURE — 36415 COLL VENOUS BLD VENIPUNCTURE: CPT | Performed by: FAMILY MEDICINE

## 2024-07-11 PROCEDURE — 84443 ASSAY THYROID STIM HORMONE: CPT | Performed by: FAMILY MEDICINE

## 2024-07-11 PROCEDURE — 86592 SYPHILIS TEST NON-TREP QUAL: CPT | Mod: HCNC | Performed by: FAMILY MEDICINE

## 2024-07-11 PROCEDURE — 80053 COMPREHEN METABOLIC PANEL: CPT | Performed by: FAMILY MEDICINE

## 2024-07-12 ENCOUNTER — HOSPITAL ENCOUNTER (OUTPATIENT)
Dept: RADIOLOGY | Facility: HOSPITAL | Age: 74
Discharge: HOME | End: 2024-07-12
Attending: FAMILY MEDICINE
Payer: MEDICARE

## 2024-07-12 DIAGNOSIS — N95.9 MENOPAUSAL AND POSTMENOPAUSAL DISORDER: ICD-10-CM

## 2024-07-12 LAB — RPR SER QL: NORMAL

## 2024-07-12 PROCEDURE — 77080 DXA BONE DENSITY AXIAL: CPT | Mod: 26,,, | Performed by: RADIOLOGY

## 2024-07-12 PROCEDURE — 77080 DXA BONE DENSITY AXIAL: CPT | Mod: TC,PO

## 2024-07-15 LAB — 1,25(OH)2D3 SERPL-MCNC: 72.3 PG/ML (ref 24.8–81.5)

## 2024-07-25 ENCOUNTER — PATIENT MESSAGE (OUTPATIENT)
Dept: PSYCHIATRY | Facility: CLINIC | Age: 74
End: 2024-07-25
Payer: MEDICARE

## 2024-07-29 ENCOUNTER — TELEPHONE (OUTPATIENT)
Dept: FAMILY MEDICINE | Facility: CLINIC | Age: 74
End: 2024-07-29
Payer: MEDICARE

## 2024-07-29 NOTE — TELEPHONE ENCOUNTER
----- Message from Vilma Vu sent at 7/29/2024  9:08 AM CDT -----  Regarding: r/s  Contact: pt  Type: Needs Medical Advice  Who Called:  patient  Symptoms (please be specific):  r/s nurse visit 8/6/24  How long has patient had these symptoms:    Pharmacy name and phone #:    Best Call Back Number: [phone]    Additional Information: call to r/s

## 2024-08-05 ENCOUNTER — PATIENT MESSAGE (OUTPATIENT)
Dept: PSYCHIATRY | Facility: CLINIC | Age: 74
End: 2024-08-05
Payer: MEDICARE

## 2024-08-07 ENCOUNTER — CLINICAL SUPPORT (OUTPATIENT)
Dept: FAMILY MEDICINE | Facility: CLINIC | Age: 74
End: 2024-08-07
Payer: MEDICARE

## 2024-08-07 VITALS — SYSTOLIC BLOOD PRESSURE: 112 MMHG | DIASTOLIC BLOOD PRESSURE: 62 MMHG | HEART RATE: 70 BPM

## 2024-08-07 DIAGNOSIS — I27.20 PULMONARY HYPERTENSION: Primary | ICD-10-CM

## 2024-08-07 PROCEDURE — 99499 UNLISTED E&M SERVICE: CPT | Mod: HCNC,S$GLB,, | Performed by: FAMILY MEDICINE

## 2024-08-10 DIAGNOSIS — K21.9 GASTROESOPHAGEAL REFLUX DISEASE, UNSPECIFIED WHETHER ESOPHAGITIS PRESENT: ICD-10-CM

## 2024-08-10 RX ORDER — OMEPRAZOLE 20 MG/1
20 CAPSULE, DELAYED RELEASE ORAL
Qty: 90 CAPSULE | Refills: 3 | Status: SHIPPED | OUTPATIENT
Start: 2024-08-10

## 2024-08-10 NOTE — TELEPHONE ENCOUNTER
Refill Decision Note   Babita Martha  is requesting a refill authorization.  Brief Assessment and Rationale for Refill:  Approve     Medication Therapy Plan:         Comments:     Note composed:12:38 PM 08/10/2024

## 2024-08-10 NOTE — TELEPHONE ENCOUNTER
No care due was identified.  [facility] Embedded Care Due Messages. Reference number: [id number].   8/10/2024 1:41:23 AM CDT

## 2024-08-22 LAB
CHOLEST SERPL-MSCNC: 194 MG/DL (ref 0–200)
HDLC SERPL-MCNC: 74 MG/DL
LDLC SERPL CALC-MCNC: 105 MG/DL
TRIGL SERPL-MCNC: 83 MG/DL (ref 40–160)

## 2024-09-20 DIAGNOSIS — E78.2 HYPERLIPIDEMIA, MIXED: ICD-10-CM

## 2024-09-20 RX ORDER — ROSUVASTATIN CALCIUM 10 MG/1
10 TABLET, COATED ORAL
Qty: 90 TABLET | Refills: 3 | Status: SHIPPED | OUTPATIENT
Start: 2024-09-20

## 2024-09-20 NOTE — TELEPHONE ENCOUNTER
No care due was identified.  [facility] Embedded Care Due Messages. Reference number: [id number].   9/20/2024 6:27:14 AM CDT

## 2024-09-20 NOTE — TELEPHONE ENCOUNTER
Refill Decision Note   Babita Martha  is requesting a refill authorization.  Brief Assessment and Rationale for Refill:  Approve     Medication Therapy Plan:         Pharmacist review requested: Yes   Comments:     Note composed:11:06 AM 09/20/2024

## 2024-09-20 NOTE — TELEPHONE ENCOUNTER
Refill Routing Note   Medication(s) are not appropriate for processing by [facility] for the following reason(s):        Allergy or intolerance:     [facility] action(s):  Defer    Medication Therapy Plan: Allergy/Contraindication: rosuvastatin    Pharmacist review requested: Yes     Appointments  past 12m or future 3m with PCP    Date Provider   Last Visit   7/10/2024 Meme Sanchez MD   Next Visit   Visit date not found Meme Sanchez MD   ED visits in past 90 days: 0        Note composed:10:50 AM 09/20/2024

## 2024-12-16 ENCOUNTER — PATIENT MESSAGE (OUTPATIENT)
Dept: PSYCHIATRY | Facility: CLINIC | Age: 74
End: 2024-12-16
Payer: MEDICARE

## 2025-01-30 DIAGNOSIS — Z00.00 ENCOUNTER FOR MEDICARE ANNUAL WELLNESS EXAM: ICD-10-CM

## 2025-02-20 ENCOUNTER — TELEPHONE (OUTPATIENT)
Dept: ALLERGY | Facility: CLINIC | Age: 75
End: 2025-02-20
Payer: MEDICARE

## 2025-02-20 NOTE — TELEPHONE ENCOUNTER
Spoke to pt and scheduled appr as requested          ----- Message from Drew sent at 2/20/2025  3:15 PM CST -----  Contact: self  Type:  Sooner Appointment RequestCaller is requesting a sooner appointment.  Caller declined first available appointment listed below.  Caller will not accept being placed on the waitlist and is requesting a message be sent to doctor.Name of Caller:  PTWhen is the first available appointment?  System said office needs to schedule themselvesSymptoms:  Immunoglobulin Deficency referral from Dr. Lucy Cotter.  Patient has the paper order.Would the patient rather a call back or a response via [facility]? [facility] Call Back Number:  [phone] Additional Information:

## 2025-02-24 ENCOUNTER — LAB VISIT (OUTPATIENT)
Dept: LAB | Facility: HOSPITAL | Age: 75
End: 2025-02-24
Attending: PSYCHIATRY & NEUROLOGY
Payer: MEDICARE

## 2025-02-24 DIAGNOSIS — Z79.899 NEED FOR PROPHYLACTIC CHEMOTHERAPY: ICD-10-CM

## 2025-02-24 DIAGNOSIS — Z13.228 SCREENING FOR PHENYLKETONURIA (PKU): ICD-10-CM

## 2025-02-24 DIAGNOSIS — E78.49 FAMILIAL COMBINED HYPERLIPIDEMIA: Primary | ICD-10-CM

## 2025-02-24 DIAGNOSIS — E55.9 AVITAMINOSIS D: ICD-10-CM

## 2025-02-24 DIAGNOSIS — E78.49 FAMILIAL COMBINED HYPERLIPIDEMIA: ICD-10-CM

## 2025-02-24 DIAGNOSIS — R68.89 MECHANICAL AND MOTOR PROBLEMS WITH INTERNAL ORGANS: ICD-10-CM

## 2025-02-24 LAB
25(OH)D3+25(OH)D2 SERPL-MCNC: 81 NG/ML (ref 30–96)
ALBUMIN SERPL BCP-MCNC: 4 G/DL (ref 3.5–5.2)
ALP SERPL-CCNC: 80 U/L (ref 55–135)
ALT SERPL W/O P-5'-P-CCNC: 15 U/L (ref 10–44)
ANION GAP SERPL CALC-SCNC: 8 MMOL/L (ref 8–16)
AST SERPL-CCNC: 21 U/L (ref 10–40)
BASOPHILS # BLD AUTO: 0.03 K/UL (ref 0–0.2)
BASOPHILS NFR BLD: 0.6 % (ref 0–1.9)
BILIRUB SERPL-MCNC: 0.3 MG/DL (ref 0.1–1)
BUN SERPL-MCNC: 18 MG/DL (ref 8–23)
CALCIUM SERPL-MCNC: 9.1 MG/DL (ref 8.7–10.5)
CHLORIDE SERPL-SCNC: 102 MMOL/L (ref 95–110)
CO2 SERPL-SCNC: 25 MMOL/L (ref 23–29)
CREAT SERPL-MCNC: 0.8 MG/DL (ref 0.5–1.4)
DIFFERENTIAL METHOD BLD: ABNORMAL
EOSINOPHIL # BLD AUTO: 0.2 K/UL (ref 0–0.5)
EOSINOPHIL NFR BLD: 3.3 % (ref 0–8)
ERYTHROCYTE [DISTWIDTH] IN BLOOD BY AUTOMATED COUNT: 12.6 % (ref 11.5–14.5)
EST. GFR  (NO RACE VARIABLE): >60 ML/MIN/1.73 M^2
FOLATE SERPL-MCNC: >22.3 NG/ML (ref 4–24)
GLUCOSE SERPL-MCNC: 89 MG/DL (ref 70–110)
HCT VFR BLD AUTO: 41.6 % (ref 37–48.5)
HGB BLD-MCNC: 13.7 G/DL (ref 12–16)
IMM GRANULOCYTES # BLD AUTO: 0.01 K/UL (ref 0–0.04)
IMM GRANULOCYTES NFR BLD AUTO: 0.2 % (ref 0–0.5)
LYMPHOCYTES # BLD AUTO: 1.9 K/UL (ref 1–4.8)
LYMPHOCYTES NFR BLD: 36.4 % (ref 18–48)
MAGNESIUM SERPL-MCNC: 2 MG/DL (ref 1.6–2.6)
MCH RBC QN AUTO: 30.6 PG (ref 27–31)
MCHC RBC AUTO-ENTMCNC: 32.9 G/DL (ref 32–36)
MCV RBC AUTO: 93 FL (ref 82–98)
MONOCYTES # BLD AUTO: 0.9 K/UL (ref 0.3–1)
MONOCYTES NFR BLD: 16.6 % (ref 4–15)
NEUTROPHILS # BLD AUTO: 2.2 K/UL (ref 1.8–7.7)
NEUTROPHILS NFR BLD: 42.9 % (ref 38–73)
NRBC BLD-RTO: 0 /100 WBC
PLATELET # BLD AUTO: 280 K/UL (ref 150–450)
PMV BLD AUTO: 9.6 FL (ref 9.2–12.9)
POTASSIUM SERPL-SCNC: 4.1 MMOL/L (ref 3.5–5.1)
PROT SERPL-MCNC: 6.8 G/DL (ref 6–8.4)
RBC # BLD AUTO: 4.48 M/UL (ref 4–5.4)
SODIUM SERPL-SCNC: 135 MMOL/L (ref 136–145)
TSH SERPL DL<=0.005 MIU/L-ACNC: 3.5 UIU/ML (ref 0.34–5.6)
VIT B12 SERPL-MCNC: 1300 PG/ML (ref 210–950)
WBC # BLD AUTO: 5.11 K/UL (ref 3.9–12.7)

## 2025-02-24 PROCEDURE — 82607 VITAMIN B-12: CPT | Performed by: PSYCHIATRY & NEUROLOGY

## 2025-02-24 PROCEDURE — 85025 COMPLETE CBC W/AUTO DIFF WBC: CPT | Performed by: PSYCHIATRY & NEUROLOGY

## 2025-02-24 PROCEDURE — 84443 ASSAY THYROID STIM HORMONE: CPT | Performed by: PSYCHIATRY & NEUROLOGY

## 2025-02-24 PROCEDURE — 83735 ASSAY OF MAGNESIUM: CPT | Performed by: PSYCHIATRY & NEUROLOGY

## 2025-02-24 PROCEDURE — 82746 ASSAY OF FOLIC ACID SERUM: CPT | Performed by: PSYCHIATRY & NEUROLOGY

## 2025-02-24 PROCEDURE — 82306 VITAMIN D 25 HYDROXY: CPT | Performed by: PSYCHIATRY & NEUROLOGY

## 2025-02-24 PROCEDURE — 86038 ANTINUCLEAR ANTIBODIES: CPT | Performed by: PSYCHIATRY & NEUROLOGY

## 2025-02-24 PROCEDURE — 80053 COMPREHEN METABOLIC PANEL: CPT | Performed by: PSYCHIATRY & NEUROLOGY

## 2025-02-25 LAB — ANA TITR SER IF: NEGATIVE {TITER}

## 2025-03-19 ENCOUNTER — LAB VISIT (OUTPATIENT)
Dept: LAB | Facility: HOSPITAL | Age: 75
End: 2025-03-19
Attending: STUDENT IN AN ORGANIZED HEALTH CARE EDUCATION/TRAINING PROGRAM
Payer: MEDICARE

## 2025-03-19 ENCOUNTER — OFFICE VISIT (OUTPATIENT)
Dept: ALLERGY | Facility: CLINIC | Age: 75
End: 2025-03-19
Payer: MEDICARE

## 2025-03-19 VITALS — OXYGEN SATURATION: 97 % | HEIGHT: 65 IN | BODY MASS INDEX: 21.56 KG/M2 | WEIGHT: 129.44 LBS | HEART RATE: 67 BPM

## 2025-03-19 DIAGNOSIS — D80.6 SPECIFIC ANTIBODY DEFICIENCY WITH NORMAL IG CONCENTRATION AND NORMAL NUMBER OF B CELLS: ICD-10-CM

## 2025-03-19 DIAGNOSIS — R76.0 ABNORMAL ANTIBODY TITER: ICD-10-CM

## 2025-03-19 DIAGNOSIS — J31.0 CHRONIC RHINITIS: Primary | ICD-10-CM

## 2025-03-19 DIAGNOSIS — J31.0 CHRONIC RHINITIS: ICD-10-CM

## 2025-03-19 LAB
IGA SERPL-MCNC: 253 MG/DL (ref 40–350)
IGG SERPL-MCNC: 1015 MG/DL (ref 650–1600)
IGM SERPL-MCNC: 78 MG/DL (ref 50–300)

## 2025-03-19 PROCEDURE — 3008F BODY MASS INDEX DOCD: CPT | Mod: HCNC,CPTII,S$GLB, | Performed by: STUDENT IN AN ORGANIZED HEALTH CARE EDUCATION/TRAINING PROGRAM

## 2025-03-19 PROCEDURE — 1125F AMNT PAIN NOTED PAIN PRSNT: CPT | Mod: HCNC,CPTII,S$GLB, | Performed by: STUDENT IN AN ORGANIZED HEALTH CARE EDUCATION/TRAINING PROGRAM

## 2025-03-19 PROCEDURE — 1159F MED LIST DOCD IN RCRD: CPT | Mod: HCNC,CPTII,S$GLB, | Performed by: STUDENT IN AN ORGANIZED HEALTH CARE EDUCATION/TRAINING PROGRAM

## 2025-03-19 PROCEDURE — 86581 STRPTCS PNEUM ANTB SEROT IA: CPT | Mod: HCNC | Performed by: STUDENT IN AN ORGANIZED HEALTH CARE EDUCATION/TRAINING PROGRAM

## 2025-03-19 PROCEDURE — 86003 ALLG SPEC IGE CRUDE XTRC EA: CPT | Mod: 59,HCNC | Performed by: STUDENT IN AN ORGANIZED HEALTH CARE EDUCATION/TRAINING PROGRAM

## 2025-03-19 PROCEDURE — 99999 PR PBB SHADOW E&M-EST. PATIENT-LVL III: CPT | Mod: PBBFAC,HCNC,, | Performed by: STUDENT IN AN ORGANIZED HEALTH CARE EDUCATION/TRAINING PROGRAM

## 2025-03-19 PROCEDURE — 99204 OFFICE O/P NEW MOD 45 MIN: CPT | Mod: HCNC,S$GLB,, | Performed by: STUDENT IN AN ORGANIZED HEALTH CARE EDUCATION/TRAINING PROGRAM

## 2025-03-19 PROCEDURE — 1101F PT FALLS ASSESS-DOCD LE1/YR: CPT | Mod: HCNC,CPTII,S$GLB, | Performed by: STUDENT IN AN ORGANIZED HEALTH CARE EDUCATION/TRAINING PROGRAM

## 2025-03-19 PROCEDURE — 36415 COLL VENOUS BLD VENIPUNCTURE: CPT | Mod: HCNC,PO | Performed by: STUDENT IN AN ORGANIZED HEALTH CARE EDUCATION/TRAINING PROGRAM

## 2025-03-19 PROCEDURE — 82784 ASSAY IGA/IGD/IGG/IGM EACH: CPT | Mod: 59,HCNC | Performed by: STUDENT IN AN ORGANIZED HEALTH CARE EDUCATION/TRAINING PROGRAM

## 2025-03-19 PROCEDURE — 3288F FALL RISK ASSESSMENT DOCD: CPT | Mod: HCNC,CPTII,S$GLB, | Performed by: STUDENT IN AN ORGANIZED HEALTH CARE EDUCATION/TRAINING PROGRAM

## 2025-03-19 PROCEDURE — 82787 IGG 1 2 3 OR 4 EACH: CPT | Mod: HCNC | Performed by: STUDENT IN AN ORGANIZED HEALTH CARE EDUCATION/TRAINING PROGRAM

## 2025-03-19 RX ORDER — OMEPRAZOLE 40 MG/1
40 CAPSULE, DELAYED RELEASE ORAL 2 TIMES DAILY
COMMUNITY
Start: 2024-10-09

## 2025-03-19 RX ORDER — IPRATROPIUM BROMIDE 21 UG/1
2 SPRAY, METERED NASAL 3 TIMES DAILY
Qty: 30 ML | Refills: 3 | Status: SHIPPED | OUTPATIENT
Start: 2025-03-19

## 2025-03-19 NOTE — PROGRESS NOTES
ALLERGY & IMMUNOLOGY CLINIC       HISTORY OF PRESENT ILLNESS   Referral from: Dr. Lucy Cotter  CC: Establish care    HPI: Babita Thomas is a 75 y.o. female  History obtained from patient.     SPAD: Since stopping Hyqvia on 2020 has been having abdominal issues and sinus infections. No abx needed mostly viral. No bacterial PNA, no sinusitis, and no AOM. Not admitted to the hospital.     Abdominal pain and bloating with constipation started a few months ago. Has seen GI. Scopes are normal and has been diagnosed SIBO and that's the reason she has been referred. Abx once. No diets tried. Some weight loss but not much.    PPSV23 in 2016. No issues with blood clot.     Other:  Psoriasis head.   Has arthritis but unsure if OA or RA. Only fingers with index being deformed.   No problems with breathing. Was told she has COPD/Asthma but never needed any kind of inhaler. Non smoker   Rhinitis from left nostril. Some sneezing. Not positional.       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 At that time she was noted to have on Hizentra 12 g every 3 weeks. During this time patient had requested  to stop infusion and she was recommended to space or stop after reviewing the pros and cons.        MEDICAL HISTORY   SurgHx:  Past Surgical History:   Procedure Laterality Date    BREAST BIOPSY Left     benign    BREAST SURGERY Left 15 yrs ago    biopsy, benign    CHOLECYSTECTOMY      COLONOSCOPY  3-5-2010    Dr Cotter 11 year bal     CYSTOSCOPY N/A 6/10/2019    Procedure: CYSTOSCOPY;  Surgeon: Faiza Coreas MD;  Location: [facility] OR;  Service: Urology;  Laterality: N/A;    ESOPHAGUS SURGERY      6 year old, peanut stuck in throat     MIDFOOT ARTHRODESIS Left 9/6/2019    Procedure: FUSION, JOINT, MIDFOOT;  Surgeon: Damian Cantu DPM;  Location: [facility] OR;  Service: Podiatry;  Laterality: Left;  SEDA NORRIS NOTIFIED    MULTIPLE TOOTH EXTRACTIONS      SINUS SURGERY  2015    x3     "TRACHEOSTOMY CLOSURE      age 6 for peanut in throat    TYMPANOSTOMY TUBE PLACEMENT      x2    WISDOM TOOTH EXTRACTION          PHYSICAL EXAM   VS: Pulse 67   Ht 5' 5" (1.651 m)   Wt 58.7 kg (129 lb 6.6 oz)   SpO2 97%   BMI 21.53 kg/m²   GENERAL: NAD, well nourished, well appea/ring  EYES: no conjunctival injection, no discharge, no infraorbital shiners  NOSE: NT pink 2+ B/L, no polyps  ORAL: MMM, no ulcers, no thrush, no cobblestoning  LUNGS: CTAB, no w/r/c, no increased WOB         LABS AND IMAGING     CBC w diff: Normal   CMP: borderline low Sodium but normal otherwise   PHILIP negative       Component      Latest Ref Rng 5/23/2017   Tetanus Ab      >0.150 IU/mL 2.330    S.pneumoniae Type 1      mcg/mL <0.3    S.pneumoniae Type 3      mcg/mL 1.3    Strep pneumo Type 4      mcg/mL <0.3    S.pneumoniae Type 5      mcg/mL 0.4    S.pneumoniae Type 8      mcg/mL 3.7    S.pneumoniae Type 9N      mcg/mL <0.3    S.pneumoniae Type 12F      mcg/mL 0.5    Strep pneumo Type 14      mcg/mL 1.2    S.pneumoniae Type 19F      mcg/mL 2.3    S.pneumoniae Type 23F      mcg/mL 1.7    S.pneumoniae Type 6B      mcg/mL <0.3    S.pneumoniae Type 7F      mcg/mL <0.3    S.pneumoniae Type 18C      mcg/mL 1.2    S.pneumoniae Type 9V Abs      mcg/mL <0.3    IgG      650 - 1600 mg/dL 1086    IgM      50 - 300 mg/dL 98    IgA Level      40 - 350 mg/dL 308    Total IgE      0 - 100 IU/mL 44      Component      Latest Ref Rng 7/20/2017   IgG 1      382 - 929 mg/dL 499    IgG 2      242 - 700 mg/dL 172 (L)    IgG 3      22 - 176 mg/dL 26    IgG 4      4 - 86 mg/dL 13          3/2020: CBC with monocytosis, no eosinophils  CMP normal  IgG 1262    CT Chest 2014: linear scarring vs atelectasis in RML, no mention of bronchiectasis in the read     CXR Fall 2019 with 6mm possible artifact - needs repeat in the future    2016 Percutaneous Skin Testing: inhalants; 2+ pecan tree, alternaria mold, 1-2+ pigweed and penicillium mold, 1+ cottonwood tree, " acremonium with 3+ histamine control and reminder negative      ASSESSMENT & PLAN     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 Though it has been associated with acquired immune deficiency syndrome, combined variable immunodeficiency, and IgA deficiency, there is a low probability that these actually cause the disease. SPAD is seen when our B cell are not able to make IgG in response to polysaccharide capsules. Encapsulated organism tend to cause AOM and sinopulmonary infections and are not associated with infections of the GIT. Humoral immunity of the GIT is mostly governed by IgA which in this case has been normal in the past. There is no evidence that IVIg or SQIg would have any benefit in these conditions unless the degree of IgG decrease is profound with associated sinopulmonary infections. Since IgG is not specifically excreted into the GI tract and there is negligible IgA in commercial preparations, little or no immunoglobulin would gain access. I am reassured by the lack bacterial sinopulmonary infection and AOM. Given the time she was last assessed will repeat immune work up and make sure we are not dealing with CVID now. We no longer emphasize on IgG subclasses and regularly do not check them but since this was part of her original presentation will check this time.     -Immunoglobulin isotypes  -Strep pneumo titters, if low will need PPSV23 booster and repeat labs again.   -IgG subclasses       Rhinitis from left nare: some sneezing. Not positional changes to suggest CSF leak.     -Indoor and outdoor immunocaps   -Add ipratropium     Follow up: 3 weeks    I spent a total of 45 minutes on the day of the visit. This includes face to face time and non-face to face time preparing to see the patient  (eg, review of tests), obtaining and/or reviewing separately obtained history, documenting clinical information in the electronic or other health record, independently interpreting results and communicating results to the patient/family/caregiver, or care coordinator.    .  Lemuel Rivera MD   [facility]

## 2025-03-20 ENCOUNTER — RESULTS FOLLOW-UP (OUTPATIENT)
Dept: ALLERGY | Facility: CLINIC | Age: 75
End: 2025-03-20

## 2025-03-24 LAB
IGG4 SER-MCNC: 19 MG/DL (ref 4–86)
IMMUNOLOGIST REVIEW: NORMAL
S PN DA SERO 19F IGG SER-MCNC: 0.3 MCG/ML
S PNEUM DA 1 IGG SER-MCNC: <0.1 MCG/ML
S PNEUM DA 10A IGG SER-MCNC: <0.1 MCG/ML
S PNEUM DA 11A IGG SER-MCNC: 1.8 MCG/ML
S PNEUM DA 12F IGG SER-MCNC: 0.3 MCG/ML
S PNEUM DA 14 IGG SER-MCNC: 0.4 MCG/ML
S PNEUM DA 15B IGG SER-MCNC: 0.5 MCG/ML
S PNEUM DA 17F IGG SER-MCNC: 0.3 MCG/ML
S PNEUM DA 18C IGG SER-MCNC: 0.3 MCG/ML
S PNEUM DA 19A IGG SER-MCNC: NORMAL MCG/ML
S PNEUM DA 2 IGG SER-MCNC: 0.6 MCG/ML
S PNEUM DA 20A IGG SER-MCNC: 0.2 MCG/ML
S PNEUM DA 22F IGG SER-MCNC: 0.3 MCG/ML
S PNEUM DA 23F IGG SER-MCNC: 0.3 MCG/ML
S PNEUM DA 3 IGG SER-MCNC: 0.1 MCG/ML
S PNEUM DA 33F IGG SER-MCNC: 0.3 MCG/ML
S PNEUM DA 4 IGG SER-MCNC: 0.1 MCG/ML
S PNEUM DA 5 IGG SER-MCNC: <0.1 MCG/ML
S PNEUM DA 6B IGG SER-MCNC: 0.1 MCG/ML
S PNEUM DA 7F IGG SER-MCNC: 0.1 MCG/ML
S PNEUM DA 8 IGG SER-MCNC: 1 MCG/ML
S PNEUM DA 9N IGG SER-MCNC: 0.2 MCG/ML
S PNEUM DA 9V IGG SER-MCNC: 0.1 MCG/ML

## 2025-03-25 LAB
A ALTERNATA IGE QN: <0.1 KU/L
A FUMIGATUS IGE QN: <0.1 KU/L
ALLERGEN BOXELDER MAPLE TREE IGE: <0.1 KU/L
ALLERGEN MULBERRY TREE IGE: <0.1 KU/L
ALLERGEN PIGWEED IGE: <0.1 KU/L
ALLERGEN WALNUT TREE IGE: <0.1 KU/L
BERMUDA GRASS IGE QN: <0.1 KU/L
C HERBARUM IGE QN: <0.1 KU/L
CAT DANDER IGE QN: <0.1 KU/L
COMMON RAGWEED IGE QN: <0.1 KU/L
D FARINAE IGE QN: <0.1 KU/L
D PTERONYSS IGE QN: <0.1 KU/L
DEPRECATED TIMOTHY IGE RAST QL: NORMAL
DOG DANDER IGE QN: <0.1 KU/L
ELDER IGE QN: <0.1 KU/L
IGE: 38.5 IU/ML
MOUSE URINE PROT IGE QN: <0.1 KU/L
MT JUNIPER IGE QN: <0.1 KU/L
P NOTATUM IGE QN: <0.1 KU/L
PECAN/HICK TREE IGE QN: <0.1 KU/L
RAST ALLERGEN INTERPRETATION: NORMAL
RAST CLASS: NORMAL
ROACH IGE QN: <0.1 KU/L
SILVER BIRCH IGE QN: <0.1 KU/L
TIMOTHY IGE QN: <0.1 KU/L
WHITE ELM IGE QN: <0.1 KU/L
WHITE OAK IGE QN: <0.1 KU/L

## 2025-04-09 ENCOUNTER — OFFICE VISIT (OUTPATIENT)
Dept: ALLERGY | Facility: CLINIC | Age: 75
End: 2025-04-09
Payer: MEDICARE

## 2025-04-09 VITALS — HEART RATE: 75 BPM | OXYGEN SATURATION: 97 % | WEIGHT: 128.5 LBS | BODY MASS INDEX: 21.41 KG/M2 | HEIGHT: 65 IN

## 2025-04-09 DIAGNOSIS — R76.0 ABNORMAL ANTIBODY TITER: ICD-10-CM

## 2025-04-09 DIAGNOSIS — D80.6 SPECIFIC ANTIBODY DEFICIENCY WITH NORMAL IG CONCENTRATION AND NORMAL NUMBER OF B CELLS: Primary | ICD-10-CM

## 2025-04-09 PROCEDURE — 1159F MED LIST DOCD IN RCRD: CPT | Mod: HCNC,CPTII,S$GLB, | Performed by: STUDENT IN AN ORGANIZED HEALTH CARE EDUCATION/TRAINING PROGRAM

## 2025-04-09 PROCEDURE — 3288F FALL RISK ASSESSMENT DOCD: CPT | Mod: HCNC,CPTII,S$GLB, | Performed by: STUDENT IN AN ORGANIZED HEALTH CARE EDUCATION/TRAINING PROGRAM

## 2025-04-09 PROCEDURE — 99999 PR PBB SHADOW E&M-EST. PATIENT-LVL III: CPT | Mod: PBBFAC,HCNC,, | Performed by: STUDENT IN AN ORGANIZED HEALTH CARE EDUCATION/TRAINING PROGRAM

## 2025-04-09 PROCEDURE — 90732 PPSV23 VACC 2 YRS+ SUBQ/IM: CPT | Mod: HCNC,S$GLB,, | Performed by: STUDENT IN AN ORGANIZED HEALTH CARE EDUCATION/TRAINING PROGRAM

## 2025-04-09 PROCEDURE — G0009 ADMIN PNEUMOCOCCAL VACCINE: HCPCS | Mod: HCNC,S$GLB,, | Performed by: STUDENT IN AN ORGANIZED HEALTH CARE EDUCATION/TRAINING PROGRAM

## 2025-04-09 PROCEDURE — 1101F PT FALLS ASSESS-DOCD LE1/YR: CPT | Mod: HCNC,CPTII,S$GLB, | Performed by: STUDENT IN AN ORGANIZED HEALTH CARE EDUCATION/TRAINING PROGRAM

## 2025-04-09 PROCEDURE — 99214 OFFICE O/P EST MOD 30 MIN: CPT | Mod: 25,HCNC,S$GLB, | Performed by: STUDENT IN AN ORGANIZED HEALTH CARE EDUCATION/TRAINING PROGRAM

## 2025-04-09 PROCEDURE — 3008F BODY MASS INDEX DOCD: CPT | Mod: HCNC,CPTII,S$GLB, | Performed by: STUDENT IN AN ORGANIZED HEALTH CARE EDUCATION/TRAINING PROGRAM

## 2025-04-09 NOTE — PROGRESS NOTES
[facility]       HISTORY OF PRESENT ILLNESS   Referral from: No ref. provider found  CC: follow up     HPI: Babita Thomas is a 75 y.o. female  History obtained from patient.     Has been infection free since last seen. Does still have abdominal pain.     Initial consult:   SPAD: Since stopping Hyqvia on 2020 has been having abdominal issues and sinus infections. No abx needed mostly viral. No bacterial PNA, no sinusitis, and no AOM. Not admitted to the hospital.     Abdominal pain and bloating with constipation started a few months ago. Has seen GI. Scopes are normal and has been diagnosed SIBO and that's the reason she has been referred. Abx once. No diets tried. Some weight loss but not much.    PPSV23 in 2016. No issues with blood clot.     Other:  Psoriasis head.   Has arthritis but unsure if OA or RA. Only fingers with index being deformed.   No problems with breathing. Was told she has COPD/Asthma but never needed any kind of inhaler. Non smoker   Rhinitis from left nostril. Some sneezing. Not positional.       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 At that time she was noted to have on Hizentra 12 g every 3 weeks. During this time patient had requested  to stop infusion and she was recommended to space or stop after reviewing the pros and cons.        MEDICAL HISTORY   SurgHx:  Past Surgical History:   Procedure Laterality Date    BREAST BIOPSY Left     benign    BREAST SURGERY Left 15 yrs ago    biopsy, benign    CHOLECYSTECTOMY      COLONOSCOPY  3-5-2010    Dr Cotter 11 year bal     CYSTOSCOPY N/A 6/10/2019    Procedure: CYSTOSCOPY;  Surgeon: Faiza Coreas MD;  Location: [facility] OR;  Service: Urology;  Laterality: N/A;    ESOPHAGUS SURGERY      6 year old, peanut stuck in throat     MIDFOOT ARTHRODESIS Left 9/6/2019    Procedure: FUSION, JOINT, MIDFOOT;  Surgeon: Damian Cantu DPM;  Location: [facility] OR;  Service: Podiatry;   "Laterality: Left;  SEDA NORRIS NOTIFIED    MULTIPLE TOOTH EXTRACTIONS      SINUS SURGERY  2015    x3    TRACHEOSTOMY CLOSURE      age 6 for peanut in throat    TYMPANOSTOMY TUBE PLACEMENT      x2    WISDOM TOOTH EXTRACTION          PHYSICAL EXAM     VS: Pulse 75   Ht 5' 5" (1.651 m)   Wt 58.3 kg (128 lb 8.5 oz)   SpO2 97%   BMI 21.39 kg/m²   GENERAL: NAD, well nourished, well appea/ring     LABS AND IMAGING   Previous:   CBC w diff: Normal   CMP: borderline low Sodium but normal otherwise   PHILIP negative       Component      Latest Ref Rng 5/23/2017   Tetanus Ab      >0.150 IU/mL 2.330    S.pneumoniae Type 1      mcg/mL <0.3    S.pneumoniae Type 3      mcg/mL 1.3    Strep pneumo Type 4      mcg/mL <0.3    S.pneumoniae Type 5      mcg/mL 0.4    S.pneumoniae Type 8      mcg/mL 3.7    S.pneumoniae Type 9N      mcg/mL <0.3    S.pneumoniae Type 12F      mcg/mL 0.5    Strep pneumo Type 14      mcg/mL 1.2    S.pneumoniae Type 19F      mcg/mL 2.3    S.pneumoniae Type 23F      mcg/mL 1.7    S.pneumoniae Type 6B      mcg/mL <0.3    S.pneumoniae Type 7F      mcg/mL <0.3    S.pneumoniae Type 18C      mcg/mL 1.2    S.pneumoniae Type 9V Abs      mcg/mL <0.3    IgG      650 - 1600 mg/dL 1086    IgM      50 - 300 mg/dL 98    IgA Level      40 - 350 mg/dL 308    Total IgE      0 - 100 IU/mL 44      Component      Latest Ref Rng 7/20/2017   IgG 1      382 - 929 mg/dL 499    IgG 2      242 - 700 mg/dL 172 (L)    IgG 3      22 - 176 mg/dL 26    IgG 4      4 - 86 mg/dL 13          3/2020: CBC with monocytosis, no eosinophils  CMP normal  IgG 1262    CT Chest 2014: linear scarring vs atelectasis in RML, no mention of bronchiectasis in the read     CXR Fall 2019 with 6mm possible artifact - needs repeat in the future    2016 Percutaneous Skin Testing: inhalants; 2+ pecan tree, alternaria mold, 1-2+ pigweed and penicillium mold, 1+ cottonwood tree, acremonium with 3+ histamine control and reminder negative     Recent:   Normal " immunoglobulins. IgG4 normal   Strep pneumo 1/22     Zone 6 normal    ASSESSMENT & PLAN     SIBO in patient with prior dx of SPAD/SAD: no longer having AOM or bacterial sinopulmonary infections. Work up with normal ig isotypes and decrease strep pneumo titers. Will need PPSV23 to complete work up. This is likely still going to be consistent w SPAD which would not explain her SIBO.     -PPSV23 today  -Repeat titers in 4-6 weeks     Rhinitis from left nare: negative inhalant testing. some sneezing. Not positional changes to suggest CSF leak.       Follow up: as needed pending labs     I spent a total of 30 minutes on the day of the visit. This includes face to face time and non-face to face time preparing to see the patient (eg, review of tests), obtaining and/or reviewing separately obtained history, documenting clinical information in the electronic or other health record, independently interpreting results and communicating results to the patient/family/caregiver, or care coordinator.    .  Lemuel Rivera MD   [facility]

## 2025-05-07 ENCOUNTER — LAB VISIT (OUTPATIENT)
Dept: LAB | Facility: HOSPITAL | Age: 75
End: 2025-05-07
Attending: STUDENT IN AN ORGANIZED HEALTH CARE EDUCATION/TRAINING PROGRAM
Payer: MEDICARE

## 2025-05-07 DIAGNOSIS — R76.0 ABNORMAL ANTIBODY TITER: ICD-10-CM

## 2025-05-07 PROCEDURE — 36415 COLL VENOUS BLD VENIPUNCTURE: CPT | Mod: PO

## 2025-06-06 ENCOUNTER — RESULTS FOLLOW-UP (OUTPATIENT)
Dept: ALLERGY | Facility: CLINIC | Age: 75
End: 2025-06-06

## 2025-06-11 ENCOUNTER — RESULTS FOLLOW-UP (OUTPATIENT)
Dept: FAMILY MEDICINE | Facility: CLINIC | Age: 75
End: 2025-06-11

## 2025-06-11 ENCOUNTER — TELEPHONE (OUTPATIENT)
Dept: FAMILY MEDICINE | Facility: CLINIC | Age: 75
End: 2025-06-11

## 2025-06-11 ENCOUNTER — HOSPITAL ENCOUNTER (OUTPATIENT)
Dept: RADIOLOGY | Facility: HOSPITAL | Age: 75
Discharge: HOME | End: 2025-06-11
Payer: MEDICARE

## 2025-06-11 ENCOUNTER — OFFICE VISIT (OUTPATIENT)
Dept: FAMILY MEDICINE | Facility: CLINIC | Age: 75
End: 2025-06-11
Payer: MEDICARE

## 2025-06-11 VITALS
OXYGEN SATURATION: 96 % | DIASTOLIC BLOOD PRESSURE: 64 MMHG | TEMPERATURE: 100 F | HEIGHT: 65 IN | HEART RATE: 98 BPM | SYSTOLIC BLOOD PRESSURE: 126 MMHG | WEIGHT: 129.19 LBS | BODY MASS INDEX: 21.52 KG/M2
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
## 2025-06-11 DIAGNOSIS — M25.561 ACUTE PAIN OF BOTH KNEES: ICD-10-CM

## 2025-06-11 DIAGNOSIS — M25.562 ACUTE PAIN OF BOTH KNEES: ICD-10-CM

## 2025-06-11 DIAGNOSIS — J45.40 MODERATE PERSISTENT ASTHMA WITHOUT COMPLICATION: ICD-10-CM

## 2025-06-11 DIAGNOSIS — M54.50 ACUTE BILATERAL LOW BACK PAIN WITHOUT SCIATICA: ICD-10-CM

## 2025-06-11 DIAGNOSIS — S09.90XA HEAD TRAUMA, INITIAL ENCOUNTER: ICD-10-CM

## 2025-06-11 DIAGNOSIS — R55 SYNCOPE AND COLLAPSE: ICD-10-CM

## 2025-06-11 LAB
OHS QRS DURATION: 76 MS
OHS QTC CALCULATION: 452 MS

## 2025-06-11 PROCEDURE — 70450 CT HEAD/BRAIN W/O DYE: CPT | Mod: 26,,, | Performed by: RADIOLOGY

## 2025-06-11 PROCEDURE — 72100 X-RAY EXAM L-S SPINE 2/3 VWS: CPT | Mod: TC

## 2025-06-11 PROCEDURE — 99999 PR PBB SHADOW E&M-EST. PATIENT-LVL IV: CPT | Mod: PBBFAC,HCNC,,

## 2025-06-11 PROCEDURE — 93010 ELECTROCARDIOGRAM REPORT: CPT | Mod: HCNC,S$GLB,, | Performed by: INTERNAL MEDICINE

## 2025-06-11 PROCEDURE — 70450 CT HEAD/BRAIN W/O DYE: CPT | Mod: TC

## 2025-06-11 PROCEDURE — 73560 X-RAY EXAM OF KNEE 1 OR 2: CPT | Mod: TC,50

## 2025-06-11 PROCEDURE — 73560 X-RAY EXAM OF KNEE 1 OR 2: CPT | Mod: 26,50,, | Performed by: RADIOLOGY

## 2025-06-11 RX ORDER — BENZONATATE 200 MG/1
200 CAPSULE ORAL
COMMUNITY
Start: 2025-06-06

## 2025-06-11 RX ORDER — AZELASTINE 1 MG/ML
1 SPRAY, METERED NASAL
COMMUNITY
Start: 2025-05-26 | End: 2026-05-26

## 2025-06-11 RX ORDER — DOXYCYCLINE 100 MG/1
100 CAPSULE ORAL 2 TIMES DAILY
COMMUNITY

## 2025-06-11 RX ORDER — NAPROXEN 500 MG/1
500 TABLET ORAL 2 TIMES DAILY WITH MEALS
Qty: 60 TABLET | Refills: 0 | Status: SHIPPED | OUTPATIENT
Start: 2025-06-11

## 2025-06-11 RX ORDER — FLUTICASONE PROPIONATE 50 MCG
1 SPRAY, SUSPENSION (ML) NASAL
COMMUNITY
Start: 2025-05-26 | End: 2025-11-22

## 2025-06-11 NOTE — TELEPHONE ENCOUNTER
Patient's spouse was notified of results. Patient's spouse verbalized understanding.     Patient 's spouse stated that patient was dx with MS a few years ago however was told her MS was in remission. Patient's spouse is asking if that has anything to do with her recent results? Please advise

## 2025-06-11 NOTE — TELEPHONE ENCOUNTER
Patient was notified of results. Patient verbalized understanding.     Patient was notified of both of provider's message below.

## 2025-06-11 NOTE — TELEPHONE ENCOUNTER
----- Message from Avani Delacruz PA-C sent at 6/11/2025  2:52 PM CDT -----  Hi Ms. Thomas,     I reviewed your imaging results. The head CT does not show any signs of acute intracranial abnormalities. There are some chronic changes likely related to your history of multiple sclerosis vs age,   but nothing of acute concern at this time. However, there is evidence of significant sinus disease. I recommend following up with your ENT for further evaluation and management.    The X-ray of your back shows degenerative changes consistent with arthritis, but no signs of acute fracture. Similarly, your knee X-rays reveal bilateral arthritis without evidence of any acute   fractures.    Please continue with the treatment plan discussed during your clinic visit, including the trial of naproxen. If your pain does not improve or worsens, dont hesitate to reach out.     Avani Delacruz PA-C  ----- Message -----  From: Mani, Rad Results In  Sent: 6/11/2025   1:38 PM CDT  To: Avani Delacruz PA-C

## 2025-06-11 NOTE — TELEPHONE ENCOUNTER
Hi,     I am not entirely sure. We can always refer back to neurology for further insight.     Avani Delacruz PA-C

## 2025-06-11 NOTE — PROGRESS NOTES
[facility]     Subjective:       Patient ID:  [id number]     Chief Complaint: Fall and Generalized Body Aches    Babita Thomas is a 75 y.o. female with a past medical history significant for HLD, hypothyroidism, COPD, MS, and immune deficiency disorder who presents to the clinic following a fall.      CHIEF COMPLAINT:  Ms. Thomas presents today following a syncopal episode with fall and subsequent pain    HISTORY OF PRESENT ILLNESS:  She experienced a syncopal episode over the weekend resulting in a fall on kitchen floor with head impact. Since the fall, she reports constant, unrelenting pain in her head, back, and knees that has not improved. Due to pain, she has been not been sleeping well at night. She currently denies any neurological symptoms including weakness, numbness, tingling, headaches, or vision changes. She has been using salon patches and ibuprofen without relief of symptoms.     SYNCOPE:  She has a history of syncope since childhood with episodes occurring every 6-7 months. Cardiac workup by cardiologist in fall of last year was negative.    MEDICAL HISTORY:  She has asthma with occasional wheezing but does not use albuterol inhaler. She also has acid reflux and chronic constipation.    Review of Systems   Constitutional:  Negative for chills and fever.   Respiratory:  Positive for shortness of breath (occasional) and wheezing (occasional). Negative for cough.    Cardiovascular:  Negative for chest pain.   Gastrointestinal:  Positive for constipation. Negative for abdominal pain, diarrhea, nausea and vomiting.   Musculoskeletal:  Positive for arthralgias, back pain and myalgias.   Neurological:  Positive for dizziness, syncope and light-headedness. Negative for speech difficulty, weakness, numbness and headaches.        Past Medical History:   Diagnosis Date    Allergy     Arthritis 1990    hands    Asthma 02/2016    no meds    Depression 5/19/2017    Fracture of left foot 09/04/2019     Dr Cantu    GERD (gastroesophageal reflux disease) 2009    on meds    High cholesterol 2014    on meds    Hyperlipidemia     Hypothyroidism     Immune deficiency disorder 05/19/2017    on meds infusion    Insomnia 1/31/2017    MS (multiple sclerosis) 2005    very well controlled, no symptoms    Shortness of breath     Sinusitis     Thyroid disease 2005    hypothyroidism- no med        Active Problem List with Overview Notes    Diagnosis Date Noted    Chronic constipation 07/10/2023    Increased endometrial stripe thickness 07/10/2023    Pulmonary hypertension 04/06/2021    Chronic obstructive pulmonary disease 04/06/2021    Myalgia due to statin 01/15/2020    Hyperlipidemia, mixed 01/15/2020    Hypothyroidism 09/07/2019    Closed nondisplaced fracture of medial cuneiform of left foot 09/04/2019    Closed nondisplaced fracture of cuboid bone of left foot 09/04/2019    Closed displaced fracture of fifth metatarsal bone of left foot 09/04/2019    Closed displaced fracture of fourth metatarsal bone of left foot 09/04/2019    Closed displaced fracture of third metatarsal bone of left foot 09/04/2019    Closed displaced fracture of second metatarsal bone of left foot 09/04/2019    Closed nondisplaced fracture of first left metatarsal bone 09/04/2019    Lisfranc dislocation, left, initial encounter 09/04/2019    Gross hematuria 06/10/2019    Immune deficiency disorder 05/19/2017    Eosinophilia 05/19/2017    Recurrent major depressive disorder 05/19/2017    Insomnia 01/31/2017    Chronic pansinusitis 12/19/2016     Dr. Aleman      Hypertrophy of inferior nasal turbinate 12/19/2016    Tortuous aorta 03/15/2016    Asthma 03/15/2016     Dr. Heard      Thyroid nodule 06/04/2015    Multiple sclerosis 01/31/2015     Dr. Quang Paniagua      GERD (gastroesophageal reflux disease) 01/31/2015        Review of patient's allergies indicates:   Allergen Reactions    Pravastatin Other (See Comments)     cramping    Tecfidera [dimethyl  fumarate] Swelling       Current Medications[1]    Lab Results   Component Value Date    WBC 5.11 02/24/2025    HGB 13.7 02/24/2025    HCT 41.6 02/24/2025     02/24/2025    CHOL 155 07/11/2024    TRIG 70 07/11/2024    HDL 61 07/11/2024    ALT 15 02/24/2025    AST 21 02/24/2025     (L) 02/24/2025    K 4.1 02/24/2025     02/24/2025    CREATININE 0.8 02/24/2025    BUN 18 02/24/2025    CO2 25 02/24/2025    TSH 3.496 02/24/2025    HGBA1C 5.1 04/16/2018           Objective:      Physical Exam  Constitutional:       General: She is not in acute distress.     Appearance: Normal appearance. She is not toxic-appearing.   HENT:      Head: Normocephalic and atraumatic.      Comments: Mild tenderness and bruise at right aspect of occipital region of scalp      Right Ear: Tympanic membrane is scarred. Tympanic membrane is not erythematous.      Left Ear: Tympanic membrane is scarred. Tympanic membrane is not erythematous.      Nose: Nose normal.   Eyes:      Pupils: Pupils are equal, round, and reactive to light.   Cardiovascular:      Rate and Rhythm: Normal rate and regular rhythm.      Pulses: Normal pulses.      Heart sounds: No murmur heard.     No friction rub.   Pulmonary:      Effort: Pulmonary effort is normal. No respiratory distress.      Breath sounds: Wheezing present.      Comments: Mild, sporadic wheezing - patient asymptomatic at this time   Musculoskeletal:      Cervical back: Normal range of motion. No bony tenderness.      Thoracic back: No bony tenderness.      Lumbar back: Bony tenderness present.      Right lower leg: No edema.      Left lower leg: No edema.      Comments: No tenderness appreciated at upper extremities   Bony tenderness of lower spine - no paraspinal tenderness.   Pain with rotation      Skin:     General: Skin is warm and dry.   Neurological:      General: No focal deficit present.      Mental Status: She is alert and oriented to person, place, and time.      Cranial  "Nerves: Cranial nerves 2-12 are intact.      Sensory: Sensation is intact.      Motor: No weakness.      Coordination: Coordination is intact. Romberg sign negative. Heel to Oseguera Test normal.      Gait: Gait is intact.   Psychiatric:         Mood and Affect: Mood normal.           Assessment:       1. Fall, initial encounter    2. Syncope and collapse    3. Acute pain of both knees    4. Acute bilateral low back pain without sciatica    5. Head trauma, initial encounter    6. Moderate persistent asthma without complication          Plan:         Assessment & Plan    - Assessed fall and subsequent widespread muscle soreness, focusing on back, knees, and head impact.  - Noted history of syncope episodes occurring approximately every 6-7 months.  - Previous cardiac workup by cardiologist was unremarkable.  - Plan to reassess treatment based on imaging results, particularly if fractures are identified.   - EKG within NSR with no acute abnormalities.   - Will provide naproxen, however told patient to hold medication until CT head is obtained.   - Recommended taking tylenol for break through pain.        Babita Gamez" was seen today for fall and generalized body aches.    Diagnoses and all orders for this visit:    Fall, initial encounter  -     naproxen (NAPROSYN) 500 MG tablet; Take 1 tablet (500 mg total) by mouth 2 (two) times daily with meals.  -     X-Ray Knee 1 or 2 View Bilateral; Future  -     X-Ray Lumbar Spine AP And Lateral; Future    Syncope and collapse  -     IN OFFICE EKG 12-LEAD (to [place])  -     CT Head Without Contrast; Future    Acute pain of both knees  -     X-Ray Knee 1 or 2 View Bilateral; Future    Acute bilateral low back pain without sciatica  -     X-Ray Lumbar Spine AP And Lateral; Future    Head trauma, initial encounter  -     CT Head Without Contrast; Future    Moderate persistent asthma without complication       -     Patient wheezing on PE - patient asymptomatic. Recommend using albuterol " as prescribed PRN.      Follow up for if symptoms are not improved.    Future Appointments       Date Provider Specialty Appt Notes    7/10/2025 Meme Sanchez MD Family Medicine Annual Exam    8/13/2025 Anna Knight MD Gastroenterology est/care             Tests to Keep You Healthy    Mammogram: DUE  Colon Cancer Screening: Met on 7/15/2021       I spent a total of 40 minutes on the day of the visit.This includes face to face time and non-face to face time preparing to see the patient (eg, review of tests), obtaining and/or reviewing separately obtained history, documenting clinical information in the electronic or other health record, independently interpreting results and communicating results to the patient/family/caregiver, or care coordinator.    This note was generated with the assistance of ambient listening technology. Verbal consent was obtained by the patient and accompanying visitor(s) for the recording of patient appointment to facilitate this note. I attest to having reviewed and edited the generated note for accuracy, though some syntax or spelling errors may persist. Please contact the author of this note for any clarification.      Avani Delacruz PA-C  Family Medicine Physician Assistant            [1]   Current Outpatient Medications:     azelastine (ASTELIN) 137 mcg (0.1 %) nasal spray, 1 spray by Nasal route., Disp: , Rfl:     benzonatate (TESSALON) 200 MG capsule, Take 200 mg by mouth., Disp: , Rfl:     cholecalciferol, vitamin D3, 2,000 unit Tab, Take by mouth., Disp: , Rfl:     doxycycline (VIBRAMYCIN) 100 MG Cap, Take 100 mg by mouth 2 (two) times daily., Disp: , Rfl:     FLUoxetine 40 MG capsule, Take 40 mg by mouth once daily., Disp: , Rfl:     fluticasone propionate (FLONASE) 50 mcg/actuation nasal spray, 1 spray by Nasal route., Disp: , Rfl:     folic acid (FOLVITE) 1 MG tablet, , Disp: , Rfl:     multivitamin-minerals-lutein Tab, Take 1 tablet by mouth once daily. 1  Tablet(s) Oral Every day., Disp: , Rfl:     omeprazole (PRILOSEC) 20 MG capsule, TAKE 1 CAPSULE EVERY DAY, Disp: 90 capsule, Rfl: 3    rosuvastatin (CRESTOR) 10 MG tablet, TAKE 1 TABLET EVERY DAY, Disp: 90 tablet, Rfl: 3    traZODone (DESYREL) 100 MG tablet, Take 100 mg by mouth every evening., Disp: , Rfl:     naproxen (NAPROSYN) 500 MG tablet, Take 1 tablet (500 mg total) by mouth 2 (two) times daily with meals., Disp: 60 tablet, Rfl: 0

## 2025-06-12 ENCOUNTER — TELEPHONE (OUTPATIENT)
Dept: FAMILY MEDICINE | Facility: CLINIC | Age: 75
End: 2025-06-12
Payer: MEDICARE
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
## 2025-06-12 RX ORDER — TIZANIDINE 2 MG/1
2 TABLET ORAL EVERY 6 HOURS PRN
Qty: 30 TABLET | Refills: 0 | Status: SHIPPED | OUTPATIENT
Start: 2025-06-12 | End: 2025-06-22

## 2025-06-12 NOTE — TELEPHONE ENCOUNTER
We could trial a low dose muscle relaxer at the lowest dose, however this medication can cause some drowsiness. Please let me know if patient is amendable.

## 2025-06-12 NOTE — TELEPHONE ENCOUNTER
Copied from CRM #[id number]. Topic: General Inquiry - Patient Advice  >> Jun 12, 2025  8:06 AM Avani wrote:  Type:  Needs Medical Advice    Who Called: pt    Symptoms (please be specific): kness and low back constant pain     How long has patient had these symptoms:  3 days    Pharmacy name and phone #:     [id number] - [place], [place] - [address]  [address]  [place] [address]  Phone: [phone] Fax: [phone]    Would the patient rather a call back or a response via [facility]? Pls call    Best Call Back Number: [phone]    Additional Information: pt is still in pain and would like to know if a low dose non addictive med can be called in.  Pt has drug allergies pls call to discuss   Please call back to advise. Thanks!

## 2025-06-13 ENCOUNTER — HOSPITAL ENCOUNTER (EMERGENCY)
Facility: HOSPITAL | Age: 75
Discharge: HOME | End: 2025-06-13
Attending: STUDENT IN AN ORGANIZED HEALTH CARE EDUCATION/TRAINING PROGRAM
Payer: MEDICARE

## 2025-06-13 VITALS
DIASTOLIC BLOOD PRESSURE: 82 MMHG | TEMPERATURE: 98 F | HEIGHT: 65 IN | OXYGEN SATURATION: 97 % | WEIGHT: 129 LBS | BODY MASS INDEX: 21.49 KG/M2 | HEART RATE: 80 BPM | RESPIRATION RATE: 18 BRPM | SYSTOLIC BLOOD PRESSURE: 145 MMHG

## 2025-06-13 DIAGNOSIS — M25.561 ACUTE PAIN OF BOTH KNEES: Primary | ICD-10-CM

## 2025-06-13 DIAGNOSIS — M54.50 ACUTE MIDLINE LOW BACK PAIN WITHOUT SCIATICA: ICD-10-CM

## 2025-06-13 DIAGNOSIS — M25.562 ACUTE PAIN OF BOTH KNEES: Primary | ICD-10-CM

## 2025-06-13 PROCEDURE — 96372 THER/PROPH/DIAG INJ SC/IM: CPT | Performed by: STUDENT IN AN ORGANIZED HEALTH CARE EDUCATION/TRAINING PROGRAM

## 2025-06-13 PROCEDURE — 63600175 PHARM REV CODE 636 W HCPCS: Mod: TB,JZ | Performed by: STUDENT IN AN ORGANIZED HEALTH CARE EDUCATION/TRAINING PROGRAM

## 2025-06-13 PROCEDURE — 25000003 PHARM REV CODE 250: Performed by: STUDENT IN AN ORGANIZED HEALTH CARE EDUCATION/TRAINING PROGRAM

## 2025-06-13 PROCEDURE — 99284 EMERGENCY DEPT VISIT MOD MDM: CPT | Mod: 25

## 2025-06-13 RX ORDER — LIDOCAINE 50 MG/G
1 PATCH TOPICAL DAILY
Qty: 14 PATCH | Refills: 0 | Status: SHIPPED | OUTPATIENT
Start: 2025-06-13 | End: 2025-06-27

## 2025-06-13 RX ORDER — LIDOCAINE 50 MG/G
1 PATCH TOPICAL
Status: DISCONTINUED | OUTPATIENT
Start: 2025-06-13 | End: 2025-06-13 | Stop reason: HOSPADM

## 2025-06-13 RX ORDER — KETOROLAC TROMETHAMINE 30 MG/ML
30 INJECTION, SOLUTION INTRAMUSCULAR; INTRAVENOUS
Status: COMPLETED | OUTPATIENT
Start: 2025-06-13 | End: 2025-06-13

## 2025-06-13 RX ORDER — ONDANSETRON 4 MG/1
4 TABLET, FILM COATED ORAL EVERY 8 HOURS PRN
Qty: 12 TABLET | Refills: 0 | Status: SHIPPED | OUTPATIENT
Start: 2025-06-13 | End: 2025-06-25

## 2025-06-13 RX ORDER — HYDROCODONE BITARTRATE AND ACETAMINOPHEN 5; 325 MG/1; MG/1
1 TABLET ORAL EVERY 6 HOURS PRN
Qty: 12 TABLET | Refills: 0 | Status: SHIPPED | OUTPATIENT
Start: 2025-06-13 | End: 2025-06-19

## 2025-06-13 RX ADMIN — KETOROLAC TROMETHAMINE 30 MG: 30 INJECTION, SOLUTION INTRAMUSCULAR; INTRAVENOUS at 05:06

## 2025-06-13 RX ADMIN — LIDOCAINE 1 PATCH: 50 PATCH TOPICAL at 05:06

## 2025-06-13 NOTE — ED PROVIDER NOTES
Encounter Date: 6/13/2025       History     Chief Complaint   Patient presents with    Back Pain     S/P FALL 2 DAYS AGO  FROM  SYNCOPE, SEEN AT [facility]    Knee Pain     BILAT     75-year-old female presents for evaluation of ongoing pain from fall several days ago.  Ongoing bilateral knee pain and lower back pain, able to ambulate, no bowel or bladder incontinence or saddle anesthesia or open wounds.  Patient had recent imaging performed, symptoms not alleviated with intermittent naproxen and Tylenol    The history is provided by the patient and medical records.     Review of patient's allergies indicates:   Allergen Reactions    Pravastatin Other (See Comments)     cramping    Tecfidera [dimethyl fumarate] Swelling     Past Medical History:   Diagnosis Date    Allergy     Arthritis 1990    hands    Asthma 02/2016    no meds    Depression 5/19/2017    Fracture of left foot 09/04/2019    Dr Cantu    GERD (gastroesophageal reflux disease) 2009    on meds    High cholesterol 2014    on meds    Hyperlipidemia     Hypothyroidism     Immune deficiency disorder 05/19/2017    on meds infusion    Insomnia 1/31/2017    MS (multiple sclerosis) 2005    very well controlled, no symptoms    Shortness of breath     Sinusitis     Thyroid disease 2005    hypothyroidism- no med     Past Surgical History:   Procedure Laterality Date    BREAST BIOPSY Left     benign    BREAST SURGERY Left 15 yrs ago    biopsy, benign    CHOLECYSTECTOMY      COLONOSCOPY  3-5-2010    Dr Cotter 11 year bal     CYSTOSCOPY N/A 6/10/2019    Procedure: CYSTOSCOPY;  Surgeon: Faiza Coreas MD;  Location: [facility] OR;  Service: Urology;  Laterality: N/A;    ESOPHAGUS SURGERY      6 year old, peanut stuck in throat     MIDFOOT ARTHRODESIS Left 9/6/2019    Procedure: FUSION, JOINT, MIDFOOT;  Surgeon: Damian Cantu DPM;  Location: [facility] OR;  Service: Podiatry;  Laterality: Left;  SEDA NORRIS NOTIFIED    MULTIPLE TOOTH EXTRACTIONS      SINUS SURGERY   2015    x3    TRACHEOSTOMY CLOSURE      age 6 for peanut in throat    TYMPANOSTOMY TUBE PLACEMENT      x2    WISDOM TOOTH EXTRACTION       Family History   Problem Relation Name Age of Onset    Hypertension Mother      Dementia Mother      Hyperlipidemia Mother      Heart disease Father  60        MI    Hypertension Sister      No Known Problems Daughter      Heart disease Paternal Uncle      Heart disease Maternal Grandfather  70        MI    Heart disease Paternal Grandfather      No Known Problems Daughter      Allergic rhinitis Neg Hx      Allergies Neg Hx      Angioedema Neg Hx      Asthma Neg Hx      Atopy Neg Hx      Eczema Neg Hx      Immunodeficiency Neg Hx      Rhinitis Neg Hx      Urticaria Neg Hx      Melanoma Neg Hx      Psoriasis Neg Hx      Lupus Neg Hx       Social History[1]  Review of Systems   All other systems reviewed and are negative.      Physical Exam     Initial Vitals [06/13/25 1634]   BP Pulse Resp Temp SpO2   129/79 82 19 98.5 °F (36.9 °C) 96 %      MAP       --         Physical Exam    Nursing note and vitals reviewed.  Constitutional: No distress.   HENT:   Head: Normocephalic.   Eyes: No scleral icterus.   Cardiovascular:  Normal rate.           Pulmonary/Chest: Effort normal. No stridor. No respiratory distress.   Abdominal: There is no guarding.   Musculoskeletal:      Comments: No significant extremity deformity, minimal effusions bilateral knees, no significant reproducible bony tenderness to palpation, patient able to ambulate and bear weight., no laceration or significant joint warmth erythema     Neurological: She is alert.   GCS 15 with no focal deficits, soft compartments   Skin: No rash noted. No erythema.         ED Course   Procedures  Labs Reviewed - No data to display       Imaging Results    None          Medications   LIDOcaine 5 % patch 1 patch (1 patch Transdermal Patch Applied 6/13/25 1712)   LIDOcaine 5 % patch 1 patch (1 patch Transdermal Patch Applied 6/13/25  1711)   ketorolac injection 30 mg (30 mg Intramuscular Given 6/13/25 1711)     Medical Decision Making  75-year-old female with ongoing pain post fall, through shared decision-making we will defer repeat imaging at this time.  Likely uncontrolled ongoing pain, history of arthritis with bilateral knee effusion do not think patient needs therapeutic arthrocentesis at this time.  We will treat with supportive care, anti-inflammatory, topical lidocaine patch, Tylenol and oral narcotics p.r.n..   reviewed,  and wife agree with plan will discharge home with supportive care and close PCP follow up.  Do not suspect cauda equina or any life-threatening extremity injury or compartment syndrome.    Amount and/or Complexity of Data Reviewed  External Data Reviewed: radiology and notes.     Details: Previous imaging reviewed of CT head, bilateral knees L-spine    Risk  Prescription drug management.                                      Clinical Impression:  Final diagnoses:  [M25.561, M25.562] Acute pain of both knees (Primary)  [M54.50] Acute midline low back pain without sciatica          ED Disposition Condition    Discharge Stable          ED Prescriptions       Medication Sig Dispense Start Date End Date Auth. Provider    LIDOcaine (LIDODERM) 5 % Place 1 patch onto the skin once daily. Remove & Discard patch within 12 hours or as directed by MD for 14 doses 14 patch 6/13/2025 6/27/2025 Trace Billy Jr., DO    HYDROcodone-acetaminophen (NORCO) 5-325 mg per tablet Take 1 tablet by mouth every 6 (six) hours as needed for Pain. 12 tablet 6/13/2025 -- Trace Billy Jr., DO    ondansetron (ZOFRAN) 4 MG tablet Take 1 tablet (4 mg total) by mouth every 8 (eight) hours as needed for Nausea. 12 tablet 6/13/2025 6/25/2025 Trace Billy Jr., DO          Follow-up Information       Follow up With Specialties Details Why Contact Info    Meme Sanchez MD Family Medicine In 1 week  [id number]  [id number]  [phone]                     [1]   Social History  Tobacco Use    Smoking status: Never     Passive exposure: Never    Smokeless tobacco: Never   Substance Use Topics    Alcohol use: No    Drug use: No        Trace Billy Jr., DO  06/13/25 1723     (yrs)

## 2025-06-13 NOTE — FIRST PROVIDER EVALUATION
Emergency Department TeleTriage Encounter Note      CHIEF COMPLAINT    Chief Complaint   Patient presents with    Back Pain     S/P FALL 2 DAYS AGO  FROM  SYNCOPE, SEEN AT [facility]    Knee Pain     BILAT       VITAL SIGNS   Initial Vitals [06/13/25 1634]   BP Pulse Resp Temp SpO2   129/79 82 19 98.5 °F (36.9 °C) 96 %      MAP       --            ALLERGIES    Review of patient's allergies indicates:   Allergen Reactions    Pravastatin Other (See Comments)     cramping    Tecfidera [dimethyl fumarate] Swelling       PROVIDER TRIAGE NOTE  Patient had a fall 5 days ago and still having pain in the low back and left knee. She had imaging 2 days ago. No neuro deficit.       ORDERS  Labs Reviewed - No data to display    ED Orders (720h ago, onward)      None              Virtual Visit Note: The provider triage portion of this emergency department evaluation and documentation was performed via [employer], a HIPAA-compliant telemedicine application, in concert with a tele-presenter in the room. A face to face patient evaluation with one of my colleagues will occur once the patient is placed in an emergency department room.      DISCLAIMER: This note was prepared with [employer] voice recognition transcription software. Garbled syntax, mangled pronouns, and other bizarre constructions may be attributed to that software system.

## 2025-06-14 ENCOUNTER — PATIENT OUTREACH (OUTPATIENT)
Facility: OTHER | Age: 75
End: 2025-06-14
Payer: MEDICARE

## 2025-06-14 NOTE — PROGRESS NOTES
Idalia Brink  ED Navigator  Emergency Department    Project: [facility] ED Navigator  Role: Community Health Worker    Date: 06/14/2025  Patient Name: Babita Thomas  MRN: [id number]  PCP: Meme Sanchez MD    Assessment:     Babita Thomas is a 75 y.o. female who has presented to ED for acute bilateral knee pain. Patient has visited the ED 1 times in the past 3 months. Patient did not contact PCP.     ED Navigator Initial Assessment    ED Navigator Enrollment Documentation  Consent to Services  Does patient consent to completing the assessment?: Yes  Contact  Method of Initial Contact: Phone  Transportation  Insurance Coverage  Do you have coverage/adequate coverage?: Yes  Specialist Appointment  Did the patient come to the ED to see a specialist?: No  Does the patient have a pending specialist referral?: No  Does the patient have a specialist appointment made?: No  PCP Follow Up Appointment  Medications  Is patient able to afford medication?: Yes  Psychological  Food  Communication/Education  Other Financial Concerns  Other Social Barriers/Concerns  Primary Barrier         Social History     Socioeconomic History    Marital status:    Tobacco Use    Smoking status: Never     Passive exposure: Never    Smokeless tobacco: Never   Substance and Sexual Activity    Alcohol use: No    Drug use: No    Sexual activity: Yes     Partners: Male       Plan:   Patient was contacted for post ED discharge navigation. They have an appointment scheduled with Dr. Meme Sanchez on 7/10/25 at 10:00. ED navigator will remind patient of appointment.

## 2025-06-16 ENCOUNTER — PATIENT OUTREACH (OUTPATIENT)
Dept: ADMINISTRATIVE | Facility: HOSPITAL | Age: 75
End: 2025-06-16
Payer: MEDICARE

## 2025-06-16 DIAGNOSIS — Z12.31 ENCOUNTER FOR SCREENING MAMMOGRAM FOR MALIGNANT NEOPLASM OF BREAST: Primary | ICD-10-CM

## 2025-06-16 NOTE — PROGRESS NOTES
Rising risk of ED/ Admissions report.    Patient's Primary Care appointment is already scheduled for: 07/10/2025 and no sooner appointment scheduled.    Note added to 07/10/2025 primary care appointment note - patient identified on rising risk ED/ hospital admission report          Care Coordination Encounter Details:       MyChart Portal Status:         [x]  Reviewed MyChart Portal Status offered / enrolled if applicable        Additional Notes:     MyChart Outcomes: Encourage use         Updates Requested / Reviewed:        Updated Care Coordination Note, Care Everywhere, and Immunizations Reconciliation Completed or Queried: [place]         Health Maintenance Screening(s) Due:      Health Maintenance Topics Overdue:      VBHM Score: 1     Mammogram    Tetanus Vaccine  RSV Vaccine                  Health Maintenance Topic(s) Outreach Outcomes & Actions Taken:    Breast Cancer Screening - Outreach Outcomes & Actions Taken  : Mammogram Order Placed and patient will schedule at a later date    Lab(s) - Outreach Outcomes & Actions Taken  : External Records Uploaded & Care Team Updated if Applicable and patient informed Lipid Panel due 08/22/2025        Chronic Disease Management:       Hypertension Measures        BP Readings from Last 1 Encounters:   06/13/25 (!) 145/82        Additional Notes:    Pulmonary Hypertension Diagnosis noted         Provider Team Continuity:     Last PCP Visit Date: 7/10/2024          [x]  Reviewed Primary Care Provider Visits, Annual Wellness Visit, and Future          Appointments to ensure appointments have been scheduled and/or           completed        Additional Notes:  AWV due           Social Determinants of Health          [x]  Reviewed, completed, and/or updated the following sections:                  Food Insecurity, Transportation Needs, Financial Resource Strain,                 Tobacco Use          Care Management, Digital Medicine, and/or Education Referrals    OPCM  Risk Score: 46.6         Next Steps - Referral Actions: Digital Medicine Outcomes and Actions Taken: Pt Declined or Not Eligible        Additional Notes:      CHW referral declined at this time

## 2025-06-19 ENCOUNTER — TELEPHONE (OUTPATIENT)
Dept: FAMILY MEDICINE | Facility: CLINIC | Age: 75
End: 2025-06-19
Payer: MEDICARE

## 2025-06-19 ENCOUNTER — OFFICE VISIT (OUTPATIENT)
Dept: FAMILY MEDICINE | Facility: CLINIC | Age: 75
End: 2025-06-19
Payer: MEDICARE

## 2025-06-19 VITALS
OXYGEN SATURATION: 97 % | BODY MASS INDEX: 20.17 KG/M2 | TEMPERATURE: 99 F | HEIGHT: 65 IN | WEIGHT: 121.06 LBS | DIASTOLIC BLOOD PRESSURE: 90 MMHG | SYSTOLIC BLOOD PRESSURE: 148 MMHG | HEART RATE: 100 BPM

## 2025-06-19 DIAGNOSIS — M54.16 LUMBAR RADICULOPATHY: Primary | ICD-10-CM

## 2025-06-19 PROCEDURE — 99999 PR PBB SHADOW E&M-EST. PATIENT-LVL V: CPT | Mod: PBBFAC,HCNC,, | Performed by: NURSE PRACTITIONER

## 2025-06-19 RX ORDER — TIZANIDINE 4 MG/1
4 TABLET ORAL 3 TIMES DAILY PRN
Qty: 30 TABLET | Refills: 0 | Status: SHIPPED | OUTPATIENT
Start: 2025-06-19 | End: 2025-06-29

## 2025-06-19 RX ORDER — HYDROCODONE BITARTRATE AND ACETAMINOPHEN 7.5; 325 MG/1; MG/1
1 TABLET ORAL EVERY 6 HOURS PRN
Qty: 28 TABLET | Refills: 0 | Status: SHIPPED | OUTPATIENT
Start: 2025-06-19 | End: 2025-06-26

## 2025-06-19 RX ORDER — METHYLPREDNISOLONE 4 MG/1
TABLET ORAL
Qty: 21 EACH | Refills: 0 | Status: SHIPPED | OUTPATIENT
Start: 2025-06-19 | End: 2025-07-10

## 2025-06-19 RX ORDER — TRAMADOL HYDROCHLORIDE 50 MG/1
50 TABLET, FILM COATED ORAL EVERY 8 HOURS PRN
Qty: 21 TABLET | Refills: 0 | Status: CANCELLED | OUTPATIENT
Start: 2025-06-19 | End: 2025-06-26

## 2025-06-19 NOTE — TELEPHONE ENCOUNTER
I spoke with pt via phone, per NP macrina pt should be seen in the ED for trouble voiding. No further questions. Unsure if pt will still come to appt. Pt was unsure what she wanted to do during phone call.

## 2025-06-19 NOTE — PROGRESS NOTES
Subjective:       Patient ID: Babita Thomas is a 75 y.o. female.    Chief Complaint: hospital follow up     HPI   74 y/o female patient with medical problems listed below presents for ED follow up. Patient is here with family. The patient was seen in the ED on 6/13/2025 after a fall that occurred several days prior to that visit. Since the fall, she has experienced bilateral knee pain and lower back pain. She was discharged with Lidoderm patch,  Norco 5/325 mg qid daily as needed for 3 days, Zofran as needed for nausea. She underwent bilateral knee and lumbar spine X-rays, which showed no acute abnormalities. She reports a history of multiple sclerosis diagnosed many years ago.  Currently, she states that her lower back pain has not improved with the medications above. She describes the pain as aching, pain10/10 in intensity, radiating to both legs, associated with numbness, no bowel or bladder symptoms, fever, or chills.   She also reports a history of chronic syncope since childhood. She was previously evaluated by cardiology, but no definitive cause was identified.    Bilateral knee x-ray 6/11/2025  Impression:  Osteoarthritic changes both knees. A fracture is not demonstrated    Lumbar spine x-ray 6/11/2025  FINDINGS:  There is a gentle dextrocurvature of the lumbar spine.  There are surgical clips in the right upper abdomen from prior cholecystectomy.  The lumbar vertebral bodies maintain normal height.  There is no fracture.  Alignment is normal other than very subtle trace retrolisthesis of L2 on L3.  There is mild-to-moderate disc space narrowing at the L2-3 level with only mild disc space narrowing at the L3-4, L5-S1 levels.  There is mid to lower lumbar facet arthropathy.     Impression:   1. As above    Problem List[1]     Review of patient's allergies indicates:   Allergen Reactions    Pravastatin Other (See Comments)     cramping    Tecfidera [dimethyl fumarate] Swelling     Past Surgical History:  "  Procedure Laterality Date    BREAST BIOPSY Left     benign    BREAST SURGERY Left 15 yrs ago    biopsy, benign    CHOLECYSTECTOMY      COLONOSCOPY  3-5-2010    Dr Cotter 11 year bal     CYSTOSCOPY N/A 6/10/2019    Procedure: CYSTOSCOPY;  Surgeon: Faiza Coreas MD;  Location: [facility] OR;  Service: Urology;  Laterality: N/A;    ESOPHAGUS SURGERY      6 year old, peanut stuck in throat     MIDFOOT ARTHRODESIS Left 9/6/2019    Procedure: FUSION, JOINT, MIDFOOT;  Surgeon: Damian Cantu DPM;  Location: [facility] OR;  Service: Podiatry;  Laterality: Left;  SEDA NORRIS NOTIFIED    MULTIPLE TOOTH EXTRACTIONS      SINUS SURGERY  2015    x3    TRACHEOSTOMY CLOSURE      age 6 for peanut in throat    TYMPANOSTOMY TUBE PLACEMENT      x2    WISDOM TOOTH EXTRACTION        Current Medications[2]    Review of Systems   Constitutional:  Negative for chills and fever.   Respiratory:  Negative for cough and shortness of breath.    Cardiovascular:  Negative for chest pain and palpitations.   Gastrointestinal:  Negative for abdominal pain.   Musculoskeletal:  Positive for back pain and gait problem.   Neurological:  Negative for dizziness and headaches.       Objective:   BP (!) 148/90 (BP Location: Left arm, Patient Position: Sitting)   Pulse 100   Temp 98.6 °F (37 °C) (Oral)   Ht 5' 5" (1.651 m)   Wt 54.9 kg (121 lb 0.5 oz)   SpO2 97%   BMI 20.14 kg/m²       Physical Exam  Constitutional:       Appearance: Normal appearance. She is ill-appearing.      Comments: Patient is sitting on wheelchair.    HENT:      Head: Atraumatic.   Cardiovascular:      Rate and Rhythm: Normal rate and regular rhythm.      Pulses: Normal pulses.      Heart sounds: Normal heart sounds.   Pulmonary:      Effort: Pulmonary effort is normal.      Breath sounds: Normal breath sounds.   Abdominal:      General: Abdomen is flat. Bowel sounds are normal.      Palpations: Abdomen is soft.   Musculoskeletal:      Lumbar back: Tenderness present. "   Neurological:      Mental Status: She is alert.         Assessment:       1. Lumbar radiculopathy        Plan:       1. Lumbar radiculopathy (Primary)  - methylPREDNISolone (MEDROL DOSEPACK) 4 mg tablet; use as directed  Dispense: 21 each; Refill: 0  - tiZANidine (ZANAFLEX) 4 MG tablet; Take 1 tablet (4 mg total) by mouth 3 (three) times daily as needed (low back pain).  Dispense: 30 tablet; Refill: 0  - Ambulatory referral/consult to [facility]; Future  - HYDROcodone-acetaminophen (NORCO) 7.5-325 mg per tablet; Take 1 tablet by mouth every 6 (six) hours as needed for Pain.  Dispense: 28 tablet; Refill: 0   - Fall precautions discussed     Case discussed with Dr. Sanchez   Patient with be reevaluated in as scheduled or sooner prn  Radha NP       [1]   Patient Active Problem List  Diagnosis    Multiple sclerosis    GERD (gastroesophageal reflux disease)    Thyroid nodule    Tortuous aorta    Asthma    Chronic pansinusitis    Hypertrophy of inferior nasal turbinate    Insomnia    Immune deficiency disorder    Eosinophilia    Recurrent major depressive disorder    Gross hematuria    Closed nondisplaced fracture of medial cuneiform of left foot    Closed nondisplaced fracture of cuboid bone of left foot    Closed displaced fracture of fifth metatarsal bone of left foot    Closed displaced fracture of fourth metatarsal bone of left foot    Closed displaced fracture of third metatarsal bone of left foot    Closed displaced fracture of second metatarsal bone of left foot    Closed nondisplaced fracture of first left metatarsal bone    Lisfranc dislocation, left, initial encounter    Hypothyroidism    Myalgia due to statin    Hyperlipidemia, mixed    Pulmonary hypertension    Chronic obstructive pulmonary disease    Chronic constipation    Increased endometrial stripe thickness   [2]   Current Outpatient Medications:     azelastine (ASTELIN) 137 mcg (0.1 %) nasal spray, 1 spray by Nasal route., Disp: , Rfl:      benzonatate (TESSALON) 200 MG capsule, Take 200 mg by mouth., Disp: , Rfl:     cholecalciferol, vitamin D3, 2,000 unit Tab, Take by mouth., Disp: , Rfl:     doxycycline (VIBRAMYCIN) 100 MG Cap, Take 100 mg by mouth 2 (two) times daily., Disp: , Rfl:     FLUoxetine 40 MG capsule, Take 40 mg by mouth once daily., Disp: , Rfl:     fluticasone propionate (FLONASE) 50 mcg/actuation nasal spray, 1 spray by Nasal route., Disp: , Rfl:     folic acid (FOLVITE) 1 MG tablet, , Disp: , Rfl:     LIDOcaine (LIDODERM) 5 %, Place 1 patch onto the skin once daily. Remove & Discard patch within 12 hours or as directed by MD for 14 doses, Disp: 14 patch, Rfl: 0    multivitamin-minerals-lutein Tab, Take 1 tablet by mouth once daily. 1 Tablet(s) Oral Every day., Disp: , Rfl:     naproxen (NAPROSYN) 500 MG tablet, Take 1 tablet (500 mg total) by mouth 2 (two) times daily with meals., Disp: 60 tablet, Rfl: 0    omeprazole (PRILOSEC) 20 MG capsule, TAKE 1 CAPSULE EVERY DAY, Disp: 90 capsule, Rfl: 3    ondansetron (ZOFRAN) 4 MG tablet, Take 1 tablet (4 mg total) by mouth every 8 (eight) hours as needed for Nausea., Disp: 12 tablet, Rfl: 0    rosuvastatin (CRESTOR) 10 MG tablet, TAKE 1 TABLET EVERY DAY, Disp: 90 tablet, Rfl: 3    traZODone (DESYREL) 100 MG tablet, Take 100 mg by mouth every evening., Disp: , Rfl:     HYDROcodone-acetaminophen (NORCO) 7.5-325 mg per tablet, Take 1 tablet by mouth every 6 (six) hours as needed for Pain., Disp: 28 tablet, Rfl: 0    methylPREDNISolone (MEDROL DOSEPACK) 4 mg tablet, use as directed, Disp: 21 each, Rfl: 0    tiZANidine (ZANAFLEX) 4 MG tablet, Take 1 tablet (4 mg total) by mouth 3 (three) times daily as needed (low back pain)., Disp: 30 tablet, Rfl: 0

## 2025-06-20 ENCOUNTER — TELEPHONE (OUTPATIENT)
Dept: FAMILY MEDICINE | Facility: CLINIC | Age: 75
End: 2025-06-20
Payer: MEDICARE

## 2025-06-25 DIAGNOSIS — Z78.0 MENOPAUSE: ICD-10-CM

## 2025-06-26 ENCOUNTER — OFFICE VISIT (OUTPATIENT)
Dept: SPINE | Facility: CLINIC | Age: 75
End: 2025-06-26
Payer: MEDICARE

## 2025-06-26 VITALS — BODY MASS INDEX: 20.17 KG/M2 | WEIGHT: 121.06 LBS | HEIGHT: 65 IN

## 2025-06-26 DIAGNOSIS — M54.42 ACUTE BILATERAL LOW BACK PAIN WITH BILATERAL SCIATICA: Primary | ICD-10-CM

## 2025-06-26 DIAGNOSIS — M54.16 LUMBAR RADICULOPATHY: ICD-10-CM

## 2025-06-26 DIAGNOSIS — M54.41 ACUTE BILATERAL LOW BACK PAIN WITH BILATERAL SCIATICA: Primary | ICD-10-CM

## 2025-06-26 DIAGNOSIS — M54.9 DORSALGIA, UNSPECIFIED: ICD-10-CM

## 2025-06-26 PROCEDURE — 99999 PR PBB SHADOW E&M-EST. PATIENT-LVL IV: CPT | Mod: PBBFAC,HCNC,, | Performed by: PHYSICAL MEDICINE & REHABILITATION

## 2025-06-26 NOTE — PROGRESS NOTES
SUBJECTIVE:    Patient ID: Babita Thomas is a 75 y.o. female.    Chief Complaint: Low-back Pain and Leg Pain    This is a 75-year-old woman who sees Dr. Sanchez for her primary care.  History of asthma hyperlipidemia and carries a diagnosis of MS although that sounds like it has been in remission for quite some time.  I have no cancer history.  No significant history of back problems.  She had a syncopal episode and fell on or around 06/07/2025.  A couple of days later she developed low back pain and bilateral posterior leg pain that has persisted.  She was initially seen by primary care on 06/11/2025 and was prescribed naproxen.  She failed to respond to that intervention and ended up in the emergency department on 06/13/2025 where she was given Lidoderm and Toradol and sent home on Norco 5 mg Lidoderm and Zofran.  She presented to primary care again on 06/19/2025 and was prescribed a Medrol Dosepak Zanaflex and Norco 7.5.  She presents to me feeling better in terms of the low back pain but with persistent bilateral posterior leg discomfort radiates into the calves on both sides.  No bowel or bladder dysfunction fever chills sweats or unexpected weight loss.  Current pain level is 10/10 and interferes with her quality of life in terms of activities of daily living recreation and social activities.  The Norco helps her sleep.  I personally reviewed x-rays of the lumbar spine done 06/11/2025 which show mild scoliosis and mild-to-moderate expected degenerative changes but no fracture or significant malalignment.          Past Medical History:   Diagnosis Date    Allergy     Arthritis 1990    hands    Asthma 02/2016    no meds    Depression 5/19/2017    Fracture of left foot 09/04/2019    Dr Cantu    GERD (gastroesophageal reflux disease) 2009    on meds    High cholesterol 2014    on meds    Hyperlipidemia     Hypothyroidism     Immune deficiency disorder 05/19/2017    on meds infusion    Insomnia 1/31/2017    MS  "(multiple sclerosis) 2005    very well controlled, no symptoms    Shortness of breath     Sinusitis     Thyroid disease 2005    hypothyroidism- no med     Social History[1]  Past Surgical History:   Procedure Laterality Date    BREAST BIOPSY Left     benign    BREAST SURGERY Left 15 yrs ago    biopsy, benign    CHOLECYSTECTOMY      COLONOSCOPY  3-5-2010    Dr Cotter 11 year bal     CYSTOSCOPY N/A 6/10/2019    Procedure: CYSTOSCOPY;  Surgeon: Faiza Coreas MD;  Location: [facility] OR;  Service: Urology;  Laterality: N/A;    ESOPHAGUS SURGERY      6 year old, peanut stuck in throat     MIDFOOT ARTHRODESIS Left 9/6/2019    Procedure: FUSION, JOINT, MIDFOOT;  Surgeon: Damian Cantu DPM;  Location: [facility] OR;  Service: Podiatry;  Laterality: Left;  SEDA NORRIS NOTIFIED    MULTIPLE TOOTH EXTRACTIONS      SINUS SURGERY  2015    x3    TRACHEOSTOMY CLOSURE      age 6 for peanut in throat    TYMPANOSTOMY TUBE PLACEMENT      x2    WISDOM TOOTH EXTRACTION       Family History   Problem Relation Name Age of Onset    Hypertension Mother      Dementia Mother      Hyperlipidemia Mother      Heart disease Father  60        MI    Hypertension Sister      No Known Problems Daughter      Heart disease Paternal Uncle      Heart disease Maternal Grandfather  70        MI    Heart disease Paternal Grandfather      No Known Problems Daughter      Allergic rhinitis Neg Hx      Allergies Neg Hx      Angioedema Neg Hx      Asthma Neg Hx      Atopy Neg Hx      Eczema Neg Hx      Immunodeficiency Neg Hx      Rhinitis Neg Hx      Urticaria Neg Hx      Melanoma Neg Hx      Psoriasis Neg Hx      Lupus Neg Hx       Vitals:    06/26/25 0922   Weight: 54.9 kg (121 lb 0.5 oz)   Height: 5' 5" (1.651 m)       Review of Systems   Constitutional:  Negative for chills, diaphoresis, fatigue, fever and unexpected weight change.   HENT:  Negative for trouble swallowing.    Eyes:  Negative for visual disturbance.   Respiratory:  Negative for shortness " of breath.    Cardiovascular:  Negative for chest pain.   Gastrointestinal:  Negative for abdominal pain, constipation, nausea and vomiting.   Genitourinary:  Negative for difficulty urinating.   Musculoskeletal:  Negative for arthralgias, back pain, gait problem, joint swelling, myalgias, neck pain and neck stiffness.   Neurological:  Negative for dizziness, speech difficulty, weakness, light-headedness, numbness and headaches.          Objective:      Physical Exam  Neurological:      Mental Status: She is alert and oriented to person, place, and time.      Comments: She is awake and in no acute distress  She rises cautiously from a seated position  She is a bit unsteady on her feet  No point tenderness or palpable masses about the lumbar spine  Forward flexion of the lumbar spine is to about 60° before she complains of pain at the lumbosacral junction.  Extension at 10° causes pain at the lumbosacral junction  Heel and toe walking was deferred due to patient's unsteadiness  Reflexes- +1-+2 reflexes at the following:   C5-Biceps   C6-Brachioradialis   C7-Triceps   L3/4-Patellar   S1-Achilles   Ky sign is negative bilaterally  Strength testing- 5/5 strength in the following muscle groups:  C5-Elbow flexion  C6-Wrist extension  C7-Elbow extension  C8-Finger flexion  T1-Finger abduction  L2-Hip flexion  L3-Knee extension  L4-Ankle dorsiflexion  L5-Great toe extension  S1-Ankle plantar flexion       Straight leg raise negative bilaterally             Assessment:       1. Acute bilateral low back pain with bilateral sciatica    2. Lumbar radiculopathy    3. Dorsalgia, unspecified           Plan:     She has a nonfocal neurological examination and no historical red flags.  I suspect she has low back pain on basis of degenerative disc disease and facet arthropathy.  Has pain with facet loading.  Possible radicular component to the posterior leg discomfort.  No evidence of nerve root dysfunction.  She is frustrated  with her ongoing symptoms at significantly interfere with her quality of life despite an adequate trial of anti-inflammatory medications.  I recommend an MRI of the lumbar spine both to look for occult fracture and to evaluate for disc pathology causing radiculitis.  She may be a candidate for epidural steroid injections or radiofrequency ablation.  She can follow up with me after the scan      Acute bilateral low back pain with bilateral sciatica    Lumbar radiculopathy  -     Ambulatory referral/consult to [facility]    Dorsalgia, unspecified  -     MRI Lumbar Spine Without Contrast; Future; Expected date: 06/26/2025               [1]   Social History  Socioeconomic History    Marital status:    Tobacco Use    Smoking status: Never     Passive exposure: Never    Smokeless tobacco: Never   Substance and Sexual Activity    Alcohol use: No    Drug use: No    Sexual activity: Yes     Partners: Male     Social Drivers of Health     Financial Resource Strain: Low Risk  (6/16/2025)    Overall Financial Resource Strain (CARDIA)     Difficulty of Paying Living Expenses: Not hard at all   Food Insecurity: No Food Insecurity (6/16/2025)    Hunger Vital Sign     Worried About Running Out of Food in the Last Year: Never true     Ran Out of Food in the Last Year: Never true   Transportation Needs: No Transportation Needs (6/16/2025)    PRAPARE - Transportation     Lack of Transportation (Medical): No     Lack of Transportation (Non-Medical): No

## 2025-07-07 ENCOUNTER — HOSPITAL ENCOUNTER (OUTPATIENT)
Dept: RADIOLOGY | Facility: HOSPITAL | Age: 75
Discharge: HOME | End: 2025-07-07
Attending: PHYSICAL MEDICINE & REHABILITATION
Payer: MEDICARE

## 2025-07-07 DIAGNOSIS — M54.9 DORSALGIA, UNSPECIFIED: ICD-10-CM

## 2025-07-07 PROCEDURE — 72148 MRI LUMBAR SPINE W/O DYE: CPT | Mod: TC,PO

## 2025-07-07 PROCEDURE — 72148 MRI LUMBAR SPINE W/O DYE: CPT | Mod: 26,,, | Performed by: RADIOLOGY

## 2025-07-10 ENCOUNTER — TELEPHONE (OUTPATIENT)
Dept: ENDOCRINOLOGY | Facility: CLINIC | Age: 75
End: 2025-07-10
Payer: MEDICARE

## 2025-07-10 ENCOUNTER — OFFICE VISIT (OUTPATIENT)
Dept: FAMILY MEDICINE | Facility: CLINIC | Age: 75
End: 2025-07-10
Payer: MEDICARE

## 2025-07-10 ENCOUNTER — PATIENT OUTREACH (OUTPATIENT)
Facility: OTHER | Age: 75
End: 2025-07-10
Payer: MEDICARE

## 2025-07-10 VITALS
TEMPERATURE: 99 F | RESPIRATION RATE: 16 BRPM | HEIGHT: 65 IN | SYSTOLIC BLOOD PRESSURE: 160 MMHG | OXYGEN SATURATION: 98 % | DIASTOLIC BLOOD PRESSURE: 82 MMHG | WEIGHT: 126.56 LBS | BODY MASS INDEX: 21.09 KG/M2 | HEART RATE: 79 BPM

## 2025-07-10 DIAGNOSIS — K21.9 GASTROESOPHAGEAL REFLUX DISEASE, UNSPECIFIED WHETHER ESOPHAGITIS PRESENT: ICD-10-CM

## 2025-07-10 DIAGNOSIS — Z12.31 ENCOUNTER FOR SCREENING MAMMOGRAM FOR MALIGNANT NEOPLASM OF BREAST: ICD-10-CM

## 2025-07-10 DIAGNOSIS — D84.9 IMMUNE DEFICIENCY DISORDER: ICD-10-CM

## 2025-07-10 DIAGNOSIS — E78.2 HYPERLIPIDEMIA, MIXED: ICD-10-CM

## 2025-07-10 DIAGNOSIS — J45.40 MODERATE PERSISTENT ASTHMA WITHOUT COMPLICATION: ICD-10-CM

## 2025-07-10 DIAGNOSIS — M54.16 LUMBAR RADICULOPATHY: ICD-10-CM

## 2025-07-10 DIAGNOSIS — G35 MULTIPLE SCLEROSIS: ICD-10-CM

## 2025-07-10 DIAGNOSIS — K59.00 CONSTIPATION, UNSPECIFIED CONSTIPATION TYPE: ICD-10-CM

## 2025-07-10 DIAGNOSIS — J44.9 CHRONIC OBSTRUCTIVE PULMONARY DISEASE, UNSPECIFIED COPD TYPE: ICD-10-CM

## 2025-07-10 DIAGNOSIS — E03.9 HYPOTHYROIDISM, UNSPECIFIED TYPE: ICD-10-CM

## 2025-07-10 DIAGNOSIS — I27.20 PULMONARY HYPERTENSION: Primary | ICD-10-CM

## 2025-07-10 DIAGNOSIS — F33.9 EPISODE OF RECURRENT MAJOR DEPRESSIVE DISORDER, UNSPECIFIED DEPRESSION EPISODE SEVERITY: ICD-10-CM

## 2025-07-10 DIAGNOSIS — E04.1 THYROID NODULE: ICD-10-CM

## 2025-07-10 PROCEDURE — 99999 PR PBB SHADOW E&M-EST. PATIENT-LVL V: CPT | Mod: PBBFAC,,, | Performed by: FAMILY MEDICINE

## 2025-07-10 RX ORDER — MELOXICAM 7.5 MG/1
7.5 TABLET ORAL DAILY
Qty: 90 TABLET | Refills: 1 | Status: SHIPPED | OUTPATIENT
Start: 2025-07-10

## 2025-07-10 RX ORDER — GABAPENTIN 300 MG/1
CAPSULE ORAL
Qty: 90 CAPSULE | Refills: 2 | Status: SHIPPED | OUTPATIENT
Start: 2025-07-10

## 2025-07-10 NOTE — PROGRESS NOTES
Subjective:       Patient ID: Babita Thomas is a 75 y.o. female.    Chief Complaint: Annual Exam    Problem List[1]  Patient is here for a chronic conditions follow up.    Reviewed labs 2/2025  History of Present Illness    CHIEF COMPLAINT:  Ms. Thomas presents today for follow up after a fall with back pain and leg symptoms.    HISTORY OF PRESENT ILLNESS:  She experienced a syncopal episode one month ago in the kitchen. Prior to losing consciousness, she had a prodromal sensation and attempted to sit down but fell hard on her back and foot. She was unable to prevent the syncopal episode. She developed immediate back pain following the fall. Initial imaging workup included x-rays, CT, and MRI, which have not yet been reviewed with a physician.    CURRENT SYMPTOMS:  She reports persistent leg pain and numbness since the fall. She describes a continuous aching sensation, similar to a toothache, located in the top of feet, calves, and knees. She denies throbbing or sharp, lightning-like pain. Associated symptoms include leg swelling. The symptoms are constant without improvement since onset. No interventions have provided effective relief.    MEDICATIONS:  She completed a prescribed steroid taper (6, 5, 4, 3 pills on consecutive days). She tried Norco with minimal pain relief.      ROS:  ROS findings as noted in HPI. Seen in ED 6/13/25 for both knee arthritis       Review of Systems   Constitutional:  Negative for fatigue and unexpected weight change.   Respiratory:  Negative for chest tightness and shortness of breath.    Cardiovascular:  Negative for chest pain, palpitations and leg swelling.   Gastrointestinal:  Negative for abdominal pain.   Musculoskeletal:  Negative for arthralgias.   Neurological:  Negative for dizziness, syncope, light-headedness and headaches.      Relevant History:  Allergy  AB def (nl Ig G and B cells)    Spine Dr. Rojas low back pain. Mri ls spine 7/2025 multilevel lumbar disc  degeneration and facet joint osteoarthrosis at L2-L3, L3-L4, and L4-L5 as described.     GYN mammo 4/24 neg   dexa 2024 osteopenia     Eye Dr. Diez cataract     Derm Dr. Oh scalp psoriasis     GI Dr. Cotter treating gerd and  chronic constipation- tried fiber, stool softeners, miralax and linzess 75mg bid . Tried trulance and caused cramping. Will try again qod or q 3 days.  Worked at first but then stopped. Last colonoscopy 7/2021 GI Dr. Cotter- bowel prep unsatisfactory. 2010 no polyps. Dr. Cotter GI GERD, gastroparesis, lactose intolerance.     Pulm Dr. Heard asthma-rare use of albuterol     Podiatry Dr. Cantu -h/o lisfranc fracture        Immunology Dr. 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 Not seeing a current neurologist. C/o new sx worsening gradually over time of forgetfulness such as misplacing things.  Denies problems with getting lost in familiar places, forgetting meds, leaving food on the stove , or forgetting to pay bills/appts.  No others notice problem. Concerned bc mother had alzheimers. Has trouble getting and staying asleep but helped with trazodone. Denies snoring and feels rested. Denies depression. Denies distraction.      Psych Dr. Zhu  prescribed metformin for weight mgmt-no longer taking. Mood well controlled on prozac     Card Dr. Wu [facility]- stopped mevacor due to muscle pain.  Now on crestor     GYN Dr. Huitron  eval for postmenopausal bleeding 10/20. U/s and EMB done Dr. Nazario Retroverted uterus with fluid within the endometrium measuring 7 mm.  This mildly thickened.  A follow-up ultrasound in 6 weeks or hysteroscopy is recommended to exclude malignancy   2.2 cm fundal fibroid which previously measured 1.9 cm. Had D &C and bx 11/20 . Has had f/u imaging with Dr. Huitron (not available) and has had no further bleeding episode        Endocrine H/o thyroid nodule last u/s 2/19 and 1/2021 no change in small nodules  and cysts compared to u/s 2017. No further imagine needed. Had been on thyroid meds in past but told to stop that she didn't need them anymore. Thyroid u/s 1/2021 Several thyroid lesions without detrimental change or indication for biopsy.     ENT Dr. Winkler chronic sinus complaints.   Had trach at 5-6 years old due to peanut stuck in throat  Objective:      Physical Exam  Vitals and nursing note reviewed.   Constitutional:       Appearance: She is well-developed.   Cardiovascular:      Rate and Rhythm: Normal rate and regular rhythm.      Heart sounds: Normal heart sounds.   Pulmonary:      Effort: Pulmonary effort is normal.      Breath sounds: Normal breath sounds.   Skin:     General: Skin is warm and dry.   Neurological:      Mental Status: She is alert and oriented to person, place, and time.         Assessment:       ICD-10-CM ICD-9-CM    1. Pulmonary hypertension  I27.20 416.8       2. Hyperlipidemia, mixed  E78.2 272.2 Comprehensive Metabolic Panel      Lipid Panel      3. Hypothyroidism, unspecified type  E03.9 244.9 CBC Auto Differential      TSH      T4, Free      4. Moderate persistent asthma without complication  J45.40 493.90       5. Gastroesophageal reflux disease, unspecified whether esophagitis present  K21.9 530.81       6. Multiple sclerosis  G35 340 Ambulatory referral/consult to Neurology      7. Thyroid nodule  E04.1 241.0       8. Chronic obstructive pulmonary disease, unspecified COPD type  J44.9 496       9. Lumbar radiculopathy  M54.16 724.4 Ambulatory Referral/Consult to Physical Therapy      meloxicam (MOBIC) 7.5 MG tablet      gabapentin (NEURONTIN) 300 MG capsule      Ambulatory Referral/Consult to Physical Therapy      10. Episode of recurrent major depressive disorder, unspecified depression episode severity  F33.9 296.30       11. Immune deficiency disorder  D84.9 279.3       12. Encounter for screening mammogram for malignant neoplasm of breast  Z12.31 V76.12 Mammo Digital  Screening Bilat w/ Chapito (XPD)      13. Constipation, unspecified constipation type  K59.00 564.00 linaCLOtide (LINZESS) 72 mcg Cap capsule         Plan:   1. Pulmonary hypertension (Primary)  Cont current mgmt     2. Hyperlipidemia, mixed  Controlled on current medications.  Continue current medications.  Crestor 10mg  - Comprehensive Metabolic Panel; Future  - Lipid Panel; Future    3. Hypothyroidism, unspecified type  Screen and treat as indicated:    - CBC Auto Differential; Future  - TSH; Future  - T4, Free; Future    4. Moderate persistent asthma without complication  Controlled on current medications.  Continue current medications.      5. Gastroesophageal reflux disease, unspecified whether esophagitis present  Controlled on current medications.  Continue current medications.\  Prilosec 20mg  6. Multiple sclerosis  New sx of below knee to toe paresthesias? Lumbar radiculopathy vs MS sx?  Refer for eval and treat  - Ambulatory referral/consult to Neurology; Future    7. Thyroid nodule  No further imaging neededd    8. Chronic obstructive pulmonary disease, unspecified COPD type  Controlled on current medications.  Continue current medications.      9. Lumbar radiculopathy  refer  - Ambulatory Referral/Consult to Physical Therapy  Add  - meloxicam (MOBIC) 7.5 MG tablet; Take 1 tablet (7.5 mg total) by mouth once daily.  Dispense: 90 tablet; Refill: 1  - gabapentin (NEURONTIN) 300 MG capsule; 1 po qhs x 1 week , then 1 po bid x 1 week, then 1 po tid maintenance  Dispense: 90 capsule; Refill: 2  - Ambulatory Referral/Consult to Physical Therapy    10. Episode of recurrent major depressive disorder, unspecified depression episode severity  Controlled on current medications.  Continue current medications.  Cont allergy/immune monitoring and care    11. Immune deficiency disorder  Cont care as directed by allergy/immunology    12. Encounter for screening mammogram for malignant neoplasm of breast  Screen and treat  as indicated:    - Mammo Digital Screening Bilat w/ Chapito (XPD); Future    13. Constipation, unspecified constipation type  restart  - linaCLOtide (LINZESS) 72 mcg Cap capsule; Take 1 capsule (72 mcg total) by mouth before breakfast.  Dispense: 90 capsule; Refill: 3  Assessment & Plan    - Explained that inflammation is a significant factor in current symptoms, particularly around nerve roots.  - Discussed how anti-inflammatory medications and physical therapy can help reduce inflammation and decompress nerves.  - Informed about potential side effects of Gabapentin, including possible dizziness or lightheadedness.  - Started Gabapentin for nerve pain.  - Started Meloxicam, daily with food.  - Discontinued Naproxen.  - Referred to physical therapy, focusing on stretching and elongating the back to decompress nerves and improve function.         Time spent with patient: 20 minutes  Patient with be reevaluated in 4 weeks or sooner prn  Greater than 50% of this visit was spent counseling as described in above documentation:Yes  This note was generated with the assistance of ambient listening technology. Verbal consent was obtained by the patient and accompanying visitor(s) for the recording of patient appointment to facilitate this note. I attest to having reviewed and edited the generated note for accuracy, though some syntax or spelling errors may persist. Please contact the author of this note for any clarification.            [1]   Patient Active Problem List  Diagnosis    Multiple sclerosis    GERD (gastroesophageal reflux disease)    Thyroid nodule    Tortuous aorta    Asthma    Chronic pansinusitis    Hypertrophy of inferior nasal turbinate    Insomnia    Immune deficiency disorder    Eosinophilia    Recurrent major depressive disorder    Gross hematuria    Closed nondisplaced fracture of medial cuneiform of left foot    Closed nondisplaced fracture of cuboid bone of left foot    Closed displaced fracture of fifth  metatarsal bone of left foot    Closed displaced fracture of fourth metatarsal bone of left foot    Closed displaced fracture of third metatarsal bone of left foot    Closed displaced fracture of second metatarsal bone of left foot    Closed nondisplaced fracture of first left metatarsal bone    Lisfranc dislocation, left, initial encounter    Hypothyroidism    Myalgia due to statin    Hyperlipidemia, mixed    Pulmonary hypertension    Chronic obstructive pulmonary disease    Chronic constipation    Increased endometrial stripe thickness

## 2025-07-16 ENCOUNTER — OFFICE VISIT (OUTPATIENT)
Dept: SPINE | Facility: CLINIC | Age: 75
End: 2025-07-16
Payer: MEDICARE

## 2025-07-16 VITALS — WEIGHT: 126.56 LBS | HEIGHT: 65 IN | BODY MASS INDEX: 21.09 KG/M2

## 2025-07-16 DIAGNOSIS — M54.41 ACUTE BILATERAL LOW BACK PAIN WITH BILATERAL SCIATICA: Primary | ICD-10-CM

## 2025-07-16 DIAGNOSIS — M54.42 ACUTE BILATERAL LOW BACK PAIN WITH BILATERAL SCIATICA: Primary | ICD-10-CM

## 2025-07-16 PROCEDURE — 99999 PR PBB SHADOW E&M-EST. PATIENT-LVL III: CPT | Mod: PBBFAC,HCNC,, | Performed by: PHYSICAL MEDICINE & REHABILITATION

## 2025-07-16 NOTE — PROGRESS NOTES
SUBJECTIVE:    Patient ID: Babita Thomas is a 75 y.o. female.    Chief Complaint: Follow-up    She is here to review her lumbar MRI done 07/07/2025 to evaluate her complaint of low back pain at the lumbosacral junction associated with bilateral leg discomfort radiating into the calves.      The MRI is summarized below:      FINDINGS:  At T12-L1, L1-L2, no narrowing.     At L2-L3, moderate loss of disc space height circumferential disc bulge results in mild central canal and mild bilateral lateral recess narrowing.  No neural foramen narrowing.     At L3-L4, mild loss of disc space height and circumferential disc bulge combine with mild facet joint osteoarthrosis and ligamentum flavum buckling to result in mild to moderate central canal and mild bilateral lateral recess narrowing.  No neural foramen narrowing.     At L4-L5, mild loss of disc space height and circumferential disc bulge combined with mild facet joint osteoarthrosis and ligamentum flavum buckling to result in moderate central canal and moderate bilateral lateral recess narrowing.  Mild right and mild-to-moderate left neural foramen narrowing are present.     At L5-S1, no narrowing.     Lumbosacral alignment is normal.  Vertebral bodies maintain normal bone marrow signal.  Conus terminates normally at L1-L2.  Right renal cysts suggested.  Paraspinal soft tissues otherwise unremarkable.     Impression:     Multilevel lumbar disc degeneration and facet joint osteoarthrosis at L2-L3, L3-L4, and L4-L5 as described.        Clinically she is getting better.  She is not having any low back pain or radiation of symptoms down her legs.  Just some soreness in her calves.  Her current pain level is 3/10.  She is starting physical therapy soon.  She asked me to take a look at her knees while she is here.  Apparently I thought she might have fluid on her knees last visit.  I honestly have no recollection of that.  She is not having any symptoms related to her  knee            Past Medical History:   Diagnosis Date    Allergy     Arthritis 1990    hands    Asthma 02/2016    no meds    Depression 5/19/2017    Fracture of left foot 09/04/2019    Dr Cantu    GERD (gastroesophageal reflux disease) 2009    on meds    High cholesterol 2014    on meds    Hyperlipidemia     Hypothyroidism     Immune deficiency disorder 05/19/2017    on meds infusion    Insomnia 1/31/2017    MS (multiple sclerosis) 2005    very well controlled, no symptoms    Shortness of breath     Sinusitis     Thyroid disease 2005    hypothyroidism- no med     Social History[1]  Past Surgical History:   Procedure Laterality Date    BREAST BIOPSY Left     benign    BREAST SURGERY Left 15 yrs ago    biopsy, benign    CHOLECYSTECTOMY      COLONOSCOPY  3-5-2010    Dr Cotter 11 year bal     CYSTOSCOPY N/A 6/10/2019    Procedure: CYSTOSCOPY;  Surgeon: Faiza Coreas MD;  Location: [facility] OR;  Service: Urology;  Laterality: N/A;    ESOPHAGUS SURGERY      6 year old, peanut stuck in throat     MIDFOOT ARTHRODESIS Left 9/6/2019    Procedure: FUSION, JOINT, MIDFOOT;  Surgeon: Damian Cantu DPM;  Location: [facility] OR;  Service: Podiatry;  Laterality: Left;  SEDA NORRIS NOTIFIED    MULTIPLE TOOTH EXTRACTIONS      SINUS SURGERY  2015    x3    TRACHEOSTOMY CLOSURE      age 6 for peanut in throat    TYMPANOSTOMY TUBE PLACEMENT      x2    WISDOM TOOTH EXTRACTION       Family History   Problem Relation Name Age of Onset    Hypertension Mother      Dementia Mother      Hyperlipidemia Mother      Heart disease Father  60        MI    Hypertension Sister      No Known Problems Daughter      Heart disease Paternal Uncle      Heart disease Maternal Grandfather  70        MI    Heart disease Paternal Grandfather      No Known Problems Daughter      Allergic rhinitis Neg Hx      Allergies Neg Hx      Angioedema Neg Hx      Asthma Neg Hx      Atopy Neg Hx      Eczema Neg Hx      Immunodeficiency Neg Hx      Rhinitis Neg Hx    "   Urticaria Neg Hx      Melanoma Neg Hx      Psoriasis Neg Hx      Lupus Neg Hx       Vitals:    07/16/25 1131   Weight: 57.4 kg (126 lb 8.7 oz)   Height: 5' 5" (1.651 m)       Review of Systems   Constitutional:  Negative for chills, diaphoresis, fatigue, fever and unexpected weight change.   HENT:  Negative for trouble swallowing.    Eyes:  Negative for visual disturbance.   Respiratory:  Negative for shortness of breath.    Cardiovascular:  Negative for chest pain.   Gastrointestinal:  Negative for abdominal pain, constipation, nausea and vomiting.   Genitourinary:  Negative for difficulty urinating.   Musculoskeletal:  Negative for arthralgias, back pain, gait problem, joint swelling, myalgias, neck pain and neck stiffness.   Neurological:  Negative for dizziness, speech difficulty, weakness, light-headedness, numbness and headaches.          Objective:      Physical Exam  Neurological:      Mental Status: She is alert and oriented to person, place, and time.      Comments: She is awake and in no acute distress  No significant effusion erythema or increased warmth of either knee.  Benign             Assessment:       1. Acute bilateral low back pain with bilateral sciatica           Plan:     I reassured her that there are no worrisome findings on her MRI.  We talked about treatment options which include living with her ongoing pain versus physical therapy versus epidural steroid injections or surgery.  She is going to proceed with physical therapy which is a great idea.  She can follow up with me after therapy or as needed.      Acute bilateral low back pain with bilateral sciatica               [1]   Social History  Socioeconomic History    Marital status:    Tobacco Use    Smoking status: Never     Passive exposure: Never    Smokeless tobacco: Never   Substance and Sexual Activity    Alcohol use: No    Drug use: No    Sexual activity: Yes     Partners: Male     Social Drivers of Health     Financial " Resource Strain: Low Risk  (6/16/2025)    Overall Financial Resource Strain (CARDIA)     Difficulty of Paying Living Expenses: Not hard at all   Food Insecurity: No Food Insecurity (6/16/2025)    Hunger Vital Sign     Worried About Running Out of Food in the Last Year: Never true     Ran Out of Food in the Last Year: Never true   Transportation Needs: No Transportation Needs (6/16/2025)    PRAPARE - Transportation     Lack of Transportation (Medical): No     Lack of Transportation (Non-Medical): No

## 2025-07-24 ENCOUNTER — HOSPITAL ENCOUNTER (OUTPATIENT)
Dept: RADIOLOGY | Facility: HOSPITAL | Age: 75
Discharge: HOME | End: 2025-07-24
Attending: FAMILY MEDICINE
Payer: MEDICARE

## 2025-07-24 DIAGNOSIS — Z12.31 ENCOUNTER FOR SCREENING MAMMOGRAM FOR MALIGNANT NEOPLASM OF BREAST: ICD-10-CM

## 2025-07-24 PROCEDURE — 77063 BREAST TOMOSYNTHESIS BI: CPT | Mod: 26,,, | Performed by: RADIOLOGY

## 2025-07-24 PROCEDURE — 77067 SCR MAMMO BI INCL CAD: CPT | Mod: 26,,, | Performed by: RADIOLOGY

## 2025-07-24 PROCEDURE — 77063 BREAST TOMOSYNTHESIS BI: CPT | Mod: TC,PO

## 2025-07-25 DIAGNOSIS — E78.2 HYPERLIPIDEMIA, MIXED: ICD-10-CM

## 2025-07-25 RX ORDER — ROSUVASTATIN CALCIUM 10 MG/1
10 TABLET, COATED ORAL
Qty: 90 TABLET | Refills: 0 | Status: SHIPPED | OUTPATIENT
Start: 2025-07-25

## 2025-07-25 NOTE — TELEPHONE ENCOUNTER
No care due was identified.  Health [facility] Embedded Care Due Messages. Reference number: [id number].   7/25/2025 1:37:02 AM CDT

## 2025-07-26 NOTE — TELEPHONE ENCOUNTER
Refill Decision Note   Babita Thomas  is requesting a refill authorization.  Brief Assessment and Rationale for Refill:  Approve     Medication Therapy Plan:  Overridden by Meme Sanchez MD on Jul 9, 2024 8:15 PM      Alert overridden per protocol: Yes   Comments:     Note composed:10:25 PM 07/25/2025             Appointments     Last Visit   7/10/2025 Meme Sanchez MD   Next Visit   8/8/2025 Meme Sanchez MD

## 2025-08-04 ENCOUNTER — LAB VISIT (OUTPATIENT)
Dept: LAB | Facility: HOSPITAL | Age: 75
End: 2025-08-04
Attending: FAMILY MEDICINE
Payer: MEDICARE

## 2025-08-04 DIAGNOSIS — E03.9 HYPOTHYROIDISM, UNSPECIFIED TYPE: ICD-10-CM

## 2025-08-04 DIAGNOSIS — E78.2 HYPERLIPIDEMIA, MIXED: ICD-10-CM

## 2025-08-04 LAB
ABSOLUTE EOSINOPHIL (SMH): 0.22 K/UL
ABSOLUTE MONOCYTE (SMH): 0.66 K/UL (ref 0.3–1)
ABSOLUTE NEUTROPHIL COUNT (SMH): 1.6 K/UL (ref 1.8–7.7)
ALBUMIN SERPL-MCNC: 4 G/DL (ref 3.5–5.2)
ALP SERPL-CCNC: 72 UNIT/L (ref 55–135)
ALT SERPL-CCNC: 15 UNIT/L (ref 10–44)
ANION GAP (SMH): 5 MMOL/L (ref 8–16)
AST SERPL-CCNC: 25 UNIT/L (ref 10–40)
BASOPHILS # BLD AUTO: 0.02 K/UL
BASOPHILS NFR BLD AUTO: 0.5 %
BILIRUB SERPL-MCNC: 0.5 MG/DL (ref 0.1–1)
BUN SERPL-MCNC: 8 MG/DL (ref 8–23)
CALCIUM SERPL-MCNC: 9.1 MG/DL (ref 8.7–10.5)
CHLORIDE SERPL-SCNC: 101 MMOL/L (ref 95–110)
CHOLEST SERPL-MCNC: 177 MG/DL (ref 120–199)
CHOLEST/HDLC SERPL: 2.8 {RATIO} (ref 2–5)
CO2 SERPL-SCNC: 33 MMOL/L (ref 23–29)
CREAT SERPL-MCNC: 0.7 MG/DL (ref 0.5–1.4)
ERYTHROCYTE [DISTWIDTH] IN BLOOD BY AUTOMATED COUNT: 13.5 % (ref 11.5–14.5)
GFR SERPLBLD CREATININE-BSD FMLA CKD-EPI: >60 ML/MIN/1.73/M2
GLUCOSE SERPL-MCNC: 82 MG/DL (ref 70–110)
HCT VFR BLD AUTO: 41.4 % (ref 37–48.5)
HDLC SERPL-MCNC: 63 MG/DL (ref 40–75)
HDLC SERPL: 35.6 % (ref 20–50)
HGB BLD-MCNC: 13.3 GM/DL (ref 12–16)
IMM GRANULOCYTES # BLD AUTO: 0.01 K/UL (ref 0–0.04)
IMM GRANULOCYTES NFR BLD AUTO: 0.3 % (ref 0–0.5)
LDLC SERPL CALC-MCNC: 90.2 MG/DL (ref 63–159)
LYMPHOCYTES # BLD AUTO: 1.4 K/UL (ref 1–4.8)
MCH RBC QN AUTO: 30.6 PG (ref 27–31)
MCHC RBC AUTO-ENTMCNC: 32.1 G/DL (ref 32–36)
MCV RBC AUTO: 95 FL (ref 82–98)
NONHDLC SERPL-MCNC: 114 MG/DL
NUCLEATED RBC (/100WBC) (SMH): 0 /100 WBC
PLATELET # BLD AUTO: 292 K/UL (ref 150–450)
PMV BLD AUTO: 9.9 FL (ref 9.2–12.9)
POTASSIUM SERPL-SCNC: 4.4 MMOL/L (ref 3.5–5.1)
PROT SERPL-MCNC: 6.8 GM/DL (ref 6–8.4)
RBC # BLD AUTO: 4.34 M/UL (ref 4–5.4)
RELATIVE EOSINOPHIL (SMH): 5.6 % (ref 0–8)
RELATIVE LYMPHOCYTE (SMH): 35.8 % (ref 18–48)
RELATIVE MONOCYTE (SMH): 16.9 % (ref 4–15)
RELATIVE NEUTROPHIL (SMH): 40.9 % (ref 38–73)
SODIUM SERPL-SCNC: 139 MMOL/L (ref 136–145)
T4 FREE SERPL-MCNC: 0.71 NG/DL (ref 0.71–1.51)
TRIGL SERPL-MCNC: 119 MG/DL (ref 30–150)
TSH SERPL-ACNC: 2.61 UIU/ML (ref 0.34–5.6)
WBC # BLD AUTO: 3.91 K/UL (ref 3.9–12.7)

## 2025-08-04 PROCEDURE — 85025 COMPLETE CBC W/AUTO DIFF WBC: CPT

## 2025-08-04 PROCEDURE — 82465 ASSAY BLD/SERUM CHOLESTEROL: CPT

## 2025-08-04 PROCEDURE — 84443 ASSAY THYROID STIM HORMONE: CPT

## 2025-08-04 PROCEDURE — 82040 ASSAY OF SERUM ALBUMIN: CPT

## 2025-08-04 PROCEDURE — 36415 COLL VENOUS BLD VENIPUNCTURE: CPT | Mod: PO

## 2025-08-04 PROCEDURE — 84439 ASSAY OF FREE THYROXINE: CPT

## 2025-08-08 ENCOUNTER — OFFICE VISIT (OUTPATIENT)
Dept: FAMILY MEDICINE | Facility: CLINIC | Age: 75
End: 2025-08-08
Payer: MEDICARE

## 2025-08-08 VITALS
WEIGHT: 133.81 LBS | OXYGEN SATURATION: 96 % | HEIGHT: 65 IN | TEMPERATURE: 99 F | RESPIRATION RATE: 12 BRPM | BODY MASS INDEX: 22.3 KG/M2 | DIASTOLIC BLOOD PRESSURE: 60 MMHG | SYSTOLIC BLOOD PRESSURE: 102 MMHG | HEART RATE: 78 BPM

## 2025-08-08 DIAGNOSIS — M54.50 ACUTE BILATERAL LOW BACK PAIN WITHOUT SCIATICA: ICD-10-CM

## 2025-08-08 DIAGNOSIS — G35 MULTIPLE SCLEROSIS: ICD-10-CM

## 2025-08-08 DIAGNOSIS — E03.9 HYPOTHYROIDISM, UNSPECIFIED TYPE: ICD-10-CM

## 2025-08-08 DIAGNOSIS — E78.2 HYPERLIPIDEMIA, MIXED: Primary | ICD-10-CM

## 2025-08-08 PROCEDURE — 99999 PR PBB SHADOW E&M-EST. PATIENT-LVL IV: CPT | Mod: PBBFAC,,,

## 2025-08-08 NOTE — PROGRESS NOTES
Subjective:       Patient ID:  [id number]     Chief Complaint: Follow-up      History of Present Illness    Ms. Thomas presents today for follow up of back pain after a fall. She continues physical therapy for back pain management. Her prescribed medication has been helpful in managing symptoms. She is following up with neurology and back specialist for continued evaluation and management of back pain following a previous fall or syncopal episode. She is not currently under active neurological care for Multiple Sclerosis management but is interested in establishing specialized neurological follow-up. CBC was stable with no change in hemoglobin or white blood cell count. Complete metabolic panel showed slightly elevated CO2. Cholesterol and thyroid labs were normal. Mammogram was normal with recommended follow up in one year.          Past Medical History:   Diagnosis Date    Allergy     Arthritis 1990    hands    Asthma 02/2016    no meds    Depression 5/19/2017    Fracture of left foot 09/04/2019    Dr Cantu    GERD (gastroesophageal reflux disease) 2009    on meds    High cholesterol 2014    on meds    Hyperlipidemia     Hypothyroidism     Immune deficiency disorder 05/19/2017    on meds infusion    Insomnia 1/31/2017    MS (multiple sclerosis) 2005    very well controlled, no symptoms    Shortness of breath     Sinusitis     Thyroid disease 2005    hypothyroidism- no med      Active Problem List with Overview Notes    Diagnosis Date Noted    Chronic constipation 07/10/2023    Increased endometrial stripe thickness 07/10/2023    Pulmonary hypertension 04/06/2021    Chronic obstructive pulmonary disease 04/06/2021    Myalgia due to statin 01/15/2020    Hyperlipidemia, mixed 01/15/2020    Hypothyroidism 09/07/2019    Closed nondisplaced fracture of medial cuneiform of left foot 09/04/2019    Closed nondisplaced fracture of cuboid bone of left foot 09/04/2019    Closed displaced fracture of fifth metatarsal bone of  left foot 09/04/2019    Closed displaced fracture of fourth metatarsal bone of left foot 09/04/2019    Closed displaced fracture of third metatarsal bone of left foot 09/04/2019    Closed displaced fracture of second metatarsal bone of left foot 09/04/2019    Closed nondisplaced fracture of first left metatarsal bone 09/04/2019    Lisfranc dislocation, left, initial encounter 09/04/2019    Gross hematuria 06/10/2019    Immune deficiency disorder 05/19/2017    Eosinophilia 05/19/2017    Recurrent major depressive disorder 05/19/2017    Insomnia 01/31/2017    Chronic pansinusitis 12/19/2016     Dr. Aleman      Hypertrophy of inferior nasal turbinate 12/19/2016    Tortuous aorta 03/15/2016    Asthma 03/15/2016     Dr. Heard      Thyroid nodule 06/04/2015    Multiple sclerosis 01/31/2015     Dr. Del Rio Arcelia      GERD (gastroesophageal reflux disease) 01/31/2015      Review of patient's allergies indicates:   Allergen Reactions    Pravastatin Other (See Comments)     cramping    Tecfidera [dimethyl fumarate] Swelling       Current Medications[1]    Lab Results   Component Value Date    WBC 3.91 08/04/2025    HGB 13.3 08/04/2025    HCT 41.4 08/04/2025     08/04/2025    CHOL 177 08/04/2025    TRIG 119 08/04/2025    HDL 63 08/04/2025    ALT 15 08/04/2025    AST 25 08/04/2025     08/04/2025    K 4.4 08/04/2025     08/04/2025    CREATININE 0.7 08/04/2025    BUN 8 08/04/2025    CO2 33 (H) 08/04/2025    TSH 2.608 08/04/2025    HGBA1C 5.1 04/16/2018       Review of Systems   Constitutional:  Negative for fatigue and fever.   HENT: Negative.  Negative for congestion, sneezing and sore throat.    Eyes: Negative.    Respiratory:  Negative for cough, shortness of breath and wheezing.    Cardiovascular:  Negative for chest pain, palpitations and leg swelling.   Gastrointestinal:  Negative for abdominal pain, nausea and vomiting.   Genitourinary: Negative.    Musculoskeletal: Negative.  Negative for arthralgias.  "  Skin: Negative.  Negative for rash.   Neurological:  Negative for dizziness, weakness, light-headedness, numbness and headaches.   Hematological: Negative.    Psychiatric/Behavioral: Negative.         Objective:      Physical Exam  Constitutional:       Appearance: Normal appearance.   HENT:      Head: Normocephalic and atraumatic.      Nose: Nose normal.   Eyes:      Extraocular Movements: Extraocular movements intact.   Cardiovascular:      Rate and Rhythm: Normal rate and regular rhythm.   Pulmonary:      Effort: Pulmonary effort is normal.      Breath sounds: Normal breath sounds.   Musculoskeletal:         General: Normal range of motion.      Cervical back: Normal range of motion.   Skin:     General: Skin is warm and dry.   Neurological:      General: No focal deficit present.      Mental Status: She is alert and oriented to person, place, and time.   Psychiatric:         Mood and Affect: Mood normal.         Assessment:       1. Hyperlipidemia, mixed    2. Hypothyroidism, unspecified type    3. Multiple sclerosis    4. Acute bilateral low back pain without sciatica        Plan:       Assessment & Plan    IMPRESSION:  - Reviewed recent lab results: CBC stable, CMP good with slightly elevated CO2 (not concerning given overall picture), cholesterol well-controlled, thyroid labs normal.  - Back pain improved following previous fall/syncope episode.  - Evaluated effectiveness of previously prescribed medication for back pain.  - Considered need for neurological follow-up for MS management.    Babita Gamez" was seen today for follow-up.    Diagnoses and all orders for this visit:    Hyperlipidemia, mixed  - stable   - continue current regimen    Hypothyroidism, unspecified type  - TSH labs within normal limits    Multiple sclerosis  Advised f/u with neurology - phone number provided     Acute bilateral low back pain without sciatica  - resolved              Future Appointments       Date Provider Specialty Appt " Notes    8/13/2025 Anna Knight MD Gastroenterology est/care    2/18/2026 Meme Sanchez MD Family Medicine 6 mo f/u               Portions of this note may have been dictated using voice recognition software and may contain dictation related errors in spelling / grammar / syntax not discovered on text review.     This note was generated with the assistance of ambient listening technology. Verbal consent was obtained by the patient and accompanying visitor(s) for the recording of patient appointment to facilitate this note. I attest to having reviewed and edited the generated note for accuracy, though some syntax or spelling errors may persist. Please contact the author of this note for any clarification.       Yina Salvador PA-C         [1]   Current Outpatient Medications:     azelastine (ASTELIN) 137 mcg (0.1 %) nasal spray, 1 spray by Nasal route., Disp: , Rfl:     cholecalciferol, vitamin D3, 2,000 unit Tab, Take by mouth., Disp: , Rfl:     FLUoxetine 40 MG capsule, Take 40 mg by mouth once daily., Disp: , Rfl:     fluticasone propionate (FLONASE) 50 mcg/actuation nasal spray, 1 spray by Nasal route., Disp: , Rfl:     folic acid (FOLVITE) 1 MG tablet, , Disp: , Rfl:     gabapentin (NEURONTIN) 300 MG capsule, 1 po qhs x 1 week , then 1 po bid x 1 week, then 1 po tid maintenance, Disp: 90 capsule, Rfl: 2    linaCLOtide (LINZESS) 72 mcg Cap capsule, Take 1 capsule (72 mcg total) by mouth before breakfast., Disp: 90 capsule, Rfl: 3    meloxicam (MOBIC) 7.5 MG tablet, Take 1 tablet (7.5 mg total) by mouth once daily., Disp: 90 tablet, Rfl: 1    multivitamin-minerals-lutein Tab, Take 1 tablet by mouth once daily. 1 Tablet(s) Oral Every day., Disp: , Rfl:     omeprazole (PRILOSEC) 20 MG capsule, TAKE 1 CAPSULE EVERY DAY, Disp: 90 capsule, Rfl: 3    rosuvastatin (CRESTOR) 10 MG tablet, TAKE 1 TABLET EVERY DAY, Disp: 90 tablet, Rfl: 0    traZODone (DESYREL) 100 MG tablet, Take 100 mg by mouth every  evening., Disp: , Rfl:

## (undated) DEVICE — SEE ITEM #152308

## (undated) DEVICE — SET CYSTO IRRIGATION UNIV SPIK

## (undated) DEVICE — GOWN SMART LRG 044673

## (undated) DEVICE — STERISTRIP 1/2 R1547

## (undated) DEVICE — SHEET DRAPE MEDIUM

## (undated) DEVICE — PAD BOVIE ADULT

## (undated) DEVICE — GLOVE BIOGEL PI GOLD SZ 6.5

## (undated) DEVICE — CUFF TOURNIQUET 18DUAL PRT 5921-218-235

## (undated) DEVICE — WATER STERILE INJ 500ML BAG

## (undated) DEVICE — SOLUTION IRRI NS BOTTLE 1000ML R5200-01

## (undated) DEVICE — UNDERGLOVE BIOGEL PI MICRO BLUE SZ 7.5

## (undated) DEVICE — GLOVE BIOGELPI GOLD SIZE 7.5

## (undated) DEVICE — TRAY LOWER EXTREMITY  SMHS029-05

## (undated) DEVICE — BANDAGE ESMARK 4X9 55514

## (undated) DEVICE — SUTURE PROLENE 4-0 PS-2 18 8682H

## (undated) DEVICE — GLOVE SURGEONS ULTRA TOUCH 6.5

## (undated) DEVICE — SUTURE VICRYL 4-0 RB-1 27 J214H

## (undated) DEVICE — PADDING CAST 4 STERILE 30-321

## (undated) DEVICE — SEE MEDLINE ITEM 152651

## (undated) DEVICE — SUTURE VICRYL 2-0 RB-1 27 J306H

## (undated) DEVICE — BLADE SCALPEL #15 371115

## (undated) DEVICE — GLOVE BIOGEL PI   GOLD SIZE 8

## (undated) DEVICE — GOWN SURGICAL BASICS XL

## (undated) DEVICE — COVER LIGHT HANDLE LB53

## (undated) DEVICE — Device

## (undated) DEVICE — DRAPE C-ARMOR FLOUROSCAN IMAGING 5523

## (undated) DEVICE — SOLUTION PREP IODINE 4OZ

## (undated) DEVICE — UNDERGLOVE BIOGEL PI MICRO BLUE SZ 8

## (undated) DEVICE — DRAPE C-ARM (FITS NEW C-ARM) 07-CA108

## (undated) DEVICE — BLADE SAW OSCILLATING/SAGITTAL  KM3-412

## (undated) DEVICE — GLOVE BIOGEL PI  GOLD SZ 7

## (undated) DEVICE — DRILL BIT

## (undated) DEVICE — DRESSING ADAPTIC 3X8 2015

## (undated) DEVICE — SUTURE VICRYL 3-0 RB-1 27 J215H

## (undated) DEVICE — TRAY SKIN PREP DRY

## (undated) DEVICE — BANDAGE ACE STERILE 4 REB3114

## (undated) DEVICE — UNDERGLOVE BIOGEL PI MICRO BLUE SZ 7

## (undated) DEVICE — SOLUTION SCRUB IODINE 4OZ

## (undated) DEVICE — MARKER SKIN W/ RULER 77734